# Patient Record
Sex: FEMALE | Race: WHITE | Employment: OTHER | URBAN - METROPOLITAN AREA
[De-identification: names, ages, dates, MRNs, and addresses within clinical notes are randomized per-mention and may not be internally consistent; named-entity substitution may affect disease eponyms.]

---

## 2017-01-05 LAB
C-REACTIVE PROTEIN: 0.44 MG/DL
SED RATE BY MODIFIED$WESTERGREN: 33 MM/H

## 2017-01-10 ENCOUNTER — TELEPHONE (OUTPATIENT)
Dept: NEUROLOGY | Facility: CLINIC | Age: 72
End: 2017-01-10

## 2017-01-10 NOTE — TELEPHONE ENCOUNTER
Per Dr. Stephon Lorenzo, C-reactive protein was normal, SED still mildly elevated but has not changed, no concerning findings with blood work. Relayed to patient. Verbalized understanding.      Also asking regarding additional imaging that was ordered by Dr. Stephon Lorenzo (s

## 2017-02-20 ENCOUNTER — TELEPHONE (OUTPATIENT)
Dept: INTERNAL MEDICINE CLINIC | Facility: CLINIC | Age: 72
End: 2017-02-20

## 2017-02-20 RX ORDER — OMEPRAZOLE 20 MG/1
CAPSULE, DELAYED RELEASE ORAL
Qty: 90 CAPSULE | Refills: 0 | Status: SHIPPED | OUTPATIENT
Start: 2017-02-20 | End: 2017-05-20

## 2017-02-20 NOTE — TELEPHONE ENCOUNTER
Patient states she has IBS, advised contact GI MD or schedule appointment with Dr. Lennette Bosworth.  Patient verbalized understanding and will contact office if black stools continue

## 2017-04-10 ENCOUNTER — OFFICE VISIT (OUTPATIENT)
Dept: INTERNAL MEDICINE CLINIC | Facility: CLINIC | Age: 72
End: 2017-04-10

## 2017-04-10 VITALS
HEIGHT: 57.5 IN | OXYGEN SATURATION: 100 % | DIASTOLIC BLOOD PRESSURE: 78 MMHG | BODY MASS INDEX: 33.41 KG/M2 | HEART RATE: 92 BPM | SYSTOLIC BLOOD PRESSURE: 142 MMHG | RESPIRATION RATE: 23 BRPM | WEIGHT: 157 LBS | TEMPERATURE: 98 F

## 2017-04-10 DIAGNOSIS — L70.9 ACNE, UNSPECIFIED ACNE TYPE: ICD-10-CM

## 2017-04-10 DIAGNOSIS — M47.816 FACET ARTHRITIS OF LUMBAR REGION: ICD-10-CM

## 2017-04-10 DIAGNOSIS — E78.2 MIXED HYPERLIPIDEMIA: ICD-10-CM

## 2017-04-10 DIAGNOSIS — Z00.00 ENCOUNTER FOR ANNUAL HEALTH EXAMINATION: ICD-10-CM

## 2017-04-10 DIAGNOSIS — D05.12 DUCTAL CARCINOMA IN SITU (DCIS) OF LEFT BREAST: ICD-10-CM

## 2017-04-10 DIAGNOSIS — E66.9 DIABETES MELLITUS TYPE 2 IN OBESE (HCC): ICD-10-CM

## 2017-04-10 DIAGNOSIS — E11.9 DIET-CONTROLLED DIABETES MELLITUS (HCC): Primary | ICD-10-CM

## 2017-04-10 DIAGNOSIS — R40.0 DAYTIME SOMNOLENCE: ICD-10-CM

## 2017-04-10 DIAGNOSIS — K21.9 GASTROESOPHAGEAL REFLUX DISEASE, ESOPHAGITIS PRESENCE NOT SPECIFIED: ICD-10-CM

## 2017-04-10 DIAGNOSIS — R90.89 ABNORMAL BRAIN MRI: ICD-10-CM

## 2017-04-10 DIAGNOSIS — M85.80 OSTEOPENIA, UNSPECIFIED LOCATION: ICD-10-CM

## 2017-04-10 DIAGNOSIS — E11.69 DIABETES MELLITUS TYPE 2 IN OBESE (HCC): ICD-10-CM

## 2017-04-10 DIAGNOSIS — I10 ESSENTIAL HYPERTENSION: ICD-10-CM

## 2017-04-10 DIAGNOSIS — R53.83 FATIGUE, UNSPECIFIED TYPE: ICD-10-CM

## 2017-04-10 PROCEDURE — 96160 PT-FOCUSED HLTH RISK ASSMT: CPT | Performed by: INTERNAL MEDICINE

## 2017-04-10 RX ORDER — LISINOPRIL 5 MG/1
5 TABLET ORAL DAILY
Qty: 90 TABLET | Refills: 0 | Status: SHIPPED | OUTPATIENT
Start: 2017-04-10 | End: 2017-05-12

## 2017-04-10 NOTE — PROGRESS NOTES
HPI:   Hollis Marks is a 66year old female who presents for a medicare supervisit and additional isues noted below. She comes in with a list of questions. 1. GERD: doing well on omeprazole  2. HLP: tolerating lipitor as ASA well  3.  She was ill f (NEUROLOGY)  Noelle Villafana MD as Consulting Physician (HEMATOLOGY)  Diane Rivas PT as Physical Therapist    Patient Active Problem List:     Diet-controlled diabetes mellitus ([address]     Mixed hyperlipidemia     Ductal carcinoma in situ (DCIS) of left breast (2004); CANCER; Other and unspecified hyperlipidemia; Type II or unspecified type diabetes mellitus without mention of complication, not stated as uncontrolled; and IBS (irritable bowel syndrome).     She  has past surgical history that includes colo Ht 57.5\"  Wt 157 lb  BMI 33.36 kg/m2  SpO2 100%  Breastfeeding? No Estimated body mass index is 33.36 kg/(m^2) as calculated from the following:    Height as of this encounter: 57.5\". Weight as of this encounter: 157 lb.     Medicare Hearing Assessment lumbar region [facility])    Gastroesophageal reflux disease, esophagitis presence not specified         Ms. Juan Mendoza already takes aspirin and has it on her medication list.     Diet assessment: Fair     Advanced Directive:  Living Will on file in [facility]?   Jeevan Cr the last 12 months?: 0-No    Do you accidently lose urine?: 0-No    Do you have difficulty seeing?: 0-No    Do you have any difficulty walking or getting up?: 0-No    Do you have any tripping hazards?: 0-No    Are you on multiple medications?: 1-Yes    Jaimes Colonoscopy,10 Years due on 11/10/1993 Update [facility] if applicable    Flex Sigmoidoscopy Screen every 10 years No results found for this or any previous visit. No flowsheet data found.      Fecal Occult Blood Annually No results found for: FOB N cover unless Medically needed    Zoster  Not covered by Medicare Part B No vaccine history found This may be covered with your pharmacy  prescription benefits        [facility] Internal Lab or Procedure External Lab or Procedure   Annual M Spine: doing well with HEP, nsaids prn, ice/heat. # Diet Controlled type 2 DM in obese patient: A1C <7.0 in 10/2016. Follows ADA diet.    - Diabetic Retinal Eye exam on 6/2/2016 by [address], [place] OD was negative for b/l diabetic reti

## 2017-04-10 NOTE — PATIENT INSTRUCTIONS
You are due for your diabetic eye exam in June, 2017. If your blood-work is normal then we could consider a sleep study if your fatigue and low energy levels do not improve. You will need the pneumovac 23 vaccine in October, 2017.     For your high bl

## 2017-05-10 RX ORDER — LISINOPRIL 5 MG/1
TABLET ORAL
Qty: 90 TABLET | Refills: 0 | OUTPATIENT
Start: 2017-05-10

## 2017-05-13 RX ORDER — LISINOPRIL 5 MG/1
TABLET ORAL
Qty: 90 TABLET | Refills: 0 | Status: SHIPPED | OUTPATIENT
Start: 2017-05-13 | End: 2017-09-29

## 2017-05-22 RX ORDER — OMEPRAZOLE 20 MG/1
CAPSULE, DELAYED RELEASE ORAL
Qty: 90 CAPSULE | Refills: 3 | Status: SHIPPED | OUTPATIENT
Start: 2017-05-22 | End: 2018-05-22

## 2017-08-23 ENCOUNTER — TELEPHONE (OUTPATIENT)
Dept: INTERNAL MEDICINE CLINIC | Facility: CLINIC | Age: 72
End: 2017-08-23

## 2017-08-23 NOTE — TELEPHONE ENCOUNTER
Pt was called and message was left instructing pt to call and make an appointment to see dr for a follow up.

## 2017-08-25 NOTE — PROGRESS NOTES
DM Eye Exam on 8/21/2017: no diabetic retinopathy b/l.   By [address] [facility] OD

## 2017-09-06 ENCOUNTER — TELEPHONE (OUTPATIENT)
Dept: INTERNAL MEDICINE CLINIC | Facility: CLINIC | Age: 72
End: 2017-09-06

## 2017-09-06 DIAGNOSIS — D05.12 DUCTAL CARCINOMA IN SITU (DCIS) OF LEFT BREAST: Primary | ICD-10-CM

## 2017-09-06 DIAGNOSIS — Z08 ENCOUNTER FOR FOLLOW-UP EXAMINATION AFTER COMPLETED TREATMENT FOR MALIGNANT NEOPLASM: ICD-10-CM

## 2017-09-06 DIAGNOSIS — Z17.0 ESTROGEN RECEPTOR POSITIVE STATUS (ER+): ICD-10-CM

## 2017-09-06 DIAGNOSIS — Z86.000 PERSONAL HISTORY OF IN-SITU NEOPLASM OF BREAST: ICD-10-CM

## 2017-09-06 NOTE — TELEPHONE ENCOUNTER
Patient walked in asking for a mammogram order from Dr Marine Vazquez. She attempted to call multiple times and left messages with no reply back. She needs a specific order for mammogram which is specified by onocologist Dr Jesus Wetzel.   Please contact patient today and

## 2017-09-08 ENCOUNTER — HOSPITAL ENCOUNTER (OUTPATIENT)
Dept: MAMMOGRAPHY | Age: 72
Discharge: HOME | End: 2017-09-08
Attending: INTERNAL MEDICINE
Payer: MEDICARE

## 2017-09-08 DIAGNOSIS — Z86.000 PERSONAL HISTORY OF IN-SITU NEOPLASM OF BREAST: ICD-10-CM

## 2017-09-08 DIAGNOSIS — Z17.0 ESTROGEN RECEPTOR POSITIVE STATUS (ER+): ICD-10-CM

## 2017-09-08 DIAGNOSIS — D05.12 DUCTAL CARCINOMA IN SITU (DCIS) OF LEFT BREAST: ICD-10-CM

## 2017-09-08 DIAGNOSIS — Z08 ENCOUNTER FOR FOLLOW-UP EXAMINATION AFTER COMPLETED TREATMENT FOR MALIGNANT NEOPLASM: ICD-10-CM

## 2017-09-08 PROCEDURE — 77067 SCR MAMMO BI INCL CAD: CPT | Performed by: INTERNAL MEDICINE

## 2017-09-19 DIAGNOSIS — Z00.00 ENCOUNTER FOR ANNUAL HEALTH EXAMINATION: ICD-10-CM

## 2017-09-19 DIAGNOSIS — M47.816 FACET ARTHRITIS OF LUMBAR REGION: ICD-10-CM

## 2017-09-19 DIAGNOSIS — E78.2 MIXED HYPERLIPIDEMIA: ICD-10-CM

## 2017-09-19 DIAGNOSIS — R53.83 FATIGUE, UNSPECIFIED TYPE: ICD-10-CM

## 2017-09-19 DIAGNOSIS — K21.9 GASTROESOPHAGEAL REFLUX DISEASE, ESOPHAGITIS PRESENCE NOT SPECIFIED: ICD-10-CM

## 2017-09-19 DIAGNOSIS — D05.12 DUCTAL CARCINOMA IN SITU (DCIS) OF LEFT BREAST: ICD-10-CM

## 2017-09-19 DIAGNOSIS — L70.9 ACNE, UNSPECIFIED ACNE TYPE: ICD-10-CM

## 2017-09-19 DIAGNOSIS — E11.69 DIABETES MELLITUS TYPE 2 IN OBESE (HCC): ICD-10-CM

## 2017-09-19 DIAGNOSIS — E66.9 DIABETES MELLITUS TYPE 2 IN OBESE (HCC): ICD-10-CM

## 2017-09-19 DIAGNOSIS — I10 ESSENTIAL HYPERTENSION: ICD-10-CM

## 2017-09-19 DIAGNOSIS — E11.9 DIET-CONTROLLED DIABETES MELLITUS (HCC): ICD-10-CM

## 2017-09-19 RX ORDER — LISINOPRIL 5 MG/1
TABLET ORAL
Qty: 90 TABLET | Refills: 0 | OUTPATIENT
Start: 2017-09-19

## 2017-09-19 NOTE — TELEPHONE ENCOUNTER
If patient is completely out of lisinopril I will send 30 tablets but she is overdue for an office visit.

## 2017-09-29 ENCOUNTER — OFFICE VISIT (OUTPATIENT)
Dept: INTERNAL MEDICINE CLINIC | Facility: CLINIC | Age: 72
End: 2017-09-29

## 2017-09-29 VITALS
TEMPERATURE: 99 F | DIASTOLIC BLOOD PRESSURE: 70 MMHG | SYSTOLIC BLOOD PRESSURE: 124 MMHG | WEIGHT: 158.25 LBS | BODY MASS INDEX: 33.67 KG/M2 | HEIGHT: 57.5 IN | HEART RATE: 98 BPM | RESPIRATION RATE: 16 BRPM | OXYGEN SATURATION: 97 %

## 2017-09-29 DIAGNOSIS — E66.9 DIABETES MELLITUS TYPE 2 IN OBESE (HCC): ICD-10-CM

## 2017-09-29 DIAGNOSIS — E78.2 MIXED HYPERLIPIDEMIA: ICD-10-CM

## 2017-09-29 DIAGNOSIS — D05.12 DUCTAL CARCINOMA IN SITU (DCIS) OF LEFT BREAST: ICD-10-CM

## 2017-09-29 DIAGNOSIS — E11.9 DIET-CONTROLLED DIABETES MELLITUS (HCC): ICD-10-CM

## 2017-09-29 DIAGNOSIS — I10 ESSENTIAL HYPERTENSION: Primary | ICD-10-CM

## 2017-09-29 DIAGNOSIS — N18.30 CKD (CHRONIC KIDNEY DISEASE) STAGE 3, GFR 30-59 ML/MIN (HCC): ICD-10-CM

## 2017-09-29 DIAGNOSIS — E11.69 DIABETES MELLITUS TYPE 2 IN OBESE (HCC): ICD-10-CM

## 2017-09-29 PROCEDURE — 99214 OFFICE O/P EST MOD 30 MIN: CPT | Performed by: INTERNAL MEDICINE

## 2017-09-29 PROCEDURE — G0009 ADMIN PNEUMOCOCCAL VACCINE: HCPCS | Performed by: INTERNAL MEDICINE

## 2017-09-29 PROCEDURE — 90732 PPSV23 VACC 2 YRS+ SUBQ/IM: CPT | Performed by: INTERNAL MEDICINE

## 2017-09-29 RX ORDER — LISINOPRIL 5 MG/1
TABLET ORAL
Qty: 90 TABLET | Refills: 3 | Status: SHIPPED | OUTPATIENT
Start: 2017-09-29 | End: 2018-10-11

## 2017-09-29 NOTE — PATIENT INSTRUCTIONS
Please see me every April for a medicare supervisit. Please make sure to get blood-work every 6 months AND notify me of your blood pressure at Dr. Tito Becker office.

## 2017-09-29 NOTE — PROGRESS NOTES
Miya Albert is a 66year old female. HPI:   Patient presents for the following issues. 1. CKDz: reviewed labs and her GFR has been stable since 2011  2. HTN: tolerating lisinopril well. Has not been checking bp at home.    3. HLP: doing well on stati pre cancerous cells stage 0   • Ductal carcinoma in situ of breast 2004    left breast   • GERD    • IBS (irritable bowel syndrome)    • Other and unspecified hyperlipidemia    • Personal history of irradiation, presenting hazards to health 01/01/2004    joy Arthritis of Lumbar Spine and Severe DDD of Lumbar Spine: doing well with HEP, nsaids prn, ice/heat. # Diet Controlled type 2 DM in obese patient: A1C <7.0 in 4/2017 Follows ADA diet. - DM Eye Exam on 8/21/2017: no diabetic retinopathy b/l.   By Vivien Alonso supervisit. She will update with her bp at Dr. Dotty Oliver office every October.    Natalie Bryson MD

## 2017-10-10 RX ORDER — LANCETS
EACH MISCELLANEOUS
Qty: 102 EACH | Refills: 1 | Status: SHIPPED | OUTPATIENT
Start: 2017-10-10 | End: 2020-01-02

## 2017-10-13 ENCOUNTER — PRIOR ORIGINAL RECORDS (OUTPATIENT)
Dept: OTHER | Age: 72
End: 2017-10-13

## 2017-10-19 ENCOUNTER — TELEPHONE (OUTPATIENT)
Dept: INTERNAL MEDICINE CLINIC | Facility: CLINIC | Age: 72
End: 2017-10-19

## 2017-10-19 NOTE — TELEPHONE ENCOUNTER
Patient went to Dr Tonnie Heimlich office and got blood pressure read; Dr Jono Anderson wanted her to let her know what her bp was at that dr visit :  122/76   Was her reading at Dr Figueroa Apodaca appt

## 2017-11-14 RX ORDER — BLOOD SUGAR DIAGNOSTIC
STRIP MISCELLANEOUS
Qty: 50 STRIP | Refills: 9 | Status: SHIPPED | OUTPATIENT
Start: 2017-11-14 | End: 2019-01-14

## 2017-11-22 RX ORDER — ATORVASTATIN CALCIUM 40 MG/1
TABLET, FILM COATED ORAL
Qty: 90 TABLET | Refills: 1 | Status: SHIPPED | OUTPATIENT
Start: 2017-11-22 | End: 2018-06-29

## 2017-12-31 ENCOUNTER — PRIOR ORIGINAL RECORDS (OUTPATIENT)
Dept: OTHER | Age: 72
End: 2017-12-31

## 2018-04-09 ENCOUNTER — TELEPHONE (OUTPATIENT)
Dept: INTERNAL MEDICINE CLINIC | Facility: CLINIC | Age: 73
End: 2018-04-09

## 2018-04-09 NOTE — TELEPHONE ENCOUNTER
Spoke to patient, dx'd with IBSD and is taking a bus tour, concerned about diarrhea possible accident as bus will only stop @ certain places, instructed patient to call her GI MD, patient verbalized understanding

## 2018-04-09 NOTE — TELEPHONE ENCOUNTER
Pt asking if ok to take OTC antidiarrheal every morning during upcoming vacation.   Call to advise pt

## 2018-05-04 ENCOUNTER — OFFICE VISIT (OUTPATIENT)
Dept: INTERNAL MEDICINE CLINIC | Facility: CLINIC | Age: 73
End: 2018-05-04

## 2018-05-04 VITALS
RESPIRATION RATE: 13 BRPM | HEIGHT: 57.5 IN | BODY MASS INDEX: 33.2 KG/M2 | OXYGEN SATURATION: 96 % | HEART RATE: 80 BPM | SYSTOLIC BLOOD PRESSURE: 130 MMHG | DIASTOLIC BLOOD PRESSURE: 84 MMHG | WEIGHT: 156 LBS | TEMPERATURE: 99 F

## 2018-05-04 DIAGNOSIS — L98.9 SCALP LESION: ICD-10-CM

## 2018-05-04 DIAGNOSIS — I10 ESSENTIAL HYPERTENSION: ICD-10-CM

## 2018-05-04 DIAGNOSIS — N18.30 CKD (CHRONIC KIDNEY DISEASE) STAGE 3, GFR 30-59 ML/MIN (HCC): ICD-10-CM

## 2018-05-04 DIAGNOSIS — E11.9 DIET-CONTROLLED DIABETES MELLITUS (HCC): ICD-10-CM

## 2018-05-04 DIAGNOSIS — E78.2 MIXED HYPERLIPIDEMIA: ICD-10-CM

## 2018-05-04 DIAGNOSIS — E66.9 OBESITY (BMI 30-39.9): ICD-10-CM

## 2018-05-04 DIAGNOSIS — L30.9 DERMATITIS: Primary | ICD-10-CM

## 2018-05-04 PROCEDURE — 99213 OFFICE O/P EST LOW 20 MIN: CPT | Performed by: PHYSICIAN ASSISTANT

## 2018-05-04 NOTE — PROGRESS NOTES
Ananth Cardozo is a 74year old female. HPI:   Patient presents for eval of what she thinks are bug bites just behind her R ear. She first noticed these on 4/26 when on vacation in [place]. She was on [address], not in a [place]ed area.   C/o deb Years: 20.00        Types: Cigarettes     Quit date: 3/1/2008  Smokeless tobacco: Never Used                      Alcohol use:  No                  REVIEW OF SYSTEMS:   Per HPI    EXAM:   /84 (BP Location: Right arm, Patient Position: Sitting, Cu

## 2018-05-04 NOTE — PATIENT INSTRUCTIONS
Bug Bite:  - may use hydrocortisone cream -- thin layer applied twice a day as needed for itching/irritation  - call if developing increased redness, pain, swelling, fever    Please do your lab work ASAP.   Remember to fast for 10-12 hours but drink plenty

## 2018-05-11 ENCOUNTER — OFFICE VISIT (OUTPATIENT)
Dept: INTERNAL MEDICINE CLINIC | Facility: CLINIC | Age: 73
End: 2018-05-11

## 2018-05-11 VITALS
BODY MASS INDEX: 32.88 KG/M2 | RESPIRATION RATE: 20 BRPM | HEIGHT: 57.5 IN | HEART RATE: 99 BPM | SYSTOLIC BLOOD PRESSURE: 126 MMHG | DIASTOLIC BLOOD PRESSURE: 80 MMHG | WEIGHT: 154.5 LBS | TEMPERATURE: 99 F | OXYGEN SATURATION: 95 %

## 2018-05-11 DIAGNOSIS — K58.0 IRRITABLE BOWEL SYNDROME WITH DIARRHEA: ICD-10-CM

## 2018-05-11 DIAGNOSIS — E78.2 MIXED HYPERLIPIDEMIA: ICD-10-CM

## 2018-05-11 DIAGNOSIS — K21.9 GASTROESOPHAGEAL REFLUX DISEASE, ESOPHAGITIS PRESENCE NOT SPECIFIED: ICD-10-CM

## 2018-05-11 DIAGNOSIS — E66.9 OBESITY (BMI 30-39.9): ICD-10-CM

## 2018-05-11 DIAGNOSIS — D05.12 DUCTAL CARCINOMA IN SITU (DCIS) OF LEFT BREAST: ICD-10-CM

## 2018-05-11 DIAGNOSIS — E11.9 DIET-CONTROLLED DIABETES MELLITUS (HCC): ICD-10-CM

## 2018-05-11 DIAGNOSIS — N18.30 CKD (CHRONIC KIDNEY DISEASE) STAGE 3, GFR 30-59 ML/MIN (HCC): ICD-10-CM

## 2018-05-11 DIAGNOSIS — Z00.00 ENCOUNTER FOR ANNUAL HEALTH EXAMINATION: Primary | ICD-10-CM

## 2018-05-11 DIAGNOSIS — I10 ESSENTIAL HYPERTENSION: ICD-10-CM

## 2018-05-11 DIAGNOSIS — Z12.39 BREAST CANCER SCREENING: ICD-10-CM

## 2018-05-11 PROBLEM — E11.69 DIABETES MELLITUS TYPE 2 IN OBESE  (HCC): Status: RESOLVED | Noted: 2017-04-10 | Resolved: 2018-05-11

## 2018-05-11 PROBLEM — E11.69 DIABETES MELLITUS TYPE 2 IN OBESE: Status: RESOLVED | Noted: 2017-04-10 | Resolved: 2018-05-11

## 2018-05-11 PROBLEM — E11.69 DIABETES MELLITUS TYPE 2 IN OBESE (HCC): Status: RESOLVED | Noted: 2017-04-10 | Resolved: 2018-05-11

## 2018-05-11 PROCEDURE — G0439 PPPS, SUBSEQ VISIT: HCPCS | Performed by: PHYSICIAN ASSISTANT

## 2018-05-11 PROCEDURE — 96160 PT-FOCUSED HLTH RISK ASSMT: CPT | Performed by: PHYSICIAN ASSISTANT

## 2018-05-11 RX ORDER — DICYCLOMINE HCL 20 MG
20 TABLET ORAL
Qty: 120 TABLET | Refills: 0 | Status: SHIPPED | OUTPATIENT
Start: 2018-05-11 | End: 2019-05-20

## 2018-05-11 NOTE — PATIENT INSTRUCTIONS
IBS / Diarrhea:  - start dicyclomine 20 mg -- start with 1 tablet at dinner time and before bed  - may take 1 tablet with each meal and before bed if needed  - call with an update in 3 weeks    Vaccines:  - recommend updated TdaP (tetanus/diphtheria/pertus

## 2018-05-11 NOTE — PROGRESS NOTES
HPI:   José Miguel Gomez is a 74year old female who presents for a MA (Medicare Advantage) [facility] (Once per calendar year). IBS - loose stool/diarrhea every morning. Symptoms sometimes flare after eating. C/o a lot of gas/bloating.   No particular f score of 0 so is at low risk.     Patient Care Team: Patient Care Team:  Alessia Hines MD as PCP - Casie Manzanares MD as Consulting Physician (NEUROLOGY)  Connie Washington MD as Consulting Physician (HEMATOLOGY)  Guanako Robledo PT as Physical Ther sores BID x 2-3 weeks. ibuprofen 200 MG Oral Tab Take 200 mg by mouth every 6 (six) hours as needed for Pain. Calcium Carbonate Antacid (TUMS) 500 MG Oral Chew Tab Chew 1 tablet by mouth daily. MULTI-VITAMIN OR Take 1 Tab by mouth daily.    ASPIRIN 81 incontinence   MUSCULOSKELETAL: denies back pain  NEURO: denies headaches, dizziness, LH  PSYCHE: denies depression or anxiety  HEMATOLOGIC: denies hx of anemia  ENDOCRINE: denies thyroid history  ALL/ASTHMA: denies hx of allergy or asthma    EXAM:   BP 12 (Zostavax) 09/19/2012        ASSESSMENT AND OTHER RELEVANT CHRONIC CONDITIONS:   Renato Souza is a 74year old female who presents for a Medicare Assessment.      PLAN SUMMARY:   Diagnoses and all orders for this visit:    Encounter for annual health exami Glaucoma Screening      Ophthalmology Visit Annually: Diabetics, FHx Glaucoma, AA>50, > 65 Data entered on: 6/21/2016   Last Dilated Eye Exam 6/2/2016       Bone Density Screening      Dexascan Every two years Last Dexa Scan:   XR DEXA BONE DENSI (ACE/ARB, digoxin diuretics, anticonvulsants.)    Potassium  Annually POTASSIUM (mmol/L)   Date Value   05/07/2018 4.3    No flowsheet data found. Creatinine  Annually CREATININE (mg/dL)   Date Value   05/07/2018 1.03 (H)    No flowsheet data found. diverticulosis, internal hemorrhoids. Dr. Alida Paredes recommended xifaxan but insurance will not cover. Symptoms are stable. # Bone density screening: see above.   # Vaccines: rec yearly flu shot, DTAP done 2007, Prevnar 13 done 2016, Pneumovax done 2017, Shonda Hunt

## 2018-05-14 ENCOUNTER — TELEPHONE (OUTPATIENT)
Dept: INTERNAL MEDICINE CLINIC | Facility: CLINIC | Age: 73
End: 2018-05-14

## 2018-05-14 NOTE — TELEPHONE ENCOUNTER
Dicyclomine HCl 20 MG Oral Tab  Patient has questions regarding side effects from this medication please call back

## 2018-05-14 NOTE — TELEPHONE ENCOUNTER
Patient picked up Dicyclomine rx and s/e listed state \"Do not take with antacids, patient currently on Tums (regular strength 500 mg 1 tablet q hs for calcium per oncology) should she stop Tums while on Dicyclomine?  Please advise

## 2018-05-23 RX ORDER — OMEPRAZOLE 20 MG/1
20 CAPSULE, DELAYED RELEASE ORAL DAILY
Qty: 90 CAPSULE | Refills: 3 | Status: SHIPPED | OUTPATIENT
Start: 2018-05-23 | End: 2019-05-23

## 2018-05-23 NOTE — TELEPHONE ENCOUNTER
Refill requested: Omeprazole 20 mg     Failed protocoll      Last refill: 5/22/17 Omeprazole 20 mg #90 NR  Relevant Labs: NA  Last OV / RTC advised: 5/11/18 Julia  RTC in 6 months     Appt Scheduled: No

## 2018-06-29 RX ORDER — ATORVASTATIN CALCIUM 40 MG/1
TABLET, FILM COATED ORAL
Qty: 90 TABLET | Refills: 0 | Status: SHIPPED | OUTPATIENT
Start: 2018-06-29 | End: 2018-10-07

## 2018-06-29 NOTE — TELEPHONE ENCOUNTER
Protocol Passed    Medication(s) to Refill:   Pending Prescriptions Disp Refills    ATORVASTATIN 40 MG Oral Tab [Pharmacy Med Name: Atorvastatin Calcium Oral Tablet 40 MG] 90 tablet 0     Sig: TAKE 1 TABLET BY MOUTH IN THE EVENING          Last Time Medication was Filled:  11/22/17      Last Office Visit with PCP: 9/29/2017    When Patient was Due Back to the Office:  6 months  (from when PCP last addressed condition)    Future Appointments:  No future appointments.       Recent Labs:  ALT and Lipid: 05/07/2018

## 2018-07-03 RX ORDER — ATORVASTATIN CALCIUM 40 MG/1
40 TABLET, FILM COATED ORAL EVERY EVENING
Qty: 90 TABLET | Refills: 1 | OUTPATIENT
Start: 2018-07-03

## 2018-07-03 NOTE — TELEPHONE ENCOUNTER
Refill requested: Atorvastatin 40 mg       Passed protocol      Last refill: 6/29/2018 Atorvastatin 40 mg #90 NR to [facility] in [place]      **Declined to pharmacy - refill sent recently.

## 2018-08-28 ENCOUNTER — TELEPHONE (OUTPATIENT)
Dept: INTERNAL MEDICINE CLINIC | Facility: CLINIC | Age: 73
End: 2018-08-28

## 2018-08-28 DIAGNOSIS — D05.12 DUCTAL CARCINOMA IN SITU OF LEFT BREAST: Primary | ICD-10-CM

## 2018-08-28 NOTE — TELEPHONE ENCOUNTER
Patient would like an order for mammogram screening entered by doctor; advised Dr Yonny Leung not in office until tomorrow and to wait 48 hours or after call central scheduling

## 2018-08-28 NOTE — TELEPHONE ENCOUNTER
Patient walked in and requested to schedule Shingrix vaccine; please request order from doctor as needed and contact patient when we are able to schedule/have inventory.  Please callback

## 2018-08-28 NOTE — TELEPHONE ENCOUNTER
Patient informed Alfonzo on back order, suggested possibly her pharmacy could provide the immunization  but pharmacies on back order also, suggested check with our office in [place], patient verbalized understanding

## 2018-09-14 ENCOUNTER — HOSPITAL ENCOUNTER (OUTPATIENT)
Dept: MAMMOGRAPHY | Age: 73
Discharge: HOME | End: 2018-09-14
Attending: PHYSICIAN ASSISTANT
Payer: MEDICARE

## 2018-09-14 DIAGNOSIS — Z12.39 BREAST CANCER SCREENING: ICD-10-CM

## 2018-09-14 PROCEDURE — 77063 BREAST TOMOSYNTHESIS BI: CPT | Performed by: PHYSICIAN ASSISTANT

## 2018-09-14 PROCEDURE — 77067 SCR MAMMO BI INCL CAD: CPT | Performed by: PHYSICIAN ASSISTANT

## 2018-10-08 RX ORDER — ATORVASTATIN CALCIUM 40 MG/1
TABLET, FILM COATED ORAL
Qty: 90 TABLET | Refills: 0 | Status: SHIPPED | OUTPATIENT
Start: 2018-10-08 | End: 2019-01-06

## 2018-10-08 NOTE — TELEPHONE ENCOUNTER
Refill requested:   Requested Prescriptions     Pending Prescriptions Disp Refills   • ATORVASTATIN 40 MG Oral Tab [Pharmacy Med Name: Atorvastatin Calcium Oral Tablet 40 MG] 90 tablet 0     Sig: TAKE 1 TABLET BY MOUTH IN THE EVENING         Passed princeo

## 2018-10-12 RX ORDER — ATORVASTATIN CALCIUM 40 MG/1
40 TABLET, FILM COATED ORAL EVERY EVENING
Qty: 90 TABLET | Refills: 1 | OUTPATIENT
Start: 2018-10-12

## 2018-10-12 RX ORDER — LISINOPRIL 5 MG/1
5 TABLET ORAL DAILY
Qty: 90 TABLET | Refills: 1 | Status: SHIPPED | OUTPATIENT
Start: 2018-10-12 | End: 2019-04-05

## 2018-11-02 ENCOUNTER — PRIOR ORIGINAL RECORDS (OUTPATIENT)
Dept: OTHER | Age: 73
End: 2018-11-02

## 2018-11-12 ENCOUNTER — TELEPHONE (OUTPATIENT)
Dept: INTERNAL MEDICINE CLINIC | Facility: CLINIC | Age: 73
End: 2018-11-12

## 2018-11-12 NOTE — TELEPHONE ENCOUNTER
Discussed with pt and notified her that we are on back order for the vaccine. Advised pt that we would not be able to start the series until we received the vaccine off backorder. Pt verbalized understanding.

## 2018-11-12 NOTE — TELEPHONE ENCOUNTER
Patient called to follow up on status of Shingrix vaccine. Patient declined getting vaccine at the pharmacy as her insurance company advised her to see PCP's office.    Please advise    Future Appointments   Date Time Provider Maral Rivera   12/7/2018

## 2018-12-07 ENCOUNTER — OFFICE VISIT (OUTPATIENT)
Dept: INTERNAL MEDICINE CLINIC | Facility: CLINIC | Age: 73
End: 2018-12-07

## 2018-12-07 VITALS
WEIGHT: 155 LBS | OXYGEN SATURATION: 98 % | SYSTOLIC BLOOD PRESSURE: 132 MMHG | DIASTOLIC BLOOD PRESSURE: 80 MMHG | RESPIRATION RATE: 18 BRPM | HEIGHT: 57.5 IN | TEMPERATURE: 97 F | HEART RATE: 88 BPM | BODY MASS INDEX: 32.98 KG/M2

## 2018-12-07 DIAGNOSIS — M47.816 FACET ARTHRITIS OF LUMBAR REGION: ICD-10-CM

## 2018-12-07 DIAGNOSIS — M94.9 DISORDER OF BONE AND CARTILAGE: ICD-10-CM

## 2018-12-07 DIAGNOSIS — M75.81 RIGHT ROTATOR CUFF TENDONITIS: ICD-10-CM

## 2018-12-07 DIAGNOSIS — M89.9 DISORDER OF BONE AND CARTILAGE: ICD-10-CM

## 2018-12-07 DIAGNOSIS — Z85.3 HISTORY OF LEFT BREAST CANCER: ICD-10-CM

## 2018-12-07 DIAGNOSIS — M54.50 CHRONIC RIGHT-SIDED LOW BACK PAIN WITHOUT SCIATICA: ICD-10-CM

## 2018-12-07 DIAGNOSIS — Z01.10 EVALUATION OF HEARING IMPAIRMENT: ICD-10-CM

## 2018-12-07 DIAGNOSIS — K58.0 IRRITABLE BOWEL SYNDROME WITH DIARRHEA: Primary | ICD-10-CM

## 2018-12-07 DIAGNOSIS — E11.9 DIET-CONTROLLED DIABETES MELLITUS (HCC): ICD-10-CM

## 2018-12-07 DIAGNOSIS — G89.29 CHRONIC RIGHT-SIDED LOW BACK PAIN WITHOUT SCIATICA: ICD-10-CM

## 2018-12-07 PROCEDURE — 99215 OFFICE O/P EST HI 40 MIN: CPT | Performed by: INTERNAL MEDICINE

## 2018-12-07 RX ORDER — LOPERAMIDE HYDROCHLORIDE 2 MG/1
2 CAPSULE ORAL 4 TIMES DAILY PRN
COMMUNITY
End: 2019-10-11

## 2018-12-07 NOTE — PROGRESS NOTES
Lane Carlson is a 74year old female. HPI:   Patient presents for the following issues. 1. Irritable Bowel syndrome: had many episodes of diarrhea in 2/2018-4/2018 so Jaycob Gayle started her on dicyclomine in 5/2018.  She tried it BID for about 2-3 months while watching TV if she is laying while watching it. Has never had a sleep study. REVIEW OF SYSTEMS:   GENERAL HEALTH: feels well otherwise.  No f/c  NEURO: denies any headaches, LH, dizzyness, LOC, falls  VISION: denies any blurred or double vision complication, not stated as uncontrolled       Past Surgical History:   Procedure Laterality Date   • APPENDECTOMY      1948   • COLONOSCOPY      2005 hiatal hernia, sm internal hem   • COLONOSCOPY  01/01/2005    fiberoptic   • D & C     • LUMPECTOMY LEFT Already on ASA 81 mg daily. CTA brain/neck was advised by myself and neurology but she declines. # HTN: controlled.  cont lisinopril 5 mg once daily  # Facet Arthritis of Lumbar Spine and Severe DDD of Lumbar Spine: doing well with HEP, nsaids prn, ice/he Gill Morales ([address])    I spent 45 minutes with patient > 50% counseling her on shoulder pain, irritable bowel syndrome. The patient indicates understanding of these issues and agrees to the plan.   The patient is asked to

## 2018-12-11 PROBLEM — Z85.3 HISTORY OF LEFT BREAST CANCER: Status: ACTIVE | Noted: 2018-12-11

## 2018-12-31 ENCOUNTER — PRIOR ORIGINAL RECORDS (OUTPATIENT)
Dept: OTHER | Age: 73
End: 2018-12-31

## 2019-01-02 ENCOUNTER — TELEPHONE (OUTPATIENT)
Dept: INTERNAL MEDICINE CLINIC | Facility: CLINIC | Age: 74
End: 2019-01-02

## 2019-01-02 DIAGNOSIS — M75.81 RIGHT ROTATOR CUFF TENDONITIS: Primary | ICD-10-CM

## 2019-01-02 DIAGNOSIS — G89.29 CHRONIC RIGHT-SIDED LOW BACK PAIN WITHOUT SCIATICA: ICD-10-CM

## 2019-01-02 DIAGNOSIS — M54.50 CHRONIC RIGHT-SIDED LOW BACK PAIN WITHOUT SCIATICA: ICD-10-CM

## 2019-01-02 NOTE — TELEPHONE ENCOUNTER
Pt had referral issued for PT for R rotator cuff tendonitis and chronic right-sided low back pain without sciatica on 18. Referral  18 and she was unable to use referral within allotted time frame.      Requesting new referral. Justin Dawson

## 2019-01-07 RX ORDER — ATORVASTATIN CALCIUM 40 MG/1
TABLET, FILM COATED ORAL
Qty: 90 TABLET | Refills: 0 | Status: SHIPPED | OUTPATIENT
Start: 2019-01-07 | End: 2019-04-05

## 2019-01-09 RX ORDER — ATORVASTATIN CALCIUM 40 MG/1
40 TABLET, FILM COATED ORAL EVERY EVENING
Qty: 90 TABLET | Refills: 0 | OUTPATIENT
Start: 2019-01-09

## 2019-01-09 NOTE — TELEPHONE ENCOUNTER
Declined : Rx recently filled to pharmacy.      Refill requested:   Requested Prescriptions     Pending Prescriptions Disp Refills   • atorvastatin 40 MG Oral Tab [Pharmacy Med Name: Atorvastatin Calcium Oral Tablet 40 MG] 90 tablet 0     Sig: Take 1 tablet

## 2019-01-14 RX ORDER — BLOOD SUGAR DIAGNOSTIC
STRIP MISCELLANEOUS
Qty: 100 STRIP | Refills: 3 | Status: SHIPPED | OUTPATIENT
Start: 2019-01-14 | End: 2020-05-18

## 2019-01-14 NOTE — TELEPHONE ENCOUNTER
Refill requested:   Requested Prescriptions     Pending Prescriptions Disp Refills   • ACCU-CHEK BRYANNA PLUS In Vitro Strip [Pharmacy Med Name: Accu-Chek Bryanna Plus In Vitro Strip]  8     Sig: use to check blood sugar 3 times a  week.        Passed protocol

## 2019-01-17 RX ORDER — BLOOD SUGAR DIAGNOSTIC
STRIP MISCELLANEOUS
Refills: 8 | OUTPATIENT
Start: 2019-01-17

## 2019-04-06 RX ORDER — ATORVASTATIN CALCIUM 40 MG/1
TABLET, FILM COATED ORAL
Qty: 90 TABLET | Refills: 0 | Status: SHIPPED | OUTPATIENT
Start: 2019-04-06 | End: 2020-01-02

## 2019-04-06 RX ORDER — LISINOPRIL 5 MG/1
TABLET ORAL
Qty: 90 TABLET | Refills: 0 | Status: SHIPPED | OUTPATIENT
Start: 2019-04-06 | End: 2019-07-08

## 2019-04-06 NOTE — TELEPHONE ENCOUNTER
Passed protocol    Last refill:   1/7/2019 Atorvastatin 40 mg #90 NR  10/12/2018 Lisinopril 5 mg #90 1R    Last lipid 5/7/2018     LOV: 12/7/2018 Dr Jen Nur RTC April [facility] Supervisit   # HTN: controlled.  cont lisinopril 5 mg once daily  # HLP, HyperTG: well

## 2019-04-09 DIAGNOSIS — N18.30 CKD (CHRONIC KIDNEY DISEASE) STAGE 3, GFR 30-59 ML/MIN (HCC): ICD-10-CM

## 2019-04-09 DIAGNOSIS — E11.9 DIET-CONTROLLED DIABETES MELLITUS (HCC): Primary | ICD-10-CM

## 2019-04-09 DIAGNOSIS — I10 ESSENTIAL HYPERTENSION: ICD-10-CM

## 2019-04-09 RX ORDER — ATORVASTATIN CALCIUM 40 MG/1
TABLET, FILM COATED ORAL
Qty: 90 TABLET | Refills: 0 | Status: SHIPPED | OUTPATIENT
Start: 2019-04-09 | End: 2019-05-20

## 2019-04-09 NOTE — TELEPHONE ENCOUNTER
Last OV: 12/7/18 with Dr. Janna Waldron  Last refill date: 4/6/19     #/refills: #90, 0 refills  When pt was asked to return for OV: April 2019  Upcoming appt: 5/20/19 with Dr. Janna Waldron  Last labs 12/28/18  Last lipid 5/7/18

## 2019-05-20 ENCOUNTER — OFFICE VISIT (OUTPATIENT)
Dept: INTERNAL MEDICINE CLINIC | Facility: CLINIC | Age: 74
End: 2019-05-20
Payer: MEDICARE

## 2019-05-20 VITALS
HEART RATE: 82 BPM | BODY MASS INDEX: 31.55 KG/M2 | SYSTOLIC BLOOD PRESSURE: 120 MMHG | WEIGHT: 156.5 LBS | HEIGHT: 59 IN | DIASTOLIC BLOOD PRESSURE: 70 MMHG | TEMPERATURE: 98 F | OXYGEN SATURATION: 97 % | RESPIRATION RATE: 14 BRPM

## 2019-05-20 DIAGNOSIS — E11.9 DIET-CONTROLLED DIABETES MELLITUS (HCC): ICD-10-CM

## 2019-05-20 DIAGNOSIS — E66.9 OBESITY (BMI 30-39.9): ICD-10-CM

## 2019-05-20 DIAGNOSIS — E78.5 HYPERLIPIDEMIA ASSOCIATED WITH TYPE 2 DIABETES MELLITUS (HCC): ICD-10-CM

## 2019-05-20 DIAGNOSIS — E11.69 HYPERLIPIDEMIA ASSOCIATED WITH TYPE 2 DIABETES MELLITUS (HCC): ICD-10-CM

## 2019-05-20 DIAGNOSIS — E11.59 HYPERTENSION ASSOCIATED WITH DIABETES (HCC): ICD-10-CM

## 2019-05-20 DIAGNOSIS — Z00.00 ROUTINE GENERAL MEDICAL EXAMINATION AT A HEALTH CARE FACILITY: Primary | ICD-10-CM

## 2019-05-20 DIAGNOSIS — N18.30 CKD (CHRONIC KIDNEY DISEASE) STAGE 3, GFR 30-59 ML/MIN (HCC): ICD-10-CM

## 2019-05-20 DIAGNOSIS — M46.96 INFLAMMATORY SPONDYLOPATHY OF LUMBAR REGION (HCC): ICD-10-CM

## 2019-05-20 DIAGNOSIS — K21.9 GASTROESOPHAGEAL REFLUX DISEASE, ESOPHAGITIS PRESENCE NOT SPECIFIED: ICD-10-CM

## 2019-05-20 DIAGNOSIS — I15.2 HYPERTENSION ASSOCIATED WITH DIABETES (HCC): ICD-10-CM

## 2019-05-20 PROBLEM — J41.0 SMOKERS' COUGH (HCC): Chronic | Status: ACTIVE | Noted: 2019-05-20

## 2019-05-20 PROCEDURE — 99397 PER PM REEVAL EST PAT 65+ YR: CPT | Performed by: INTERNAL MEDICINE

## 2019-05-20 PROCEDURE — G0439 PPPS, SUBSEQ VISIT: HCPCS | Performed by: INTERNAL MEDICINE

## 2019-05-20 PROCEDURE — 96160 PT-FOCUSED HLTH RISK ASSMT: CPT | Performed by: INTERNAL MEDICINE

## 2019-05-20 RX ORDER — CLOTRIMAZOLE AND BETAMETHASONE DIPROPIONATE 10; .64 MG/G; MG/G
1 CREAM TOPICAL 2 TIMES DAILY PRN
Qty: 15 G | Refills: 0 | Status: SHIPPED | OUTPATIENT
Start: 2019-05-20 | End: 2019-10-11

## 2019-05-20 NOTE — PROGRESS NOTES
Sandra Azevedo is a 74year old female. HPI:   Patient presents for medicare supervisit and additional issues noted below. 1. DM: controlledj without medications. FBS are < 130. No hpyoglycemia. 2. HTN: tolerating meds well  3.  HLP: tolerating statin shoulder             Syncope, stomach cramps    Family History   Problem Relation Age of Onset   • Heart Disorder Father          of heart attack age 62   • Alcohol and Other Disorders Associated Father    • Arthritis Father    • Other (Other) Father bilateral rub test is equal  GENERAL: A&O well developed, well nourished, in no apparent distress  SKIN: no rashes, no suspicious lesions  HEENT: atraumatic, MMM, throat is clear  NECK: supple, no jvd, no thyromegaly  LUNGS: clear to auscultation bilateral controlled on omeprazole. Was unable to wean off PPI  # HLP, HyperTG with T2 DM: well controlled on atorvastatin  # Osteopenia: DEXA 11/2016 FRAX score is low for major osteoporotic/hip fracture. Thus no indication for pharm therapy.  Cont daily calcium/vit

## 2019-05-20 NOTE — PATIENT INSTRUCTIONS
Please repeat your labs at the end of May, 2019. You are due for your diabetic eye exam in August,2019. Please call central scheduling at [phone].     I do advise you get the TDAP (tetanus, diptheriae, pertussis) vaccine every 10 years but it

## 2019-05-24 RX ORDER — OMEPRAZOLE 20 MG/1
20 CAPSULE, DELAYED RELEASE ORAL DAILY
Qty: 90 CAPSULE | Refills: 3 | Status: SHIPPED | OUTPATIENT
Start: 2019-05-24 | End: 2020-05-18

## 2019-05-24 NOTE — TELEPHONE ENCOUNTER
Refill requested:   Requested Prescriptions     Pending Prescriptions Disp Refills   • OMEPRAZOLE 20 MG Oral Capsule Delayed Release [Pharmacy Med Name: OMEPRAZOLE 20 MG Capsule Delayed Release] 90 capsule 3     Sig: TAKE 1 CAPSULE (20 MG TOTAL) BY MOUTH D

## 2019-06-11 ENCOUNTER — TELEPHONE (OUTPATIENT)
Dept: INTERNAL MEDICINE CLINIC | Facility: CLINIC | Age: 74
End: 2019-06-11

## 2019-06-11 NOTE — TELEPHONE ENCOUNTER
Call pt with recent lab results     Calls are screened must announce calling from Dr. Daniel Bhakta office when prompted

## 2019-07-09 RX ORDER — LISINOPRIL 5 MG/1
TABLET ORAL
Qty: 90 TABLET | Refills: 2 | Status: SHIPPED | OUTPATIENT
Start: 2019-07-09 | End: 2020-03-26

## 2019-07-09 NOTE — TELEPHONE ENCOUNTER
Lisinopril 5 mg    Last OV relevant to medication: 5/20/2019    Last refill date: 4/6/2019 90tabs with 0 refills     When pt was asked to return for OV: 1 year    Upcoming appt/reason: none    Labs: n/a

## 2019-07-23 NOTE — TELEPHONE ENCOUNTER
DC on 5/13/17 I have personally performed a face to face diagnostic evaluation on this patient. I have reviewed the ACP note and agree with the history, exam and plan of care, except as noted.

## 2019-08-14 ENCOUNTER — TELEPHONE (OUTPATIENT)
Dept: INTERNAL MEDICINE CLINIC | Facility: CLINIC | Age: 74
End: 2019-08-14

## 2019-08-14 NOTE — TELEPHONE ENCOUNTER
Patient dropped off diabetic eye exam report for Dr. Jono Anderson.   Report placed in Dr. Jnoo Anderson folder in front office

## 2019-08-14 NOTE — TELEPHONE ENCOUNTER
Diabetic eye exam received from [address] [place] [address] Rd     Exam done on 8/8/2019    Sasha Bass O.D    No Diabetic Retinopathy Detected

## 2019-09-10 ENCOUNTER — TELEPHONE (OUTPATIENT)
Dept: INTERNAL MEDICINE CLINIC | Facility: CLINIC | Age: 74
End: 2019-09-10

## 2019-09-10 DIAGNOSIS — Z12.31 ENCOUNTER FOR SCREENING MAMMOGRAM FOR MALIGNANT NEOPLASM OF BREAST: Primary | ICD-10-CM

## 2019-09-10 NOTE — TELEPHONE ENCOUNTER
Patient calling in requesting an order for a mammogram to be placed for her. Please call pt with order status.

## 2019-09-16 ENCOUNTER — HOSPITAL ENCOUNTER (OUTPATIENT)
Dept: MAMMOGRAPHY | Age: 74
Discharge: HOME | End: 2019-09-16
Attending: INTERNAL MEDICINE
Payer: MEDICARE

## 2019-09-16 DIAGNOSIS — Z12.31 ENCOUNTER FOR SCREENING MAMMOGRAM FOR MALIGNANT NEOPLASM OF BREAST: ICD-10-CM

## 2019-09-16 PROCEDURE — 77067 SCR MAMMO BI INCL CAD: CPT | Performed by: INTERNAL MEDICINE

## 2019-09-16 PROCEDURE — 77063 BREAST TOMOSYNTHESIS BI: CPT | Performed by: INTERNAL MEDICINE

## 2019-09-25 ENCOUNTER — OFFICE VISIT (OUTPATIENT)
Dept: INTERNAL MEDICINE CLINIC | Facility: CLINIC | Age: 74
End: 2019-09-25
Payer: MEDICARE

## 2019-09-25 VITALS
TEMPERATURE: 98 F | HEIGHT: 57.5 IN | RESPIRATION RATE: 16 BRPM | HEART RATE: 88 BPM | WEIGHT: 151.25 LBS | SYSTOLIC BLOOD PRESSURE: 136 MMHG | BODY MASS INDEX: 32.19 KG/M2 | OXYGEN SATURATION: 98 % | DIASTOLIC BLOOD PRESSURE: 62 MMHG

## 2019-09-25 DIAGNOSIS — R93.89 ABNORMAL MAGNETIC RESONANCE ANGIOGRAPHY (MRA) OF NECK: ICD-10-CM

## 2019-09-25 DIAGNOSIS — H53.9 VISUAL CHANGES: Primary | ICD-10-CM

## 2019-09-25 DIAGNOSIS — R90.89 ABNORMAL MRA, BRAIN: ICD-10-CM

## 2019-09-25 DIAGNOSIS — G43.109 OCULAR MIGRAINE: ICD-10-CM

## 2019-09-25 PROCEDURE — 99214 OFFICE O/P EST MOD 30 MIN: CPT | Performed by: PHYSICIAN ASSISTANT

## 2019-09-25 NOTE — PATIENT INSTRUCTIONS
Please call Barry Caldwell at [phone] to set up the following tests:  - CTA of the brain & carotid arteries    Please schedule follow up visits with neurology (Dr. Eric Vogt) and ophthalmology (Dr. Mirza Davies).     Please see Dr. Marcy Bach nex

## 2019-09-25 NOTE — PROGRESS NOTES
Ananth Spine is a 74year old female. HPI:   Patient presents for visual changes over the last few months. C/o several episodes a month of seeing a \"halo\" around everything she looked at. Mostly L eye but sometimes R.   Then became more of a \"jagg Social History    Tobacco Use      Smoking status: Former Smoker        Packs/day: 1.00        Years: 20.00        Pack years: 20        Types: Cigarettes        Quit date: 3/1/2008        Years since quittin.5      Smokeless tobacco: Never Used appropriate judgement and insight    ASSESSMENT AND PLAN:   # Visual changes, ocular migraine, abnormal MRA brain/neck: pt saw neuro for similar visual changes in 2016 (was also having HAs at that time) and had MRI/MRA brain & neck which showed:  - Mild to

## 2019-09-25 NOTE — PROGRESS NOTES
Renato Souza is a 74year old female. HPI:   Patient presents for intermittent ocular migraines.     Had a cluster of these in June, patient expresses experiencing similar sx to that cluster on 9/18, then again Sunday (9/22), but sx lasting longer than TAKE 1 TABLET BY MOUTH ONE TIME A DAY , Disp: 90 tablet, Rfl: 2  •  OMEPRAZOLE 20 MG Oral Capsule Delayed Release, TAKE 1 CAPSULE (20 MG TOTAL) BY MOUTH DAILY. , Disp: 90 capsule, Rfl: 3  •  clotrimazole-betamethasone 1-0.05 % External Cream, Apply 1 Applic SURGICAL HISTORY      lumpectomy 2004 lft breast stage 0   • RADIATION LEFT      Mammosite   • TONSILLECTOMY      1949   • UPPER GI ENDOSCOPY,EXAM      01/01/2005      Social History    Tobacco Use      Smoking status: Former Smoker        Packs/day: 1.00 left eye  --referral to neurology and ophthalmology  --counseled to seek emergent medical attention if loss of vision, severe headache, eye pain, changes in vision. 2. Ocular Migraines  --defer mgmt to ophthalmology     3.  DM-2 -- deferred today  --f/u

## 2019-09-30 ENCOUNTER — TELEPHONE (OUTPATIENT)
Dept: INTERNAL MEDICINE CLINIC | Facility: CLINIC | Age: 74
End: 2019-09-30

## 2019-09-30 NOTE — TELEPHONE ENCOUNTER
Nena Del Castillo from [employer] calling to discuss prior authorization for CT scan of head; please callback to discuss further    Tracking NUMBER/REF # [id number]

## 2019-10-03 ENCOUNTER — HOSPITAL ENCOUNTER (OUTPATIENT)
Dept: CT IMAGING | Facility: HOSPITAL | Age: 74
Discharge: HOME | End: 2019-10-03
Attending: PHYSICIAN ASSISTANT
Payer: MEDICARE

## 2019-10-03 DIAGNOSIS — R90.89 ABNORMAL MRA, BRAIN: ICD-10-CM

## 2019-10-03 DIAGNOSIS — H53.9 VISUAL CHANGES: ICD-10-CM

## 2019-10-03 DIAGNOSIS — R93.89 ABNORMAL MAGNETIC RESONANCE ANGIOGRAPHY (MRA) OF NECK: ICD-10-CM

## 2019-10-03 DIAGNOSIS — G43.109 OCULAR MIGRAINE: ICD-10-CM

## 2019-10-03 PROCEDURE — 82565 ASSAY OF CREATININE: CPT

## 2019-10-03 PROCEDURE — 70498 CT ANGIOGRAPHY NECK: CPT | Performed by: PHYSICIAN ASSISTANT

## 2019-10-03 PROCEDURE — 70496 CT ANGIOGRAPHY HEAD: CPT | Performed by: PHYSICIAN ASSISTANT

## 2019-10-04 ENCOUNTER — PRIOR ORIGINAL RECORDS (OUTPATIENT)
Dept: OTHER | Age: 74
End: 2019-10-04

## 2019-10-06 DIAGNOSIS — N18.30 CKD (CHRONIC KIDNEY DISEASE) STAGE 3, GFR 30-59 ML/MIN (HCC): ICD-10-CM

## 2019-10-06 DIAGNOSIS — I10 ESSENTIAL HYPERTENSION: ICD-10-CM

## 2019-10-06 DIAGNOSIS — E11.9 DIET-CONTROLLED DIABETES MELLITUS (HCC): ICD-10-CM

## 2019-10-07 RX ORDER — ATORVASTATIN CALCIUM 40 MG/1
TABLET, FILM COATED ORAL
Qty: 90 TABLET | Refills: 0 | Status: SHIPPED | OUTPATIENT
Start: 2019-10-07 | End: 2019-10-11

## 2019-10-07 NOTE — TELEPHONE ENCOUNTER
Last OV; 9/25/19 with Selina Ac PA-C  Last refill date: 4/6/19     #/refills: #90,  0 refills  When pt was asked to return for OV: 1 month  Upcoming appt/reason: no upcoming appt  Last labs 5/31/19

## 2019-10-11 ENCOUNTER — OFFICE VISIT (OUTPATIENT)
Dept: NEUROLOGY | Facility: CLINIC | Age: 74
End: 2019-10-11
Payer: MEDICARE

## 2019-10-11 VITALS
WEIGHT: 151 LBS | DIASTOLIC BLOOD PRESSURE: 61 MMHG | HEIGHT: 57.5 IN | HEART RATE: 74 BPM | BODY MASS INDEX: 32.13 KG/M2 | RESPIRATION RATE: 16 BRPM | SYSTOLIC BLOOD PRESSURE: 138 MMHG

## 2019-10-11 DIAGNOSIS — H53.40 VISUAL FIELD LOSS: Primary | ICD-10-CM

## 2019-10-11 PROCEDURE — 99215 OFFICE O/P EST HI 40 MIN: CPT | Performed by: OTHER

## 2019-10-11 NOTE — PROGRESS NOTES
[facility] Neurology outpatient progress note  Date of service: 10/11/2019    Patient here for a follow-up visit for new worsening left eye visual symptoms, she noted a left lower half visual field loss in the past few weeks, she has seen ophthalmologist, she has ATORVASTATIN 40 MG Oral Tab, TAKE 1 TABLET BY MOUTH IN THE EVENING , Disp: 90 tablet, Rfl: 0  •  ACCU-CHEK BRYANNA PLUS In Vitro Strip, use to check blood sugar 3 times a  week., Disp: 100 strip, Rfl: 3  •  ACCU-CHEK MULTICLIX LANCETS Does not apply Misc, us Types: Cigarettes        Quit date: 3/1/2008        Years since quittin.6      Smokeless tobacco: Never Used    Alcohol use: No      Alcohol/week: 0.0 standard drinks    Family History   Problem Relation Age of Onset   • Heart Disorder Father in any invasive procedure at this time, she was made aware of increased risk for stroke due to such condition    Plan:  I recommend MRI brain to rule out stroke, before she is going to travel [place], she agreed  I referred her to see Neuro ophthalmologist,

## 2019-10-14 ENCOUNTER — TELEPHONE (OUTPATIENT)
Dept: SURGERY | Facility: CLINIC | Age: 74
End: 2019-10-14

## 2019-10-14 ENCOUNTER — TELEPHONE (OUTPATIENT)
Dept: NEUROLOGY | Facility: CLINIC | Age: 74
End: 2019-10-14

## 2019-10-14 ENCOUNTER — TELEPHONE (OUTPATIENT)
Dept: INTERNAL MEDICINE CLINIC | Facility: CLINIC | Age: 74
End: 2019-10-14

## 2019-10-14 DIAGNOSIS — H53.40 VISUAL FIELD LOSS: Primary | ICD-10-CM

## 2019-10-14 NOTE — TELEPHONE ENCOUNTER
Patient wants to know if Brody Bynum received a report from Dr. Hamilton Prado ophthalmologist. She stated that Dr. Mirza Davies is recommending her to see a neuro ophthalmologist. Patient also wants to know if Brody Bynum has received the report from Dr. Prado Feeling

## 2019-10-14 NOTE — TELEPHONE ENCOUNTER
I returned the patient's call with questions regarding her MRI and scheduling. Patient also requested  an open MRI and would like medication for her MRI. Per patient please call hp# and let the phone ring until the voice mail picks up.  Once she hears your

## 2019-10-16 NOTE — TELEPHONE ENCOUNTER
Patient called in for an update to see if we recieved report from Dr. Carlson Quiet ophthalmologist.     Patient also stated that Dr. Alek Estrada did give her two recommendations.  Patient is inquiring if this recommendation is within her network and if PCP can w

## 2019-10-16 NOTE — TELEPHONE ENCOUNTER
Pt notified referral has been entered and approved through Dr Elsa Bullock' office. Pt asked if Dr Elsa Bullock is in her network. I informed pt that we do not have that information and advised her to contact insurance company to ensure coverage.      Pt also informed th

## 2019-10-21 ENCOUNTER — HOSPITAL ENCOUNTER (OUTPATIENT)
Dept: MRI IMAGING | Age: 74
Discharge: HOME | End: 2019-10-21
Attending: Other
Payer: MEDICARE

## 2019-10-21 DIAGNOSIS — H53.40 VISUAL FIELD LOSS: ICD-10-CM

## 2019-10-21 PROCEDURE — 70551 MRI BRAIN STEM W/O DYE: CPT | Performed by: OTHER

## 2019-10-21 NOTE — TELEPHONE ENCOUNTER
Per Epic review, patient is scheduled today for MRI at 11 am.      Patient never returned calls to [facility]. Encounter closed.

## 2019-10-23 ENCOUNTER — TELEPHONE (OUTPATIENT)
Dept: NEUROLOGY | Facility: CLINIC | Age: 74
End: 2019-10-23

## 2019-10-23 NOTE — TELEPHONE ENCOUNTER
Patient called CARLIE back and was given the following information regarding the brain MRI:    Message from Amado Andujar MD sent at 10/22/2019  2:33 PM CDT -----  Please advise pt : MRI brain unremarkable. No action required. Patient VU and would like to inform provider of the following information:    Patient saw ophthalmologist Dr. 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 Patient has f/u scheduled on 10/29/2019 with Dr. Ervin Cordova. Patient will have Dr. Ervin Cordova fax results of OV notes to Dr. Daniel Borrego. Patient would like to know if Dr. Daniel Borrego believe is is safe for her to fly to [facility] in two weeks.

## 2019-10-23 NOTE — TELEPHONE ENCOUNTER
Left message for pt to call office, no answer at home number, voicemail not identified. Need to relay note below from Dr. Iliana Munoz.

## 2019-10-23 NOTE — TELEPHONE ENCOUNTER
----- Message from Edith Scheuermann, MD sent at 10/22/2019  2:33 PM CDT -----  Please advise pt : MRI brain unremarkable. No action required.

## 2019-10-24 NOTE — TELEPHONE ENCOUNTER
From neuro standpoint, No contraindication for her to fly, MRI brain on 10/21 was negative for acute stroke, pt should seek clearance from Dr. Laron Arshad and Dr. Avani Perez for her incoming trip as well. For new severe symptoms, pt should go ER for evaluation.

## 2019-10-24 NOTE — TELEPHONE ENCOUNTER
Called pt to relay note below, left message for pt to call office back to relay Dr. Chaparrita Aguilar recommendation.

## 2019-10-24 NOTE — TELEPHONE ENCOUNTER
Spoke with pt, informed her of note below from Dr. Lilia Lopez, she expressed understanding and states Dr. Avani Perez has ok'd her flight, she states she will seek clearance from Dr. Laron Arshad.

## 2019-10-28 ENCOUNTER — TELEPHONE (OUTPATIENT)
Dept: INTERNAL MEDICINE CLINIC | Facility: CLINIC | Age: 74
End: 2019-10-28

## 2019-10-28 NOTE — TELEPHONE ENCOUNTER
Pt called for referral to opthalmology, Dr. Josue Barry. Pt was told by ophthalmology office that she was only authorized for 1 visit. Pt has fol up appt with Dr. Tonio Reid 10/29/19. Referral auth for 3 visits 10/16/19.   Spoke with Debbie aNylor in Dr. Tonio Reid

## 2019-10-29 ENCOUNTER — TELEPHONE (OUTPATIENT)
Dept: NEUROLOGY | Facility: CLINIC | Age: 74
End: 2019-10-29

## 2019-12-31 ENCOUNTER — PRIOR ORIGINAL RECORDS (OUTPATIENT)
Dept: OTHER | Age: 74
End: 2019-12-31

## 2020-01-02 RX ORDER — LANCETS
EACH MISCELLANEOUS
Qty: 102 EACH | Refills: 0 | Status: SHIPPED | OUTPATIENT
Start: 2020-01-02 | End: 2021-09-08

## 2020-01-02 RX ORDER — ATORVASTATIN CALCIUM 40 MG/1
TABLET, FILM COATED ORAL
Qty: 90 TABLET | Refills: 0 | Status: SHIPPED | OUTPATIENT
Start: 2020-01-02 | End: 2020-04-07

## 2020-01-02 NOTE — TELEPHONE ENCOUNTER
Atorvastatin 40 MG  Last OV relevant to medication: 5-20-19  Last refill date: 4-6-19 #/refills: 0  When pt was asked to return for OV: CPX 1yr  Upcoming appt/reason: none  Recent labs: 5-31-19: Lipid

## 2020-01-29 ENCOUNTER — TELEPHONE (OUTPATIENT)
Dept: INTERNAL MEDICINE CLINIC | Facility: CLINIC | Age: 75
End: 2020-01-29

## 2020-01-29 RX ORDER — LANCETS
1 EACH MISCELLANEOUS DAILY
Qty: 102 EACH | Refills: 0 | Status: SHIPPED | OUTPATIENT
Start: 2020-01-29 | End: 2021-01-28

## 2020-01-29 NOTE — TELEPHONE ENCOUNTER
Fax received from [facility] stating Multiclix is discontinued and they are request fastclix lancets to replace other lancets.     New lancets sent to pharmacy

## 2020-03-25 NOTE — TELEPHONE ENCOUNTER
Lisinopril 5 mg  Last OV relevant to medication: 5-2-19  Last refill date: 7-9-19 #/refills: 2  When pt was asked to return for OV: 1 yr  Upcoming appt/reason: none  Recent labs: 5-31-19: BMP

## 2020-03-26 RX ORDER — LISINOPRIL 5 MG/1
TABLET ORAL
Qty: 90 TABLET | Refills: 0 | Status: SHIPPED | OUTPATIENT
Start: 2020-03-26 | End: 2020-06-22

## 2020-04-07 RX ORDER — ATORVASTATIN CALCIUM 40 MG/1
TABLET, FILM COATED ORAL
Qty: 90 TABLET | Refills: 0 | Status: SHIPPED | OUTPATIENT
Start: 2020-04-07 | End: 2020-07-16

## 2020-04-07 NOTE — TELEPHONE ENCOUNTER
Atorvastatin 40 mg  Last OV relevant to medication: 5-20-19  Last refill date: 1-2-20 #/refills: 0  When pt was asked to return for OV: 1 yr  Upcoming appt/reason: none  Recent labs: 5-31-19: Lipid

## 2020-05-08 RX ORDER — BLOOD SUGAR DIAGNOSTIC
STRIP MISCELLANEOUS
Qty: 100 STRIP | Refills: 3 | OUTPATIENT
Start: 2020-05-08

## 2020-05-08 RX ORDER — OMEPRAZOLE 20 MG/1
CAPSULE, DELAYED RELEASE ORAL
Qty: 90 CAPSULE | Refills: 3 | OUTPATIENT
Start: 2020-05-08

## 2020-05-08 NOTE — TELEPHONE ENCOUNTER
ACCU-CHEK BRYANNA PLUS   Strip  Protocol Passed   Last refill date: 1/4/19     #/refills: 100 strip, 3 refills  OMEPRAZOLE 20 MG Capsule Delayed Release  Last refill date: 5/24/19     #/refills: #90, 3 refills     Last OV relevant to medication: 5/20/19  [facility]

## 2020-05-18 RX ORDER — BLOOD SUGAR DIAGNOSTIC
STRIP MISCELLANEOUS
Qty: 100 STRIP | Refills: 0 | Status: SHIPPED | OUTPATIENT
Start: 2020-05-18 | End: 2021-02-15

## 2020-05-18 RX ORDER — OMEPRAZOLE 20 MG/1
20 CAPSULE, DELAYED RELEASE ORAL DAILY
Qty: 90 CAPSULE | Refills: 0 | Status: SHIPPED | OUTPATIENT
Start: 2020-05-18 | End: 2020-08-13

## 2020-05-18 NOTE — TELEPHONE ENCOUNTER
Pt scheduled appt     Future Appointments   Date Time Provider Maral Rivera   6/1/2020 11:30 AM Shameka Dee MD [facility] 8 [facility] [place]     Refills below needed to [facility] mail order    Omeprazole 20MG   Accu-Chek Vicenta Plus Strip

## 2020-06-01 ENCOUNTER — OFFICE VISIT (OUTPATIENT)
Dept: INTERNAL MEDICINE CLINIC | Facility: CLINIC | Age: 75
End: 2020-06-01
Payer: MEDICARE

## 2020-06-01 VITALS
OXYGEN SATURATION: 99 % | RESPIRATION RATE: 16 BRPM | HEIGHT: 59.5 IN | SYSTOLIC BLOOD PRESSURE: 138 MMHG | TEMPERATURE: 99 F | HEART RATE: 88 BPM | BODY MASS INDEX: 30.24 KG/M2 | WEIGHT: 152 LBS | DIASTOLIC BLOOD PRESSURE: 70 MMHG

## 2020-06-01 DIAGNOSIS — E11.69 HYPERLIPIDEMIA ASSOCIATED WITH TYPE 2 DIABETES MELLITUS (HCC): ICD-10-CM

## 2020-06-01 DIAGNOSIS — H47.012 OPTIC NERVE ISCHEMIA, LEFT: ICD-10-CM

## 2020-06-01 DIAGNOSIS — H93.13 BILATERAL TINNITUS: ICD-10-CM

## 2020-06-01 DIAGNOSIS — E78.5 HYPERLIPIDEMIA ASSOCIATED WITH TYPE 2 DIABETES MELLITUS (HCC): ICD-10-CM

## 2020-06-01 DIAGNOSIS — N18.30 CKD (CHRONIC KIDNEY DISEASE) STAGE 3, GFR 30-59 ML/MIN (HCC): ICD-10-CM

## 2020-06-01 DIAGNOSIS — Z00.00 ROUTINE GENERAL MEDICAL EXAMINATION AT A HEALTH CARE FACILITY: Primary | ICD-10-CM

## 2020-06-01 DIAGNOSIS — I15.2 HYPERTENSION ASSOCIATED WITH DIABETES (HCC): ICD-10-CM

## 2020-06-01 DIAGNOSIS — Z12.31 VISIT FOR SCREENING MAMMOGRAM: ICD-10-CM

## 2020-06-01 DIAGNOSIS — E11.9 DIET-CONTROLLED DIABETES MELLITUS (HCC): ICD-10-CM

## 2020-06-01 DIAGNOSIS — E11.59 HYPERTENSION ASSOCIATED WITH DIABETES (HCC): ICD-10-CM

## 2020-06-01 DIAGNOSIS — M85.852 OSTEOPENIA OF LEFT HIP: ICD-10-CM

## 2020-06-01 PROBLEM — J41.0 SMOKERS' COUGH (HCC): Chronic | Status: ACTIVE | Noted: 2020-06-01

## 2020-06-01 PROCEDURE — 96160 PT-FOCUSED HLTH RISK ASSMT: CPT | Performed by: INTERNAL MEDICINE

## 2020-06-01 PROCEDURE — G0439 PPPS, SUBSEQ VISIT: HCPCS | Performed by: INTERNAL MEDICINE

## 2020-06-01 PROCEDURE — 99397 PER PM REEVAL EST PAT 65+ YR: CPT | Performed by: INTERNAL MEDICINE

## 2020-06-01 NOTE — PROGRESS NOTES
Felicia Tsai is a 66year old female. HPI:   Patient presents for a medicare supervisit and additional issues noted below. Comes in with a list of questions.   1. GERD: cannot live without taking omeprazole every day  2. Episodes of dizziness and light palpitations  GI: denies abdominal pain, constipation, n/v, BRBPR, melena. Chronic diarrhea is stable. : no dysuria  or hematuria  SKIN: denies any unusual skin lesions or rashes - I have referrd her to dermatology but she does not go regularly.  She do 2005 hiatal hernia, sm internal hem   • COLONOSCOPY  01/01/2005    fiberoptic   • D & C     • LUMPECTOMY LEFT  01/01/2004    breast   • OTHER SURGICAL HISTORY      lumpectomy 2004 lft breast stage 0   • RADIATION LEFT      Mammosite   • TONSILLECTOMY dermatitis: continues to have symptoms. Does not think clotrimazole-betamethasone helped. She is going to try otc anti-fungal. Has seen gyne in past and had biopsy. # Irritable Bowel Syndrome with diarrhea: chronic, stable.  Dr. Sam Walton suggested [facility] diverticulosis, internal hemorrhoids. Dr. Susana Craig recommended xifaxan but insurance will not cover. Symptoms are stable. # Bone density screening: see above. # Vaccines: rec yearly flu shot,, Prevnar 13 done 2016, Pneumovax done 2017, Zostavax done 2012.

## 2020-06-11 ENCOUNTER — OFFICE VISIT (OUTPATIENT)
Dept: INTERNAL MEDICINE CLINIC | Facility: CLINIC | Age: 75
End: 2020-06-11
Payer: MEDICARE

## 2020-06-11 VITALS
DIASTOLIC BLOOD PRESSURE: 60 MMHG | HEIGHT: 59 IN | WEIGHT: 150.5 LBS | BODY MASS INDEX: 30.34 KG/M2 | SYSTOLIC BLOOD PRESSURE: 136 MMHG | RESPIRATION RATE: 16 BRPM | TEMPERATURE: 99 F | HEART RATE: 97 BPM | OXYGEN SATURATION: 97 %

## 2020-06-11 DIAGNOSIS — N76.0 VULVOVAGINITIS: Primary | ICD-10-CM

## 2020-06-11 PROCEDURE — 99213 OFFICE O/P EST LOW 20 MIN: CPT | Performed by: PHYSICIAN ASSISTANT

## 2020-06-11 NOTE — PROGRESS NOTES
HPI:  Tani Umanzor is a 66year old female who presents for vaginal itching x several years. Flared up again over the last 6 months. Was rx clotrimazole/betamethasone cream last year -- unsure if this helped.   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

## 2020-06-11 NOTE — PATIENT INSTRUCTIONS
Stop clotrimazole cream.    Cool compresses as needed. Start an over the counter anti-histamine such as Claritin for the itch. Follow up with Dr. Sabiha Gates.

## 2020-06-12 ENCOUNTER — TELEPHONE (OUTPATIENT)
Dept: INTERNAL MEDICINE CLINIC | Facility: CLINIC | Age: 75
End: 2020-06-12

## 2020-06-12 DIAGNOSIS — H53.40 VISUAL FIELD LOSS: ICD-10-CM

## 2020-06-12 DIAGNOSIS — H47.012 OPTIC NERVE ISCHEMIA, LEFT: Primary | ICD-10-CM

## 2020-06-12 NOTE — TELEPHONE ENCOUNTER
Patient walked into office with requests of: Fax Referral to [facility] Ophthalmology -faxed today [phone]. Patient also requests a new referral for Dr Ronan Paredes Ophthalmology.   Please contact patient when doctor and [address] authorize

## 2020-06-15 NOTE — TELEPHONE ENCOUNTER
Pt notified that referral for Dr Gussie Holstein has been issued and pending approval through insurance.

## 2020-06-22 RX ORDER — LISINOPRIL 5 MG/1
TABLET ORAL
Qty: 90 TABLET | Refills: 3 | Status: SHIPPED | OUTPATIENT
Start: 2020-06-22 | End: 2021-06-28

## 2020-06-26 ENCOUNTER — OFFICE VISIT (OUTPATIENT)
Dept: OBGYN CLINIC | Facility: CLINIC | Age: 75
End: 2020-06-26
Payer: MEDICARE

## 2020-06-26 VITALS
HEIGHT: 59 IN | HEART RATE: 96 BPM | SYSTOLIC BLOOD PRESSURE: 160 MMHG | WEIGHT: 152 LBS | DIASTOLIC BLOOD PRESSURE: 80 MMHG | RESPIRATION RATE: 16 BRPM | BODY MASS INDEX: 30.64 KG/M2 | TEMPERATURE: 98 F

## 2020-06-26 DIAGNOSIS — N76.3 CHRONIC VULVITIS: Primary | ICD-10-CM

## 2020-06-26 DIAGNOSIS — L29.2 VULVAR ITCHING: ICD-10-CM

## 2020-06-26 DIAGNOSIS — L90.0 LICHEN SCLEROSUS: ICD-10-CM

## 2020-06-26 PROCEDURE — 99203 OFFICE O/P NEW LOW 30 MIN: CPT | Performed by: OBSTETRICS & GYNECOLOGY

## 2020-06-26 RX ORDER — CLOBETASOL PROPIONATE 0.5 MG/G
1 CREAM TOPICAL 2 TIMES DAILY
Qty: 1 TUBE | Refills: 0 | Status: SHIPPED | OUTPATIENT
Start: 2020-06-26 | End: 2020-12-02

## 2020-06-26 NOTE — PROGRESS NOTES
Seen at pcp office for the labial itching. Had used  otc antifungal for 9 days, used thin layer of Cortizone 10 after to help with the itch and generic Claritin. Had a sore in labial area. Better now.

## 2020-06-26 NOTE — PROGRESS NOTES
[facility]  Obstetrics and Gynecology   History & Physical    Chief complaint: Patient presents with:   Other: external itching for a while  chronic vulvitis with itching    Subjective:     HPI: Hollis Marks is a 66year old  with LMP No L total) by mouth daily. , Disp: 90 capsule, Rfl: 0  Glucose Blood (ACCU-CHEK BRYANNA PLUS) In Vitro Strip, use to check blood sugar 3 times a  week., Disp: 100 strip, Rfl: 0  ATORVASTATIN 40 MG Oral Tab, TAKE 1 TABLET BY MOUTH IN THE EVENING , Disp: 90 tablet, lumpectomy 2004 lft breast stage 0   • RADIATION LEFT      Mammosite   • TONSILLECTOMY      1951   • UPPER GI ENDOSCOPY,EXAM      01/01/2005       Social History:  Social History    Socioeconomic History      Marital status:       Spouse name: Concern: Not Asked        Stress Concern: Not Asked        Weight Concern: Not Asked        Special Diet: Not Asked        Back Care: Not Asked        Exercise: Yes          jazzercise 3x/wk, tennis 1-2x/wk        Bike Helmet: Not Asked        Seat Belt: N 06/08/2020    K 4.4 06/08/2020     06/08/2020    CO2 27 06/08/2020       Lab Results   Component Value Date    CHOLEST 174 06/08/2020    TRIG 153 (H) 06/08/2020    HDL 59 06/08/2020    LDL 90 06/08/2020    VLDL 35 03/17/2016    TCHDLRATIO 3.13 03/17/

## 2020-07-13 ENCOUNTER — OFFICE VISIT (OUTPATIENT)
Dept: OBGYN CLINIC | Facility: CLINIC | Age: 75
End: 2020-07-13
Payer: MEDICARE

## 2020-07-13 VITALS
RESPIRATION RATE: 16 BRPM | SYSTOLIC BLOOD PRESSURE: 148 MMHG | DIASTOLIC BLOOD PRESSURE: 70 MMHG | BODY MASS INDEX: 30.84 KG/M2 | WEIGHT: 153 LBS | HEART RATE: 94 BPM | TEMPERATURE: 98 F | HEIGHT: 59 IN

## 2020-07-13 DIAGNOSIS — N90.9 NONINFLAMMATORY DISORDER OF VULVA AND PERINEUM: Primary | ICD-10-CM

## 2020-07-13 PROCEDURE — 88305 TISSUE EXAM BY PATHOLOGIST: CPT | Performed by: OBSTETRICS & GYNECOLOGY

## 2020-07-13 PROCEDURE — 56605 BIOPSY OF VULVA/PERINEUM: CPT | Performed by: OBSTETRICS & GYNECOLOGY

## 2020-07-13 NOTE — PROGRESS NOTES
Vulva biopsy    DIAGNOSIS:   R/o LS. White discoloration over labia minora and introitus. There are 2 area of abrasion most likely due to scratching    HPI:   66year old  No LMP recorded. Patient is postmenopausal..  Here for  .biopsy    PROCEDURE

## 2020-07-16 RX ORDER — ATORVASTATIN CALCIUM 40 MG/1
TABLET, FILM COATED ORAL
Qty: 90 TABLET | Refills: 3 | Status: SHIPPED | OUTPATIENT
Start: 2020-07-16 | End: 2021-06-28

## 2020-07-16 NOTE — TELEPHONE ENCOUNTER
LOV: 6/11/2020 with Elder Backbone, BRIJESH  RTC: 1 year  Last Relevant Labs: 6/8/2020   Filled: 4/7/2020  #90 with 0 refills    Future Appointments   Date Time Provider Maral Rivera   8/17/2020  1:30 PM Sveta Bass MD [facility] [place]

## 2020-08-13 RX ORDER — OMEPRAZOLE 20 MG/1
CAPSULE, DELAYED RELEASE ORAL
Qty: 90 CAPSULE | Refills: 3 | Status: SHIPPED | OUTPATIENT
Start: 2020-08-13 | End: 2021-08-09

## 2020-08-17 ENCOUNTER — OFFICE VISIT (OUTPATIENT)
Dept: OBGYN CLINIC | Facility: CLINIC | Age: 75
End: 2020-08-17
Payer: MEDICARE

## 2020-08-17 VITALS
HEIGHT: 59.5 IN | TEMPERATURE: 98 F | HEART RATE: 84 BPM | WEIGHT: 154 LBS | SYSTOLIC BLOOD PRESSURE: 138 MMHG | BODY MASS INDEX: 30.63 KG/M2 | DIASTOLIC BLOOD PRESSURE: 80 MMHG

## 2020-08-17 DIAGNOSIS — L90.0 LICHEN SCLEROSUS: Primary | ICD-10-CM

## 2020-08-17 PROCEDURE — 99214 OFFICE O/P EST MOD 30 MIN: CPT | Performed by: OBSTETRICS & GYNECOLOGY

## 2020-08-17 PROCEDURE — 3079F DIAST BP 80-89 MM HG: CPT | Performed by: OBSTETRICS & GYNECOLOGY

## 2020-08-17 PROCEDURE — 3075F SYST BP GE 130 - 139MM HG: CPT | Performed by: OBSTETRICS & GYNECOLOGY

## 2020-08-17 PROCEDURE — 3008F BODY MASS INDEX DOCD: CPT | Performed by: OBSTETRICS & GYNECOLOGY

## 2020-08-17 NOTE — PROGRESS NOTES
CHIEF COMPLAINT:   Patient presents with follow-up for lichen sclerosus  HPI:   Lane Carlson is a 66year old  No LMP recorded. Patient is postmenopausal. who presents with history of severe vulva itching.   She had a vulvar biopsy done showing lich resolution of  White discoloration  IMPRESSION/PLAN:     1. Lichen sclerosus  Responded well to clobetasol. Patient has stopped using it for several weeks. She states that the itching has completely resolved.   Lichen sclerosus is a chronic condition and

## 2020-08-17 NOTE — PROGRESS NOTES
Pt here for f/u to pathology results. She states itching has stopped and she is no longer using medication.

## 2020-09-16 ENCOUNTER — HOSPITAL ENCOUNTER (OUTPATIENT)
Dept: MAMMOGRAPHY | Age: 75
Discharge: HOME | End: 2020-09-16
Attending: INTERNAL MEDICINE
Payer: MEDICARE

## 2020-09-16 DIAGNOSIS — Z12.31 VISIT FOR SCREENING MAMMOGRAM: ICD-10-CM

## 2020-09-16 PROCEDURE — 77067 SCR MAMMO BI INCL CAD: CPT | Performed by: INTERNAL MEDICINE

## 2020-09-16 PROCEDURE — 77063 BREAST TOMOSYNTHESIS BI: CPT | Performed by: INTERNAL MEDICINE

## 2020-09-17 ENCOUNTER — HOSPITAL ENCOUNTER (OUTPATIENT)
Dept: BONE DENSITY | Age: 75
Discharge: HOME | End: 2020-09-17
Attending: INTERNAL MEDICINE
Payer: MEDICARE

## 2020-09-17 DIAGNOSIS — M85.852 OSTEOPENIA OF LEFT HIP: ICD-10-CM

## 2020-09-17 PROCEDURE — 77080 DXA BONE DENSITY AXIAL: CPT | Performed by: INTERNAL MEDICINE

## 2020-10-11 VITALS
DIASTOLIC BLOOD PRESSURE: 68 MMHG | WEIGHT: 154.41 LBS | SYSTOLIC BLOOD PRESSURE: 126 MMHG | HEIGHT: 59 IN | BODY MASS INDEX: 31.13 KG/M2

## 2020-10-11 VITALS
BODY MASS INDEX: 31.08 KG/M2 | WEIGHT: 154.19 LBS | DIASTOLIC BLOOD PRESSURE: 66 MMHG | HEIGHT: 59 IN | SYSTOLIC BLOOD PRESSURE: 128 MMHG

## 2020-10-11 VITALS
SYSTOLIC BLOOD PRESSURE: 122 MMHG | BODY MASS INDEX: 31.69 KG/M2 | WEIGHT: 157.21 LBS | HEIGHT: 59 IN | DIASTOLIC BLOOD PRESSURE: 76 MMHG

## 2020-10-12 ENCOUNTER — OFFICE VISIT (OUTPATIENT)
Dept: INTERNAL MEDICINE CLINIC | Facility: CLINIC | Age: 75
End: 2020-10-12
Payer: MEDICARE

## 2020-10-12 VITALS
RESPIRATION RATE: 16 BRPM | DIASTOLIC BLOOD PRESSURE: 62 MMHG | SYSTOLIC BLOOD PRESSURE: 114 MMHG | HEART RATE: 64 BPM | WEIGHT: 151 LBS | HEIGHT: 59.6 IN | OXYGEN SATURATION: 98 % | TEMPERATURE: 99 F | BODY MASS INDEX: 30.04 KG/M2

## 2020-10-12 DIAGNOSIS — M81.0 AGE-RELATED OSTEOPOROSIS WITHOUT CURRENT PATHOLOGICAL FRACTURE: Primary | ICD-10-CM

## 2020-10-12 DIAGNOSIS — L98.9 NON-HEALING SKIN LESION: ICD-10-CM

## 2020-10-12 DIAGNOSIS — E78.5 HYPERLIPIDEMIA ASSOCIATED WITH TYPE 2 DIABETES MELLITUS (HCC): ICD-10-CM

## 2020-10-12 DIAGNOSIS — L90.0 LICHEN SCLEROSUS: ICD-10-CM

## 2020-10-12 DIAGNOSIS — E11.9 DIET-CONTROLLED DIABETES MELLITUS (HCC): ICD-10-CM

## 2020-10-12 DIAGNOSIS — E11.69 HYPERLIPIDEMIA ASSOCIATED WITH TYPE 2 DIABETES MELLITUS (HCC): ICD-10-CM

## 2020-10-12 PROCEDURE — 99214 OFFICE O/P EST MOD 30 MIN: CPT | Performed by: INTERNAL MEDICINE

## 2020-10-12 PROCEDURE — 3078F DIAST BP <80 MM HG: CPT | Performed by: INTERNAL MEDICINE

## 2020-10-12 PROCEDURE — 3074F SYST BP LT 130 MM HG: CPT | Performed by: INTERNAL MEDICINE

## 2020-10-12 PROCEDURE — 3008F BODY MASS INDEX DOCD: CPT | Performed by: INTERNAL MEDICINE

## 2020-10-12 RX ORDER — ALENDRONATE SODIUM 70 MG/1
70 TABLET ORAL WEEKLY
Qty: 12 TABLET | Refills: 3 | Status: SHIPPED | OUTPATIENT
Start: 2020-10-12

## 2020-10-12 RX ORDER — ACETAMINOPHEN 160 MG
2000 TABLET,DISINTEGRATING ORAL DAILY
COMMUNITY

## 2020-10-12 NOTE — PROGRESS NOTES
Sharlette Dancer is a 66year old female. HPI:   Patient presents for a new diagnosis of osteoporosis and the following issues. 1. Osteoporosis: new. she plays tennis 2-3 times per week and does jazzercise 2-3 mornings per week.  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 Jw Anderson: diverticulosis, internal hemorrhoids. Dr. Jw Anderson recommended xifaxan but insurance will not cover. Symptoms are stable. # Bone density screening: see above.   # Payal Mckay

## 2020-10-12 NOTE — PATIENT INSTRUCTIONS
Please complete your dental work, discuss fosamax with your oral surgeon, and complete your vitamin D blood test prior to starting fosamax. You will be due for labs again in late December, 2020. Please call in early December so I can place orders.     P

## 2020-10-26 ENCOUNTER — TELEPHONE (OUTPATIENT)
Dept: INTERNAL MEDICINE CLINIC | Facility: CLINIC | Age: 75
End: 2020-10-26

## 2020-10-26 NOTE — TELEPHONE ENCOUNTER
Patient calling with update from her conversation with oral surgeon regarding bone density medication; she would like to give update to nurse please callback

## 2020-10-27 NOTE — TELEPHONE ENCOUNTER
Pt wanted to inform KS that she spoke with oral surgeon, has tooth extraction scheduled for 11/6/20 and will keep us updated as to when she can start fosamax.

## 2020-12-02 RX ORDER — CLOBETASOL PROPIONATE 0.5 MG/G
CREAM TOPICAL
Qty: 15 G | Refills: 0 | Status: SHIPPED | OUTPATIENT
Start: 2020-12-02 | End: 2021-10-22

## 2020-12-08 ENCOUNTER — TELEPHONE (OUTPATIENT)
Dept: INTERNAL MEDICINE CLINIC | Facility: CLINIC | Age: 75
End: 2020-12-08

## 2020-12-08 NOTE — TELEPHONE ENCOUNTER
1) Pt is calling to request lab orders to be placed to [facility]. States at [address] she was advised to call our office and request this. LOV: 10/12/2020 - Dr Maldonado Sanchez and requesting call back once ordered so she can schedule an OV.     2) Pt states she re

## 2020-12-09 NOTE — TELEPHONE ENCOUNTER
Labs ordered. She needs to ask her dentist to give us a specific time when it is okay to start fosamax.

## 2021-02-15 RX ORDER — BLOOD SUGAR DIAGNOSTIC
STRIP MISCELLANEOUS
Qty: 50 STRIP | Refills: 0 | Status: SHIPPED | OUTPATIENT
Start: 2021-02-15 | End: 2021-06-15

## 2021-02-15 NOTE — TELEPHONE ENCOUNTER
Name from pharmacy: [address]          Will file in chart as: [address] [facility] In Vitro Strip    Sig: TEST BLOOD SUGAR THREE TIMES WEEKLY    Disp:  50 strip    Refills:  0 (Pharmacy requested: Not specified)    Start: 2/15/2021    Cl

## 2021-03-04 DIAGNOSIS — Z23 NEED FOR VACCINATION: ICD-10-CM

## 2021-03-12 ENCOUNTER — TELEPHONE (OUTPATIENT)
Dept: CASE MANAGEMENT | Age: 76
End: 2021-03-12

## 2021-04-28 ENCOUNTER — OFFICE VISIT (OUTPATIENT)
Dept: INTERNAL MEDICINE CLINIC | Facility: CLINIC | Age: 76
End: 2021-04-28
Payer: MEDICARE

## 2021-04-28 VITALS
BODY MASS INDEX: 31.26 KG/M2 | HEART RATE: 89 BPM | OXYGEN SATURATION: 98 % | TEMPERATURE: 98 F | HEIGHT: 58.66 IN | WEIGHT: 153 LBS | RESPIRATION RATE: 16 BRPM | SYSTOLIC BLOOD PRESSURE: 132 MMHG | DIASTOLIC BLOOD PRESSURE: 72 MMHG

## 2021-04-28 DIAGNOSIS — Z12.31 VISIT FOR SCREENING MAMMOGRAM: ICD-10-CM

## 2021-04-28 DIAGNOSIS — E11.69 HYPERLIPIDEMIA ASSOCIATED WITH TYPE 2 DIABETES MELLITUS (HCC): ICD-10-CM

## 2021-04-28 DIAGNOSIS — E11.59 HYPERTENSION ASSOCIATED WITH DIABETES (HCC): ICD-10-CM

## 2021-04-28 DIAGNOSIS — E78.5 HYPERLIPIDEMIA ASSOCIATED WITH TYPE 2 DIABETES MELLITUS (HCC): ICD-10-CM

## 2021-04-28 DIAGNOSIS — E11.9 DIET-CONTROLLED DIABETES MELLITUS (HCC): ICD-10-CM

## 2021-04-28 DIAGNOSIS — L90.0 LICHEN SCLEROSUS: ICD-10-CM

## 2021-04-28 DIAGNOSIS — M46.96 INFLAMMATORY SPONDYLOPATHY OF LUMBAR REGION (HCC): ICD-10-CM

## 2021-04-28 DIAGNOSIS — Z00.00 ROUTINE GENERAL MEDICAL EXAMINATION AT A HEALTH CARE FACILITY: Primary | ICD-10-CM

## 2021-04-28 DIAGNOSIS — M81.0 AGE-RELATED OSTEOPOROSIS WITHOUT CURRENT PATHOLOGICAL FRACTURE: ICD-10-CM

## 2021-04-28 DIAGNOSIS — E66.9 DIABETES MELLITUS TYPE 2 IN OBESE (HCC): ICD-10-CM

## 2021-04-28 DIAGNOSIS — N18.31 STAGE 3A CHRONIC KIDNEY DISEASE (HCC): ICD-10-CM

## 2021-04-28 DIAGNOSIS — I15.2 HYPERTENSION ASSOCIATED WITH DIABETES (HCC): ICD-10-CM

## 2021-04-28 DIAGNOSIS — E11.69 DIABETES MELLITUS TYPE 2 IN OBESE (HCC): ICD-10-CM

## 2021-04-28 PROBLEM — E11.22 CKD STAGE 3 DUE TO TYPE 2 DIABETES MELLITUS (HCC): Status: ACTIVE | Noted: 2017-09-29

## 2021-04-28 PROBLEM — N18.30 CKD STAGE 3 DUE TO TYPE 2 DIABETES MELLITUS (HCC): Status: ACTIVE | Noted: 2017-09-29

## 2021-04-28 PROBLEM — H47.012: Status: RESOLVED | Noted: 2020-06-01 | Resolved: 2021-04-28

## 2021-04-28 PROBLEM — N18.30 CKD (CHRONIC KIDNEY DISEASE) STAGE 3, GFR 30-59 ML/MIN (HCC): Status: RESOLVED | Noted: 2017-09-29 | Resolved: 2021-04-28

## 2021-04-28 PROBLEM — L29.2 VULVAR ITCHING: Status: RESOLVED | Noted: 2020-06-26 | Resolved: 2021-04-28

## 2021-04-28 PROCEDURE — 3075F SYST BP GE 130 - 139MM HG: CPT | Performed by: INTERNAL MEDICINE

## 2021-04-28 PROCEDURE — 3078F DIAST BP <80 MM HG: CPT | Performed by: INTERNAL MEDICINE

## 2021-04-28 PROCEDURE — 99397 PER PM REEVAL EST PAT 65+ YR: CPT | Performed by: INTERNAL MEDICINE

## 2021-04-28 PROCEDURE — 3008F BODY MASS INDEX DOCD: CPT | Performed by: INTERNAL MEDICINE

## 2021-04-28 PROCEDURE — 96160 PT-FOCUSED HLTH RISK ASSMT: CPT | Performed by: INTERNAL MEDICINE

## 2021-04-28 PROCEDURE — G0439 PPPS, SUBSEQ VISIT: HCPCS | Performed by: INTERNAL MEDICINE

## 2021-04-28 NOTE — PROGRESS NOTES
Hollis Marks is a 66year old female. HPI:   Patient presents for a medicare supervisit and additional issues noted below. 1. DM: home FBS are < 130s. 2. Osteoporosis: she has not started fosamax b/c of her dental work.  She is overdue for appt with denies edema     Wt Readings from Last 6 Encounters:  04/28/21 : 153 lb (69.4 kg)  10/12/20 : 151 lb (68.5 kg)  08/17/20 : 154 lb (69.9 kg)  07/13/20 : 153 lb (69.4 kg)  06/26/20 : 152 lb (68.9 kg)  06/11/20 : 150 lb 8 oz (68.3 kg)        Ampicillin Former Smoker        Packs/day: 1.00        Years: 20.00        Pack years: 20        Types: Cigarettes        Quit date: 3/1/2006        Years since quitting: 15.1      Smokeless tobacco: Never Used    Vaping Use      Vaping Use: Never used    Alcohol use with chronic clobetasol bi-weekly  # Rectal Rash: appears to be perianal lichen sclerosus. Will use clobetasol bi-weekly. If still not improving, refer to derm  # Irritable Bowel Syndrome with diarrhea: chronic, stable.  Dr. Mindi Pascual suggested [facility] in past 2012.  TDAP 2019. Rec Shingrix vaccine at pharmacy.   Goals of Care - full code but no prolonged life support  POA for [facility] [place]. -Jason Luis Miguel sister in [address] Team:  GI - Dr. Chiquita Jimenez - Dr. Maxine Terry ([address])       The p

## 2021-04-28 NOTE — PATIENT INSTRUCTIONS
Please complete fasting labs in June, 2021. For your rectal folds you can use over the counter cetaphil, aveeno, aquaphor. I also advise you get the shingrix vaccine once in a lifetime.  This is NEW and considered better than the previous shingles v falling asleep. RLS is more common in older people and tends to run in families. Overuse of caffeine or alcohol may make symptoms worse. Iron deficiency, diabetes, or kidney problems can contribute to RLS.  In more severe cases, you may also have symptoms i other medical problems linked to RLS. These include kidney disease, diabetes, Parkinson disease, and multiple sclerosis. Your doctor may prescribe medicines to reduce your symptoms and help you sleep better.    Tips for temporary relief  To reduce your disc

## 2021-05-01 ENCOUNTER — TELEPHONE (OUTPATIENT)
Dept: INTERNAL MEDICINE CLINIC | Facility: CLINIC | Age: 76
End: 2021-05-01

## 2021-06-15 RX ORDER — BLOOD SUGAR DIAGNOSTIC
STRIP MISCELLANEOUS
Qty: 50 STRIP | Refills: 0 | Status: SHIPPED | OUTPATIENT
Start: 2021-06-15 | End: 2021-10-18

## 2021-06-15 NOTE — TELEPHONE ENCOUNTER
Glucose Blood (ACCU-CHEK BRYANNA PLUS) In Vitro Strip          Sig: TEST BLOOD SUGAR THREE TIMES WEEKLY    Disp:  50 strip    Refills:  0    Start: 6/15/2021    Class: Normal    Non-formulary    Last ordered: 4 months ago by Timothy Caban MD     Diabetic Supp

## 2021-06-28 RX ORDER — ATORVASTATIN CALCIUM 40 MG/1
TABLET, FILM COATED ORAL
Qty: 90 TABLET | Refills: 0 | Status: SHIPPED | OUTPATIENT
Start: 2021-06-28 | End: 2021-10-20

## 2021-06-28 RX ORDER — LISINOPRIL 5 MG/1
TABLET ORAL
Qty: 90 TABLET | Refills: 0 | Status: SHIPPED | OUTPATIENT
Start: 2021-06-28 | End: 2021-09-29

## 2021-08-09 RX ORDER — OMEPRAZOLE 20 MG/1
CAPSULE, DELAYED RELEASE ORAL
Qty: 90 CAPSULE | Refills: 3 | Status: SHIPPED | OUTPATIENT
Start: 2021-08-09

## 2021-09-02 ENCOUNTER — HOSPITAL ENCOUNTER (OUTPATIENT)
Age: 76
Discharge: HOME | End: 2021-09-02
Payer: MEDICARE

## 2021-09-02 VITALS
DIASTOLIC BLOOD PRESSURE: 48 MMHG | RESPIRATION RATE: 16 BRPM | TEMPERATURE: 98 F | HEART RATE: 87 BPM | HEIGHT: 58.5 IN | BODY MASS INDEX: 30.65 KG/M2 | OXYGEN SATURATION: 97 % | SYSTOLIC BLOOD PRESSURE: 166 MMHG | WEIGHT: 150 LBS

## 2021-09-02 DIAGNOSIS — M54.6 ACUTE LEFT-SIDED THORACIC BACK PAIN: Primary | ICD-10-CM

## 2021-09-02 LAB
POCT BILIRUBIN URINE: NEGATIVE
POCT GLUCOSE URINE: NEGATIVE MG/DL
POCT KETONE URINE: NEGATIVE MG/DL
POCT LEUKOCYTE ESTERASE URINE: NEGATIVE
POCT NITRITE URINE: NEGATIVE
POCT PH URINE: 5.5 (ref 5–8)
POCT PROTEIN URINE: NEGATIVE MG/DL
POCT SPECIFIC GRAVITY URINE: 1.01
POCT URINE CLARITY: CLEAR
POCT URINE COLOR: YELLOW
POCT UROBILINOGEN URINE: 0.2 MG/DL

## 2021-09-02 PROCEDURE — 81002 URINALYSIS NONAUTO W/O SCOPE: CPT | Performed by: PHYSICIAN ASSISTANT

## 2021-09-02 PROCEDURE — 99203 OFFICE O/P NEW LOW 30 MIN: CPT

## 2021-09-02 PROCEDURE — 99213 OFFICE O/P EST LOW 20 MIN: CPT

## 2021-09-02 RX ORDER — IBUPROFEN 600 MG/1
600 TABLET ORAL ONCE
Status: COMPLETED | OUTPATIENT
Start: 2021-09-02 | End: 2021-09-02

## 2021-09-02 NOTE — ED PROVIDER NOTES
Patient Seen in: [facility]      History   Patient presents with:  Flank Pain    Stated Complaint: lower back pain,breathing hard    HPI/Subjective:   HPI    Jorge Ellis is a 77-year-old female who presents today for evaluation of left upper b History:   Procedure Laterality Date   • APPENDECTOMY      1950   • COLONOSCOPY      2005 hiatal hernia, sm internal hem   • COLONOSCOPY  01/01/2005    fiberoptic   • D & C     • LUMPECTOMY LEFT  01/01/2004    breast   • OTHER SURGICAL HISTORY      lumpect motion. No edema or tenderness. Neurological: CN III - XII grossly intact, normal strength and sensation. Skin: Skin is warm and dry. No rash noted. No erythema.        ED Course     Labs Reviewed   POCT URINALYSIS DIPSTICK - Abnormal; Notable for the f left-sided thoracic back pain  (primary encounter diagnosis)     Disposition:  Discharge  9/2/2021 11:58 am    Follow-up:  Carmelo Manzanares MD  [address] [id number]      As needed, If symptoms worsen          Me

## 2021-09-02 NOTE — ED INITIAL ASSESSMENT (HPI)
Left flank pain - started at 1 pm last night. Takes tylenol 1 500mg  1-2 tabs every 3 hrs. Last  Dose at 9 am.  Denies nausea, vomiting, fever, diarrhea. In the past had right sided flank pain .  Over a year ago pt addressed the issue to PCP but was nothin

## 2021-09-08 RX ORDER — LANCETS
EACH MISCELLANEOUS
Qty: 102 EACH | Refills: 1 | Status: SHIPPED | OUTPATIENT
Start: 2021-09-08

## 2021-09-17 ENCOUNTER — HOSPITAL ENCOUNTER (OUTPATIENT)
Dept: MAMMOGRAPHY | Age: 76
Discharge: HOME | End: 2021-09-17
Attending: INTERNAL MEDICINE
Payer: MEDICARE

## 2021-09-17 DIAGNOSIS — Z12.31 VISIT FOR SCREENING MAMMOGRAM: ICD-10-CM

## 2021-09-17 PROCEDURE — 77063 BREAST TOMOSYNTHESIS BI: CPT | Performed by: INTERNAL MEDICINE

## 2021-09-17 PROCEDURE — 77067 SCR MAMMO BI INCL CAD: CPT | Performed by: INTERNAL MEDICINE

## 2021-09-29 RX ORDER — LISINOPRIL 5 MG/1
TABLET ORAL
Qty: 90 TABLET | Refills: 0 | Status: SHIPPED | OUTPATIENT
Start: 2021-09-29 | End: 2021-12-28

## 2021-09-29 NOTE — TELEPHONE ENCOUNTER
Medication(s) to Refill:   Requested Prescriptions     Pending Prescriptions Disp Refills   • LISINOPRIL 5 MG Oral Tab [Pharmacy Med Name: Lisinopril Oral Tablet 5 MG] 90 tablet 0     Sig: TAKE 1 TABLET BY MOUTH EVERY DAY       LOV: 4/28/2021      RTC:  6

## 2021-10-18 RX ORDER — BLOOD SUGAR DIAGNOSTIC
STRIP MISCELLANEOUS
Qty: 50 STRIP | Refills: 0 | Status: SHIPPED | OUTPATIENT
Start: 2021-10-18 | End: 2022-01-27

## 2021-10-20 RX ORDER — ATORVASTATIN CALCIUM 40 MG/1
TABLET, FILM COATED ORAL
Qty: 90 TABLET | Refills: 0 | Status: SHIPPED | OUTPATIENT
Start: 2021-10-20 | End: 2022-01-19

## 2021-10-22 RX ORDER — CLOBETASOL PROPIONATE 0.5 MG/G
CREAM TOPICAL
Qty: 15 G | Refills: 0 | Status: SHIPPED | OUTPATIENT
Start: 2021-10-22

## 2021-12-02 ENCOUNTER — TELEPHONE (OUTPATIENT)
Dept: INTERNAL MEDICINE CLINIC | Facility: CLINIC | Age: 76
End: 2021-12-02

## 2021-12-02 DIAGNOSIS — H47.012 OPTIC NERVE ISCHEMIA, LEFT: Primary | ICD-10-CM

## 2021-12-02 NOTE — TELEPHONE ENCOUNTER
Pt is here to let us know she received her Flu shot at [place] [facility] on 09/28/2021. Also received her Covid vaccine booster at [place] [facility] on 10/22/2021. Copy of card placed in Dr. Titi Lorenz fax folder.

## 2021-12-02 NOTE — TELEPHONE ENCOUNTER
Pt has an appt on 01/04/2022 with Ophthalmologist Dr. Kamlesh Rogel. Pt is requesting referral.    Pt would like referral to be faxed to Dr. Spencer Wills once approved. [address]. [phone] R.[phone]

## 2021-12-20 NOTE — TELEPHONE ENCOUNTER
Called parent left a message letting her know that the referral is authorized and that it was faxed to doctor's office.

## 2021-12-28 ENCOUNTER — TELEPHONE (OUTPATIENT)
Dept: INTERNAL MEDICINE CLINIC | Facility: CLINIC | Age: 76
End: 2021-12-28

## 2021-12-28 RX ORDER — LISINOPRIL 5 MG/1
TABLET ORAL
Qty: 90 TABLET | Refills: 0 | Status: SHIPPED | OUTPATIENT
Start: 2021-12-28

## 2021-12-28 NOTE — TELEPHONE ENCOUNTER
Due for OV - please reach out to schedule pt      Failed protocol     Last refill:  LISINOPRIL 5 MG Oral Tab 90 tablet 0 9/29/2021    Sig:   TAKE 1 TABLET BY MOUTH EVERY DAY       LOV:   4/28/2021 Dr Marine Vazquez RTC 6 months  # HTN with T2DM: controlled.  cont lis

## 2021-12-28 NOTE — TELEPHONE ENCOUNTER
Please inform patient she was due for 6 month follow-up in October. She is also due for her 6 month labs now. We do recommend she sees us every 6 months (and she has in past) for close follow-up of her DM and HTN.

## 2021-12-28 NOTE — TELEPHONE ENCOUNTER
Patient declining to schedule until she is due for her annual physical. Patient states it is unnecessary for an office visit and does not remember anyone telling her she would be due in 6 months.  Patient states unless she absolutely has to, she won't be co

## 2021-12-30 NOTE — TELEPHONE ENCOUNTER
Patient called back   She misunderstood her avs   Patient is aware that she was supposed to follow up in 6months   MAS and follow up appt scheduled for 2/16/22

## 2022-01-19 RX ORDER — ATORVASTATIN CALCIUM 40 MG/1
TABLET, FILM COATED ORAL
Qty: 90 TABLET | Refills: 0 | Status: SHIPPED | OUTPATIENT
Start: 2022-01-19

## 2022-01-27 RX ORDER — BLOOD SUGAR DIAGNOSTIC
STRIP MISCELLANEOUS
Qty: 50 STRIP | Refills: 3 | Status: SHIPPED | OUTPATIENT
Start: 2022-01-27 | End: 2022-05-11

## 2022-02-16 ENCOUNTER — OFFICE VISIT (OUTPATIENT)
Dept: INTERNAL MEDICINE CLINIC | Facility: CLINIC | Age: 77
End: 2022-02-16
Payer: MEDICARE

## 2022-02-16 VITALS
HEART RATE: 101 BPM | TEMPERATURE: 98 F | BODY MASS INDEX: 30.98 KG/M2 | HEIGHT: 58.5 IN | DIASTOLIC BLOOD PRESSURE: 64 MMHG | SYSTOLIC BLOOD PRESSURE: 138 MMHG | OXYGEN SATURATION: 99 % | WEIGHT: 151.63 LBS | RESPIRATION RATE: 18 BRPM

## 2022-02-16 DIAGNOSIS — M81.0 AGE-RELATED OSTEOPOROSIS WITHOUT CURRENT PATHOLOGICAL FRACTURE: ICD-10-CM

## 2022-02-16 DIAGNOSIS — E11.69 HYPERLIPIDEMIA ASSOCIATED WITH TYPE 2 DIABETES MELLITUS (HCC): ICD-10-CM

## 2022-02-16 DIAGNOSIS — E11.59 HYPERTENSION ASSOCIATED WITH DIABETES (HCC): ICD-10-CM

## 2022-02-16 DIAGNOSIS — E11.69 DIABETES MELLITUS TYPE 2 IN OBESE (HCC): ICD-10-CM

## 2022-02-16 DIAGNOSIS — I15.2 HYPERTENSION ASSOCIATED WITH DIABETES (HCC): ICD-10-CM

## 2022-02-16 DIAGNOSIS — E78.5 HYPERLIPIDEMIA ASSOCIATED WITH TYPE 2 DIABETES MELLITUS (HCC): ICD-10-CM

## 2022-02-16 DIAGNOSIS — N18.30 CKD STAGE 3 DUE TO TYPE 2 DIABETES MELLITUS (HCC): ICD-10-CM

## 2022-02-16 DIAGNOSIS — M46.96 INFLAMMATORY SPONDYLOPATHY OF LUMBAR REGION (HCC): ICD-10-CM

## 2022-02-16 DIAGNOSIS — Z00.00 ROUTINE GENERAL MEDICAL EXAMINATION AT A HEALTH CARE FACILITY: Primary | ICD-10-CM

## 2022-02-16 DIAGNOSIS — E11.9 DIET-CONTROLLED DIABETES MELLITUS (HCC): ICD-10-CM

## 2022-02-16 DIAGNOSIS — K58.0 IRRITABLE BOWEL SYNDROME WITH DIARRHEA: ICD-10-CM

## 2022-02-16 DIAGNOSIS — E11.22 CKD STAGE 3 DUE TO TYPE 2 DIABETES MELLITUS (HCC): ICD-10-CM

## 2022-02-16 DIAGNOSIS — E66.9 DIABETES MELLITUS TYPE 2 IN OBESE (HCC): ICD-10-CM

## 2022-02-16 PROBLEM — J41.0 SMOKERS' COUGH (HCC): Chronic | Status: ACTIVE | Noted: 2022-02-16

## 2022-02-16 PROCEDURE — 3078F DIAST BP <80 MM HG: CPT | Performed by: INTERNAL MEDICINE

## 2022-02-16 PROCEDURE — 3008F BODY MASS INDEX DOCD: CPT | Performed by: INTERNAL MEDICINE

## 2022-02-16 PROCEDURE — 99397 PER PM REEVAL EST PAT 65+ YR: CPT | Performed by: INTERNAL MEDICINE

## 2022-02-16 PROCEDURE — 96160 PT-FOCUSED HLTH RISK ASSMT: CPT | Performed by: INTERNAL MEDICINE

## 2022-02-16 PROCEDURE — G0439 PPPS, SUBSEQ VISIT: HCPCS | Performed by: INTERNAL MEDICINE

## 2022-02-16 PROCEDURE — 3075F SYST BP GE 130 - 139MM HG: CPT | Performed by: INTERNAL MEDICINE

## 2022-02-16 RX ORDER — ALENDRONATE SODIUM 70 MG/1
70 TABLET ORAL WEEKLY
Qty: 12 TABLET | Refills: 3 | Status: SHIPPED | OUTPATIENT
Start: 2022-02-16

## 2022-02-16 NOTE — PATIENT INSTRUCTIONS
Please bring your machine into your next office visit to ensure it is accurate. Please measure your blood pressure and pulse daily and record these values. Please measure your blood pressure two times in the morning and two times in the evening (1 minute apart) after when you have been relaxing for at least 5 minutes. Both feet should be flat on the ground and you should be seated. Please bring these values in at your next visit. Please call us if your blood pressure is greater than 130/80 on 3 occasions. Please do not exceed 3000 mg (3 grams) of tylenol in 24 hours. It is very important you look at the amount of tylenol (acetaminophen) in any of your over the counter medications to ensure you do not exceed a safe amount of tylenol per day. You can use 400-800 mg of ibuprofen 3-4 times daily. You should not exceed 2400 mg in 24 hours. ALWAYS take with food. Do not continue this high dosing for more than 2-4 weeks as ibuprofen products can cause stomach ulcers, worsen heartburn, affect the kidneys and thin the blood. You can also use over the counter lidocaine patches and diclofenac gel. You can use over the counter turmeric daily for your muscle aches    I also advise you get the shingrix vaccine once in a lifetime. This is NEW and considered better than the previous shingles vaccine named, zostavax. It can cost up to $200. I advise you get the annual flu shot every September, October.

## 2022-03-14 ENCOUNTER — TELEPHONE (OUTPATIENT)
Dept: INTERNAL MEDICINE CLINIC | Facility: CLINIC | Age: 77
End: 2022-03-14

## 2022-03-23 RX ORDER — LISINOPRIL 5 MG/1
TABLET ORAL
Qty: 90 TABLET | Refills: 0 | Status: SHIPPED | OUTPATIENT
Start: 2022-03-23

## 2022-04-05 ENCOUNTER — TELEPHONE (OUTPATIENT)
Dept: INTERNAL MEDICINE CLINIC | Facility: CLINIC | Age: 77
End: 2022-04-05

## 2022-04-20 ENCOUNTER — TELEPHONE (OUTPATIENT)
Dept: INTERNAL MEDICINE CLINIC | Facility: CLINIC | Age: 77
End: 2022-04-20

## 2022-04-20 NOTE — TELEPHONE ENCOUNTER
Incoming DM eye exam via fax from [address].  Flowsheet has been updated, report viewed and signed by [facility]

## 2022-04-20 NOTE — TELEPHONE ENCOUNTER
Called pt lmtcb to notify her referral has been authorized and provide Dr. Ramon Luz.     Dr. Octavia Muhammad  Gastroenterologist   [address] [place] [address] Rd    [phone]

## 2022-04-25 RX ORDER — ATORVASTATIN CALCIUM 40 MG/1
40 TABLET, FILM COATED ORAL EVERY EVENING
Qty: 90 TABLET | Refills: 3 | Status: SHIPPED | OUTPATIENT
Start: 2022-04-25 | End: 2023-04-18

## 2022-05-11 ENCOUNTER — OFFICE VISIT (OUTPATIENT)
Dept: INTERNAL MEDICINE CLINIC | Facility: CLINIC | Age: 77
End: 2022-05-11
Payer: MEDICARE

## 2022-05-11 VITALS
HEART RATE: 104 BPM | OXYGEN SATURATION: 8 % | DIASTOLIC BLOOD PRESSURE: 50 MMHG | BODY MASS INDEX: 31.45 KG/M2 | HEIGHT: 58 IN | TEMPERATURE: 98 F | RESPIRATION RATE: 16 BRPM | SYSTOLIC BLOOD PRESSURE: 150 MMHG | WEIGHT: 149.81 LBS

## 2022-05-11 DIAGNOSIS — I15.2 HYPERTENSION ASSOCIATED WITH DIABETES (HCC): ICD-10-CM

## 2022-05-11 DIAGNOSIS — K21.9 GASTROESOPHAGEAL REFLUX DISEASE, UNSPECIFIED WHETHER ESOPHAGITIS PRESENT: ICD-10-CM

## 2022-05-11 DIAGNOSIS — E78.5 HYPERLIPIDEMIA ASSOCIATED WITH TYPE 2 DIABETES MELLITUS (HCC): ICD-10-CM

## 2022-05-11 DIAGNOSIS — E11.59 HYPERTENSION ASSOCIATED WITH DIABETES (HCC): ICD-10-CM

## 2022-05-11 DIAGNOSIS — E66.9 DIABETES MELLITUS TYPE 2 IN OBESE (HCC): Primary | ICD-10-CM

## 2022-05-11 DIAGNOSIS — M81.0 AGE-RELATED OSTEOPOROSIS WITHOUT CURRENT PATHOLOGICAL FRACTURE: ICD-10-CM

## 2022-05-11 DIAGNOSIS — E11.69 DIABETES MELLITUS TYPE 2 IN OBESE (HCC): Primary | ICD-10-CM

## 2022-05-11 DIAGNOSIS — E11.69 HYPERLIPIDEMIA ASSOCIATED WITH TYPE 2 DIABETES MELLITUS (HCC): ICD-10-CM

## 2022-05-11 PROCEDURE — 3078F DIAST BP <80 MM HG: CPT | Performed by: INTERNAL MEDICINE

## 2022-05-11 PROCEDURE — 99214 OFFICE O/P EST MOD 30 MIN: CPT | Performed by: INTERNAL MEDICINE

## 2022-05-11 PROCEDURE — 3077F SYST BP >= 140 MM HG: CPT | Performed by: INTERNAL MEDICINE

## 2022-05-11 PROCEDURE — 3008F BODY MASS INDEX DOCD: CPT | Performed by: INTERNAL MEDICINE

## 2022-05-11 RX ORDER — BLOOD SUGAR DIAGNOSTIC
STRIP MISCELLANEOUS
Qty: 50 STRIP | Refills: 3 | Status: SHIPPED | OUTPATIENT
Start: 2022-05-11

## 2022-05-11 NOTE — PATIENT INSTRUCTIONS
You can also use the following topical agents for your pain. They are over the counter:  1. Topical lidocaine (lidoderm patches). Wear for 12 hours and off for 12 hours  2. Topical diclofenac gel can be used several times/day    Do not lay down for 45 minutes after you swallow the ibuprofen. Please call us with name of physical therapy location you would like us to order a referral for. You are due for your labs in July, 2022.

## 2022-05-13 ENCOUNTER — TELEPHONE (OUTPATIENT)
Dept: INTERNAL MEDICINE CLINIC | Facility: CLINIC | Age: 77
End: 2022-05-13

## 2022-05-13 NOTE — TELEPHONE ENCOUNTER
Pt walked into office and gave physical therapy info for referral.     [address] [facility] [address] [facility], [address]  Ada Reynaga PT   Phone - [phone]  Fax - [phone]    Per pt she has already confirmed with insurance that they will cover

## 2022-06-20 RX ORDER — LISINOPRIL 5 MG/1
5 TABLET ORAL DAILY
Qty: 90 TABLET | Refills: 3 | Status: SHIPPED | OUTPATIENT
Start: 2022-06-20 | End: 2023-04-18

## 2022-07-05 ENCOUNTER — TELEPHONE (OUTPATIENT)
Dept: PHYSICAL THERAPY | Facility: HOSPITAL | Age: 77
End: 2022-07-05

## 2022-07-06 ENCOUNTER — OFFICE VISIT (OUTPATIENT)
Dept: PHYSICAL THERAPY | Age: 77
End: 2022-07-06
Attending: INTERNAL MEDICINE
Payer: MEDICARE

## 2022-07-06 DIAGNOSIS — M47.816 FACET ARTHRITIS OF LUMBAR REGION: ICD-10-CM

## 2022-07-06 DIAGNOSIS — M54.6 THORACIC BACK PAIN, UNSPECIFIED BACK PAIN LATERALITY, UNSPECIFIED CHRONICITY: ICD-10-CM

## 2022-07-06 PROCEDURE — 97161 PT EVAL LOW COMPLEX 20 MIN: CPT

## 2022-07-06 PROCEDURE — 97110 THERAPEUTIC EXERCISES: CPT

## 2022-07-07 ENCOUNTER — TELEPHONE (OUTPATIENT)
Dept: INTERNAL MEDICINE CLINIC | Facility: CLINIC | Age: 77
End: 2022-07-07

## 2022-07-07 NOTE — TELEPHONE ENCOUNTER
Incoming diabetic eye exam via fax from [facility]. Flowsheet has been updated, report placed in KS in basket.

## 2022-07-08 LAB
ALT: 15 U/L (ref 6–29)
AST: 17 U/L (ref 10–35)
BUN/CREATININE RATIO: 17 (CALC) (ref 6–22)
BUN: 18 MG/DL (ref 7–25)
CALCIUM: 8.9 MG/DL (ref 8.6–10.4)
CARBON DIOXIDE: 28 MMOL/L (ref 20–32)
CHLORIDE: 105 MMOL/L (ref 98–110)
CREATININE: 1.06 MG/DL (ref 0.6–0.93)
EGFR IF AFRICN AM: 58 ML/MIN/1.73M2
EGFR IF NONAFRICN AM: 50 ML/MIN/1.73M2
GLUCOSE: 118 MG/DL (ref 65–99)
HEMOGLOBIN A1C: 6.5 % OF TOTAL HGB
POTASSIUM: 4.3 MMOL/L (ref 3.5–5.3)
SODIUM: 140 MMOL/L (ref 135–146)
VITAMIN D, 25-OH, TOTAL: 61 NG/ML (ref 30–100)

## 2022-07-11 ENCOUNTER — OFFICE VISIT (OUTPATIENT)
Dept: PHYSICAL THERAPY | Age: 77
End: 2022-07-11
Attending: INTERNAL MEDICINE
Payer: MEDICARE

## 2022-07-11 DIAGNOSIS — M54.6 THORACIC BACK PAIN, UNSPECIFIED BACK PAIN LATERALITY, UNSPECIFIED CHRONICITY: ICD-10-CM

## 2022-07-11 DIAGNOSIS — M47.816 FACET ARTHRITIS OF LUMBAR REGION: ICD-10-CM

## 2022-07-11 PROCEDURE — 97140 MANUAL THERAPY 1/> REGIONS: CPT

## 2022-07-11 PROCEDURE — 97110 THERAPEUTIC EXERCISES: CPT

## 2022-07-11 NOTE — PROGRESS NOTES
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 States she is compliant with HEP but has questions about exercises. Just came from [facility] and states that they worked on a lot of back strengthening so her right mid back is sore today. Pain: 6/10 right mid back      Objective: lat dorsi tenderness--moderate on right      Assessment: completed todays treatment without c/o increased pain or discomfort following treatment. Reviewed HEP and pt required cues and modifications for proper performance. Pt able to complete correctly with cues. Thoracic mobility did improve with mobilizations. Responded very well to [facility] and reported no pain post treatment. Plan: continue POC. Progress as tolerated. Date: 7/11/2022  TX#: 2/4 Date:                 TX#: 3/ Date:                 TX#: 4/ Date:                 TX#: 5/ Date:    Tx#: 6/   UBE x6 min F/B       Reviewed HEP and answered questions regarding form       Doorway pectoral stretch 3x10 sec       Wall press up feet close to wall x10       Back to wall posture correction with arm slides x10       RTB scapular low rows x20  RTB scapular high rows x15  RTB R shoulder ER x10       Prone: thoracic lateral mobilizations Gr 2/3- x8 min  Prone: STM scapular and lats x8 min  Prone: bilat sh ext x10  hillary pose: 3x10 sec ea for central, R/L          CP x10 min              HEP: 7/11/2022  Continue HEP    Charges: Ex 2 MT 1       Total Timed Treatment: 45 min  Total Treatment Time: 55 min

## 2022-07-13 ENCOUNTER — OFFICE VISIT (OUTPATIENT)
Dept: PHYSICAL THERAPY | Age: 77
End: 2022-07-13
Attending: INTERNAL MEDICINE
Payer: MEDICARE

## 2022-07-13 DIAGNOSIS — M54.6 THORACIC BACK PAIN, UNSPECIFIED BACK PAIN LATERALITY, UNSPECIFIED CHRONICITY: ICD-10-CM

## 2022-07-13 DIAGNOSIS — M47.816 FACET ARTHRITIS OF LUMBAR REGION: ICD-10-CM

## 2022-07-13 PROCEDURE — 97140 MANUAL THERAPY 1/> REGIONS: CPT

## 2022-07-13 PROCEDURE — 97110 THERAPEUTIC EXERCISES: CPT

## 2022-07-13 NOTE — PROGRESS NOTES
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 States she felt good for the rest of the day after last session but soreness came back next morning. Was able to do some yard work on Monday without increases in back pain. Was at [facility] this morning and did a lot of arm work so right shoulder blade area is tender. Pain: 0/10 at rest, with moving back tenderness VAS 4/10      Objective: lat dorsi tenderness--moderate on right      Assessment: completed todays treatment without c/o increased pain or discomfort following treatment. Good tolerance for exercise progression today for scapular strengthening. Thoracic mobility also improving and tolerating gentle mobilizations well. Moderate lat dorsi and scapular tenderness noted during STM. Added 3 way hillary pose to HEP. Denies pain post treatment. Plan: continue POC. Progress as tolerated. Date: 7/11/2022  TX#: 2/4 Date: 7/13/2022             TX#: 3/4 Date:                 TX#: 4/ Date:                 TX#: 5/ Date:    Tx#: 6/   UBE x6 min F/B UBE x5 min backward      Reviewed HEP and answered questions regarding form --      Doorway pectoral stretch 3x10 sec Doorway pectoral stretch 3x10 sec      Wall press up feet close to wall x10 GTB lat pull down 2x10      Back to wall posture correction with arm slides x10 Back to wall posture correction with arm slides x10      RTB scapular low rows x20  RTB scapular high rows x15  RTB R shoulder ER x10 GTB scapular low rows x20  GTB scapular high rows x20  RTB R shoulder ER x10      Prone: thoracic lateral mobilizations Gr 2/3- x8 min  Prone: STM scapular and lats x8 min  Prone: bilat sh ext x10  hillary pose: 3x10 sec ea for central, R/L    Prone: thoracic lateral mobilizations Gr 2/3-, inferior rib mobilizations gentle Gr 2 x8 min  Prone: STM scapular and lats x8 min  Prone: bilat sh ext x10  Prone sh rows x10  hillary pose: 3x10 sec ea for central, R/L       CP x10 min CP x10 min             HEP: 7/11/2022  Continue HEP  7/13/2022  3 way hillary pose    Charges: Ex 2 MT 1       Total Timed Treatment: 45 min  Total Treatment Time: 55 min

## 2022-07-18 ENCOUNTER — OFFICE VISIT (OUTPATIENT)
Dept: PHYSICAL THERAPY | Age: 77
End: 2022-07-18
Attending: INTERNAL MEDICINE
Payer: MEDICARE

## 2022-07-18 DIAGNOSIS — M54.6 THORACIC BACK PAIN, UNSPECIFIED BACK PAIN LATERALITY, UNSPECIFIED CHRONICITY: ICD-10-CM

## 2022-07-18 DIAGNOSIS — M47.816 FACET ARTHRITIS OF LUMBAR REGION: ICD-10-CM

## 2022-07-18 PROCEDURE — 97110 THERAPEUTIC EXERCISES: CPT

## 2022-07-18 PROCEDURE — 97140 MANUAL THERAPY 1/> REGIONS: CPT

## 2022-07-20 ENCOUNTER — OFFICE VISIT (OUTPATIENT)
Dept: PHYSICAL THERAPY | Age: 77
End: 2022-07-20
Attending: INTERNAL MEDICINE
Payer: MEDICARE

## 2022-07-20 PROCEDURE — 97140 MANUAL THERAPY 1/> REGIONS: CPT

## 2022-07-20 PROCEDURE — 97110 THERAPEUTIC EXERCISES: CPT

## 2022-07-20 NOTE — PROGRESS NOTES
Diagnosis:   Facet arthritis of lumbar region (M47.816)  Thoracic back pain, unspecified back pain laterality, unspecified chronicity (M54.6)      Referring Provider: Dany Ruiz  Date of Evaluation:    7/6/22    Precautions:  osteopenia Next MD visit:   none scheduled  Date of Surgery: n/a   Insurance Primary/Secondary: [facility] / N/A     # Auth Visits: 8 approved until 9/19/22           Subjective: Reports she felt good after last session and the stiffness in the mid back has been a lot better too. Mowed her lawn last night and didn't have the back pain or stiffness afterwards which she is very pleased with. Denies pain pre-rx. Pain: 0/10 at rest, feels some stiffness in mid back      Objective: lat dorsi tenderness--moderate on right      Assessment: completed todays treatment without c/o increased pain or discomfort following treatment. Mid thoracic hypomobility present today and did improve with manual therapy. Lower thoracic mobility T8-12 WNL today on assessment. Lat dorsi tenderness improving. Good tolerance for exercise progressions today. Denies pain post treatment. Plan: continue POC. Progress as tolerated. Date: 7/11/2022  TX#: 2/4 Date: 7/13/2022             TX#: 3/4 Date: 7/18/2022            TX#: 4/4 Date: 7/20/2022             TX#: 5/8 Date:    Tx#: 6/   UBE x6 min F/B UBE x5 min backward UBE x5 min backward UBE x5 min backward    Reviewed HEP and answered questions regarding form --      Doorway pectoral stretch 3x10 sec Doorway pectoral stretch 3x10 sec Doorway pectoral stretch 3x10 sec Doorway pectoral stretch 3x10 sec    Wall press up feet close to wall x10 GTB lat pull down 2x10      Back to wall posture correction with arm slides x10 Back to wall posture correction with arm slides x10 Back to wall posture correction with arm slides x10 Back to wall posture correction with arm slides x10    RTB scapular low rows x20  RTB scapular high rows x15  RTB R shoulder ER x10 GTB scapular low rows x20  GTB scapular high rows x20  RTB R shoulder ER x10 GTB scapular low rows x20  GTB scapular high rows x20  GTB bilat sh ext x20  RTB R/L shoulder ER 2x10 GTB scapular low rows x20  GTB scapular high rows x20  GTB bilat sh ext x20  RTB bilat shoulder ER in neutral 2x10       Free motion: 2 plates lat pull down 2x10    Prone: thoracic lateral mobilizations Gr 2/3- x8 min  Prone: STM scapular and lats x8 min  Prone: bilat sh ext x10  hillary pose: 3x10 sec ea for central, R/L    Prone: thoracic lateral mobilizations Gr 2/3-, inferior rib mobilizations gentle Gr 2 x8 min  Prone: STM scapular and lats x8 min  Prone: bilat sh ext x10  Prone sh rows x10  hillary pose: 3x10 sec ea for central, R/L  Prone: thoracic lateral mobilizations Gr 2/3-, inferior rib mobilizations gentle Gr 2 x8 min  Prone: STM scapular and lats x8 min  Re-assess Prone: thoracic lateral mobilizations Gr 2/3-, inferior rib mobilizations gentle Gr 2 x8 min  Prone: STM scapular and lats x8 min  Prone: bilat sh ext x10  Prone sh rows x10  Prone sh h abd x10    CP x10 min CP x10 min             HEP: 7/11/2022  Continue HEP  7/13/2022  3 way hillary pose    Charges: Ex 2 MT 1       Total Timed Treatment: 45 min  Total Treatment Time: 45 min

## 2022-07-25 ENCOUNTER — OFFICE VISIT (OUTPATIENT)
Dept: PHYSICAL THERAPY | Age: 77
End: 2022-07-25
Attending: INTERNAL MEDICINE
Payer: MEDICARE

## 2022-07-25 PROCEDURE — 97110 THERAPEUTIC EXERCISES: CPT

## 2022-07-25 PROCEDURE — 97140 MANUAL THERAPY 1/> REGIONS: CPT

## 2022-07-25 NOTE — PROGRESS NOTES
Diagnosis:   Facet arthritis of lumbar region (M47.816)  Thoracic back pain, unspecified back pain laterality, unspecified chronicity (M54.6)      Referring Provider: James Nolan  Date of Evaluation:    7/6/22    Precautions:  osteopenia Next MD visit:   none scheduled  Date of Surgery: n/a   Insurance Primary/Secondary: [facility] / N/A     # Auth Visits: 8 approved until 9/19/22           Subjective: Reports she has been having some gastric issues for the past 10 weeks with loose stool and is being treated with Regan Epp. Over the weekend she had been feeling very constipated and had some epigastric discomfort with burping. States that is it a little better today but she is planning to call MD today for an appt. Did not go to [facility] today or [facility] yesterday due to not feeling well. Pain: 0/10 at rest, feels some stiffness in mid back      Objective: lat dorsi tenderness--mild on right  BP: 150/78  SPO2: 98%, HR: 88bpm      Assessment: completed todays treatment without c/o increased pain or discomfort following treatment. Deferred prone lying exercises due to her gastric issues that were exacerbated yesterday. Advised pt to contact MD or go to urgent care if her gastric pain does not improve or worsens. Patient states understanding. Vitals were taken today and everything was WNL except systolic BP which was 223 mmHg. She tolerated strengthening and posture exercises well today and did not have increases in her gastric or back pain. Plan: continue POC. Progress as tolerated.   Date: 7/18/2022            TX#: 4/4 Date: 7/20/2022             TX#: 5/8 Date: 7/25/2022  Tx#: 6/8   UBE x5 min backward UBE x5 min backward  re-assess        Doorway pectoral stretch 3x10 sec Doorway pectoral stretch 3x10 sec Doorway pectoral stretch 3x10 sec        Back to wall posture correction with arm slides x10 Back to wall posture correction with arm slides x10 Back to wall posture correction with arm slides x10   GTB scapular low rows x20  GTB scapular high rows x20  GTB bilat sh ext x20  RTB R/L shoulder ER 2x10 GTB scapular low rows x20  GTB scapular high rows x20  GTB bilat sh ext x20  RTB bilat shoulder ER in neutral 2x10 GTB scapular low rows x20  GTB scapular high rows x20  GTB bilat sh ext x20  RTB bilat shoulder ER in neutral 2x10    Free motion: 2 plates lat pull down 2x10 Free motion: 2 plates lat pull down 2x10   Prone: thoracic lateral mobilizations Gr 2/3-, inferior rib mobilizations gentle Gr 2 x8 min  Prone: STM scapular and lats x8 min  Re-assess Prone: thoracic lateral mobilizations Gr 2/3-, inferior rib mobilizations gentle Gr 2 x8 min  Prone: STM scapular and lats x8 min  Prone: bilat sh ext x10  Prone sh rows x10  Prone sh h abd x10 Sidelying: STM scapular and lats x8 min    Deferred prone lying due to gastric discomfort             HEP: 7/11/2022  Continue HEP  7/13/2022  3 way hillary pose    Charges: Ex 2 MT 1       Total Timed Treatment: 45 min  Total Treatment Time: 45 min

## 2022-07-27 ENCOUNTER — OFFICE VISIT (OUTPATIENT)
Dept: PHYSICAL THERAPY | Age: 77
End: 2022-07-27
Attending: INTERNAL MEDICINE
Payer: MEDICARE

## 2022-07-27 PROCEDURE — 97140 MANUAL THERAPY 1/> REGIONS: CPT

## 2022-07-27 PROCEDURE — 97110 THERAPEUTIC EXERCISES: CPT

## 2022-07-27 NOTE — PROGRESS NOTES
Diagnosis:   Facet arthritis of lumbar region (M47.816)  Thoracic back pain, unspecified back pain laterality, unspecified chronicity (M54.6)      Referring Provider: James Nolan  Date of Evaluation:    7/6/22    Precautions:  osteopenia Next MD visit:   none scheduled  Date of Surgery: n/a   Insurance Primary/Secondary: [facility] / N/A     # Auth Visits: 8 approved until 9/19/22           Subjective: Reports that she is feeling better with her gastric issue. Used a stool to place her feet on when on the toilet which was able to have a couple good BM's. States that her mid back is feeling better and doesn't have stiffness or pain today. Does feel a little tenderness in the right lat dorsi area. Also still feels a little tenderness in her abdomen so she doesn't want to do the prone lying exercises today. Pain: 0/10 at rest,      Objective: lat dorsi tenderness--mild on right        Assessment: completed todays treatment without c/o increased pain or discomfort following treatment. Good tolerance for exercise progressions today. Lat dorsi muscle tenderness is improving. Not able to assess thoracic mobility due to pt deferring prone lying due to gastric issues. Overall is progressing well.   Plan:Re-assess next session  Date: 7/18/2022            TX#: 4/4 Date: 7/20/2022             TX#: 5/8 Date: 7/25/2022  Tx#: 6/8 Date: 7/27/2022  Tx# 7/8   UBE x5 min backward UBE x5 min backward  re-assess UBE retro x5 min         Doorway pectoral stretch 3x10 sec Doorway pectoral stretch 3x10 sec Doorway pectoral stretch 3x10 sec Corner stretch 3x10 sec         Back to wall posture correction with arm slides x10 Back to wall posture correction with arm slides x10 Back to wall posture correction with arm slides x10 Back to wall posture correction with arm slides x10   GTB scapular low rows x20  GTB scapular high rows x20  GTB bilat sh ext x20  RTB R/L shoulder ER 2x10 GTB scapular low rows x20  GTB scapular high rows x20  GTB bilat sh ext x20  RTB bilat shoulder ER in neutral 2x10 GTB scapular low rows x20  GTB scapular high rows x20  GTB bilat sh ext x20  RTB bilat shoulder ER in neutral 2x10 GTB scapular low rows x25  GTB scapular high rows x25  GTB bilat sh ext x25  Seated scap squeeze 10x 5 sec  bilat sh ER in neutral 2# db x15      Free motion: 2 plates lat pull down 2x10 Free motion: 2 plates lat pull down 2x10 Free motion: 2 plates lat pull down 2x10   Prone: thoracic lateral mobilizations Gr 2/3-, inferior rib mobilizations gentle Gr 2 x8 min  Prone: STM scapular and lats x8 min  Re-assess Prone: thoracic lateral mobilizations Gr 2/3-, inferior rib mobilizations gentle Gr 2 x8 min  Prone: STM scapular and lats x8 min  Prone: bilat sh ext x10  Prone sh rows x10  Prone sh h abd x10 Sidelying: STM scapular and lats x8 min    Deferred prone lying due to gastric discomfort Sidelying: STM scapular and lats x8 min               HEP: 7/11/2022  Continue HEP  7/13/2022  3 way hillary pose    Charges: Ex 2 MT 1       Total Timed Treatment: 45 min  Total Treatment Time: 45 min

## 2022-08-01 ENCOUNTER — OFFICE VISIT (OUTPATIENT)
Dept: PHYSICAL THERAPY | Age: 77
End: 2022-08-01
Attending: INTERNAL MEDICINE
Payer: MEDICARE

## 2022-08-01 PROCEDURE — 97140 MANUAL THERAPY 1/> REGIONS: CPT

## 2022-08-01 PROCEDURE — 97110 THERAPEUTIC EXERCISES: CPT

## 2022-08-02 ENCOUNTER — TELEPHONE (OUTPATIENT)
Dept: INTERNAL MEDICINE CLINIC | Facility: CLINIC | Age: 77
End: 2022-08-02

## 2022-08-02 NOTE — TELEPHONE ENCOUNTER
Incoming diabetic eye exam via fax from [address] Flowsheet has been updated, report placed in [place] un basket.

## 2022-08-05 RX ORDER — CLOBETASOL PROPIONATE 0.5 MG/G
CREAM TOPICAL
Qty: 15 G | Refills: 0 | Status: SHIPPED | OUTPATIENT
Start: 2022-08-05

## 2022-08-15 RX ORDER — OMEPRAZOLE 20 MG/1
CAPSULE, DELAYED RELEASE ORAL
Qty: 90 CAPSULE | Refills: 3 | Status: SHIPPED
Start: 2022-08-15

## 2022-09-07 ENCOUNTER — TELEPHONE (OUTPATIENT)
Dept: INTERNAL MEDICINE CLINIC | Facility: CLINIC | Age: 77
End: 2022-09-07

## 2022-09-07 DIAGNOSIS — Z12.31 VISIT FOR SCREENING MAMMOGRAM: Primary | ICD-10-CM

## 2022-09-07 NOTE — TELEPHONE ENCOUNTER
Pt stated she needs to get the latest covid booster shoot as soon as possible. However she is due for her mammogram. Pt stated she received a letter in the mail about being due for her mammogram followed by covid regulations where it is advised for pt's to wait 4 weeks after receiving the booster or notify staff before scheduling mammogram. Pt stated she would rather wait the 4 weeks after and she would need a new order because the one in the system will  soon. Please advise.

## 2022-09-07 NOTE — TELEPHONE ENCOUNTER
Pended new order for mammogram,, please review and sign if approve.     Pt wants covid booster now, then wait 4 weeks for jarod.

## 2022-09-12 NOTE — TELEPHONE ENCOUNTER
Called pt and informed that mammogram was ordered and it's good for a year. Pt stated she'll schedule for around beginning of October.

## 2022-09-27 ENCOUNTER — HOSPITAL ENCOUNTER (OUTPATIENT)
Dept: MAMMOGRAPHY | Age: 77
Discharge: HOME | End: 2022-09-27
Attending: INTERNAL MEDICINE

## 2022-09-27 DIAGNOSIS — Z12.31 VISIT FOR SCREENING MAMMOGRAM: ICD-10-CM

## 2022-09-27 PROCEDURE — 77067 SCR MAMMO BI INCL CAD: CPT | Performed by: INTERNAL MEDICINE

## 2022-09-27 PROCEDURE — 77063 BREAST TOMOSYNTHESIS BI: CPT | Performed by: INTERNAL MEDICINE

## 2022-11-16 ENCOUNTER — TELEPHONE (OUTPATIENT)
Dept: INTERNAL MEDICINE CLINIC | Facility: CLINIC | Age: 77
End: 2022-11-16

## 2022-11-16 DIAGNOSIS — M81.0 AGE-RELATED OSTEOPOROSIS WITHOUT CURRENT PATHOLOGICAL FRACTURE: Primary | ICD-10-CM

## 2022-11-16 NOTE — TELEPHONE ENCOUNTER
Patient is requesting an order for a dexascan, her last scan was 09/2020. She started Fosamax in the spring and would like to know if it is making a difference. Would like a call once order is entered.

## 2022-12-13 ENCOUNTER — HOSPITAL ENCOUNTER (OUTPATIENT)
Dept: BONE DENSITY | Age: 77
Discharge: HOME | End: 2022-12-13
Attending: INTERNAL MEDICINE
Payer: MEDICARE

## 2022-12-13 DIAGNOSIS — M81.0 AGE-RELATED OSTEOPOROSIS WITHOUT CURRENT PATHOLOGICAL FRACTURE: ICD-10-CM

## 2022-12-13 PROCEDURE — 77080 DXA BONE DENSITY AXIAL: CPT | Performed by: INTERNAL MEDICINE

## 2023-01-18 RX ORDER — ALENDRONATE SODIUM 70 MG/1
TABLET ORAL
Qty: 12 TABLET | Refills: 0 | Status: SHIPPED | OUTPATIENT
Start: 2023-01-18

## 2023-01-25 ENCOUNTER — TELEPHONE (OUTPATIENT)
Dept: SURGERY | Facility: CLINIC | Age: 78
End: 2023-01-25

## 2023-01-25 ENCOUNTER — TELEPHONE (OUTPATIENT)
Dept: INTERNAL MEDICINE CLINIC | Facility: CLINIC | Age: 78
End: 2023-01-25

## 2023-01-25 NOTE — TELEPHONE ENCOUNTER
TRUE; she has not seen me over 3 years, so she is no longer an established pt of mine anymore, will defer all to her PCP.

## 2023-02-06 NOTE — TELEPHONE ENCOUNTER
Refill request for OMEPRAZOLE 20 MG Oral.    Requesting as new prescription since updating insurance and now needs to be sent to new pharmacy.      [address] [facility], [address]  [address] e [phone], [phone]   [address] [id number]   Phone: [phone] Fax: [phone]

## 2023-02-07 RX ORDER — OMEPRAZOLE 20 MG/1
20 CAPSULE, DELAYED RELEASE ORAL DAILY
Qty: 90 CAPSULE | Refills: 0 | Status: SHIPPED | OUTPATIENT
Start: 2023-02-07

## 2023-04-06 RX ORDER — ALENDRONATE SODIUM 70 MG/1
TABLET ORAL
Qty: 12 TABLET | Refills: 0 | Status: SHIPPED | OUTPATIENT
Start: 2023-04-06

## 2023-04-06 NOTE — TELEPHONE ENCOUNTER
Future Appointments   Date Time Provider Maral Rivera   6/2/2023  2:00 PM Seema Avilez MD [facility] [place]     Pt informed and scheduled MAS.

## 2023-04-18 RX ORDER — LISINOPRIL 5 MG/1
TABLET ORAL
Qty: 90 TABLET | Refills: 0 | Status: SHIPPED | OUTPATIENT
Start: 2023-04-18

## 2023-04-18 RX ORDER — ATORVASTATIN CALCIUM 40 MG/1
40 TABLET, FILM COATED ORAL EVERY EVENING
Qty: 90 TABLET | Refills: 0 | Status: SHIPPED | OUTPATIENT
Start: 2023-04-18

## 2023-04-18 RX ORDER — OMEPRAZOLE 20 MG/1
CAPSULE, DELAYED RELEASE ORAL
Qty: 90 CAPSULE | Refills: 0 | Status: SHIPPED | OUTPATIENT
Start: 2023-04-18

## 2023-04-18 RX ORDER — ALENDRONATE SODIUM 70 MG/1
TABLET ORAL
Qty: 12 TABLET | Refills: 0 | Status: SHIPPED | OUTPATIENT
Start: 2023-04-18

## 2023-06-01 NOTE — TELEPHONE ENCOUNTER
I called and let the patient know the medication was sent in and that she will need someone to drive her there and back. She voiced understanding and had no further questions.    Protocol failed     Requesting: atorvastatin 40mg     LOV: 4/28/21   # HLP, HyperTG with T2 DM: well controlled on atorvastatin  RTC: 6 months   Filled: 10/20/21 #90 0 refills   Recent Labs: 1/11/22     Upcoming OV: 2/16/22

## 2023-06-02 ENCOUNTER — OFFICE VISIT (OUTPATIENT)
Dept: INTERNAL MEDICINE CLINIC | Facility: CLINIC | Age: 78
End: 2023-06-02
Payer: MEDICARE

## 2023-06-02 VITALS
DIASTOLIC BLOOD PRESSURE: 70 MMHG | BODY MASS INDEX: 31.14 KG/M2 | OXYGEN SATURATION: 98 % | SYSTOLIC BLOOD PRESSURE: 150 MMHG | RESPIRATION RATE: 16 BRPM | HEIGHT: 58 IN | WEIGHT: 148.38 LBS | HEART RATE: 98 BPM | TEMPERATURE: 98 F

## 2023-06-02 DIAGNOSIS — K21.9 GASTROESOPHAGEAL REFLUX DISEASE, UNSPECIFIED WHETHER ESOPHAGITIS PRESENT: ICD-10-CM

## 2023-06-02 DIAGNOSIS — E11.69 HYPERLIPIDEMIA ASSOCIATED WITH TYPE 2 DIABETES MELLITUS (HCC): ICD-10-CM

## 2023-06-02 DIAGNOSIS — I15.2 HYPERTENSION ASSOCIATED WITH DIABETES (HCC): ICD-10-CM

## 2023-06-02 DIAGNOSIS — Z00.00 ROUTINE GENERAL MEDICAL EXAMINATION AT A HEALTH CARE FACILITY: Primary | ICD-10-CM

## 2023-06-02 DIAGNOSIS — E78.5 HYPERLIPIDEMIA ASSOCIATED WITH TYPE 2 DIABETES MELLITUS (HCC): ICD-10-CM

## 2023-06-02 DIAGNOSIS — E66.9 DIABETES MELLITUS TYPE 2 IN OBESE (HCC): ICD-10-CM

## 2023-06-02 DIAGNOSIS — E11.22 CKD STAGE 3 DUE TO TYPE 2 DIABETES MELLITUS (HCC): ICD-10-CM

## 2023-06-02 DIAGNOSIS — N18.30 CKD STAGE 3 DUE TO TYPE 2 DIABETES MELLITUS (HCC): ICD-10-CM

## 2023-06-02 DIAGNOSIS — M81.0 AGE-RELATED OSTEOPOROSIS WITHOUT CURRENT PATHOLOGICAL FRACTURE: ICD-10-CM

## 2023-06-02 DIAGNOSIS — E11.59 HYPERTENSION ASSOCIATED WITH DIABETES (HCC): ICD-10-CM

## 2023-06-02 DIAGNOSIS — M46.96 INFLAMMATORY SPONDYLOPATHY OF LUMBAR REGION (HCC): ICD-10-CM

## 2023-06-02 DIAGNOSIS — Z12.31 VISIT FOR SCREENING MAMMOGRAM: ICD-10-CM

## 2023-06-02 DIAGNOSIS — E11.9 DIET-CONTROLLED DIABETES MELLITUS (HCC): ICD-10-CM

## 2023-06-02 DIAGNOSIS — R20.2 PARESTHESIA: ICD-10-CM

## 2023-06-02 DIAGNOSIS — E11.69 DIABETES MELLITUS TYPE 2 IN OBESE (HCC): ICD-10-CM

## 2023-06-02 DIAGNOSIS — M76.62 TENDONITIS, ACHILLES, LEFT: ICD-10-CM

## 2023-06-02 PROBLEM — J41.0 SMOKERS' COUGH (HCC): Chronic | Status: RESOLVED | Noted: 2023-06-02 | Resolved: 2023-06-02

## 2023-06-02 PROBLEM — J41.0 SMOKERS' COUGH (HCC): Chronic | Status: ACTIVE | Noted: 2023-06-02

## 2023-06-02 PROCEDURE — 3008F BODY MASS INDEX DOCD: CPT | Performed by: INTERNAL MEDICINE

## 2023-06-02 PROCEDURE — 3078F DIAST BP <80 MM HG: CPT | Performed by: INTERNAL MEDICINE

## 2023-06-02 PROCEDURE — 1160F RVW MEDS BY RX/DR IN RCRD: CPT | Performed by: INTERNAL MEDICINE

## 2023-06-02 PROCEDURE — 1125F AMNT PAIN NOTED PAIN PRSNT: CPT | Performed by: INTERNAL MEDICINE

## 2023-06-02 PROCEDURE — 1159F MED LIST DOCD IN RCRD: CPT | Performed by: INTERNAL MEDICINE

## 2023-06-02 PROCEDURE — 1170F FXNL STATUS ASSESSED: CPT | Performed by: INTERNAL MEDICINE

## 2023-06-02 PROCEDURE — 96160 PT-FOCUSED HLTH RISK ASSMT: CPT | Performed by: INTERNAL MEDICINE

## 2023-06-02 PROCEDURE — G0439 PPPS, SUBSEQ VISIT: HCPCS | Performed by: INTERNAL MEDICINE

## 2023-06-02 PROCEDURE — 3077F SYST BP >= 140 MM HG: CPT | Performed by: INTERNAL MEDICINE

## 2023-06-02 PROCEDURE — 99214 OFFICE O/P EST MOD 30 MIN: CPT | Performed by: INTERNAL MEDICINE

## 2023-06-02 RX ORDER — ATORVASTATIN CALCIUM 80 MG/1
80 TABLET, FILM COATED ORAL DAILY
Qty: 90 TABLET | Refills: 1 | Status: SHIPPED | OUTPATIENT
Start: 2023-06-02

## 2023-06-02 RX ORDER — CLOBETASOL PROPIONATE 0.5 MG/G
CREAM TOPICAL
Qty: 15 G | Refills: 0 | Status: SHIPPED | OUTPATIENT
Start: 2023-06-02

## 2023-06-02 RX ORDER — BLOOD SUGAR DIAGNOSTIC
1 STRIP MISCELLANEOUS
Qty: 50 STRIP | Refills: 3 | Status: SHIPPED | OUTPATIENT
Start: 2023-06-02

## 2023-06-02 NOTE — PATIENT INSTRUCTIONS
Please take lisinopril every evening. Please increase your atorvastatin to 80 mg daily and repeat your labs in 3 months. You do not need to fast.    You can use 400-800 mg of ibuprofen 3-4 times daily. You should not exceed 2400 mg in 24 hours. ALWAYS take with food. Do not continue this high dosing for more than 2-4 weeks as ibuprofen products can cause stomach ulcers, worsen heartburn, affect the kidneys and thin the blood.

## 2023-08-14 RX ORDER — OMEPRAZOLE 20 MG/1
20 CAPSULE, DELAYED RELEASE ORAL DAILY
Qty: 90 CAPSULE | Refills: 3 | Status: SHIPPED | OUTPATIENT
Start: 2023-08-14

## 2023-08-14 NOTE — TELEPHONE ENCOUNTER
LOV: 6/2/2023 with Dr. Spencer Arnold  RTC: 1 year  Last Relevant Labs: 2/10/2023  Filled: 4/18/2023    #90 with 0 refills    No future appointments.

## 2023-08-29 ENCOUNTER — OFFICE VISIT (OUTPATIENT)
Dept: NEUROLOGY | Facility: CLINIC | Age: 78
End: 2023-08-29
Payer: MEDICARE

## 2023-08-29 VITALS — SYSTOLIC BLOOD PRESSURE: 140 MMHG | DIASTOLIC BLOOD PRESSURE: 82 MMHG | HEART RATE: 84 BPM | RESPIRATION RATE: 16 BRPM

## 2023-08-29 DIAGNOSIS — G43.109 OCULAR MIGRAINE: Primary | ICD-10-CM

## 2023-08-29 PROCEDURE — 1159F MED LIST DOCD IN RCRD: CPT | Performed by: OTHER

## 2023-08-29 PROCEDURE — 3079F DIAST BP 80-89 MM HG: CPT | Performed by: OTHER

## 2023-08-29 PROCEDURE — 3077F SYST BP >= 140 MM HG: CPT | Performed by: OTHER

## 2023-08-29 PROCEDURE — 99204 OFFICE O/P NEW MOD 45 MIN: CPT | Performed by: OTHER

## 2023-08-29 RX ORDER — DIVALPROEX SODIUM 250 MG/1
TABLET, EXTENDED RELEASE ORAL
Qty: 60 TABLET | Refills: 3 | Status: SHIPPED | OUTPATIENT
Start: 2023-08-29

## 2023-09-02 LAB
ALT: 11 U/L (ref 6–29)
AST: 14 U/L (ref 10–35)
BUN: 11 MG/DL (ref 7–25)
CALCIUM: 8.9 MG/DL (ref 8.6–10.4)
CARBON DIOXIDE: 29 MMOL/L (ref 20–32)
CHLORIDE: 105 MMOL/L (ref 98–110)
CHOL/HDLC RATIO: 2.8 (CALC)
CHOLESTEROL, TOTAL: 145 MG/DL
CREATININE: 0.99 MG/DL (ref 0.6–1)
EGFR: 58 ML/MIN/1.73M2
FOLATE, SERUM: >24 NG/ML
GLUCOSE: 157 MG/DL (ref 65–139)
HDL CHOLESTEROL: 51 MG/DL
HEMOGLOBIN A1C: 7 % OF TOTAL HGB
LDL-CHOLESTEROL: 69 MG/DL (CALC)
NON-HDL CHOLESTEROL: 94 MG/DL (CALC)
POTASSIUM: 3.7 MMOL/L (ref 3.5–5.3)
SODIUM: 143 MMOL/L (ref 135–146)
TRIGLYCERIDES: 170 MG/DL
VITAMIN B12: 685 PG/ML (ref 200–1100)

## 2023-09-13 ENCOUNTER — TELEPHONE (OUTPATIENT)
Dept: NEUROLOGY | Facility: CLINIC | Age: 78
End: 2023-09-13

## 2023-09-19 NOTE — TELEPHONE ENCOUNTER
LOV: 6/2/2023 with Dr. Karishma Mansfield  RTC: 1 year  Last Relevant Labs: 8/30/2023  Filled: 4/18/2023   #90 with 0 refills    Future Appointments   Date Time Provider Maral Soaresi   9/22/2023  1:00 PM Zay Lawrence MD [facility] [place]   10/2/2023  1:20 PM BBK TRAE RM1 [facility] [place]   10/11/2023 10:30 AM BK MR RM1 (1.5T) BK [address]   12/5/2023 11:00 AM Alejandro Ma MD [facility] [address] [facility] [address]

## 2023-09-20 RX ORDER — LISINOPRIL 5 MG/1
5 TABLET ORAL DAILY
Qty: 90 TABLET | Refills: 0 | Status: SHIPPED | OUTPATIENT
Start: 2023-09-20

## 2023-09-22 ENCOUNTER — OFFICE VISIT (OUTPATIENT)
Dept: INTERNAL MEDICINE CLINIC | Facility: CLINIC | Age: 78
End: 2023-09-22
Payer: MEDICARE

## 2023-09-22 VITALS
WEIGHT: 144 LBS | BODY MASS INDEX: 30.23 KG/M2 | RESPIRATION RATE: 12 BRPM | OXYGEN SATURATION: 98 % | HEIGHT: 58 IN | TEMPERATURE: 98 F | DIASTOLIC BLOOD PRESSURE: 64 MMHG | HEART RATE: 70 BPM | SYSTOLIC BLOOD PRESSURE: 136 MMHG

## 2023-09-22 DIAGNOSIS — E11.9 DIET-CONTROLLED DIABETES MELLITUS (HCC): ICD-10-CM

## 2023-09-22 DIAGNOSIS — I15.2 HYPERTENSION ASSOCIATED WITH DIABETES: ICD-10-CM

## 2023-09-22 DIAGNOSIS — E11.59 HYPERTENSION ASSOCIATED WITH DIABETES: ICD-10-CM

## 2023-09-22 DIAGNOSIS — G43.109 OCULAR MIGRAINE: Primary | ICD-10-CM

## 2023-09-22 PROCEDURE — 99215 OFFICE O/P EST HI 40 MIN: CPT | Performed by: INTERNAL MEDICINE

## 2023-09-22 PROCEDURE — 3008F BODY MASS INDEX DOCD: CPT | Performed by: INTERNAL MEDICINE

## 2023-09-22 PROCEDURE — 3075F SYST BP GE 130 - 139MM HG: CPT | Performed by: INTERNAL MEDICINE

## 2023-09-22 PROCEDURE — 1160F RVW MEDS BY RX/DR IN RCRD: CPT | Performed by: INTERNAL MEDICINE

## 2023-09-22 PROCEDURE — 1159F MED LIST DOCD IN RCRD: CPT | Performed by: INTERNAL MEDICINE

## 2023-09-22 PROCEDURE — 1170F FXNL STATUS ASSESSED: CPT | Performed by: INTERNAL MEDICINE

## 2023-09-22 PROCEDURE — 3078F DIAST BP <80 MM HG: CPT | Performed by: INTERNAL MEDICINE

## 2023-09-25 RX ORDER — ALENDRONATE SODIUM 70 MG/1
70 TABLET ORAL WEEKLY
Qty: 12 TABLET | Refills: 3 | Status: SHIPPED | OUTPATIENT
Start: 2023-09-25

## 2023-09-25 NOTE — TELEPHONE ENCOUNTER
LOV: 9/22/2023 with Dr. Rupesh Lion  RTC: 6 months  Last Relevant Labs: 8/30/2023  Last bone density scan 12/13/2022  Filled: 4/18/2023    #12 with 0 refills    Future Appointments   Date Time Provider Maral Rivera   10/2/2023  1:20 PM BBK TRAE RM1 [facility] [place]   10/11/2023 10:30 AM BK MR RM1 (1.5T) BK [address]   12/5/2023 11:00 AM Suellen Shepard MD EM[facility] [address] [facility] [address]

## 2023-09-28 ENCOUNTER — TELEPHONE (OUTPATIENT)
Dept: NEUROLOGY | Facility: CLINIC | Age: 78
End: 2023-09-28

## 2023-09-28 NOTE — TELEPHONE ENCOUNTER
Attempted to call pt but was bad connection. Will try again later        Per Epic review:    Type Date User Summary Attachment   ANSI 278 Information 09/27/2023 10:58 AM Abhinav, Jhonny ANSI Authorization Response for DANNIELLE MCR -   Note:  Referral was marked as Authorized     Service Information  Action code:  A1-Certified in total    Authorization code:  [id number]    Trace number:  [id number] (Assigned by: [id number])    Note:  ----------Case Summary-----------, Status: APPROVED, Status Detail: Your case has been approved by the benefit manager., BENEFIT MANAGER: Ghada, ----------Decision Data----------, Status: APPROVED, Procedure Code: 28553, Authorization Number: [id number], Start Date: 2023-09-26, End Date: 2024-03-24, Status: APPROVED, Procedure Code: 08107, Authorization Number: [id number], Start Date: 2023-09-27, End Date: 2024-03-25, Status: APPROVED, Procedure Code: 88966, Authorization Number: [id number], Start Date: 2023-09-26, ---------------------------------, Link: [email or web address]    Certification expires:  9/87/7981    Certification from:  9/26/2023

## 2023-09-28 NOTE — TELEPHONE ENCOUNTER
Pt is inquiring if her imaging requests have been approved by [address] [place] [address] . She also has questions regarding the imaging. Pt's best call back number is [phone]. Endorsed to [facility].

## 2023-10-02 ENCOUNTER — HOSPITAL ENCOUNTER (OUTPATIENT)
Dept: MAMMOGRAPHY | Age: 78
Discharge: HOME | End: 2023-10-02
Attending: INTERNAL MEDICINE
Payer: MEDICARE

## 2023-10-02 DIAGNOSIS — Z12.31 VISIT FOR SCREENING MAMMOGRAM: ICD-10-CM

## 2023-10-02 PROCEDURE — 77063 BREAST TOMOSYNTHESIS BI: CPT | Performed by: INTERNAL MEDICINE

## 2023-10-02 PROCEDURE — 77067 SCR MAMMO BI INCL CAD: CPT | Performed by: INTERNAL MEDICINE

## 2023-10-11 ENCOUNTER — HOSPITAL ENCOUNTER (OUTPATIENT)
Dept: MRI IMAGING | Age: 78
Discharge: HOME | DRG: 023 | End: 2023-10-11
Attending: Other

## 2023-10-11 ENCOUNTER — TELEPHONE (OUTPATIENT)
Dept: NEUROLOGY | Facility: CLINIC | Age: 78
End: 2023-10-11

## 2023-10-11 ENCOUNTER — ANESTHESIA (OUTPATIENT)
Dept: SURGERY | Facility: HOSPITAL | Age: 78
End: 2023-10-11
Payer: MEDICARE

## 2023-10-11 ENCOUNTER — HOSPITAL ENCOUNTER (INPATIENT)
Facility: HOSPITAL | Age: 78
LOS: 14 days | Discharge: NURSING, REHAB OR LONG-TERM CARE | DRG: 023 | End: 2023-10-25
Attending: EMERGENCY MEDICINE | Admitting: HOSPITALIST

## 2023-10-11 ENCOUNTER — HOSPITAL ENCOUNTER (OUTPATIENT)
Dept: MRI IMAGING | Age: 78
Discharge: HOME | End: 2023-10-11
Attending: Other

## 2023-10-11 ENCOUNTER — APPOINTMENT (OUTPATIENT)
Dept: CT IMAGING | Facility: HOSPITAL | Age: 78
DRG: 023 | End: 2023-10-11
Attending: EMERGENCY MEDICINE

## 2023-10-11 ENCOUNTER — ANESTHESIA EVENT (OUTPATIENT)
Dept: SURGERY | Facility: HOSPITAL | Age: 78
End: 2023-10-11
Payer: MEDICARE

## 2023-10-11 ENCOUNTER — APPOINTMENT (OUTPATIENT)
Dept: GENERAL RADIOLOGY | Facility: HOSPITAL | Age: 78
DRG: 023 | End: 2023-10-11
Attending: EMERGENCY MEDICINE

## 2023-10-11 DIAGNOSIS — I60.9 SAH (SUBARACHNOID HEMORRHAGE) (HCC): Primary | ICD-10-CM

## 2023-10-11 DIAGNOSIS — I62.9 INTRACRANIAL HEMORRHAGE (HCC): Primary | ICD-10-CM

## 2023-10-11 DIAGNOSIS — G43.109 OCULAR MIGRAINE: ICD-10-CM

## 2023-10-11 DIAGNOSIS — R58 HEMORRHAGE: ICD-10-CM

## 2023-10-11 LAB
ALBUMIN SERPL-MCNC: 2.7 G/DL (ref 3.4–5)
ALBUMIN/GLOB SERPL: 0.8 {RATIO} (ref 1–2)
ALP LIVER SERPL-CCNC: 58 U/L
ALT SERPL-CCNC: 21 U/L
ANION GAP SERPL CALC-SCNC: 8 MMOL/L (ref 0–18)
ANTIBODY SCREEN: NEGATIVE
APTT PPP: 25.5 SECONDS (ref 23.3–35.6)
ARTERIAL PATENCY WRIST A: POSITIVE
AST SERPL-CCNC: 16 U/L (ref 15–37)
BASE EXCESS BLD CALC-SCNC: 2 MMOL/L
BASE EXCESS BLDA CALC-SCNC: 7.2 MMOL/L (ref ?–2)
BASOPHILS # BLD AUTO: 0.04 X10(3) UL (ref 0–0.2)
BASOPHILS NFR BLD AUTO: 0.5 %
BILIRUB SERPL-MCNC: 0.3 MG/DL (ref 0.1–2)
BODY TEMPERATURE: 98.6 F
BUN BLD-MCNC: 17 MG/DL (ref 7–18)
CA-I BLD-SCNC: 1.03 MMOL/L (ref 1.12–1.32)
CA-I BLD-SCNC: 1.11 MMOL/L (ref 0.95–1.32)
CALCIUM BLD-MCNC: 8.1 MG/DL (ref 8.5–10.1)
CHLORIDE SERPL-SCNC: 106 MMOL/L (ref 98–112)
CO2 BLD-SCNC: 27 MMOL/L (ref 22–32)
CO2 SERPL-SCNC: 27 MMOL/L (ref 21–32)
COHGB MFR BLD: 1.2 % SAT (ref 0–3)
CREAT BLD-MCNC: 1.04 MG/DL
EGFRCR SERPLBLD CKD-EPI 2021: 55 ML/MIN/1.73M2 (ref 60–?)
EOSINOPHIL # BLD AUTO: 0.05 X10(3) UL (ref 0–0.7)
EOSINOPHIL NFR BLD AUTO: 0.7 %
ERYTHROCYTE [DISTWIDTH] IN BLOOD BY AUTOMATED COUNT: 13.4 %
FIO2: 100 %
GLOBULIN PLAS-MCNC: 3.6 G/DL (ref 2.8–4.4)
GLUCOSE BLD-MCNC: 151 MG/DL (ref 70–99)
GLUCOSE BLD-MCNC: 154 MG/DL (ref 70–99)
GLUCOSE BLD-MCNC: 167 MG/DL (ref 70–99)
HCO3 BLD-SCNC: 25.7 MEQ/L
HCO3 BLDA-SCNC: 30.6 MEQ/L (ref 21–27)
HCT VFR BLD AUTO: 35.1 %
HCT VFR BLD CALC: 29 %
HGB BLD-MCNC: 11.4 G/DL
HGB BLD-MCNC: 12.1 G/DL
IMM GRANULOCYTES # BLD AUTO: 0.02 X10(3) UL (ref 0–1)
IMM GRANULOCYTES NFR BLD: 0.3 %
INR BLD: 1.12 (ref 0.85–1.16)
LACTATE BLD-SCNC: 1.9 MMOL/L (ref 0.5–2)
LYMPHOCYTES # BLD AUTO: 2.25 X10(3) UL (ref 1–4)
LYMPHOCYTES NFR BLD AUTO: 29.6 %
MCH RBC QN AUTO: 29.9 PG (ref 26–34)
MCHC RBC AUTO-ENTMCNC: 32.5 G/DL (ref 31–37)
MCV RBC AUTO: 92.1 FL
METHGB MFR BLD: 0.5 % SAT (ref 0.4–1.5)
MONOCYTES # BLD AUTO: 0.87 X10(3) UL (ref 0.1–1)
MONOCYTES NFR BLD AUTO: 11.5 %
NEUTROPHILS # BLD AUTO: 4.36 X10 (3) UL (ref 1.5–7.7)
NEUTROPHILS # BLD AUTO: 4.36 X10(3) UL (ref 1.5–7.7)
NEUTROPHILS NFR BLD AUTO: 57.4 %
OSMOLALITY SERPL CALC.SUM OF ELEC: 297 MOSM/KG (ref 275–295)
OXYHGB MFR BLDA: 98.3 % (ref 92–100)
PCO2 BLD: 37.7 MMHG
PCO2 BLDA: 29 MM HG (ref 35–45)
PEEP: 5 CM H2O
PH BLD: 7.44 [PH]
PH BLDA: 7.6 [PH] (ref 7.35–7.45)
PLATELET # BLD AUTO: 167 10(3)UL (ref 150–450)
PO2 BLD: 393 MMHG
PO2 BLDA: 455 MM HG (ref 80–100)
POTASSIUM BLD-SCNC: 2.5 MMOL/L (ref 3.6–5.1)
POTASSIUM BLD-SCNC: 2.6 MMOL/L (ref 3.6–5.1)
POTASSIUM SERPL-SCNC: 2.8 MMOL/L (ref 3.5–5.1)
PROT SERPL-MCNC: 6.3 G/DL (ref 6.4–8.2)
PROTHROMBIN TIME: 14.4 SECONDS (ref 11.6–14.8)
RBC # BLD AUTO: 3.81 X10(6)UL
RH BLOOD TYPE: POSITIVE
SAO2 % BLD: 100 %
SARS-COV-2 RNA RESP QL NAA+PROBE: NOT DETECTED
SODIUM BLD-SCNC: 138 MMOL/L (ref 135–145)
SODIUM BLD-SCNC: 143 MMOL/L (ref 136–145)
SODIUM SERPL-SCNC: 141 MMOL/L (ref 136–145)
TIDAL VOLUME: 450 ML
TROPONIN I HIGH SENSITIVITY: 14 NG/L
VALPROATE SERPL-MCNC: 38.6 UG/ML (ref 50–100)
VENT RATE: 20 /MIN
WBC # BLD AUTO: 7.6 X10(3) UL (ref 4–11)

## 2023-10-11 PROCEDURE — 99223 1ST HOSP IP/OBS HIGH 75: CPT | Performed by: STUDENT IN AN ORGANIZED HEALTH CARE EDUCATION/TRAINING PROGRAM

## 2023-10-11 PROCEDURE — 70498 CT ANGIOGRAPHY NECK: CPT | Performed by: EMERGENCY MEDICINE

## 2023-10-11 PROCEDURE — 70549 MR ANGIOGRAPH NECK W/O&W/DYE: CPT | Performed by: OTHER

## 2023-10-11 PROCEDURE — 71045 X-RAY EXAM CHEST 1 VIEW: CPT | Performed by: EMERGENCY MEDICINE

## 2023-10-11 PROCEDURE — 70553 MRI BRAIN STEM W/O & W/DYE: CPT | Performed by: OTHER

## 2023-10-11 PROCEDURE — A9575 INJ GADOTERATE MEGLUMI 0.1ML: HCPCS | Performed by: OTHER

## 2023-10-11 PROCEDURE — 3075F SYST BP GE 130 - 139MM HG: CPT | Performed by: STUDENT IN AN ORGANIZED HEALTH CARE EDUCATION/TRAINING PROGRAM

## 2023-10-11 PROCEDURE — 70546 MR ANGIOGRAPH HEAD W/O&W/DYE: CPT | Performed by: OTHER

## 2023-10-11 PROCEDURE — 00C00ZZ EXTIRPATION OF MATTER FROM BRAIN, OPEN APPROACH: ICD-10-PCS | Performed by: NEUROLOGICAL SURGERY

## 2023-10-11 PROCEDURE — 3078F DIAST BP <80 MM HG: CPT | Performed by: STUDENT IN AN ORGANIZED HEALTH CARE EDUCATION/TRAINING PROGRAM

## 2023-10-11 PROCEDURE — 70496 CT ANGIOGRAPHY HEAD: CPT | Performed by: EMERGENCY MEDICINE

## 2023-10-11 PROCEDURE — 99291 CRITICAL CARE FIRST HOUR: CPT | Performed by: NURSE PRACTITIONER

## 2023-10-11 PROCEDURE — 70450 CT HEAD/BRAIN W/O DYE: CPT | Performed by: EMERGENCY MEDICINE

## 2023-10-11 DEVICE — BURR HOLE COVER, WITH TAB, 10MM
Type: IMPLANTABLE DEVICE | Site: HEAD | Status: FUNCTIONAL
Brand: UNIVERSAL NEURO 3

## 2023-10-11 DEVICE — STRAIGHT PLATE, 12MM BAR, WITH TAB
Type: IMPLANTABLE DEVICE | Site: HEAD | Status: FUNCTIONAL
Brand: UNIVERSAL NEURO 3

## 2023-10-11 DEVICE — BONE PLATE, WITHOUT BAR: Type: IMPLANTABLE DEVICE | Site: HEAD | Status: FUNCTIONAL

## 2023-10-11 DEVICE — COLLAGEN DURAL REGENERATION MEMBRANE 3IN X 3IN (7.5CM X 7.5CM)
Type: IMPLANTABLE DEVICE | Site: HEAD | Status: FUNCTIONAL
Brand: DURAMATRIX-ONLAY PLUS

## 2023-10-11 RX ORDER — LIDOCAINE HYDROCHLORIDE AND EPINEPHRINE 10; 10 MG/ML; UG/ML
INJECTION, SOLUTION INFILTRATION; PERINEURAL AS NEEDED
Status: DISCONTINUED | OUTPATIENT
Start: 2023-10-11 | End: 2023-10-11 | Stop reason: HOSPADM

## 2023-10-11 RX ORDER — ROCURONIUM BROMIDE 10 MG/ML
INJECTION, SOLUTION INTRAVENOUS AS NEEDED
Status: DISCONTINUED | OUTPATIENT
Start: 2023-10-11 | End: 2023-10-11 | Stop reason: SURG

## 2023-10-11 RX ORDER — ETOMIDATE 2 MG/ML
INJECTION INTRAVENOUS
Status: COMPLETED | OUTPATIENT
Start: 2023-10-11 | End: 2023-10-11

## 2023-10-11 RX ORDER — PHENYLEPHRINE HCL 10 MG/ML
VIAL (ML) INJECTION AS NEEDED
Status: DISCONTINUED | OUTPATIENT
Start: 2023-10-11 | End: 2023-10-11 | Stop reason: SURG

## 2023-10-11 RX ORDER — GADOTERATE MEGLUMINE 376.9 MG/ML
15 INJECTION INTRAVENOUS
Status: COMPLETED | OUTPATIENT
Start: 2023-10-11 | End: 2023-10-11

## 2023-10-11 RX ORDER — CEFAZOLIN SODIUM 1 G/3ML
INJECTION, POWDER, FOR SOLUTION INTRAMUSCULAR; INTRAVENOUS AS NEEDED
Status: DISCONTINUED | OUTPATIENT
Start: 2023-10-11 | End: 2023-10-11 | Stop reason: SURG

## 2023-10-11 RX ORDER — SODIUM CHLORIDE 9 MG/ML
INJECTION, SOLUTION INTRAVENOUS CONTINUOUS PRN
Status: DISCONTINUED | OUTPATIENT
Start: 2023-10-11 | End: 2023-10-11 | Stop reason: SURG

## 2023-10-11 RX ORDER — LEVETIRACETAM 500 MG/5ML
1000 INJECTION, SOLUTION, CONCENTRATE INTRAVENOUS ONCE
Status: COMPLETED | OUTPATIENT
Start: 2023-10-11 | End: 2023-10-11

## 2023-10-11 RX ORDER — LABETALOL HYDROCHLORIDE 5 MG/ML
INJECTION, SOLUTION INTRAVENOUS
Status: COMPLETED
Start: 2023-10-11 | End: 2023-10-11

## 2023-10-11 RX ORDER — ROCURONIUM BROMIDE 10 MG/ML
INJECTION, SOLUTION INTRAVENOUS
Status: COMPLETED | OUTPATIENT
Start: 2023-10-11 | End: 2023-10-11

## 2023-10-11 RX ADMIN — PHENYLEPHRINE HCL 100 MCG: 10 MG/ML VIAL (ML) INJECTION at 22:24:00

## 2023-10-11 RX ADMIN — CEFAZOLIN SODIUM 2 G: 1 INJECTION, POWDER, FOR SOLUTION INTRAMUSCULAR; INTRAVENOUS at 21:50:00

## 2023-10-11 RX ADMIN — GADOTERATE MEGLUMINE 13 ML: 376.9 INJECTION INTRAVENOUS at 11:27:00

## 2023-10-11 RX ADMIN — ROCURONIUM BROMIDE 50 MG: 10 INJECTION, SOLUTION INTRAVENOUS at 21:45:00

## 2023-10-11 RX ADMIN — SODIUM CHLORIDE: 9 INJECTION, SOLUTION INTRAVENOUS at 21:38:00

## 2023-10-11 RX ADMIN — ROCURONIUM BROMIDE 20 MG: 10 INJECTION, SOLUTION INTRAVENOUS at 23:10:00

## 2023-10-11 RX ADMIN — ROCURONIUM BROMIDE 20 MG: 10 INJECTION, SOLUTION INTRAVENOUS at 22:32:00

## 2023-10-11 RX ADMIN — SODIUM CHLORIDE: 9 INJECTION, SOLUTION INTRAVENOUS at 23:51:00

## 2023-10-11 RX ADMIN — PHENYLEPHRINE HCL 100 MCG: 10 MG/ML VIAL (ML) INJECTION at 22:11:00

## 2023-10-11 NOTE — TELEPHONE ENCOUNTER
Received call regarding patient MRI. Radiologist states patient is recommended for CTA of the neck is recommended. Will advised provider. Per Dr. Curtis Mantilla order CTA as requested. JUAN J as DX. Spoke to patient regarding the order and why. She was concerned but after discussion verbalized understanding. Patient will call to schedule STAT CTA. Adult

## 2023-10-11 NOTE — TELEPHONE ENCOUNTER
Patient called to report possible side effects of divalproex 250 MG bid. Wants to know if she should continue taking it. Not sure if all symptoms are related to the medication. Reports macular migraines have not really diminished, still has one every 2-3 days. Started medication on 9/17/23: 1.) Began having daily headaches in right temple,behind right ear & back of head. -Now taking Ibuprofen 200 MG daily for the headaches & inflammation. 2.) Having bilateral ankle swelling and puffiness of feet, L>R.     3.) Feeling \"a little bit light-headed, more than before starting medication. \"     Routed to provider for review & recommendation.

## 2023-10-12 ENCOUNTER — APPOINTMENT (OUTPATIENT)
Dept: CT IMAGING | Facility: HOSPITAL | Age: 78
DRG: 023 | End: 2023-10-12
Attending: NURSE PRACTITIONER

## 2023-10-12 ENCOUNTER — APPOINTMENT (OUTPATIENT)
Dept: CT IMAGING | Facility: HOSPITAL | Age: 78
DRG: 023 | End: 2023-10-12
Attending: NEUROLOGICAL SURGERY

## 2023-10-12 ENCOUNTER — APPOINTMENT (OUTPATIENT)
Dept: INTERVENTIONAL RADIOLOGY/VASCULAR | Facility: HOSPITAL | Age: 78
DRG: 023 | End: 2023-10-12

## 2023-10-12 ENCOUNTER — APPOINTMENT (OUTPATIENT)
Dept: CV DIAGNOSTICS | Facility: HOSPITAL | Age: 78
DRG: 023 | End: 2023-10-12

## 2023-10-12 PROBLEM — E87.6 HYPOKALEMIA: Status: ACTIVE | Noted: 2023-10-12

## 2023-10-12 PROBLEM — I61.1 NONTRAUMATIC CORTICAL HEMORRHAGE OF RIGHT CEREBRAL HEMISPHERE (HCC): Status: ACTIVE | Noted: 2023-10-12

## 2023-10-12 PROBLEM — J96.00 ACUTE RESPIRATORY FAILURE (HCC): Status: ACTIVE | Noted: 2023-10-12

## 2023-10-12 PROBLEM — I10 PRIMARY HYPERTENSION: Status: ACTIVE | Noted: 2017-04-10

## 2023-10-12 PROBLEM — I60.9 SAH (SUBARACHNOID HEMORRHAGE) (HCC): Status: ACTIVE | Noted: 2023-10-12

## 2023-10-12 PROBLEM — D72.829 LEUKOCYTOSIS: Status: ACTIVE | Noted: 2023-10-12

## 2023-10-12 PROBLEM — I16.1 HYPERTENSIVE EMERGENCY: Status: ACTIVE | Noted: 2023-10-12

## 2023-10-12 PROBLEM — Z98.890 S/P CRANIOTOMY: Status: ACTIVE | Noted: 2023-10-12

## 2023-10-12 PROBLEM — G93.5 BRAIN COMPRESSION (HCC): Status: ACTIVE | Noted: 2023-10-12

## 2023-10-12 PROBLEM — R90.89 MIDLINE SHIFT OF BRAIN: Status: ACTIVE | Noted: 2023-10-12

## 2023-10-12 PROBLEM — E83.42 HYPOMAGNESEMIA: Status: ACTIVE | Noted: 2023-10-12

## 2023-10-12 LAB
ANION GAP SERPL CALC-SCNC: 7 MMOL/L (ref 0–18)
ATRIAL RATE: 79 BPM
BASE EXCESS BLDA CALC-SCNC: 1.8 MMOL/L (ref ?–2)
BODY TEMPERATURE: 98.6 F
BUN BLD-MCNC: 15 MG/DL (ref 7–18)
C DIFF TOX B STL QL: NEGATIVE
CALCIUM BLD-MCNC: 7.4 MG/DL (ref 8.5–10.1)
CHLORIDE SERPL-SCNC: 111 MMOL/L (ref 98–112)
CHOLEST SERPL-MCNC: 129 MG/DL (ref ?–200)
CO2 SERPL-SCNC: 26 MMOL/L (ref 21–32)
COHGB MFR BLD: 2.3 % SAT (ref 0–3)
CREAT BLD-MCNC: 0.89 MG/DL
EGFRCR SERPLBLD CKD-EPI 2021: 66 ML/MIN/1.73M2 (ref 60–?)
ERYTHROCYTE [DISTWIDTH] IN BLOOD BY AUTOMATED COUNT: 13.5 %
EST. AVERAGE GLUCOSE BLD GHB EST-MCNC: 160 MG/DL (ref 68–126)
FIO2: 40 %
GLUCOSE BLD-MCNC: 156 MG/DL (ref 70–99)
GLUCOSE BLD-MCNC: 180 MG/DL (ref 70–99)
GLUCOSE BLD-MCNC: 181 MG/DL (ref 70–99)
GLUCOSE BLD-MCNC: 191 MG/DL (ref 70–99)
GLUCOSE BLD-MCNC: 195 MG/DL (ref 70–99)
GLUCOSE BLD-MCNC: 208 MG/DL (ref 70–99)
GLUCOSE BLD-MCNC: 211 MG/DL (ref 70–99)
HBA1C MFR BLD: 7.2 % (ref ?–5.7)
HCO3 BLDA-SCNC: 26.3 MEQ/L (ref 21–27)
HCT VFR BLD AUTO: 33 %
HDLC SERPL-MCNC: 45 MG/DL (ref 40–59)
HGB BLD-MCNC: 10.9 G/DL
HGB BLD-MCNC: 11 G/DL
LDLC SERPL CALC-MCNC: 66 MG/DL (ref ?–100)
MAGNESIUM SERPL-MCNC: 1.6 MG/DL (ref 1.6–2.6)
MCH RBC QN AUTO: 30.5 PG (ref 26–34)
MCHC RBC AUTO-ENTMCNC: 33 G/DL (ref 31–37)
MCV RBC AUTO: 92.4 FL
METHGB MFR BLD: 0.3 % SAT (ref 0.4–1.5)
MRSA DNA SPEC QL NAA+PROBE: NEGATIVE
NONHDLC SERPL-MCNC: 84 MG/DL (ref ?–130)
OSMOLALITY SERPL CALC.SUM OF ELEC: 305 MOSM/KG (ref 275–295)
OXYHGB MFR BLDA: 97.4 % (ref 92–100)
P AXIS: 57 DEGREES
P-R INTERVAL: 132 MS
PCO2 BLDA: 37 MM HG (ref 35–45)
PEEP: 5 CM H2O
PH BLDA: 7.45 [PH] (ref 7.35–7.45)
PLATELET # BLD AUTO: 153 10(3)UL (ref 150–450)
PO2 BLDA: 104 MM HG (ref 80–100)
POTASSIUM SERPL-SCNC: 3 MMOL/L (ref 3.5–5.1)
POTASSIUM SERPL-SCNC: 3.7 MMOL/L (ref 3.5–5.1)
Q-T INTERVAL: 400 MS
QRS DURATION: 74 MS
QTC CALCULATION (BEZET): 458 MS
R AXIS: 38 DEGREES
RBC # BLD AUTO: 3.57 X10(6)UL
SODIUM SERPL-SCNC: 144 MMOL/L (ref 136–145)
T AXIS: 40 DEGREES
TIDAL VOLUME: 390 ML
TRIGL SERPL-MCNC: 119 MG/DL (ref 30–149)
TRIGL SERPL-MCNC: 97 MG/DL (ref 30–149)
VENT RATE: 15 /MIN
VENTRICULAR RATE: 79 BPM
VLDLC SERPL CALC-MCNC: 15 MG/DL (ref 0–30)
WBC # BLD AUTO: 14.1 X10(3) UL (ref 4–11)

## 2023-10-12 PROCEDURE — 99232 SBSQ HOSP IP/OBS MODERATE 35: CPT | Performed by: HOSPITALIST

## 2023-10-12 PROCEDURE — 99292 CRITICAL CARE ADDL 30 MIN: CPT | Performed by: OTHER

## 2023-10-12 PROCEDURE — B31Q1ZZ FLUOROSCOPY OF CERVICO-CEREBRAL ARCH USING LOW OSMOLAR CONTRAST: ICD-10-PCS | Performed by: RADIOLOGY

## 2023-10-12 PROCEDURE — 70450 CT HEAD/BRAIN W/O DYE: CPT | Performed by: NURSE PRACTITIONER

## 2023-10-12 PROCEDURE — 70450 CT HEAD/BRAIN W/O DYE: CPT | Performed by: NEUROLOGICAL SURGERY

## 2023-10-12 PROCEDURE — 93306 TTE W/DOPPLER COMPLETE: CPT | Performed by: NURSE PRACTITIONER

## 2023-10-12 PROCEDURE — B3181ZZ FLUOROSCOPY OF BILATERAL INTERNAL CAROTID ARTERIES USING LOW OSMOLAR CONTRAST: ICD-10-PCS | Performed by: RADIOLOGY

## 2023-10-12 PROCEDURE — 99291 CRITICAL CARE FIRST HOUR: CPT | Performed by: OTHER

## 2023-10-12 PROCEDURE — 99291 CRITICAL CARE FIRST HOUR: CPT

## 2023-10-12 RX ORDER — ONDANSETRON 2 MG/ML
INJECTION INTRAMUSCULAR; INTRAVENOUS
Status: COMPLETED
Start: 2023-10-12 | End: 2023-10-12

## 2023-10-12 RX ORDER — POLYETHYLENE GLYCOL 3350 17 G/17G
17 POWDER, FOR SOLUTION ORAL DAILY PRN
Status: DISCONTINUED | OUTPATIENT
Start: 2023-10-12 | End: 2023-10-25

## 2023-10-12 RX ORDER — METOCLOPRAMIDE HYDROCHLORIDE 5 MG/ML
10 INJECTION INTRAMUSCULAR; INTRAVENOUS AS NEEDED
Status: ACTIVE | OUTPATIENT
Start: 2023-10-12 | End: 2023-10-12

## 2023-10-12 RX ORDER — NICOTINE POLACRILEX 4 MG
15 LOZENGE BUCCAL
Status: DISCONTINUED | OUTPATIENT
Start: 2023-10-12 | End: 2023-10-25

## 2023-10-12 RX ORDER — LORAZEPAM 2 MG/ML
1 INJECTION INTRAMUSCULAR
Status: DISCONTINUED | OUTPATIENT
Start: 2023-10-12 | End: 2023-10-25

## 2023-10-12 RX ORDER — BISACODYL 10 MG
10 SUPPOSITORY, RECTAL RECTAL
Status: DISCONTINUED | OUTPATIENT
Start: 2023-10-12 | End: 2023-10-25

## 2023-10-12 RX ORDER — ATORVASTATIN CALCIUM 80 MG/1
80 TABLET, FILM COATED ORAL DAILY
Status: DISCONTINUED | OUTPATIENT
Start: 2023-10-12 | End: 2023-10-17

## 2023-10-12 RX ORDER — ONDANSETRON 2 MG/ML
4 INJECTION INTRAMUSCULAR; INTRAVENOUS AS NEEDED
Status: ACTIVE | OUTPATIENT
Start: 2023-10-12 | End: 2023-10-12

## 2023-10-12 RX ORDER — SENNOSIDES 8.8 MG/5ML
10 LIQUID ORAL NIGHTLY PRN
Status: DISCONTINUED | OUTPATIENT
Start: 2023-10-12 | End: 2023-10-17

## 2023-10-12 RX ORDER — POTASSIUM CHLORIDE 1.5 G/1.58G
40 POWDER, FOR SOLUTION ORAL ONCE
Status: COMPLETED | OUTPATIENT
Start: 2023-10-12 | End: 2023-10-12

## 2023-10-12 RX ORDER — ACETAMINOPHEN 650 MG/1
650 SUPPOSITORY RECTAL EVERY 4 HOURS PRN
Status: DISCONTINUED | OUTPATIENT
Start: 2023-10-12 | End: 2023-10-25

## 2023-10-12 RX ORDER — DEXTROSE MONOHYDRATE 25 G/50ML
50 INJECTION, SOLUTION INTRAVENOUS
Status: DISCONTINUED | OUTPATIENT
Start: 2023-10-12 | End: 2023-10-25

## 2023-10-12 RX ORDER — HYDROCODONE BITARTRATE AND ACETAMINOPHEN 5; 325 MG/1; MG/1
1 TABLET ORAL ONCE AS NEEDED
Status: DISCONTINUED | OUTPATIENT
Start: 2023-10-12 | End: 2023-10-12

## 2023-10-12 RX ORDER — NALOXONE HYDROCHLORIDE 0.4 MG/ML
80 INJECTION, SOLUTION INTRAMUSCULAR; INTRAVENOUS; SUBCUTANEOUS AS NEEDED
Status: ACTIVE | OUTPATIENT
Start: 2023-10-12 | End: 2023-10-12

## 2023-10-12 RX ORDER — PANTOPRAZOLE SODIUM 20 MG/1
20 TABLET, DELAYED RELEASE ORAL
Status: DISCONTINUED | OUTPATIENT
Start: 2023-10-12 | End: 2023-10-12

## 2023-10-12 RX ORDER — METOCLOPRAMIDE HYDROCHLORIDE 5 MG/ML
10 INJECTION INTRAMUSCULAR; INTRAVENOUS EVERY 8 HOURS PRN
Status: DISCONTINUED | OUTPATIENT
Start: 2023-10-12 | End: 2023-10-25

## 2023-10-12 RX ORDER — HEPARIN SODIUM 5000 [USP'U]/ML
INJECTION, SOLUTION INTRAVENOUS; SUBCUTANEOUS
Status: COMPLETED
Start: 2023-10-12 | End: 2023-10-12

## 2023-10-12 RX ORDER — DEXAMETHASONE SODIUM PHOSPHATE 4 MG/ML
4 VIAL (ML) INJECTION AS NEEDED
Status: ACTIVE | OUTPATIENT
Start: 2023-10-12 | End: 2023-10-12

## 2023-10-12 RX ORDER — HYDROMORPHONE HYDROCHLORIDE 1 MG/ML
0.2 INJECTION, SOLUTION INTRAMUSCULAR; INTRAVENOUS; SUBCUTANEOUS EVERY 2 HOUR PRN
Status: DISCONTINUED | OUTPATIENT
Start: 2023-10-12 | End: 2023-10-18

## 2023-10-12 RX ORDER — ONDANSETRON 2 MG/ML
4 INJECTION INTRAMUSCULAR; INTRAVENOUS EVERY 6 HOURS PRN
Status: DISCONTINUED | OUTPATIENT
Start: 2023-10-12 | End: 2023-10-25

## 2023-10-12 RX ORDER — SENNOSIDES 8.6 MG
17.2 TABLET ORAL NIGHTLY PRN
Status: DISCONTINUED | OUTPATIENT
Start: 2023-10-12 | End: 2023-10-25

## 2023-10-12 RX ORDER — OXYCODONE HYDROCHLORIDE 5 MG/1
5 TABLET ORAL EVERY 4 HOURS PRN
Status: DISCONTINUED | OUTPATIENT
Start: 2023-10-12 | End: 2023-10-25

## 2023-10-12 RX ORDER — HYDRALAZINE HYDROCHLORIDE 20 MG/ML
10 INJECTION INTRAMUSCULAR; INTRAVENOUS EVERY 2 HOUR PRN
Status: DISCONTINUED | OUTPATIENT
Start: 2023-10-12 | End: 2023-10-25

## 2023-10-12 RX ORDER — ENEMA 19; 7 G/133ML; G/133ML
1 ENEMA RECTAL ONCE AS NEEDED
Status: DISCONTINUED | OUTPATIENT
Start: 2023-10-12 | End: 2023-10-25

## 2023-10-12 RX ORDER — ACETAMINOPHEN 325 MG/1
650 TABLET ORAL EVERY 4 HOURS PRN
Status: DISCONTINUED | OUTPATIENT
Start: 2023-10-12 | End: 2023-10-25

## 2023-10-12 RX ORDER — SODIUM CHLORIDE 9 MG/ML
INJECTION, SOLUTION INTRAVENOUS CONTINUOUS
Status: DISCONTINUED | OUTPATIENT
Start: 2023-10-12 | End: 2023-10-14

## 2023-10-12 RX ORDER — DIVALPROEX SODIUM 250 MG/1
250 TABLET, EXTENDED RELEASE ORAL DAILY
Status: DISCONTINUED | OUTPATIENT
Start: 2023-10-12 | End: 2023-10-12

## 2023-10-12 RX ORDER — POTASSIUM CHLORIDE 14.9 MG/ML
20 INJECTION INTRAVENOUS ONCE
Status: COMPLETED | OUTPATIENT
Start: 2023-10-12 | End: 2023-10-12

## 2023-10-12 RX ORDER — MAGNESIUM SULFATE HEPTAHYDRATE 40 MG/ML
2 INJECTION, SOLUTION INTRAVENOUS ONCE
Status: COMPLETED | OUTPATIENT
Start: 2023-10-12 | End: 2023-10-12

## 2023-10-12 RX ORDER — IODIXANOL 320 MG/ML
200 INJECTION, SOLUTION INTRAVASCULAR
Status: DISCONTINUED | OUTPATIENT
Start: 2023-10-12 | End: 2023-10-23 | Stop reason: ALTCHOICE

## 2023-10-12 RX ORDER — HYDROCODONE BITARTRATE AND ACETAMINOPHEN 5; 325 MG/1; MG/1
2 TABLET ORAL ONCE AS NEEDED
Status: DISCONTINUED | OUTPATIENT
Start: 2023-10-12 | End: 2023-10-12

## 2023-10-12 RX ORDER — CAFFEINE CITRATE 20 MG/ML
SOLUTION ORAL
Status: COMPLETED
Start: 2023-10-12 | End: 2023-10-12

## 2023-10-12 RX ORDER — LIDOCAINE HYDROCHLORIDE 10 MG/ML
INJECTION, SOLUTION INFILTRATION; PERINEURAL
Status: COMPLETED
Start: 2023-10-12 | End: 2023-10-12

## 2023-10-12 RX ORDER — MIDAZOLAM HYDROCHLORIDE 1 MG/ML
INJECTION INTRAMUSCULAR; INTRAVENOUS
Status: COMPLETED
Start: 2023-10-12 | End: 2023-10-12

## 2023-10-12 RX ORDER — HYDROMORPHONE HYDROCHLORIDE 1 MG/ML
0.4 INJECTION, SOLUTION INTRAMUSCULAR; INTRAVENOUS; SUBCUTANEOUS EVERY 5 MIN PRN
Status: ACTIVE | OUTPATIENT
Start: 2023-10-12 | End: 2023-10-12

## 2023-10-12 RX ORDER — CHLORHEXIDINE GLUCONATE ORAL RINSE 1.2 MG/ML
15 SOLUTION DENTAL
Status: DISCONTINUED | OUTPATIENT
Start: 2023-10-12 | End: 2023-10-13

## 2023-10-12 RX ORDER — LABETALOL HYDROCHLORIDE 5 MG/ML
10 INJECTION, SOLUTION INTRAVENOUS EVERY 10 MIN PRN
Status: COMPLETED | OUTPATIENT
Start: 2023-10-12 | End: 2023-10-13

## 2023-10-12 RX ORDER — NICOTINE POLACRILEX 4 MG
30 LOZENGE BUCCAL
Status: DISCONTINUED | OUTPATIENT
Start: 2023-10-12 | End: 2023-10-25

## 2023-10-12 RX ORDER — CEFAZOLIN SODIUM/WATER 2 G/20 ML
SYRINGE (ML) INTRAVENOUS
Status: COMPLETED
Start: 2023-10-12 | End: 2023-10-12

## 2023-10-12 RX ORDER — LEVETIRACETAM 500 MG/5ML
500 INJECTION, SOLUTION, CONCENTRATE INTRAVENOUS EVERY 12 HOURS
Status: DISCONTINUED | OUTPATIENT
Start: 2023-10-12 | End: 2023-10-13

## 2023-10-12 RX ORDER — HYDROMORPHONE HYDROCHLORIDE 1 MG/ML
0.4 INJECTION, SOLUTION INTRAMUSCULAR; INTRAVENOUS; SUBCUTANEOUS EVERY 2 HOUR PRN
Status: DISCONTINUED | OUTPATIENT
Start: 2023-10-12 | End: 2023-10-18

## 2023-10-12 RX ORDER — PHENYLEPHRINE HCL IN 0.9% NACL 50MG/250ML
PLASTIC BAG, INJECTION (ML) INTRAVENOUS CONTINUOUS PRN
Status: DISCONTINUED | OUTPATIENT
Start: 2023-10-12 | End: 2023-10-18

## 2023-10-12 RX ORDER — OXYCODONE HYDROCHLORIDE 5 MG/1
2.5 TABLET ORAL EVERY 4 HOURS PRN
Status: DISCONTINUED | OUTPATIENT
Start: 2023-10-12 | End: 2023-10-25

## 2023-10-12 NOTE — CM/SW NOTE
CM asked to assist with finding resources for pt's dog, whom is home alone while she is hospitalized. Spoke with pt's brother, Leroy Irwin, via phone who states that he lives in [place], [place] and is not able to fly to [place] due to his medical condition. There are not any family/friends in the [place] area that he would be able to care for the dog, except he believes her next door neighbor (on the left when looking at her home) may have a key and has cared for her dog previously. He does not know his name or have his phone number. Leroy Irwin reports that he knows the pt would be very worried about her dog when she comes to. Leroy Irwin reports that he will be available by phone to assist anyone he can. Call placed to the [facility] and spoke to Ladonna. Ladonna provided contact information for the [employer] and suggested speaking with the supervisor Mihir. Call placed to [employer] and left  with request to call back. Additionally, listened to the prompts and was directed to the [facility] [place] police dispatch. Spoke with dispatcher and was told that animal control will call back. Call received from Sandie Delgado ([phone]) with the [employer] and explained circumstances. Sandie Delgado was provided with the information for pt's brother and she will speak with colleagues on how to proceed. She will contact  with next steps. 1340: Follow up call placed to [facility] at the [employer] to determine status of situation. [facility] reports that she has escalated this matter to her supervisor and that her supervisor is working on this at the moment. She will follow up with CM in the next hour with plans. IAN Hanane able to confirm that pt has house keys and car keys in her purse at bedside. Relayed this information to [facility]. 1500: Call received from [facility] at [employer].  She reports that she was able to locate and speak with pt's next door neighbor, [employer] ([phone]). CM spoke with Melina Jones who confirms she has house keys and has previously cared for pt's dog. She spoke with pt yesterday before she went in for testing and pt had asked her to look after her dog if need be, but she had not heard from her since then. Melina Jones confirms that she will go to pt's home and take care of the dog. She was also provided with pt's brother, Ciaran's, phone number to further discuss needs for the dog.     1600: Melina Jones tended to the dog and provided CM with update. She also was able to get in touch with pt's brother. Melina Jones is able to care for the dog over next few days and will work with pt's brother on long term plan for dog if need be. She wishes this information be relayed to pt to ease her mind, as she knows she will be worried about her dog. Updated unit CM.     SHELDON MurrayN, RN-BC    [id number]

## 2023-10-12 NOTE — ANESTHESIA PROCEDURE NOTES
Arterial Line    Date/Time: 10/11/2023 9:45 PM    Performed by: Mary Hawk MD  Authorized by: Mary Hawk MD    General Information and Staff    Procedure Start:  10/11/2023 9:45 PM  Procedure End:  10/11/2023 9:50 PM  Anesthesiologist:  Mary Hawk MD  Performed By:  Anesthesiologist  Patient Location:  OR  Indication: continuous blood pressure monitoring and blood sampling needed    Site Identification: real time ultrasound guided and surface landmarks    Preanesthetic Checklist: 2 patient identifiers, IV checked, risks and benefits discussed, monitors and equipment checked, pre-op evaluation, timeout performed, anesthesia consent and sterile technique used    Procedure Details    Catheter Size:  20 G  Catheter Length:  1 and 3/4 inch  Catheter Type:  Arrow  Seldinger Technique?: Yes    Laterality:  Left  Site:  Radial artery  Site Prep: chlorhexidine    Line Secured:  Wrist Brace, tape and Tegaderm    Assessment    Events: patient tolerated procedure well with no complications      Medications  10/11/2023 9:45 PM      Additional Comments

## 2023-10-12 NOTE — PHYSICAL THERAPY NOTE
Physical Therapy    Order for PT eval received. Pt remains intubated and sedated following craniotomy on 10/11. Will re-attempt as schedule and pt's medical stability allow.

## 2023-10-12 NOTE — TELEPHONE ENCOUNTER
Not sure if these are due to medication, but please stop med if you feel it caused them, I recommend you to check with eye doctor as well, we can discuss about other options when above so called \"side effects\" are better, please go ER or UC for any concerning questions or symptoms, see me sooner in office if needed, can be on waitlist.

## 2023-10-12 NOTE — BRIEF PROCEDURE NOTE
[facility]     Neurointerventional Surgery Operative Report  Estela Ruiz Patient Status:  Inpatient    11/10/1945 MRN [id number]   Location [address] [facility][1] Attending Duwaine Buerger, MD   Hosp Day # 1 PCP Dois Carrel, MD     Procedure: cervicocerebral angiography    Physicians: PARIS Torres. Technique: Sterile technique, US guidance and fluoroscopic guidance    Anesthesia: Local and Sedation    Procedure Details   Informed consent was obtained from the patient's family after explaining the procedure, it's rationale and risks including but not limited to infection, bleeding, contrast rxn/nephropathy, vascular injury, and stroke. The patient's family expressed an understanding of the procedure and its risks and a willingness for the patient to undergo the procedure. The patient was brought to the inteventional suite where a standard timeout procedure was performed prior to commencing the procedure. At the termination of the procedure, the devices were removed and hemostasis was achieved using Mynx  device. Findings:  A full report is to follow. The preliminary findings are : The right MCA branches are diffusely and smoothly narrowed, new/worse than seen on CTA done on admission. No definite AV shunt noted. No other abnormalities seen. The findings were discussed with:  Message sent to Dr.'s Roland Fletcher and Barry barrera. Estimated Blood Loss:  Minimal         Complications:  None; patient tolerated the procedure well. Disposition: ICU - intubated and hemodynamically stable. Condition: stable    Plan: The patient will be sent directly to the Neurospine ICU.       Brenden Horta MD  10/12/2023

## 2023-10-12 NOTE — OCCUPATIONAL THERAPY NOTE
OT orders received, chart reviewed. Patient remains vented/sedated. Will hold for today and continue to follow, resuming OT when medically appropriate.

## 2023-10-12 NOTE — SLP NOTE
Order received, note patient orally intubated and not appropriate for evaluation at this time. Will hold and continue to follow peripherally, completing evaluation as clinically appropriate.     Ramana Castro Pérez 87 CCC-SLP  Pager [phone]

## 2023-10-12 NOTE — PLAN OF CARE
Assumed care of patient this morning. Patient vented/sedated with propofol. Sedation holiday done. Able to follow commands intermittently on right side. Tone noted to left side but unable to move. Does not open eyes. When opened forced deviation noted. Pupils remains reactive. Pupillometer q1h per orders along with neuro checks. Hydralazine prn to maintain sbp within parameters given. Kong remains to gravity. Diagnostic cerebral angio in progress. Vss. See assessment for complete details. Will continue to monitor.

## 2023-10-12 NOTE — ED QUICK NOTES
Pt returned to room B2 with RN on monitor. Pt quickly declining in mental status, ERMD is aware. RT, ERMD, and pharm at bedside, preparing to intubate.

## 2023-10-12 NOTE — ED QUICK NOTES
Called and notified pt's brother Kenny Chan that pt is in ER. He reports he lives in [place] and pt does not have any family/friends in the area.

## 2023-10-12 NOTE — OPERATIVE REPORT
Neurosurgery operative report        Preoperative diagnosis:  1. Right frontal intraparenchymal hemorrhage with brain compression, shift, and altered mental status      Postoperative diagnosis:  Same      Procedure performed:  1. Right frontal craniotomy for evacuation of intraparenchymal hemorrhage          Surgeon: Jony Mendoza MD        Assistant: ISIS Plata        Anesthesia: General endotracheal        Estimated blood loss: 50 cc        Indications for procedure:    Please also see the relevant progress notes for further information. Briefly, this patient presented to the emergency department as a code stroke. Earlier in the day, she underwent MRI scan of the brain as part of an outpatient work-up. This demonstrated a small amount of subarachnoid hemorrhage. The patient was recalled for CTA. She became symptomatic and presented to the emergency department. CT scan demonstrated a right frontal intraparenchymal hemorrhage. There were areas of hypodensity consistent with active extravasation. CTA demonstrated no evidence of aneurysm or vascular malformation. Following CT scan, the patient declined neurologically, and was intubated by the emergency department staff. We were consulted. OR team was called in for emergent craniotomy. Upon evaluation, the patient was found to be intubated. She was localizing weakly on the right, with no movement on the left. No family was available for consent. Therefore, two-physician consent was documented. This represented an emergent, life-threatening crisis, and any delay of treatment could result in worsening neurologic disability or death. Procedure in detail:    The patient was reevaluated in the preoperative holding area. The patient was reexamined. The operation was reviewed, including risks, benefits, and alternatives. Informed consent was verified. The patient was then brought back to the operating theater.   A timeout was performed per protocol. General endotracheal anesthesia was induced. Lines and monitors were started by the anesthesiologist.    She was placed in the supine position on standard operating table. The patient's head was placed on a Crawford horseshoe. All pressure points were carefully padded and checked. It was removed with surgical clippers. The scalp was cleansed with isopropyl alcohol. The incision was marked out. DuraPrep was now applied followed by standard sterile surgical draping. Lidocaine with epinephrine was injected. None a timeout was performed per protocol. Skin incision was made with a #10 scalpel blade. Kendall clips were applied. A subgaleal dissection was carried out. A self-retaining retractor was placed. The craniotomy incision was marked out with Bovie electrocautery using cutting current, to incise pericranium. 2 bur holes were now placed medially with the . A craniotome was used to fashion the craniotomy flap. The bone was handed off to the back table for later reimplantation. The dura was found to be tinged blue and fairly tense. A cruciform dural incision was made with Metzenbaum scissors. Dural leaflets were retracted with 4-0 Nurolon sutures. 8 large area of discolored, hemorrhagic cortex was noted. This was incised with a #15 scalpel blade, microscissors, and bipolar electrocautery. Ultimately, a portion of this was fairly dense and fibrous, suggesting more mature clot. This could not be evacuated with suction alone. The deeper portions were more gelatinous blood clot with some fluid. This was evacuated with suction and sparing use of bipolar electrocautery. The more firm, fibrous portion was then resected from adjacent tissue with suction and bipolar electrocautery. Several specimens were sent to pathology. At the conclusion of this process, the barium was found to be well decompressed.   The surgical site was irrigated copiously. Hemostasis was achieved with bipolar electrocautery, cotton balls with half-strength peroxide, and a small amount of Floseal.  At the conclusion of this process, meticulous hemostasis was noted. Dura was then reapproximated with interrupted 4-0 Nurolon sutures. A piece of DuraGen was cut and placed over the dura. The bone was prefabricated to the back table. The bone flap was reimplanted. The incision was irrigated copiously. Kendall clips were removed. A subgaleal Hemovac drain was placed and brought out through a separate stab incision. January Angst was closed with interrupted inverted 3-0 Vicryl's. Skin was closed with staples. The drain was secured with a suture. A dressing was applied. Drapes removed. The patient was now returned to a neutral, supine position. Disposition: Cardiac neuro ICU    Condition: Critical, intubated, and under anesthesia    Counts: All needle, sponge, and cottonoid counts were reported correct at the end of the operation. Complications: None    Wound classification: 1, clean    Implants: Stephanie cranial plating system    Specimen: Multiple specimens of intracranial hemorrhage sent to pathology for permanent sections          This report was dictated using voice recognition technology. Errors in syntax or recognition may occur, and should not be construed to change the meaning of a sentence.

## 2023-10-12 NOTE — ED QUICK NOTES
Radiology called and states there is active extravasation noted from the imaging. MD Holland made aware.

## 2023-10-12 NOTE — ED QUICK NOTES
Pt mental status appears to be declining quickly while in CT scan, ERMD is in CT scan and is aware at this time. Will notify team to set up for probably intubation upon return to room. RN unable to complete full neuro assessment due to pt's delayed responses.

## 2023-10-12 NOTE — PLAN OF CARE
Admitted pt from OR s/p crani intubated and sedated. Propofol turned off to assess neuro status. Pt woke up and followed commands on R side. No movements in L side. EOM only to R side. Pt unable to look to L side. Wrist restraint applied to R side for safety. SBP maintained 110-140 with Cardene gtt.

## 2023-10-12 NOTE — ED QUICK NOTES
RN met other staff at stroke launch pad. Per report, pt was sent to ER from outpatient imaging due to having stroke symptoms- left arm tingling and unable to move left arm. Pt is a&ox4, unable to move left side and has gaze to right side and left facial droop. Pt unsure when her last known normal was. DIANE called and notified.

## 2023-10-12 NOTE — BRIEF OP NOTE
Pre-Operative Diagnosis: Hemorrhage [R58]     Post-Operative Diagnosis: Hemorrhage [R58]      Procedure Performed:   CRANIOTOMY FOR RIGHT FRONTAL HEMORRHAGE    Surgeon(s) and Role:     Cierra Fang MD - Primary    Assistant(s):  Surgical Assistant.: Germán Saenz     Surgical Findings: large right frontal ICH with more dense fibrous area     Specimen: permanent x2     Estimated Blood Loss: 50 cc    Johnson Hancock MD  10/11/2023  11:28 PM

## 2023-10-13 ENCOUNTER — APPOINTMENT (OUTPATIENT)
Dept: ULTRASOUND IMAGING | Facility: HOSPITAL | Age: 78
DRG: 023 | End: 2023-10-13
Attending: Other

## 2023-10-13 ENCOUNTER — APPOINTMENT (OUTPATIENT)
Dept: CT IMAGING | Facility: HOSPITAL | Age: 78
DRG: 023 | End: 2023-10-13
Attending: INTERNAL MEDICINE

## 2023-10-13 ENCOUNTER — NURSE ONLY (OUTPATIENT)
Dept: ELECTROPHYSIOLOGY | Facility: HOSPITAL | Age: 78
End: 2023-10-13
Attending: INTERNAL MEDICINE
Payer: MEDICARE

## 2023-10-13 ENCOUNTER — APPOINTMENT (OUTPATIENT)
Dept: GENERAL RADIOLOGY | Facility: HOSPITAL | Age: 78
DRG: 023 | End: 2023-10-13
Attending: INTERNAL MEDICINE

## 2023-10-13 LAB
ANION GAP SERPL CALC-SCNC: 6 MMOL/L (ref 0–18)
ARTERIAL PATENCY WRIST A: POSITIVE
BASE EXCESS BLDA CALC-SCNC: -0.6 MMOL/L (ref ?–2)
BASE EXCESS BLDA CALC-SCNC: 0.6 MMOL/L (ref ?–2)
BLOOD TYPE BARCODE: 6200
BODY TEMPERATURE: 98.6 F
BODY TEMPERATURE: 98.6 F
BUN BLD-MCNC: 12 MG/DL (ref 7–18)
CA-I BLD-SCNC: 1.04 MMOL/L (ref 0.95–1.32)
CALCIUM BLD-MCNC: 7.4 MG/DL (ref 8.5–10.1)
CHLORIDE SERPL-SCNC: 115 MMOL/L (ref 98–112)
CO2 SERPL-SCNC: 24 MMOL/L (ref 21–32)
COHGB MFR BLD: 1.8 % SAT (ref 0–3)
COHGB MFR BLD: 2 % SAT (ref 0–3)
CREAT BLD-MCNC: 0.86 MG/DL
EGFRCR SERPLBLD CKD-EPI 2021: 69 ML/MIN/1.73M2 (ref 60–?)
ERYTHROCYTE [DISTWIDTH] IN BLOOD BY AUTOMATED COUNT: 14.7 %
FIO2: 30 %
GLUCOSE BLD-MCNC: 128 MG/DL (ref 70–99)
GLUCOSE BLD-MCNC: 141 MG/DL (ref 70–99)
GLUCOSE BLD-MCNC: 166 MG/DL (ref 70–99)
GLUCOSE BLD-MCNC: 168 MG/DL (ref 70–99)
GLUCOSE BLD-MCNC: 180 MG/DL (ref 70–99)
HCO3 BLDA-SCNC: 24.5 MEQ/L (ref 21–27)
HCO3 BLDA-SCNC: 25.4 MEQ/L (ref 21–27)
HCT VFR BLD AUTO: 32.3 %
HGB BLD-MCNC: 10.1 G/DL
HGB BLD-MCNC: 10.4 G/DL
HGB BLD-MCNC: 9.7 G/DL
L/M: 2 L/MIN
LACTATE BLD-SCNC: 1.7 MMOL/L (ref 0.5–2)
MAGNESIUM SERPL-MCNC: 2.1 MG/DL (ref 1.6–2.6)
MCH RBC QN AUTO: 29.7 PG (ref 26–34)
MCHC RBC AUTO-ENTMCNC: 31.3 G/DL (ref 31–37)
MCV RBC AUTO: 95 FL
METHGB MFR BLD: 0.2 % SAT (ref 0.4–1.5)
METHGB MFR BLD: 0.2 % SAT (ref 0.4–1.5)
OSMOLALITY SERPL CALC.SUM OF ELEC: 304 MOSM/KG (ref 275–295)
OXYHGB MFR BLDA: 96.4 % (ref 92–100)
OXYHGB MFR BLDA: 98 % (ref 92–100)
PCO2 BLDA: 32 MM HG (ref 35–45)
PCO2 BLDA: 32 MM HG (ref 35–45)
PEEP: 5 CM H2O
PH BLDA: 7.46 [PH] (ref 7.35–7.45)
PH BLDA: 7.48 [PH] (ref 7.35–7.45)
PLATELET # BLD AUTO: 132 10(3)UL (ref 150–450)
PO2 BLDA: 127 MM HG (ref 80–100)
PO2 BLDA: 74 MM HG (ref 80–100)
POTASSIUM BLD-SCNC: 4.1 MMOL/L (ref 3.6–5.1)
POTASSIUM SERPL-SCNC: 3.9 MMOL/L (ref 3.5–5.1)
POTASSIUM SERPL-SCNC: 3.9 MMOL/L (ref 3.5–5.1)
PRESSURE SUPPORT: 5 CM H2O
RBC # BLD AUTO: 3.4 X10(6)UL
SODIUM BLD-SCNC: 141 MMOL/L (ref 135–145)
SODIUM SERPL-SCNC: 145 MMOL/L (ref 136–145)
UNIT VOLUME: 282 ML
WBC # BLD AUTO: 11.9 X10(3) UL (ref 4–11)

## 2023-10-13 PROCEDURE — 71045 X-RAY EXAM CHEST 1 VIEW: CPT | Performed by: INTERNAL MEDICINE

## 2023-10-13 PROCEDURE — 70450 CT HEAD/BRAIN W/O DYE: CPT | Performed by: INTERNAL MEDICINE

## 2023-10-13 PROCEDURE — 99232 SBSQ HOSP IP/OBS MODERATE 35: CPT | Performed by: HOSPITALIST

## 2023-10-13 PROCEDURE — 93886 INTRACRANIAL COMPLETE STUDY: CPT | Performed by: OTHER

## 2023-10-13 PROCEDURE — 99291 CRITICAL CARE FIRST HOUR: CPT | Performed by: INTERNAL MEDICINE

## 2023-10-13 RX ORDER — SODIUM BICARBONATE 325 MG/1
325 TABLET ORAL AS NEEDED
Status: DISCONTINUED | OUTPATIENT
Start: 2023-10-13 | End: 2023-10-25

## 2023-10-13 NOTE — PLAN OF CARE
Problem: NEUROLOGICAL - ADULT  Goal: Achieves stable or improved neurological status  Description: INTERVENTIONS  - Assess for and report changes in neurological status  - Initiate measures to prevent increased intracranial pressure  - Maintain blood pressure and fluid volume within ordered parameters to optimize cerebral perfusion and minimize risk of hemorrhage  - Monitor temperature, glucose, and sodium.  Initiate appropriate interventions as ordered  Outcome: Not Progressing  Goal: Achieves maximal functionality and self care  Description: INTERVENTIONS  - Monitor swallowing and airway patency with patient fatigue and changes in neurological status  - Encourage and assist patient to increase activity and self care with guidance from PT/OT  - Encourage visually impaired, hearing impaired and aphasic patients to use assistive/communication devices  Outcome: Not Progressing     Problem: Safety Risk - Non-Violent Restraints  Goal: Patient will remain free from self-harm  Description: INTERVENTIONS:  - Apply the least restrictive restraint to prevent harm  - Notify patient and family of reasons restraints applied  - Assess for any contributing factors to confusion (electrolyte disturbances, delirium, medications)  - Discontinue any unnecessary medical devices as soon as possible  - Assess the patient's physical comfort, circulation, skin condition, hydration, nutrition and elimination needs   - Reorient and redirection as needed  - Assess for the need to continue restraints  Outcome: Completed

## 2023-10-13 NOTE — OCCUPATIONAL THERAPY NOTE
OT orders received, chart reviewed. Patient obtunded and not following commands. Will hold today and continue to follow, completing evaluation as medically appropriate.

## 2023-10-13 NOTE — PLAN OF CARE
Q1 Neuro assessments with pupillometer see flowsheets. Intubated. No sedation. Follows commands briefly unable to stay awake for extended period of time. VSS overnight. Arterial line. Kong.

## 2023-10-13 NOTE — PHYSICAL THERAPY NOTE
Physical Therapy    Re-attempted eval this a.m. Remains intubated, but not sedated w/ diminished alertness. Will re-attempt as schedule and medical stability allow.

## 2023-10-14 ENCOUNTER — APPOINTMENT (OUTPATIENT)
Dept: ULTRASOUND IMAGING | Facility: HOSPITAL | Age: 78
DRG: 023 | End: 2023-10-14
Attending: Other

## 2023-10-14 ENCOUNTER — APPOINTMENT (OUTPATIENT)
Dept: GENERAL RADIOLOGY | Facility: HOSPITAL | Age: 78
DRG: 023 | End: 2023-10-14
Attending: HOSPITALIST

## 2023-10-14 ENCOUNTER — NURSE ONLY (OUTPATIENT)
Dept: ELECTROPHYSIOLOGY | Facility: HOSPITAL | Age: 78
End: 2023-10-14
Attending: INTERNAL MEDICINE
Payer: MEDICARE

## 2023-10-14 ENCOUNTER — APPOINTMENT (OUTPATIENT)
Dept: GENERAL RADIOLOGY | Facility: HOSPITAL | Age: 78
DRG: 023 | End: 2023-10-14
Attending: INTERNAL MEDICINE

## 2023-10-14 PROBLEM — R56.9 SEIZURE-LIKE ACTIVITY (HCC): Status: ACTIVE | Noted: 2023-10-14

## 2023-10-14 LAB
ANION GAP SERPL CALC-SCNC: 6 MMOL/L (ref 0–18)
APTT PPP: 26.4 SECONDS (ref 23.3–35.6)
BASOPHILS # BLD AUTO: 0.04 X10(3) UL (ref 0–0.2)
BASOPHILS NFR BLD AUTO: 0.3 %
BUN BLD-MCNC: 16 MG/DL (ref 7–18)
CALCIUM BLD-MCNC: 7.7 MG/DL (ref 8.5–10.1)
CHLORIDE SERPL-SCNC: 115 MMOL/L (ref 98–112)
CO2 SERPL-SCNC: 23 MMOL/L (ref 21–32)
CREAT BLD-MCNC: 0.78 MG/DL
EGFRCR SERPLBLD CKD-EPI 2021: 77 ML/MIN/1.73M2 (ref 60–?)
EOSINOPHIL # BLD AUTO: 0.05 X10(3) UL (ref 0–0.7)
EOSINOPHIL NFR BLD AUTO: 0.4 %
ERYTHROCYTE [DISTWIDTH] IN BLOOD BY AUTOMATED COUNT: 14.8 %
GLUCOSE BLD-MCNC: 131 MG/DL (ref 70–99)
GLUCOSE BLD-MCNC: 135 MG/DL (ref 70–99)
GLUCOSE BLD-MCNC: 142 MG/DL (ref 70–99)
GLUCOSE BLD-MCNC: 154 MG/DL (ref 70–99)
HCT VFR BLD AUTO: 32.4 %
HGB BLD-MCNC: 10 G/DL
IMM GRANULOCYTES # BLD AUTO: 0.04 X10(3) UL (ref 0–1)
IMM GRANULOCYTES NFR BLD: 0.3 %
INR BLD: 1.26 (ref 0.85–1.16)
LYMPHOCYTES # BLD AUTO: 1 X10(3) UL (ref 1–4)
LYMPHOCYTES NFR BLD AUTO: 8.7 %
MAGNESIUM SERPL-MCNC: 2.2 MG/DL (ref 1.6–2.6)
MCH RBC QN AUTO: 30.2 PG (ref 26–34)
MCHC RBC AUTO-ENTMCNC: 30.9 G/DL (ref 31–37)
MCV RBC AUTO: 97.9 FL
MONOCYTES # BLD AUTO: 1.58 X10(3) UL (ref 0.1–1)
MONOCYTES NFR BLD AUTO: 13.8 %
NEUTROPHILS # BLD AUTO: 8.76 X10 (3) UL (ref 1.5–7.7)
NEUTROPHILS # BLD AUTO: 8.76 X10(3) UL (ref 1.5–7.7)
NEUTROPHILS NFR BLD AUTO: 76.5 %
OSMOLALITY SERPL CALC.SUM OF ELEC: 301 MOSM/KG (ref 275–295)
PLATELET # BLD AUTO: 122 10(3)UL (ref 150–450)
POTASSIUM SERPL-SCNC: 3.6 MMOL/L (ref 3.5–5.1)
PROTHROMBIN TIME: 15.8 SECONDS (ref 11.6–14.8)
RBC # BLD AUTO: 3.31 X10(6)UL
SODIUM SERPL-SCNC: 144 MMOL/L (ref 136–145)
WBC # BLD AUTO: 11.5 X10(3) UL (ref 4–11)

## 2023-10-14 PROCEDURE — 99292 CRITICAL CARE ADDL 30 MIN: CPT | Performed by: INTERNAL MEDICINE

## 2023-10-14 PROCEDURE — 71045 X-RAY EXAM CHEST 1 VIEW: CPT | Performed by: INTERNAL MEDICINE

## 2023-10-14 PROCEDURE — 99232 SBSQ HOSP IP/OBS MODERATE 35: CPT | Performed by: HOSPITALIST

## 2023-10-14 PROCEDURE — 99291 CRITICAL CARE FIRST HOUR: CPT | Performed by: INTERNAL MEDICINE

## 2023-10-14 PROCEDURE — 43752 NASAL/OROGASTRIC W/TUBE PLMT: CPT | Performed by: INTERNAL MEDICINE

## 2023-10-14 PROCEDURE — 93886 INTRACRANIAL COMPLETE STUDY: CPT | Performed by: OTHER

## 2023-10-14 RX ORDER — SODIUM CHLORIDE 9 MG/ML
INJECTION, SOLUTION INTRAVENOUS CONTINUOUS
Status: DISCONTINUED | OUTPATIENT
Start: 2023-10-14 | End: 2023-10-16

## 2023-10-14 RX ORDER — LORAZEPAM 2 MG/ML
4 INJECTION INTRAMUSCULAR EVERY 5 MIN PRN
Status: DISCONTINUED | OUTPATIENT
Start: 2023-10-14 | End: 2023-10-25

## 2023-10-14 NOTE — PLAN OF CARE
Moves right side following commands. Left side withdraws from pain. Frequent secretions requiring suctioning. PT removed IV and dobhoff tube. Unable to get new dobhoff tube placed overnight. Soft wrist restraint applied.

## 2023-10-14 NOTE — OCCUPATIONAL THERAPY NOTE
Patient continues to be minimally responsive, RN noted facial twitching and is currently undergoing vEEG. Will hold for today and continue to follow, completing evaluation as medically appropriate.

## 2023-10-14 NOTE — PROGRESS NOTES
Patient's brother Antonio Loma contacted and updated on patient's status. All pertinent questions and concerns were addressed. Unable to complete stroke education over the telephone. Stroke booklet left at patient's bedside. Will provide stroke education when appropriate.

## 2023-10-14 NOTE — PLAN OF CARE
Assumed care of patient this morning. Patient vented. No sedation. Moves right side spontaneously but not to command. Left arm tone noted. Lle movement noted to knee with stimulation. Patient does not open eyes or nod to questions. Purposeful movement with right hand to suctioning. Breathing trial completed. Extubated per orders. Patient remains somnolent. Arousable with stimulation. Does not speak. Opens eyes slightly to increased stimulation. Kong/art dc'd per protocol. Purewick in place. Brother-in-law updated regarding poc. Questions encouraged/answered. Vss. See assessment for complete details. Will continue to monitor.

## 2023-10-15 ENCOUNTER — APPOINTMENT (OUTPATIENT)
Dept: ULTRASOUND IMAGING | Facility: HOSPITAL | Age: 78
DRG: 023 | End: 2023-10-15
Attending: Other

## 2023-10-15 ENCOUNTER — NURSE ONLY (OUTPATIENT)
Dept: ELECTROPHYSIOLOGY | Facility: HOSPITAL | Age: 78
DRG: 023 | End: 2023-10-15
Attending: INTERNAL MEDICINE
Payer: MEDICARE

## 2023-10-15 DIAGNOSIS — R13.10 DYSPHAGIA: Primary | ICD-10-CM

## 2023-10-15 PROBLEM — R56.9 SEIZURES (HCC): Status: ACTIVE | Noted: 2023-10-14

## 2023-10-15 LAB
ANION GAP SERPL CALC-SCNC: 4 MMOL/L (ref 0–18)
APTT PPP: 27.5 SECONDS (ref 23.3–35.6)
BASOPHILS # BLD AUTO: 0.03 X10(3) UL (ref 0–0.2)
BASOPHILS NFR BLD AUTO: 0.3 %
BUN BLD-MCNC: 18 MG/DL (ref 7–18)
CALCIUM BLD-MCNC: 7.7 MG/DL (ref 8.5–10.1)
CHLORIDE SERPL-SCNC: 119 MMOL/L (ref 98–112)
CO2 SERPL-SCNC: 22 MMOL/L (ref 21–32)
CREAT BLD-MCNC: 0.94 MG/DL
EGFRCR SERPLBLD CKD-EPI 2021: 62 ML/MIN/1.73M2 (ref 60–?)
EOSINOPHIL # BLD AUTO: 0.1 X10(3) UL (ref 0–0.7)
EOSINOPHIL NFR BLD AUTO: 1 %
ERYTHROCYTE [DISTWIDTH] IN BLOOD BY AUTOMATED COUNT: 14.9 %
GLUCOSE BLD-MCNC: 111 MG/DL (ref 70–99)
GLUCOSE BLD-MCNC: 119 MG/DL (ref 70–99)
GLUCOSE BLD-MCNC: 119 MG/DL (ref 70–99)
GLUCOSE BLD-MCNC: 126 MG/DL (ref 70–99)
GLUCOSE BLD-MCNC: 127 MG/DL (ref 70–99)
HCT VFR BLD AUTO: 30.5 %
HGB BLD-MCNC: 9.8 G/DL
IMM GRANULOCYTES # BLD AUTO: 0.04 X10(3) UL (ref 0–1)
IMM GRANULOCYTES NFR BLD: 0.4 %
INR BLD: 1.33 (ref 0.85–1.16)
LYMPHOCYTES # BLD AUTO: 0.89 X10(3) UL (ref 1–4)
LYMPHOCYTES NFR BLD AUTO: 8.8 %
MAGNESIUM SERPL-MCNC: 2.3 MG/DL (ref 1.6–2.6)
MCH RBC QN AUTO: 29.8 PG (ref 26–34)
MCHC RBC AUTO-ENTMCNC: 32.1 G/DL (ref 31–37)
MCV RBC AUTO: 92.7 FL
MONOCYTES # BLD AUTO: 1.17 X10(3) UL (ref 0.1–1)
MONOCYTES NFR BLD AUTO: 11.6 %
NEUTROPHILS # BLD AUTO: 7.89 X10 (3) UL (ref 1.5–7.7)
NEUTROPHILS # BLD AUTO: 7.89 X10(3) UL (ref 1.5–7.7)
NEUTROPHILS NFR BLD AUTO: 77.9 %
OSMOLALITY SERPL CALC.SUM OF ELEC: 303 MOSM/KG (ref 275–295)
PHOSPHATE SERPL-MCNC: 1.7 MG/DL (ref 2.5–4.9)
PLATELET # BLD AUTO: 108 10(3)UL (ref 150–450)
POTASSIUM SERPL-SCNC: 3.8 MMOL/L (ref 3.5–5.1)
POTASSIUM SERPL-SCNC: 3.8 MMOL/L (ref 3.5–5.1)
PROTHROMBIN TIME: 16.6 SECONDS (ref 11.6–14.8)
RBC # BLD AUTO: 3.29 X10(6)UL
SODIUM SERPL-SCNC: 145 MMOL/L (ref 136–145)
VALPROATE SERPL-MCNC: 102.9 UG/ML (ref 50–100)
WBC # BLD AUTO: 10.1 X10(3) UL (ref 4–11)

## 2023-10-15 PROCEDURE — 99291 CRITICAL CARE FIRST HOUR: CPT | Performed by: INTERNAL MEDICINE

## 2023-10-15 PROCEDURE — 99024 POSTOP FOLLOW-UP VISIT: CPT | Performed by: NEUROLOGICAL SURGERY

## 2023-10-15 PROCEDURE — 93886 INTRACRANIAL COMPLETE STUDY: CPT | Performed by: OTHER

## 2023-10-15 PROCEDURE — 99292 CRITICAL CARE ADDL 30 MIN: CPT | Performed by: INTERNAL MEDICINE

## 2023-10-15 PROCEDURE — 99232 SBSQ HOSP IP/OBS MODERATE 35: CPT | Performed by: HOSPITALIST

## 2023-10-15 RX ORDER — LABETALOL HYDROCHLORIDE 5 MG/ML
10 INJECTION, SOLUTION INTRAVENOUS EVERY 4 HOURS PRN
Status: DISCONTINUED | OUTPATIENT
Start: 2023-10-15 | End: 2023-10-25

## 2023-10-15 NOTE — PLAN OF CARE
Assumed care of pt this am, she will follow commands with right side, left side is flacid, she appears to be seizing with facial/mouth twitching, ativan given, neuro critical care notified. Continuous EEG ordered which was positive for seizure activity, additional dose of IV depacon given. Pt pulled out dobhoff over night, unable to replace at bedside or in radiology. Neuro critical care notified. Pt remains lethargic, will follow simple commands on right side with repeated stimulation.

## 2023-10-15 NOTE — PLAN OF CARE
Q1 Neuros with Pupillometry. Follows commands on right side. Withdraws from pain on left. Non verbal. Continuous EEG. Needs dobhoff placed by GI unable to be placed by IR. Sinus Tach on tele.  SBP parameter maintained

## 2023-10-15 NOTE — PHYSICAL THERAPY NOTE
Physical Therapy    Cont to hold pt per neuro request, as pt had some seizures last night. Has been on 24 hour EEG. Will cont to follow peripherally and will initiate eval when pt is medically appropriate to participate.

## 2023-10-15 NOTE — SLP NOTE
ADULT SWALLOWING EVALUATION    ASSESSMENT    ASSESSMENT/OVERALL IMPRESSION:  Pt seen for BSSE. Pt just extubated on 10/13/23. Pt has not been seen by our ST service previously. Pt admitted for right frontal ICH/subarachnoid hemorrhage. Per the pt's RN, pt had a seizure yesterday and was postictal. Pt is arousable and overall more alert today. The pt had a dobhoff placed, which she pulled out and the staff as well as radiology have been unable to place the tube again. Attempting po trials to see if an oral diet can be initiated. Pt's RN present for evaluation. Pt repositioned. Pt following commands to open mouth, stick out tongue and vocalize. Speech severely dysarthric, however pt asking \"Am I going to live? \"  Pt given po trials of moderately thick liquids by tsp. Pt with minimal oral opening for acceptance, reduced containment, reduced bolus formation and reduced a-p propulsion. Laryngeal elevation present, but weak. Throat clear observed x1. Due to severity of oral phase, weakness and decreased endurance, recommend to continue NPO at this time with oral care. GI to be consulted to place NG tube. Agree with non-oral method of nutrition/hydration at this time. Will continue to attempt po trials to determine appropriateness for least restrictive diet consistency. Will complete cog/comm evaluation when appropriate. RN aware.          RECOMMENDATIONS   Diet Recommendations - Solids: NPO  Diet Recommendations - Liquids: NPO                              Medication Administration Recommendations: Non-oral  Treatment Plan/Recommendations: Dysphagia therapy;SLP to reassess       HISTORY   MEDICAL HISTORY  Reason for Referral: Stroke protocol    Problem List  Principal Problem:    Intracranial hemorrhage ([address]  Active Problems:    Type 2 diabetes mellitus without complication, without long-term current use of insulin (HCC)    Primary hypertension    SAH (subarachnoid hemorrhage) ([address]    Acute respiratory failure ([address] S/P craniotomy    Hypertensive emergency    Nontraumatic cortical hemorrhage of right cerebral hemisphere [facility])    Brain compression (HCC)    Midline shift of brain    Hypokalemia    Hypomagnesemia    Leukocytosis    Seizures ([facility]      Past Medical History  Past Medical History:   Diagnosis Date    Anxiety     Breast CA ([facility] 2004    CANCER     lft breast lumpectomy pre cancerous cells stage 0    Diabetes mellitus ([facility]     Diarrhea, unspecified     Ductal carcinoma in situ of breast 2004    left breast    Flatulence/gas pain/belching     GERD     H/O infectious mononucleosis     diagnosed in 1960s    High cholesterol     Hypertension     IBS (irritable bowel syndrome)     Osteoporosis     Other and unspecified hyperlipidemia     Personal history of irradiation, presenting hazards to health 01/01/2004    mammosite    Sleep disturbance     Stool incontinence     Type II or unspecified type diabetes mellitus without mention of complication, not stated as uncontrolled     Wears glasses           Diet Prior to Admission: Regular; Thin liquids           SWALLOWING HISTORY  Current Diet Consistency: NPO  Dysphagia History: None  Imaging Results:   Narrative   PROCEDURE:  XR CHEST AP PORTABLE  (CPT=71045)     TECHNIQUE:  AP chest radiograph was obtained. COMPARISON:  [facility] , XR, XR CHEST AP PORTABLE  (CPT=71045), 10/13/2023, 4:50 PM.     INDICATIONS:  dobhoff placement     PATIENT STATED HISTORY: (As transcribed by Technologist)  Status post dobhoff placement. FINDINGS:  Dobbhoff tube is within the mid esophagus and should be advanced further into the stomach. There is small effusions atelectasis at the lung bases.        SUBJECTIVE       OBJECTIVE   ORAL MOTOR EXAMINATION  Dentition: Functional  Symmetry: Reduced left facial  Strength: Reduced left facial  Tone: Reduced left facial  Range of Motion: Reduced left facial  Rate of Motion: Reduced    Voice Quality: Weak  Respiratory Status: Supplemental O2  Consistencies Trialed: Honey thick liquids  Method of Presentation: Spoon  Patient Positioning: Upright    Oral Phase of Swallow: Impaired  Bolus Retrieval: Impaired  Bilabial Seal: Impaired  Bolus Formation: Impaired  Bolus Propulsion: Impaired  Mastication:  (did not assess)  Retention: Impaired    Pharyngeal Phase of Swallow: Impaired  Laryngeal Elevation Timing: Appears impaired  Laryngeal Elevation Strength: Appears impaired  Laryngeal Elevation Coordination: Appears impaired  (Please note: Silent aspiration cannot be evaluated clinically. Videofluoroscopic Swallow Study is required to rule-out silent aspiration.)    Esophageal Phase of Swallow: No complaints consistent with possible esophageal involvement                GOALS  Goal #1 SLP to continue po trials.   In Progress     FOLLOW UP  Treatment Plan/Recommendations: Dysphagia therapy;SLP to reassess     Follow Up Needed (Documentation Required): Yes  SLP Follow-up Date: 10/17/23    Thank you for your referral.   If you have any questions, please contact LOUIS Haider

## 2023-10-15 NOTE — PLAN OF CARE
Assumed care of pt this am, more awake today and able to answer questions appropriately. Speech is very slurred but can make out most words. Pt follow commands and moves right arm and leg but left side is flacid. Neuro surgery removed drain, there is small amount of serosanguineous drainage. Spoke with pt brother Vladimir Abrams and gave him update, all questions answered.

## 2023-10-16 ENCOUNTER — APPOINTMENT (OUTPATIENT)
Dept: ULTRASOUND IMAGING | Facility: HOSPITAL | Age: 78
DRG: 023 | End: 2023-10-16
Attending: Other

## 2023-10-16 ENCOUNTER — ANESTHESIA EVENT (OUTPATIENT)
Dept: ENDOSCOPY | Facility: HOSPITAL | Age: 78
End: 2023-10-16
Payer: MEDICARE

## 2023-10-16 ENCOUNTER — ANESTHESIA (OUTPATIENT)
Dept: ENDOSCOPY | Facility: HOSPITAL | Age: 78
End: 2023-10-16
Payer: MEDICARE

## 2023-10-16 ENCOUNTER — APPOINTMENT (OUTPATIENT)
Dept: GENERAL RADIOLOGY | Facility: HOSPITAL | Age: 78
DRG: 023 | End: 2023-10-16
Attending: INTERNAL MEDICINE

## 2023-10-16 LAB
ALBUMIN SERPL-MCNC: 2 G/DL (ref 3.4–5)
ALBUMIN/GLOB SERPL: 0.5 {RATIO} (ref 1–2)
ALP LIVER SERPL-CCNC: 50 U/L
ALT SERPL-CCNC: 14 U/L
ANION GAP SERPL CALC-SCNC: 5 MMOL/L (ref 0–18)
APTT PPP: 31.1 SECONDS (ref 23.3–35.6)
AST SERPL-CCNC: 13 U/L (ref 15–37)
BASOPHILS # BLD AUTO: 0.02 X10(3) UL (ref 0–0.2)
BASOPHILS NFR BLD AUTO: 0.2 %
BILIRUB SERPL-MCNC: 0.5 MG/DL (ref 0.1–2)
BUN BLD-MCNC: 23 MG/DL (ref 7–18)
CALCIUM BLD-MCNC: 7.8 MG/DL (ref 8.5–10.1)
CHLORIDE SERPL-SCNC: 120 MMOL/L (ref 98–112)
CO2 SERPL-SCNC: 25 MMOL/L (ref 21–32)
CREAT BLD-MCNC: 0.79 MG/DL
EGFRCR SERPLBLD CKD-EPI 2021: 76 ML/MIN/1.73M2 (ref 60–?)
EOSINOPHIL # BLD AUTO: 0.02 X10(3) UL (ref 0–0.7)
EOSINOPHIL NFR BLD AUTO: 0.2 %
ERYTHROCYTE [DISTWIDTH] IN BLOOD BY AUTOMATED COUNT: 14.9 %
GLOBULIN PLAS-MCNC: 4.2 G/DL (ref 2.8–4.4)
GLUCOSE BLD-MCNC: 113 MG/DL (ref 70–99)
GLUCOSE BLD-MCNC: 127 MG/DL (ref 70–99)
GLUCOSE BLD-MCNC: 128 MG/DL (ref 70–99)
GLUCOSE BLD-MCNC: 131 MG/DL (ref 70–99)
GLUCOSE BLD-MCNC: 153 MG/DL (ref 70–99)
HCT VFR BLD AUTO: 32.7 %
HGB BLD-MCNC: 10.1 G/DL
IMM GRANULOCYTES # BLD AUTO: 0.04 X10(3) UL (ref 0–1)
IMM GRANULOCYTES NFR BLD: 0.4 %
INR BLD: 1.3 (ref 0.85–1.16)
LYMPHOCYTES # BLD AUTO: 0.75 X10(3) UL (ref 1–4)
LYMPHOCYTES NFR BLD AUTO: 8.2 %
MAGNESIUM SERPL-MCNC: 2.3 MG/DL (ref 1.6–2.6)
MCH RBC QN AUTO: 29.5 PG (ref 26–34)
MCHC RBC AUTO-ENTMCNC: 30.9 G/DL (ref 31–37)
MCV RBC AUTO: 95.6 FL
MONOCYTES # BLD AUTO: 0.93 X10(3) UL (ref 0.1–1)
MONOCYTES NFR BLD AUTO: 10.1 %
NEUTROPHILS # BLD AUTO: 7.43 X10 (3) UL (ref 1.5–7.7)
NEUTROPHILS # BLD AUTO: 7.43 X10(3) UL (ref 1.5–7.7)
NEUTROPHILS NFR BLD AUTO: 80.9 %
OSMOLALITY SERPL CALC.SUM OF ELEC: 315 MOSM/KG (ref 275–295)
PHOSPHATE SERPL-MCNC: 2.1 MG/DL (ref 2.5–4.9)
PLATELET # BLD AUTO: 139 10(3)UL (ref 150–450)
POTASSIUM SERPL-SCNC: 3.6 MMOL/L (ref 3.5–5.1)
POTASSIUM SERPL-SCNC: 3.6 MMOL/L (ref 3.5–5.1)
PROT SERPL-MCNC: 6.2 G/DL (ref 6.4–8.2)
PROTHROMBIN TIME: 16.2 SECONDS (ref 11.6–14.8)
RBC # BLD AUTO: 3.42 X10(6)UL
SODIUM SERPL-SCNC: 150 MMOL/L (ref 136–145)
WBC # BLD AUTO: 9.2 X10(3) UL (ref 4–11)

## 2023-10-16 PROCEDURE — 99291 CRITICAL CARE FIRST HOUR: CPT | Performed by: INTERNAL MEDICINE

## 2023-10-16 PROCEDURE — 99292 CRITICAL CARE ADDL 30 MIN: CPT | Performed by: INTERNAL MEDICINE

## 2023-10-16 PROCEDURE — 71045 X-RAY EXAM CHEST 1 VIEW: CPT | Performed by: INTERNAL MEDICINE

## 2023-10-16 PROCEDURE — 05H933Z INSERTION OF INFUSION DEVICE INTO RIGHT BRACHIAL VEIN, PERCUTANEOUS APPROACH: ICD-10-PCS | Performed by: HOSPITALIST

## 2023-10-16 PROCEDURE — 0DH68UZ INSERTION OF FEEDING DEVICE INTO STOMACH, VIA NATURAL OR ARTIFICIAL OPENING ENDOSCOPIC: ICD-10-PCS | Performed by: INTERNAL MEDICINE

## 2023-10-16 PROCEDURE — 93886 INTRACRANIAL COMPLETE STUDY: CPT | Performed by: OTHER

## 2023-10-16 PROCEDURE — 99232 SBSQ HOSP IP/OBS MODERATE 35: CPT | Performed by: HOSPITALIST

## 2023-10-16 RX ORDER — SODIUM CHLORIDE, SODIUM LACTATE, POTASSIUM CHLORIDE, CALCIUM CHLORIDE 600; 310; 30; 20 MG/100ML; MG/100ML; MG/100ML; MG/100ML
INJECTION, SOLUTION INTRAVENOUS CONTINUOUS PRN
Status: DISCONTINUED | OUTPATIENT
Start: 2023-10-16 | End: 2023-10-16 | Stop reason: SURG

## 2023-10-16 RX ORDER — POTASSIUM CHLORIDE 14.9 MG/ML
20 INJECTION INTRAVENOUS ONCE
Status: COMPLETED | OUTPATIENT
Start: 2023-10-16 | End: 2023-10-16

## 2023-10-16 RX ORDER — ONDANSETRON 2 MG/ML
4 INJECTION INTRAMUSCULAR; INTRAVENOUS EVERY 6 HOURS PRN
Status: DISCONTINUED | OUTPATIENT
Start: 2023-10-16 | End: 2023-10-16 | Stop reason: HOSPADM

## 2023-10-16 RX ORDER — METOCLOPRAMIDE HYDROCHLORIDE 5 MG/ML
10 INJECTION INTRAMUSCULAR; INTRAVENOUS EVERY 8 HOURS PRN
Status: DISCONTINUED | OUTPATIENT
Start: 2023-10-16 | End: 2023-10-16 | Stop reason: HOSPADM

## 2023-10-16 RX ORDER — SODIUM CHLORIDE, SODIUM LACTATE, POTASSIUM CHLORIDE, CALCIUM CHLORIDE 600; 310; 30; 20 MG/100ML; MG/100ML; MG/100ML; MG/100ML
INJECTION, SOLUTION INTRAVENOUS CONTINUOUS
Status: DISCONTINUED | OUTPATIENT
Start: 2023-10-16 | End: 2023-10-16

## 2023-10-16 RX ORDER — MIDAZOLAM HYDROCHLORIDE 1 MG/ML
INJECTION INTRAMUSCULAR; INTRAVENOUS AS NEEDED
Status: DISCONTINUED | OUTPATIENT
Start: 2023-10-16 | End: 2023-10-16 | Stop reason: SURG

## 2023-10-16 RX ORDER — SODIUM CHLORIDE, SODIUM GLUCONATE, SODIUM ACETATE, POTASSIUM CHLORIDE AND MAGNESIUM CHLORIDE 526; 502; 368; 37; 30 MG/100ML; MG/100ML; MG/100ML; MG/100ML; MG/100ML
75 INJECTION, SOLUTION INTRAVENOUS CONTINUOUS
Status: DISCONTINUED | OUTPATIENT
Start: 2023-10-16 | End: 2023-10-17

## 2023-10-16 RX ADMIN — SODIUM CHLORIDE, SODIUM LACTATE, POTASSIUM CHLORIDE, CALCIUM CHLORIDE: 600; 310; 30; 20 INJECTION, SOLUTION INTRAVENOUS at 16:15:00

## 2023-10-16 RX ADMIN — MIDAZOLAM HYDROCHLORIDE 1 MG: 1 INJECTION INTRAMUSCULAR; INTRAVENOUS at 16:23:00

## 2023-10-16 RX ADMIN — MIDAZOLAM HYDROCHLORIDE 1 MG: 1 INJECTION INTRAMUSCULAR; INTRAVENOUS at 16:20:00

## 2023-10-16 NOTE — PHYSICAL THERAPY NOTE
PHYSICAL THERAPY EVALUATION - INPATIENT     Room Number: 5385/1875-P  Evaluation Date: 10/16/2023  Type of Evaluation: Initial  Physician Order: PT Eval and Treat    Presenting Problem: IPH s/p R frontal craniotomy on 10/11  Co-Morbidities : DM, ocular migraine, IBS, CKD, GERD, HTN, anxiety, left breast cancer  Reason for Therapy: Mobility Dysfunction and Discharge Planning    History related to current admission: Patient is a 76year old female who presented to the ED on 10/11/23 from outpatient medical institution, where she was undergoing MRI for complaints of migraines, when pt had sudden onset L sided weakness and a facial numbness. Pt was admitted with large IPH in R frontal lobe. Pt underwent emergent right frontal craniotomy for evacuation of IPH on 10/11/23. Pt underwent cervicocerebral angiography on 10/12/23. ASSESSMENT   In this PT evaluation, the patient presents with the following impairments: 1) impaired L U/LE force generating capacity, 2) impaired static sitting balance, 3) R sided gaze preference/L sided neglect, 4) impaired RUE coordination, 5) impaired aerobic endurance, 6) impaired muscular endurance, 7) impaired L U/LE sensation. These impairments and comorbidities manifest themselves as functional limitations in activity tolerance andindependent bed mobility and render the pt unable to achieve transfers or gait at this time. The patient is below baseline and would benefit from skilled inpatient PT to address the above deficits to assist patient in returning to prior to level of function. Functional outcome measures completed include Modified [place] and AMPAC. The patient scored a 5 on the Modified [place] Scale, which is below baseline level of function compared to pre-stroke activities. The -PAC '6-Clicks' Inpatient Basic Mobility Short Form was completed and this patient is demonstrating a Approx Degree of Impairment: 100%  degree of impairment in mobility.  Research supports that patients with this level of impairment may benefit from inpatient rehabilitation. DISCHARGE RECOMMENDATIONS  PT Discharge Recommendations: Acute rehabilitation    PLAN  PT Treatment Plan: Bed mobility; Body mechanics; Endurance; Patient education; Neuromuscular re-educate;Range of motion;Strengthening;Stair training;Transfer training;Balance training  Rehab Potential : Good  Frequency (Obs): 3-5x/week  Number of Visits to Meet Established Goals: 5      CURRENT GOALS    Goal #1 Patient is able to demonstrate supine - sit EOB @ level: maximum assistance     Goal #2 Patient is able to demonstrate transfers Sit to/from Stand at assistance level: dependent assistance     Goal #3 Patient is able to maintain static unsupported sit x5 minutes with SBA. Goal #4    Goal #5    Goal #6    Goal Comments: Goals established on 10/16/2023    HOME SITUATION  Type of Home: House   Home Layout: Two level                Lives With: Alone (dog, Pablito)  Drives: Yes  Patient Owned Equipment: None  Patient Regularly Uses: Reading glasses    Prior Level of Hardy: The pt reports that prior to admit she was independent with ambulation and I/ADL's. Pt reports she has one recent fall when she was dizzy and her legs \"collapsed. \"    SUBJECTIVE  \"Milk and cereal.\"  Pt reports she is hungry. Pt's speech is garbled but intelligible ~75% of the time. Pt speaks in short 1-2 word phrases. OBJECTIVE  Precautions: Seizure;Aspiration;Bed/chair alarm (SBP: 110-140)  Fall Risk: High fall risk    WEIGHT BEARING RESTRICTION  Weight Bearing Restriction: None                PAIN ASSESSMENT  Rating:  (denies but unable to rate)          COGNITION  Arousal/Alertness:  delayed responses to stimuli  Orientation Level:  oriented x4--initially states \"[facility]\" but when reoriented to [facility] able to state town as [facility].   Memory:  decreased recall of recent events  Following Commands:  follows one step commands with increased time and follows one step commands with repetition  Awareness of Deficits:  decreased awareness of deficits  Problem Solving:  assistance required to identify errors made, assistance required to generate solutions, and assistance required to implement solutions    RANGE OF MOTION AND STRENGTH ASSESSMENT  RUE ASSESSMENT   RUE AROM  R Shoulder Flexion: WFL - Within Functional Limits   R Elbow Flexion/Extension: WFL - Within Functional Limits   R Wrist Flexion: WFL - Within Functional Limits   R Wrist Extension: WFL - Within Functional Limits    RUE PROM     RUE Strength  R Shoulder Flexion: 4-/5   R Elbow Flexion: 4/5   R Elbow Extension: 4/5   R Hand : 4+/5    LUE ASSESSMENT   LUE AROM  No active movement noted   LUE PROM  L Shoulder Flexion: WFL - Within Functional Limits   L Elbow Flexion/Extension: WFL - Within Functional Limits   L Wrist Flexion: WFL - Within Functional Limits   L Wrist Extension: WFL - Within Functional Limits    LUE Strength  L Shoulder Flexion: 0/5   L Elbow Flexion: 0/5   L Elbow Extension: 0/5   L Hand : 0/5    RLE ASSESSMENT   RLE AROM  R Hip Flexion: 3/4 - Full ROM   R Knee Flexion: 3/4 - Full ROM   R Knee Extension: 3/4 - Full ROM   R Ankle Dorsiflexion:  3/4 - Full ROM  R Ankle Plantar Flexion: 3/4 - Full ROM   RLE PROM     RLE Strength  R Iliopsoas: 4/5   R Quadricep: 4/5   R Hamstrin/5   R Anterior Tibialis: 4/5   R Gastrocnemius: 4/5    RLE Edema  None    LLE ASSESSMENT   LLE AROM  No active movement noted   LLE PROM  L Hip Flexion: 3/4 - Full ROM   L Knee Flexion: 3/4 - Full ROM   L Knee Extension: 3/4 - Full ROM   L Ankle Dorsiflexion:  3/4 - Full ROM  L Ankle Plantar Flexion: 3/4 - Full ROM   LLE Strength  L Hip Iliopsoas: 0/5   L Quadricep: 0/5   L Hamstrin/5   L Anterior Tibialis: 0/5   L Gastrocnemius: 0/5    LLE Edema  None        BALANCE  Static Sitting: Poor  Dynamic Sitting: Dependent  Static Standing: Not tested  Dynamic Standing: Not tested    ADDITIONAL TESTS  Additional Tests: Modified [place]              Modified [place]: 5                      O2 WALK  Oxygen Therapy  SPO2% on Oxygen at Rest: 97  At rest oxygen flow (liters per minute): 2    NEUROLOGICAL FINDINGS  Neurological Findings: Coordination - Finger to Nose;Sensation; Tone  Coordination - Finger to Nose: Asymmetrical;Right decreased speed;Right decreased accuracy (unable to complete on left side, RUE dysmetria)        Sensation: decreased sensation to L U/LE, unable to distinguish light touch stimuli  Tone: Resting flexion posture of LUE, wrist and hand, but PROM full with no resistance      AM-PAC '6-Clicks' INPATIENT SHORT FORM - BASIC MOBILITY  How much difficulty does the patient currently have. .. Patient Difficulty: Turning over in bed (including adjusting bedclothes, sheets and blankets)?: Unable   Patient Difficulty: Sitting down on and standing up from a chair with arms (e.g., wheelchair, bedside commode, etc.): Unable   Patient Difficulty: Moving from lying on back to sitting on the side of the bed?: Unable   How much help from another person does the patient currently need. .. Help from Another: Moving to and from a bed to a chair (including a wheelchair)?: Total   Help from Another: Need to walk in hospital room?: Total   Help from Another: Climbing 3-5 steps with a railing?: Total       AM-PAC Score:  Raw Score: 6   Approx Degree of Impairment: 100%   Standardized Score (AM-PAC Scale): 23.55   CMS Modifier (G-Code): CN    FUNCTIONAL ABILITY STATUS  Gait Assessment   Functional Mobility/Gait Assessment  Gait Assistance: Not tested    Skilled Therapy Provided     Bed Mobility:  Rolling: Max A x2  Supine to sit: Max A x2   Sit to supine: Max A x2     Transfer Mobility:  Sit to stand: NT   Stand to sit: NT  Gait = NT    Therapist's Comments:  Pt able to sit upright at EOB x15 minutes with Min-Mod A.   Pt completed lateral trunk flexion with forearm weight bearing x10 seconds bilaterally with Min-Mod A to return to midline. Pt with right sided gaze preference throughout session. When prompted pt able to turn head to the left, but eyes do not cross midline. Vision: Pt able to read large print presented to the right side correctly. With left eye covered pt was able to correctly identify number of fingers. Pt was unable to identify number of fingers with right eye covered. Exercise/Education Provided:  Bed mobility  Functional activity tolerated  Neuromuscular re-educate  Posture  Strengthening    Patient End of Session: In bed;Needs met;Call light within reach;RN aware of session/findings; All patient questions and concerns addressed;SCDs in place; Alarm set (in chair position)      Patient Evaluation Complexity Level:  History High - 3 or more personal factors and/or co-morbidities   Examination of body systems High - addressing a total of 4 or more elements   Clinical Presentation High - Unstable   Clinical Decision Making High - Unstable       PT Session Time: 30 minutes  Neuromuscular Re-education: 15 minutes

## 2023-10-16 NOTE — OPERATIVE REPORT
Operative Report-Esophagogastroduodenoscopy with endoscopically guided Dobhoff placement      PREOPERATIVE DIAGNOSIS/INDICATION:  Feeding difficulty  Intracranial hemorrhage  POSTOPERTATIVE DIAGNOSIS:  Hiatal hernia  PROCEDURE PERFORMED: EGD with Dobhoff placement  INFORMED CONSENT: Once a brief history and physical examination was performed, the risks, benefits and alternatives to the procedure were discussed with the patient and/or family and informed consent was obtained. The risks of sedation, perforation, missed lesions and need for surgery were all discussed. Patient expressed understanding of the risks and agreed to proceed. PROCEDURE DESCRIPTION:  The patient was then brought to the endoscopy suite where his/her pulse, pulse oximetry and blood pressure were monitored. He/she was placed in the left lateral decubitus position and deep sedation was administered. Once adequate sedation was achieved, a bite block was placed and a lubricated tip of an Olympus video upper endoscope was inserted through the oropharynx and gently manipulated through the esophagus into the stomach and the distal duodenum. Upon withdrawal of the endoscope, careful visualization of the mucosa was performed. FINDINGS:  ESOPHAGUS: Tortuous in course, no mucosal erosions ulcers, masses  EGJ: Located at 35 cm, diaphragmatic impression at 40 cm, 5 cm hiatal hernia  STOMACH:Normal mucosa throughout  DUODENUM: Normal to the extent examined  THERAPEUTICS: Using direct visualization, the Dobhoff tube was advanced via the right nostril into the distal esophagus, where the tip was endoscopically re-directed out of the hernia sac into the antrum, and duodenal bulb  RECOMMENDATIONS:   Post EGD precautions, watch for bleeding, infection, perforation, adverse drug reactions   CXR to confirm placement   Once confirmed, ok to begin tube feeding per dietary recommendations.   CC Report:     Tete Jackson MD  10/16/2023  4:39 PM  Everette Bonilla MD

## 2023-10-16 NOTE — CM/SW NOTE
Notified by RN that visitors from patient's [employer] were at bedside and requested to be added to pt chart for updates- RN declined and noted they were also asking pt if she wanted them to assist with paying bills, managing her house and moving her car and pt said \"no\"  RN contacted security to secure pt's purse which was at bedside. Pt's brother in law is emergency contact listed on Face Sheet    / to remain available for support and/or discharge planning.      Mattie Terrazas MBA MSN, RN CTL/  [id number]

## 2023-10-16 NOTE — PLAN OF CARE
0800 Received drowsy following commands . NPO for possible dobhoff placement . Dr. Quinn Hill here to see . MRI cancelled . CT for tomorrow. Speech here failed swallow . Discussed with GI APN plans for dobhoff placement later this afternoon. She called brother in law and left message. Incontinent of stool x2 care rendered . Pure placed . Seen by PT / OT . Several visitors today 2 from [employer] and and person who states he is her  . I asked patient prior if she  was ok to have visitors. and she answered yes . Her purse was placed in security . One of the visitors  wanted keys to move her car and to go to her house and look for papers and be placed on her chart so she could get information . Gave her  number did not give information . Patient did not want help from this couple . She was not listed as patient contact . Seizure precautions . No seizures seen. [id number] accompanied by RN.

## 2023-10-16 NOTE — ANESTHESIA POSTPROCEDURE EVALUATION
[facility] Patient Status:  Inpatient   Age/Gender 76year old female MRN [id number]   Location [address] [address] Attending Ayaka Lopez MD   [address] PCP Dois Carrel, MD       Anesthesia Post-op Note    ESOPHAGOGASTRODUODENOSCOPY with Dobbhoff placement    Procedure Summary       Date: 10/16/23 Room / Location: [facility] ENDOSCOPY 02 / 1404 [facility] ENDOSCOPY    Anesthesia Start: 3207 Anesthesia Stop: 7320    Procedure: ESOPHAGOGASTRODUODENOSCOPY with Dobbhoff placement Diagnosis:       Dysphagia      (successful dobhoff tube placement)    Surgeons: Nilo Adame MD Anesthesiologist: Sumit Lagunas MD    Anesthesia Type: MAC ASA Status: 3            Anesthesia Type: MAC    Vitals Value Taken Time   /72 10/16/23 1635   Temp 97.5 10/16/23 1635   Pulse 92 10/16/23 1635   Resp 14 10/16/23 1635   SpO2 96 10/16/23 1635       Patient Location: Endoscopy    Anesthesia Type: MAC    Airway Patency: patent    Postop Pain Control: adequate    Mental Status: preanesthetic baseline    Nausea/Vomiting: none    Cardiopulmonary/Hydration status: stable euvolemic    Complications: no apparent anesthesia related complications    Postop vital signs: stable    Dental Exam: Unchanged from Preop    Patient to be discharged from PACU when criteria met.

## 2023-10-16 NOTE — SLP NOTE
ADULT SWALLOWING EVALUATION    ASSESSMENT    ASSESSMENT/OVERALL IMPRESSION:  Patient seen to reassess swallow function. Patient admitted for right frontal ICH/SAH. She was intubated from 10/11/23 to 10/13/23. She had a seizure 10/14/23 but none since. Upon my arrival today, she is alert with a right gaze preference. She does answer basic questions. Her speech is quite dysarthric. Her respiratory rate ranged from 23-29 throughout. Her voice was clear. Oral mechanism exam remarkable for reduced left facial tone and strength. She also has general weakness in the lingual and facial musculature as well during oral mechanism exam. SLP rendered oral care, presented ice chip, mildly thick and moderately thick liquids by tsp (all CLD and limited trials given possible procedure later today). Patient with intact oral retrieveal and containment. Oral prep and transit appeared mildly prolonged. Laryngeal excursion judged to be of reduced strength and timeliness to palpation. There was increased upper airway congestion after ice chip and mildly thick liquids and post swallow cough after mildly thick and moderately thick liquid trials. Patient denied sensation of cough related to swallowing. Patient appears unsafe for po diet at this time. SLP will continue to follow for ongoing assessment of swallow function and completion of communication assessment per protocol. Discussed with RN and patient. Updated GI APN as well. Huber Assessment of Swallow Function Score: Moderate (149)    RECOMMENDATIONS   Diet Recommendations - Solids: NPO  Diet Recommendations - Liquids: NPO                              Medication Administration Recommendations: Non-oral  Treatment Plan/Recommendations: SLP to reassess; Dysphagia therapy;Communication evaluation  Discharge Recommendations/Plan: Undetermined    HISTORY   MEDICAL HISTORY  Reason for Referral: Stroke protocol    Problem List  Principal Problem:    Intracranial hemorrhage [facility])  Active Problems:    Type 2 diabetes mellitus without complication, without long-term current use of insulin ([facility] [address]    Primary hypertension    SAH (subarachnoid hemorrhage) (HCC)    Acute respiratory failure (HCC)    S/P craniotomy    Hypertensive emergency    Nontraumatic cortical hemorrhage of right cerebral hemisphere [facility])    Brain compression (HCC)    Midline shift of brain    Hypokalemia    Hypomagnesemia    Leukocytosis    Seizures ([facility] [address]      Past Medical History  Past Medical History:   Diagnosis Date    Anxiety     Breast CA ([facility] [address] 2004    CANCER     lft breast lumpectomy pre cancerous cells stage 0    Diabetes mellitus ([facility] [address]     Diarrhea, unspecified     Ductal carcinoma in situ of breast 2004    left breast    Flatulence/gas pain/belching     GERD     H/O infectious mononucleosis     diagnosed in 1960s    High cholesterol     Hypertension     IBS (irritable bowel syndrome)     Osteoporosis     Other and unspecified hyperlipidemia     Personal history of irradiation, presenting hazards to health 01/01/2004    mammosite    Sleep disturbance     Stool incontinence     Type II or unspecified type diabetes mellitus without mention of complication, not stated as uncontrolled     Wears glasses           Diet Prior to Admission: Regular; Thin liquids       Patient/Family Goals: to get better    SWALLOWING HISTORY  Current Diet Consistency: NPO  Dysphagia History: denied  Imaging Results:   CXR from 10/13/23 revealed:  FINDINGS:  The cardiomediastinal silhouette is enlarged, stable. A gastric tube has been advanced terminating in the distal stomach in appropriate position. Small left effusion is noted                   Impression   CONCLUSION:  Gastric tube has been advanced terminating in the distal stomach. .     Brain CT from 10/13/23 revealed:  CONCLUSION:  There is interval development of hypoattenuation centered on the left posterior fossa most likely representing an area of recent infarct. Scattered areas of subarachnoid and likely subdural as well as intraparenchymal hemorrhage appear overall stable. Continued follow-up is suggested. Critical results were discussed with Alexei Garland RN by Dr. Corinne Malcolm at approximately [phone] on 10/13/23. Read back was performed. LOCATION:  [facility]         Dictated by (CST): Colt Gee MD on 10/13/2023 at 4:47 PM   SUBJECTIVE       OBJECTIVE   ORAL MOTOR EXAMINATION  Dentition: Functional  Symmetry: Reduced left facial  Strength: Reduced left facial  Tone: Reduced left facial  Range of Motion: Reduced left facial  Rate of Motion: Reduced    Voice Quality: Weak  Respiratory Status: Supplemental O2  Consistencies Trialed: Thin liquids; Nectar thick liquids; Honey thick liquids (clear liquids administered only)  Method of Presentation: Spoon  Patient Positioning: Upright    Oral Phase of Swallow: Impaired  Bolus Retrieval: Impaired  Bilabial Seal: Impaired  Bolus Formation: Impaired  Bolus Propulsion: Impaired  Mastication:  (did not assess)  Retention: Impaired    Pharyngeal Phase of Swallow: Impaired  Laryngeal Elevation Timing: Appears impaired  Laryngeal Elevation Strength: Appears impaired  Laryngeal Elevation Coordination: Appears impaired  (Please note: Silent aspiration cannot be evaluated clinically. Videofluoroscopic Swallow Study is required to rule-out silent aspiration.)    Esophageal Phase of Swallow: No complaints consistent with possible esophageal involvement              GOALS  Goal #1 Patient will participate in reassessment of swallow function  In Progress   Goal #2 Patient will participate in cognitive communication assessment    In Progress     FOLLOW UP  Treatment Plan/Recommendations: SLP to reassess; Dysphagia therapy;Communication evaluation  Number of Visits to Meet Established Goals: 5  Follow Up Needed (Documentation Required): Yes  SLP Follow-up Date: 10/17/23    Thank you for your referral.   If you have any questions, please contact Raquel Gamboa MS CCC-SLP  Pager [phone]

## 2023-10-16 NOTE — VASCULAR ACCESS
Vascular Access consult for IV Access. Left arm flaccid. Midline and PIV placed in right arm. Noted RUE cephalic vein found to be non-compressible for entire length of extremity.

## 2023-10-16 NOTE — PLAN OF CARE
Neuro checks with pupilometer q 1 hr. Pt with left facial droop, slurred speech. O x 3-4. LE +4/5. RE +1/5. Sensation intact per pt x 4 extremities. Daily NIH. Seizure precautions. Daily TCD's. Continuous EEG in place. On 2-L NC to keep sats >93%. +nonproductive, weak cough. SR-ST with PAC's. Occasionally in bigeminy. NPO. PEG vs dobhoff by GI today. Accu checks q 6. Pt unable to void. Bladder scanned and straight cathed at 2100. Bladder scan this am 240 at 0400. Brief and purwick in place. BM x 3 overnight. Denies pain. PT/OT/Speech working with pt. No family present for shift.

## 2023-10-16 NOTE — DISCHARGE PLANNING
Attn: CENTRAL SCHEDULING DEPT.     Patient follow-up appointment information:    Visit Type: Stroke follow-up  Date of Stroke: 10/11/2023  Type of Stroke: Hemorrhagic  Patient to follow-up: 3 months  OP Neurologist: Any available neurologist  Anticipated discharge (if known): TBD  Discharge disposition (if known): Acute rehab      Anjana Robledo RN, BSN  Stroke Navigator  [phone]

## 2023-10-17 ENCOUNTER — APPOINTMENT (OUTPATIENT)
Dept: CT IMAGING | Facility: HOSPITAL | Age: 78
DRG: 023 | End: 2023-10-17
Attending: INTERNAL MEDICINE

## 2023-10-17 ENCOUNTER — APPOINTMENT (OUTPATIENT)
Dept: ULTRASOUND IMAGING | Facility: HOSPITAL | Age: 78
DRG: 023 | End: 2023-10-17
Attending: Other

## 2023-10-17 PROBLEM — R13.10 DYSPHAGIA: Status: ACTIVE | Noted: 2023-10-17

## 2023-10-17 LAB
ANION GAP SERPL CALC-SCNC: 4 MMOL/L (ref 0–18)
ANION GAP SERPL CALC-SCNC: 6 MMOL/L (ref 0–18)
BASOPHILS # BLD AUTO: 0.02 X10(3) UL (ref 0–0.2)
BASOPHILS NFR BLD AUTO: 0.3 %
BUN BLD-MCNC: 22 MG/DL (ref 7–18)
BUN BLD-MCNC: 25 MG/DL (ref 7–18)
CALCIUM BLD-MCNC: 7.7 MG/DL (ref 8.5–10.1)
CALCIUM BLD-MCNC: 7.8 MG/DL (ref 8.5–10.1)
CHLORIDE SERPL-SCNC: 121 MMOL/L (ref 98–112)
CHLORIDE SERPL-SCNC: 124 MMOL/L (ref 98–112)
CO2 SERPL-SCNC: 25 MMOL/L (ref 21–32)
CO2 SERPL-SCNC: 27 MMOL/L (ref 21–32)
CREAT BLD-MCNC: 0.8 MG/DL
CREAT BLD-MCNC: 0.84 MG/DL
EGFRCR SERPLBLD CKD-EPI 2021: 71 ML/MIN/1.73M2 (ref 60–?)
EGFRCR SERPLBLD CKD-EPI 2021: 75 ML/MIN/1.73M2 (ref 60–?)
EOSINOPHIL # BLD AUTO: 0.07 X10(3) UL (ref 0–0.7)
EOSINOPHIL NFR BLD AUTO: 1 %
ERYTHROCYTE [DISTWIDTH] IN BLOOD BY AUTOMATED COUNT: 15.3 %
GLUCOSE BLD-MCNC: 108 MG/DL (ref 70–99)
GLUCOSE BLD-MCNC: 117 MG/DL (ref 70–99)
GLUCOSE BLD-MCNC: 118 MG/DL (ref 70–99)
GLUCOSE BLD-MCNC: 122 MG/DL (ref 70–99)
GLUCOSE BLD-MCNC: 138 MG/DL (ref 70–99)
GLUCOSE BLD-MCNC: 162 MG/DL (ref 70–99)
GLUCOSE BLD-MCNC: 175 MG/DL (ref 70–99)
HCT VFR BLD AUTO: 32.4 %
HGB BLD-MCNC: 10.3 G/DL
IMM GRANULOCYTES # BLD AUTO: 0.08 X10(3) UL (ref 0–1)
IMM GRANULOCYTES NFR BLD: 1.1 %
LYMPHOCYTES # BLD AUTO: 0.79 X10(3) UL (ref 1–4)
LYMPHOCYTES NFR BLD AUTO: 10.7 %
MCH RBC QN AUTO: 29.4 PG (ref 26–34)
MCHC RBC AUTO-ENTMCNC: 31.8 G/DL (ref 31–37)
MCV RBC AUTO: 92.6 FL
MONOCYTES # BLD AUTO: 0.74 X10(3) UL (ref 0.1–1)
MONOCYTES NFR BLD AUTO: 10.1 %
NEUTROPHILS # BLD AUTO: 5.66 X10 (3) UL (ref 1.5–7.7)
NEUTROPHILS # BLD AUTO: 5.66 X10(3) UL (ref 1.5–7.7)
NEUTROPHILS NFR BLD AUTO: 76.8 %
OSMOLALITY SERPL CALC.SUM OF ELEC: 323 MOSM/KG (ref 275–295)
OSMOLALITY SERPL CALC.SUM OF ELEC: 324 MOSM/KG (ref 275–295)
PHOSPHATE SERPL-MCNC: 1.6 MG/DL (ref 2.5–4.9)
PLATELET # BLD AUTO: 108 10(3)UL (ref 150–450)
POTASSIUM SERPL-SCNC: 3.4 MMOL/L (ref 3.5–5.1)
POTASSIUM SERPL-SCNC: 4 MMOL/L (ref 3.5–5.1)
POTASSIUM SERPL-SCNC: 4 MMOL/L (ref 3.5–5.1)
RBC # BLD AUTO: 3.5 X10(6)UL
SODIUM SERPL-SCNC: 153 MMOL/L (ref 136–145)
SODIUM SERPL-SCNC: 154 MMOL/L (ref 136–145)
VALPROATE SERPL-MCNC: 88.4 UG/ML (ref 50–100)
WBC # BLD AUTO: 7.4 X10(3) UL (ref 4–11)

## 2023-10-17 PROCEDURE — 70498 CT ANGIOGRAPHY NECK: CPT | Performed by: INTERNAL MEDICINE

## 2023-10-17 PROCEDURE — 70496 CT ANGIOGRAPHY HEAD: CPT | Performed by: INTERNAL MEDICINE

## 2023-10-17 PROCEDURE — 99232 SBSQ HOSP IP/OBS MODERATE 35: CPT | Performed by: HOSPITALIST

## 2023-10-17 PROCEDURE — 99291 CRITICAL CARE FIRST HOUR: CPT | Performed by: INTERNAL MEDICINE

## 2023-10-17 PROCEDURE — 93886 INTRACRANIAL COMPLETE STUDY: CPT | Performed by: OTHER

## 2023-10-17 RX ORDER — ENOXAPARIN SODIUM 100 MG/ML
40 INJECTION SUBCUTANEOUS DAILY
Status: DISCONTINUED | OUTPATIENT
Start: 2023-10-17 | End: 2023-10-25

## 2023-10-17 RX ORDER — DEXTROSE MONOHYDRATE 50 MG/ML
INJECTION, SOLUTION INTRAVENOUS CONTINUOUS
Status: DISCONTINUED | OUTPATIENT
Start: 2023-10-17 | End: 2023-10-19

## 2023-10-17 RX ORDER — ATORVASTATIN CALCIUM 80 MG/1
80 TABLET, FILM COATED ORAL DAILY
Status: DISCONTINUED | OUTPATIENT
Start: 2023-10-18 | End: 2023-10-25

## 2023-10-17 NOTE — SLP NOTE
SPEECH DAILY NOTE - INPATIENT    ASSESSMENT & PLAN   ASSESSMENT  Patient seen to assess for improvement in swallow function. Patient alert and up in bed. She remains quite dysarthric but is able to participate in conversation today quite well. SLP presented puree, moderately thick and mildly thick liquids by tsp to assess swallow function. Patient with reduced bilabial seal to spoon and intermittent anterior loss of bolus noted. Reduced bolus formation noted with resulting mild left buccal retention which was removed by SLP. Laryngeal excursion judged to be of reduced strength and timeliness to palpation. There was post swallow cough after mildly thick and moderately thick liquids. Patient alertness improved today. Plan to continue to follow allowing more time for recovery and ongoing evaluation. Diet Recommendations - Solids: NPO  Diet Recommendations - Liquids: NPO          Medication Administration Recommendations: Non-oral                     Discharge Recommendations  Discharge Recommendations/Plan: Undetermined    Treatment Plan  Treatment Plan/Recommendations: SLP to reassess; Dysphagia therapy;Communication evaluation    Interdisciplinary Communication: Discussed with RN            GOALS  Goal #1 Patient will participate in reassessment of swallow function  In Progress   Goal #2 Patient will participate in cognitive communication assessment     In Progress     FOLLOW UP  Follow Up Needed (Documentation Required): Yes  SLP Follow-up Date: 10/18/23  Number of Visits to Meet Established Goals: 5    Session: 1 of 5    If you have any questions, please contact Brett Ortez [phone] [facility] [place]  Pager [phone]

## 2023-10-17 NOTE — PROGRESS NOTES
[facility] Pharmacy Note:  Discontinuation of Stress Ulcer Prophylaxis     Berta Reina is a 76year old patient who was initiated on stress ulcer prophylaxis with pantoprazole (PROTONIX). The patient no longer meets criteria for stress ulcer prophylaxis. It has been discontinued per the pharmacy consult.     Thank you,  Jake Bolanos, PharmD  10/17/2023 1:19 PM

## 2023-10-17 NOTE — PLAN OF CARE
Neuro q 2 days/q 3 nights. No new deficits noted. Seizure precautions in place. Daily TCD's. Daily NIH. AM CT results called to APN - no new orders. Restraint to RUE to prevent pt from pulling out GI inserted feeding tube. Unable to wean O2 off while keeping sats >93% per orders. Currently on 1-L. VSS. Edema noted to UE L>R. Dobhoff at 65 cm. Tolerating TF at goal. Water flushed q 4 hrs 100mL. 1 BM overnight. Purwick in place. Denies pain. SCD's on. PT/OT/Speech working with pt. No family present during shift.

## 2023-10-18 ENCOUNTER — ANESTHESIA EVENT (OUTPATIENT)
Dept: ENDOSCOPY | Facility: HOSPITAL | Age: 78
DRG: 023 | End: 2023-10-18
Payer: MEDICARE

## 2023-10-18 ENCOUNTER — ANESTHESIA (OUTPATIENT)
Dept: ENDOSCOPY | Facility: HOSPITAL | Age: 78
DRG: 023 | End: 2023-10-18
Payer: MEDICARE

## 2023-10-18 LAB
ANION GAP SERPL CALC-SCNC: 2 MMOL/L (ref 0–18)
BUN BLD-MCNC: 22 MG/DL (ref 7–18)
CALCIUM BLD-MCNC: 7.7 MG/DL (ref 8.5–10.1)
CHLORIDE SERPL-SCNC: 122 MMOL/L (ref 98–112)
CO2 SERPL-SCNC: 29 MMOL/L (ref 21–32)
CREAT BLD-MCNC: 0.72 MG/DL
EGFRCR SERPLBLD CKD-EPI 2021: 85 ML/MIN/1.73M2 (ref 60–?)
ERYTHROCYTE [DISTWIDTH] IN BLOOD BY AUTOMATED COUNT: 15.1 %
GLUCOSE BLD-MCNC: 122 MG/DL (ref 70–99)
GLUCOSE BLD-MCNC: 123 MG/DL (ref 70–99)
GLUCOSE BLD-MCNC: 132 MG/DL (ref 70–99)
GLUCOSE BLD-MCNC: 138 MG/DL (ref 70–99)
HCT VFR BLD AUTO: 30.2 %
HGB BLD-MCNC: 9.3 G/DL
MCH RBC QN AUTO: 29.3 PG (ref 26–34)
MCHC RBC AUTO-ENTMCNC: 30.8 G/DL (ref 31–37)
MCV RBC AUTO: 95.3 FL
OSMOLALITY SERPL CALC.SUM OF ELEC: 322 MOSM/KG (ref 275–295)
PHOSPHATE SERPL-MCNC: 2.4 MG/DL (ref 2.5–4.9)
PLATELET # BLD AUTO: 116 10(3)UL (ref 150–450)
POTASSIUM SERPL-SCNC: 3.7 MMOL/L (ref 3.5–5.1)
POTASSIUM SERPL-SCNC: 3.7 MMOL/L (ref 3.5–5.1)
RBC # BLD AUTO: 3.17 X10(6)UL
SODIUM SERPL-SCNC: 153 MMOL/L (ref 136–145)
WBC # BLD AUTO: 5.9 X10(3) UL (ref 4–11)

## 2023-10-18 PROCEDURE — 0DH63UZ INSERTION OF FEEDING DEVICE INTO STOMACH, PERCUTANEOUS APPROACH: ICD-10-PCS | Performed by: INTERNAL MEDICINE

## 2023-10-18 PROCEDURE — 99232 SBSQ HOSP IP/OBS MODERATE 35: CPT | Performed by: HOSPITALIST

## 2023-10-18 DEVICE — PERCUTANEOUS ENDOSCOPIC GASTROSTOMY KIT
Type: IMPLANTABLE DEVICE | Site: ABDOMEN | Status: FUNCTIONAL
Brand: ENDOVIVE SAFETY PEG KIT

## 2023-10-18 RX ORDER — NALOXONE HYDROCHLORIDE 0.4 MG/ML
80 INJECTION, SOLUTION INTRAMUSCULAR; INTRAVENOUS; SUBCUTANEOUS AS NEEDED
Status: DISCONTINUED | OUTPATIENT
Start: 2023-10-18 | End: 2023-10-18 | Stop reason: HOSPADM

## 2023-10-18 RX ORDER — SODIUM CHLORIDE, SODIUM LACTATE, POTASSIUM CHLORIDE, CALCIUM CHLORIDE 600; 310; 30; 20 MG/100ML; MG/100ML; MG/100ML; MG/100ML
INJECTION, SOLUTION INTRAVENOUS CONTINUOUS
Status: DISCONTINUED | OUTPATIENT
Start: 2023-10-18 | End: 2023-10-19

## 2023-10-18 RX ORDER — SODIUM CHLORIDE, SODIUM LACTATE, POTASSIUM CHLORIDE, CALCIUM CHLORIDE 600; 310; 30; 20 MG/100ML; MG/100ML; MG/100ML; MG/100ML
INJECTION, SOLUTION INTRAVENOUS CONTINUOUS PRN
Status: DISCONTINUED | OUTPATIENT
Start: 2023-10-18 | End: 2023-10-18 | Stop reason: SURG

## 2023-10-18 RX ORDER — LIDOCAINE HYDROCHLORIDE 10 MG/ML
INJECTION, SOLUTION EPIDURAL; INFILTRATION; INTRACAUDAL; PERINEURAL AS NEEDED
Status: DISCONTINUED | OUTPATIENT
Start: 2023-10-18 | End: 2023-10-18 | Stop reason: SURG

## 2023-10-18 RX ORDER — ONDANSETRON 2 MG/ML
4 INJECTION INTRAMUSCULAR; INTRAVENOUS AS NEEDED
Status: DISCONTINUED | OUTPATIENT
Start: 2023-10-18 | End: 2023-10-18 | Stop reason: HOSPADM

## 2023-10-18 RX ORDER — CEFAZOLIN SODIUM 1 G/3ML
INJECTION, POWDER, FOR SOLUTION INTRAMUSCULAR; INTRAVENOUS AS NEEDED
Status: DISCONTINUED | OUTPATIENT
Start: 2023-10-18 | End: 2023-10-18 | Stop reason: SURG

## 2023-10-18 RX ADMIN — LIDOCAINE HYDROCHLORIDE 30 MG: 10 INJECTION, SOLUTION EPIDURAL; INFILTRATION; INTRACAUDAL; PERINEURAL at 17:13:00

## 2023-10-18 RX ADMIN — SODIUM CHLORIDE, SODIUM LACTATE, POTASSIUM CHLORIDE, CALCIUM CHLORIDE: 600; 310; 30; 20 INJECTION, SOLUTION INTRAVENOUS at 17:09:00

## 2023-10-18 RX ADMIN — CEFAZOLIN SODIUM 2 G: 1 INJECTION, POWDER, FOR SOLUTION INTRAMUSCULAR; INTRAVENOUS at 17:10:00

## 2023-10-18 NOTE — PLAN OF CARE
Pt tele status. Neuro q 4 hrs - no new deficits noted. No seizure activity noted,. VSS. On RA. NPO after pulling out dobhoff - GI to see pt today. Purewick in place. SCD's on. Head incision staples intact, no drainage noted. Denies pain.  No family present for shift

## 2023-10-18 NOTE — CM/SW NOTE
Since patient to get PEG tube today--PM&R order obtained to evaluate for acute rehab--updated [facility] liaison Arsh Mix of consult--patient to be evaluated tomorrow

## 2023-10-18 NOTE — SLP NOTE
SPEECH DAILY NOTE - INPATIENT    ASSESSMENT & PLAN   ASSESSMENT  Patient seen to assess for improvement in swallow function. Patient alert to voice and up in bed. Persistent left neglect though given cues, she is able to turn head and eyes to midline but difficulty sustaining more than a few seconds. Patient speech remains dysarthric but with appropriate conversation. SLP presented limited trials of moderately thick liquids by tsp. Patient with prolonged oral prep and transit time. Laryngeal excursion strength appeared of reduced strength and timeliness to palpation. There was post swallow cough x2 out of 3 trials. Note NG tube was pulled by patient overnight. Note plan for PEG placement given clinical signs of dysphagia as well as concern for ability to meet nutrition needs by mouth. Will continue to follow for ongoing assessment of po tolerance. Discussed with RN. Diet Recommendations - Solids: NPO  Diet Recommendations - Liquids: NPO          Medication Administration Recommendations: Non-oral                     Discharge Recommendations  Discharge Recommendations/Plan: Undetermined    Treatment Plan  Treatment Plan/Recommendations: SLP to reassess; Dysphagia therapy;Communication evaluation    Interdisciplinary Communication: Discussed with RN            GOALS  Goal #1 Patient will participate in reassessment of swallow function  In Progress   Goal #2 Patient will participate in cognitive communication assessment     In Progress     FOLLOW UP  Follow Up Needed (Documentation Required): Yes  SLP Follow-up Date: 10/19/23  Number of Visits to Meet Established Goals: 5    Session: 2 of 5    If you have any questions, please contact Karli Castro Pérez [phone] [facility]  Pager [phone]

## 2023-10-18 NOTE — PHYSICAL THERAPY NOTE
PHYSICAL THERAPY TREATMENT NOTE - INPATIENT    Room Number: 2816/4145-N       Session: 1     Number of Visits to Meet Established Goals: 5    Presenting Problem: IPH s/p R frontal craniotomy on 10/11  Co-Morbidities : DM, ocular migraine, IBS, CKD, GERD, HTN, anxiety, left breast cancer  ASSESSMENT     Pt is able to complete EOB sitting x 10 minutes - still requiring Max A x 2 for any and all mobility. At this time, Pt. presents with decreased balance, impaired strength, difficulty with gait/transfers resulting in downgrade of overall functional mobility. Due to above deficits, Pt will benefit from continued IP PT, so that patient may achieve highest functional independence/return to baseline. DISCHARGE RECOMMENDATIONS  PT Discharge Recommendations: Acute rehabilitation     PLAN  PT Treatment Plan: Bed mobility; Body mechanics; Endurance; Patient education; Neuromuscular re-educate;Range of motion;Strengthening;Stair training;Transfer training;Balance training  Rehab Potential : Good  Frequency (Obs): 3-5x/week    CURRENT GOALS     Goal #1 Patient is able to demonstrate supine - sit EOB @ level: maximum assistance      Goal #2 Patient is able to demonstrate transfers Sit to/from Stand at assistance level: dependent assistance      Goal #3 Patient is able to maintain static unsupported sit x5 minutes with SBA. Goal #4     Goal #5     Goal #6     Goal Comments: Goals established on 10/16/2023    10/18/2023 all goals ongoing      SUBJECTIVE  Garbled, \"yes ok\"    OBJECTIVE  Precautions: Drain(s); Bed/chair alarm;Seizure;Aspiration (SBP: 110-150; DOBHOFF tube)    WEIGHT BEARING RESTRICTION  Weight Bearing Restriction: None                PAIN ASSESSMENT   Rating: Unable to rate  Location: noticeable pain response during LLE manual stretching       BALANCE                                                                                                                       Static Sitting: Poor  Dynamic Sitting: Dependent           Static Standing: Not tested  Dynamic Standing: Not tested    ACTIVITY TOLERANCE                         O2 WALK         AM-PAC '6-Clicks' INPATIENT SHORT FORM - BASIC MOBILITY  How much difficulty does the patient currently have. .. Patient Difficulty: Turning over in bed (including adjusting bedclothes, sheets and blankets)?: Unable   Patient Difficulty: Sitting down on and standing up from a chair with arms (e.g., wheelchair, bedside commode, etc.): Unable   Patient Difficulty: Moving from lying on back to sitting on the side of the bed?: Unable   How much help from another person does the patient currently need. .. Help from Another: Moving to and from a bed to a chair (including a wheelchair)?: Total   Help from Another: Need to walk in hospital room?: Total   Help from Another: Climbing 3-5 steps with a railing?: Total       AM-PAC Score:  Raw Score: 6   Approx Degree of Impairment: 100%   Standardized Score (AM-PAC Scale): 23.55   CMS Modifier (G-Code): CN    FUNCTIONAL ABILITY STATUS  Gait Assessment   Functional Mobility/Gait Assessment  Gait Assistance: Not tested    Skilled Therapy Provided    Bed Mobility:  Rolling: Max A x 2   Supine<>Sit: Max A x 2   Sit<>Supine: Max A x 2       Therapist's Comments: worked on cervical flexibility - still with R side gaze preference, only able to obtain L cervical rotation ~10 deg left of midline. THERAPEUTIC EXERCISES  Lower Extremity Hamstring/gastroc     Upper Extremity LUE PROM     Position Sitting and Supine     Repetitions   10   Sets   1     Patient End of Session: In bed;Needs met;Call light within reach;RN aware of session/findings; All patient questions and concerns addressed;SCDs in place; Alarm set    PT Session Time: 23 minutes  Gait Trainin minutes  Therapeutic Activity: 8 minutes  Therapeutic Exercise: 0 minutes   Neuromuscular Re-education: 15 minutes

## 2023-10-18 NOTE — ANESTHESIA POSTPROCEDURE EVALUATION
Gopi 7 Patient Status:  Inpatient   Age/Gender 76year old female MRN [id number]   Location [address] [address] Attending Al Buckner MD   [facility] Day # 7 PCP Lashell Luz MD       Anesthesia Post-op Note    PERCUTANEOUS ENDOSCOPIC GASTROSTOMY TUBE PLACEMENT    Procedure Summary       Date: 10/18/23 Room / Location: [address] [place] [address] ENDOSCOPY 02 / [address] [facility] ENDOSCOPY    Anesthesia Start: 7749 Anesthesia Stop: 0678    Procedure: PERCUTANEOUS ENDOSCOPIC GASTROSTOMY TUBE PLACEMENT Diagnosis: (successful Gtube placement)    Surgeons: Urszula Curran MD Anesthesiologist: Jaylen Wells MD    Anesthesia Type: MAC ASA Status: 3            Anesthesia Type: MAC    Vitals Value Taken Time   /56 10/18/23 1740   Temp  10/18/23 1740   Pulse 85 10/18/23 1740   Resp 16 10/18/23 1740   SpO2 92 10/18/23 1740       Patient Location: Endoscopy    Anesthesia Type: MAC    Airway Patency: patent    Postop Pain Control: adequate    Mental Status: mildly sedated but able to meaningfully participate in the post-anesthesia evaluation    Nausea/Vomiting: none    Cardiopulmonary/Hydration status: stable euvolemic    Complications: no apparent anesthesia related complications    Postop vital signs: stable    Dental Exam: Unchanged from Postop

## 2023-10-18 NOTE — PLAN OF CARE
Received patient at 07:30 drowsy wakens easily follows commands and right side, flaccid on the left. Patient with slurred speech oriented x3. Obtain consent from brother in law Arely Guzman. Patient agreeable to PEG tube placement. Problem: NEUROLOGICAL - ADULT  Goal: Achieves stable or improved neurological status  Description: INTERVENTIONS  - Assess for and report changes in neurological status  - Initiate measures to prevent increased intracranial pressure  - Maintain blood pressure and fluid volume within ordered parameters to optimize cerebral perfusion and minimize risk of hemorrhage  - Monitor temperature, glucose, and sodium.  Initiate appropriate interventions as ordered  Outcome: Progressing  Goal: Achieves maximal functionality and self care  Description: INTERVENTIONS  - Monitor swallowing and airway patency with patient fatigue and changes in neurological status  - Encourage and assist patient to increase activity and self care with guidance from PT/OT  - Encourage visually impaired, hearing impaired and aphasic patients to use assistive/communication devices  Outcome: Progressing  Goal: Absence of seizures  Description: INTERVENTIONS  - Monitor for seizure activity  - Administer anti-seizure medications as ordered  - Monitor neurological status  Outcome: Progressing  Goal: Remains free of injury related to seizure activity  Description: INTERVENTIONS:  - Maintain airway, patient safety  and administer oxygen as ordered  - Monitor patient for seizure activity, document and report duration and description of seizure to MD/LIP  - If seizure occurs, turn patient to side and suction secretions as needed  - Reorient patient post seizure  - Seizure pads on all 4 side rails  - Instruct patient/family to notify RN of any seizure activity  - Instruct patient/family to call for assistance with activity based on assessment  Outcome: Progressing

## 2023-10-18 NOTE — PROGRESS NOTES
Discussed the risks, benefits, and alternatives with the patient's POA Luis Tinsley) including bleeding, perforation, intestinal obstruction, peritonitis, and the requirement that the tube remain in place for a minimum of 30 days. He was agreeable to precede after all questions were answered to his satisfaction.       Will plan for EGD with PEG placement under MAC this afternoon with Dr Alysia Hurst  Vancomycin 1gm IVPB on-call to GI lab     HYACINTH Darby  12:36 PM  10/18/2023  [facility]  [phone]

## 2023-10-18 NOTE — OR NURSING
Received patient awake. Answers yes and no questions with nod of her head appropriately. L side flaccid. Bilateral arms edematous with purple discoloration to areas. IVF's infusing. NSR. L arm and L leg elevated on pillows.

## 2023-10-18 NOTE — OPERATIVE REPORT
Operative Report-EGD with PEG placement      PREOPERATIVE DIAGNOSIS/INDICATION: Feeding difficulty, Neuromuscular dysphagia  POSTOPERTATIVE DIAGNOSIS: Feeding difficulty, Neurmuscular dysphagia s/p successful placement of PEG  PROCEDURE PERFORMED: EGD with PEG placement  INFORMED CONSENT: Once a brief history and physical examination was performed, the risks, benefits and alternatives to the procedure were discussed with the patient and/or family and informed consent was obtained. The risks of sedation, perforation, missed lesions and need for surgery, PEG tract infections were all discussed. Patient expressed understanding of the risks and agreed to proceed. PROCEDURE DESCRIPTION:  The patient was then brought to the endoscopy suite where his/her pulse, pulse oximetry and blood pressure were monitored. He/she was placed in the left lateral decubitus position and deep sedation was administered. Once adequate sedation was achieved, a bite block was placed and a lubricated tip of an Olympus video upper endoscope was inserted through the oropharynx and gently manipulated through the esophagus into the stomach and the distal duodenum. Upon withdrawal of the endoscope, careful visualization of the mucosa was performed. The optimal site for PEG placement was identified using transillumination. The site was marked and draped in sterile fashion. A total of 5 cc of Lidocaine was injected locally for topical anesthesia. A needle and trochar were then introduced through the abdominal wall into the gastric lumen. The needle was visualized internally. The trochar was removed and a guidewire was introduced through the needle and grasped internally using a snare. The endoscope and guidewire were withdrawn from the patient and adhered to a 20-Fr BARD gastrostomy tube.   Using standard pull through technique, the tube was adhered to the abdominal wall using an external rounded bolster. The patient tolerated the procedure well without complication. FINDINGS:  ESOPHAGUS: Normal in course and caliber  no mucosal erosions, ulcers  EGJ:Located at 40 cm otherwise normal  STOMACH: Normal  DUODENUM: Normal  THERAPEUTICS: PEG placement  RECOMMENDATIONS:   Post EGD precautions, watch for bleeding, infection, perforation, adverse drug reactions   Use abdominal binder at all times  CBC in am  OK to use tube for pills and flushes now, feedings in 1 hour . Nuno Mancini   CC Report:     Abbi Erazo MD  10/18/2023  5:31 PM

## 2023-10-19 ENCOUNTER — APPOINTMENT (OUTPATIENT)
Dept: ULTRASOUND IMAGING | Facility: HOSPITAL | Age: 78
DRG: 023 | End: 2023-10-19
Attending: INTERNAL MEDICINE

## 2023-10-19 LAB
BASOPHILS # BLD AUTO: 0.05 X10(3) UL (ref 0–0.2)
BASOPHILS NFR BLD AUTO: 0.4 %
BUN BLD-MCNC: 15 MG/DL (ref 7–18)
CREAT BLD-MCNC: 0.84 MG/DL
EGFRCR SERPLBLD CKD-EPI 2021: 71 ML/MIN/1.73M2 (ref 60–?)
EOSINOPHIL # BLD AUTO: 0.12 X10(3) UL (ref 0–0.7)
EOSINOPHIL NFR BLD AUTO: 1 %
ERYTHROCYTE [DISTWIDTH] IN BLOOD BY AUTOMATED COUNT: 14.6 %
GLUCOSE BLD-MCNC: 164 MG/DL (ref 70–99)
GLUCOSE BLD-MCNC: 174 MG/DL (ref 70–99)
GLUCOSE BLD-MCNC: 176 MG/DL (ref 70–99)
HCT VFR BLD AUTO: 33.3 %
HGB BLD-MCNC: 10.8 G/DL
IMM GRANULOCYTES # BLD AUTO: 0.16 X10(3) UL (ref 0–1)
IMM GRANULOCYTES NFR BLD: 1.3 %
LYMPHOCYTES # BLD AUTO: 1.71 X10(3) UL (ref 1–4)
LYMPHOCYTES NFR BLD AUTO: 13.7 %
MCH RBC QN AUTO: 30.1 PG (ref 26–34)
MCHC RBC AUTO-ENTMCNC: 32.4 G/DL (ref 31–37)
MCV RBC AUTO: 92.8 FL
MONOCYTES # BLD AUTO: 0.85 X10(3) UL (ref 0.1–1)
MONOCYTES NFR BLD AUTO: 6.8 %
NEUTROPHILS # BLD AUTO: 9.63 X10 (3) UL (ref 1.5–7.7)
NEUTROPHILS # BLD AUTO: 9.63 X10(3) UL (ref 1.5–7.7)
NEUTROPHILS NFR BLD AUTO: 76.8 %
PHOSPHATE SERPL-MCNC: 2.2 MG/DL (ref 2.5–4.9)
PLATELET # BLD AUTO: 131 10(3)UL (ref 150–450)
POTASSIUM SERPL-SCNC: 3.7 MMOL/L (ref 3.5–5.1)
RBC # BLD AUTO: 3.59 X10(6)UL
WBC # BLD AUTO: 12.5 X10(3) UL (ref 4–11)

## 2023-10-19 PROCEDURE — 93970 EXTREMITY STUDY: CPT | Performed by: INTERNAL MEDICINE

## 2023-10-19 PROCEDURE — 99233 SBSQ HOSP IP/OBS HIGH 50: CPT | Performed by: HOSPITALIST

## 2023-10-19 NOTE — CM/SW NOTE
SORIN spoke with pt's brother in law, Evangelist Moreno, over the phone to discuss discharge planning. Explained to Evangelist Moreno that PT is recommending rehab at discharge. Pt got peg tube placed. Pt may be nearing discharge. Explained that pt is most likely going to require care even after RADHA. Evangelist Moreno [place]. Evangelist Moreno does agree to rehab. However, he is requesting that Astrid Joshua (unsure of last name spelling) be involved in pt's care and RADHA decision. Evangelist Moreno stated that she is involved in [place] and has visited. Explained that we don't have Cande's contact information and Evangelist Moreno has been the one that has been contacted. Evangelist Moreno said that he has Cande's phone # and will provide it to SW when he returns home. SORIN explained that pt's [employer] will have to provide authorization for RADHA, and that they typically approve week by week. SORIN sent RADHA referrals in [place]. SW to send email to Evangelist Johnselfego with the [facility] 12 list once available. Will complete PASRR. BART Bateman, [facility]  Discharge Planner  [id number]    Addendum: PASRR assessment completed and uploaded to RADHA referral in [facility] [facility].

## 2023-10-19 NOTE — CDS QUERY
CLINICAL DOCUMENTATION CLARIFICATION FORM  Dear Charis CLAROS,  Clinical information (provided below) suggests a finding of edema on the brain imaging report. BASED ON YOUR CLINICAL JUDGEMENT, PLEASE SELECT   THE MOST APPROPRIATE RESPONSE:   [ X  ] Clinically Significant Vasogenic Edema  [   ] Evidence of Vasogenic Edema on Imaging without Clinical Significance  [   ] Other (please specify):     Documentation from the Medical Record:   RISK FACTORS: HTN - DM2 - Migraine    CLINICAL INDICATORS:  -10/11/23 CT Brain: CONCLUSION:    There is interval development of a large parenchymal hematoma measuring up to 4.9 cm with surrounding vasogenic edema. There is mild mass effect but no midline shift       TREATMENT: Decompressive Craniotomy - Neuro Critical Care - Neurosurgery consultation                For questions regarding this query, please contact Clinical : Nisha Howell [phone]                                                           THIS FORM IS A PERMANENT PART OF [facility]

## 2023-10-19 NOTE — PLAN OF CARE
Received pt from GI lab this evening  Denies pain  PEG tube C/D/I.  Abd binder in place  Seizure precautions maintained  Tube feeds started per orders  Pt updated on POC

## 2023-10-19 NOTE — PLAN OF CARE
Received pt at 1930  Pt AOx3, SR/ST, RA, VSS  Seizure precautions  Neuro checks. See flowsheets  IVF  TF infusing at goal  PRN Hydralazine x1  PRN tylenol & heat packs given for neck pain  D/c Planning: PMR Eval 10/19. US BUE  Call light within reach. Needs currently met      Problem: NEUROLOGICAL - ADULT  Goal: Achieves stable or improved neurological status  Description: INTERVENTIONS  - Assess for and report changes in neurological status  - Initiate measures to prevent increased intracranial pressure  - Maintain blood pressure and fluid volume within ordered parameters to optimize cerebral perfusion and minimize risk of hemorrhage  - Monitor temperature, glucose, and sodium.  Initiate appropriate interventions as ordered  Outcome: Progressing  Goal: Achieves maximal functionality and self care  Description: INTERVENTIONS  - Monitor swallowing and airway patency with patient fatigue and changes in neurological status  - Encourage and assist patient to increase activity and self care with guidance from PT/OT  - Encourage visually impaired, hearing impaired and aphasic patients to use assistive/communication devices  Outcome: Progressing  Goal: Absence of seizures  Description: INTERVENTIONS  - Monitor for seizure activity  - Administer anti-seizure medications as ordered  - Monitor neurological status  Outcome: Progressing  Goal: Remains free of injury related to seizure activity  Description: INTERVENTIONS:  - Maintain airway, patient safety  and administer oxygen as ordered  - Monitor patient for seizure activity, document and report duration and description of seizure to MD/LIP  - If seizure occurs, turn patient to side and suction secretions as needed  - Reorient patient post seizure  - Seizure pads on all 4 side rails  - Instruct patient/family to notify RN of any seizure activity  - Instruct patient/family to call for assistance with activity based on assessment  Outcome: Progressing     Problem: Diabetes/Glucose Control  Goal: Glucose maintained within prescribed range  Description: INTERVENTIONS:  - Monitor Blood Glucose as ordered  - Assess for signs and symptoms of hyperglycemia and hypoglycemia  - Administer ordered medications to maintain glucose within target range  - Assess barriers to adequate nutritional intake and initiate nutrition consult as needed  - Instruct patient on self management of diabetes  Outcome: Progressing

## 2023-10-19 NOTE — PLAN OF CARE
Assumed care at [facility] and oriented x4  NSR on tele and on room air   Headache 6/10, relieved with prn tylenol  PEG clean, dry, and intact   Q4 neuro checks, L sided deficits noted, no new deficits noted  Stroke education given by RN   Plan for XR video swallow tomorrow   US done, pending results   Continuous tube feeds infusing through PEG  Phosphorus electrolyte replacement per protocol   Needs met  Call light w/in reach

## 2023-10-19 NOTE — PLAN OF CARE
Stroke education given to patient; the following education was provided:     BEFAST - Stroke warning signs and symptoms  Personalized risk factors including hypertension, diabetes  Need for follow-up after discharge  How to activate EMS for stroke  Healthy lifestyle (nutrition and exercise)    Patient present during education. Patient receptive to teachings. All pertinent questions and concerns were addressed.      Norm Rhoades RN, BSN  Stroke Navigator  [phone]

## 2023-10-19 NOTE — CM/SW NOTE
SW received call from [facility] whom provided SW with Cande's phone # of [phone]. [facility] stated that he wants Jonathan Alert involved in pt's care and rehab planning. SW met with pt at bedside to also gather information on [employer] Alert. Pt was able to tell SW who Jonathan Alert was. Pt stated Peter Robles is from my [employer]. \" Pt stated that she also lives in [facility]. Pt did give ok for SW to contact Jonathan Alert for discharge planning and rehab placement. SW to call Jonathan Alert to further discuss.     BART Cassidy, [facility]  Discharge [address] [facility].

## 2023-10-19 NOTE — CM/SW NOTE
SORIN spoke with Miguel Nava over the phone to introduce SW role in pt's care. Explained that [employer] to assist with pt's plan of care and next steps regarding discharge planning. Miguel Nava stated that she just got off the phone w/ Vladimir Abrams and is aware of pt's current needs. Praised Miguel Nava for being a dear friend to pt and they go to the same [employer]. Sarah agreeable to assisting with helping pt for rehab planning. SORIN explained to Miguel Nava that pt needs RADHA. SORIN emailed RADHA list to Miguel Nava at [email or web address] Miguel Nava will review the RADHA options. Explained that insurance Gayatri Bills will need to be obtained after a choice is made. SORIN encouraged Sarah to secure a RADHA choice by Friday morning. Miguel Nava stated that she will be unavailable from 12:00p-5:00p tomorrow, but will be available in the morning to discuss. SORIN also sent message to the available facilities to see if pt were to go to chosen facility, could her dog Pablito come and visit her at some point for emotional support. Await responses. SORIN placed Sarah as an emergency contact on pt's file.      BART Bergman, [facility]  Discharge [address] [facility].

## 2023-10-19 NOTE — SLP NOTE
SPEECH DAILY NOTE - INPATIENT    ASSESSMENT & PLAN   ASSESSMENT  Patient seen to assess for improvement in swallow function. Patient alert and up in bed. Patient speech remains dysarthric but intelligibility adequate for conversation though with reduced breath support for sustained verbalization. Persistent left visual field preference. SLP presented puree and moderately thick liquids by tsp. Patient with intact oral retrieval and containment. Oral prep and transit appeared mildly prolonged. Patient benefited from cues to not speak with po in her mouth. Laryngeal excursion judged to be of adequate strength to palpation. Patient with no significant oral retention noted after multiple trials. Patient was noted to cough prior to po trials today and delayed cough x2 after po trials. Patient continues to demonstrate slow progress daily. Recommend proceed with VFSS to objectively assess swallow function and further guide treatment. Discussed with patient who was in agreement.      Diet Recommendations - Solids: NPO  Diet Recommendations - Liquids: NPO          Medication Administration Recommendations: Non-oral                     Discharge Recommendations  Discharge Recommendations/Plan: Undetermined    Treatment Plan  Treatment Plan/Recommendations: Videofluoroscopic swallow study;Communication evaluation    Interdisciplinary Communication: Discussed with RN            GOALS  Goal #1 Patient will participate in reassessment of swallow function  Met   Goal #2 Patient will participate in cognitive communication assessment     In Progress   Goal #3 Patient will participate in VFSS  New Goal     FOLLOW UP  Follow Up Needed (Documentation Required): Yes  SLP Follow-up Date: 10/20/23  Number of Visits to Meet Established Goals: 5    Session: 3 of 5    If you have any questions, please contact Mary Castro Pérez [phone] [facility]  Pager [phone]

## 2023-10-20 ENCOUNTER — APPOINTMENT (OUTPATIENT)
Dept: GENERAL RADIOLOGY | Facility: HOSPITAL | Age: 78
DRG: 023 | End: 2023-10-20
Attending: HOSPITALIST

## 2023-10-20 LAB
BILIRUB UR QL STRIP.AUTO: NEGATIVE
CLARITY UR REFRACT.AUTO: CLEAR
ERYTHROCYTE [DISTWIDTH] IN BLOOD BY AUTOMATED COUNT: 14.8 %
GLUCOSE BLD-MCNC: 148 MG/DL (ref 70–99)
GLUCOSE BLD-MCNC: 158 MG/DL (ref 70–99)
GLUCOSE BLD-MCNC: 159 MG/DL (ref 70–99)
GLUCOSE BLD-MCNC: 166 MG/DL (ref 70–99)
GLUCOSE BLD-MCNC: 172 MG/DL (ref 70–99)
GLUCOSE BLD-MCNC: 181 MG/DL (ref 70–99)
GLUCOSE UR STRIP.AUTO-MCNC: NORMAL MG/DL
HCT VFR BLD AUTO: 30.5 %
HGB BLD-MCNC: 10 G/DL
KETONES UR STRIP.AUTO-MCNC: NEGATIVE MG/DL
LEUKOCYTE ESTERASE UR QL STRIP.AUTO: NEGATIVE
MCH RBC QN AUTO: 30 PG (ref 26–34)
MCHC RBC AUTO-ENTMCNC: 32.8 G/DL (ref 31–37)
MCV RBC AUTO: 91.6 FL
NITRITE UR QL STRIP.AUTO: NEGATIVE
PH UR STRIP.AUTO: 6.5 [PH] (ref 5–8)
PHOSPHATE SERPL-MCNC: 3.4 MG/DL (ref 2.5–4.9)
PLATELET # BLD AUTO: 120 10(3)UL (ref 150–450)
POTASSIUM SERPL-SCNC: 3 MMOL/L (ref 3.5–5.1)
PROT UR STRIP.AUTO-MCNC: NEGATIVE MG/DL
RBC # BLD AUTO: 3.33 X10(6)UL
RBC UR QL AUTO: NEGATIVE
SP GR UR STRIP.AUTO: 1.02 (ref 1–1.03)
STAPHYLOCOCCUS AUREUS, NOT MRSA BY PCR: NOT DETECTED
UROBILINOGEN UR STRIP.AUTO-MCNC: NORMAL MG/DL
WBC # BLD AUTO: 9.9 X10(3) UL (ref 4–11)

## 2023-10-20 PROCEDURE — 74230 X-RAY XM SWLNG FUNCJ C+: CPT | Performed by: HOSPITALIST

## 2023-10-20 PROCEDURE — 99233 SBSQ HOSP IP/OBS HIGH 50: CPT | Performed by: HOSPITALIST

## 2023-10-20 RX ORDER — POTASSIUM CHLORIDE 1.5 G/1.58G
40 POWDER, FOR SOLUTION ORAL EVERY 4 HOURS
Status: COMPLETED | OUTPATIENT
Start: 2023-10-20 | End: 2023-10-20

## 2023-10-20 RX ORDER — LISINOPRIL 5 MG/1
5 TABLET ORAL DAILY
Status: DISCONTINUED | OUTPATIENT
Start: 2023-10-20 | End: 2023-10-25

## 2023-10-20 NOTE — VIDEO SWALLOW STUDY NOTE
ADULT VIDEOFLUOROSCOPIC SWALLOWING STUDY    Admission Date: 10/11/2023  Evaluation Date: 10/20/23  Radiologist: Dr. Naranjo Pulling: Puree (1:1 feeding assistance)  Diet Recommendations - Liquids: Nectar thick liquids/ Mildly thick (by tsp only)    Further Follow-up:  Follow Up Needed (Documentation Required): Yes  SLP Follow-up Date: 10/23/23        Medication Administration Recommendations: Crushed in puree  Treatment Plan/Recommendations: Dysphagia therapy    HISTORY   Background/Objective Information:    Problem List  Principal Problem:    Intracranial hemorrhage ([address]  Active Problems:    Type 2 diabetes mellitus without complication, without long-term current use of insulin ([address]    Primary hypertension    SAH (subarachnoid hemorrhage) (HCC)    Acute respiratory failure (HCC)    S/P craniotomy    Hypertensive emergency    Nontraumatic cortical hemorrhage of right cerebral hemisphere ([address]    Brain compression (HCC)    Midline shift of brain    Hypokalemia    Hypomagnesemia    Leukocytosis    Seizures ([address]    Dysphagia      Past Medical History  Past Medical History:   Diagnosis Date    Anxiety     Breast CA ([address] 2004    CANCER     lft breast lumpectomy pre cancerous cells stage 0    Diabetes mellitus ([address]     Diarrhea, unspecified     Ductal carcinoma in situ of breast 2004    left breast    Flatulence/gas pain/belching     GERD     H/O infectious mononucleosis     diagnosed in 1960s    High cholesterol     Hypertension     IBS (irritable bowel syndrome)     Osteoporosis     Other and unspecified hyperlipidemia     Personal history of irradiation, presenting hazards to health 01/01/2004    mammosite    Sleep disturbance     Stool incontinence     Type II or unspecified type diabetes mellitus without mention of complication, not stated as uncontrolled     Wears glasses        Current Diet Consistency: NPO        History of Recent: No recent respiratory difficulty Imaging results:   CTA Brain + CTA Carotids from 10/17/23 revealed:  CONCLUSION:       1. Redemonstration of changes of previous right frontal craniotomy. Surgical clips are again noted along the right scalp along with postprocedural pneumocephalus. There is a mixed attenuation postoperative subdural collection again noted along the  right cerebral convexity measuring up to 5 mm in thickness, previously 6 mm. 2. There are stable areas of hypoattenuation and parenchymal hemorrhage noted in the right frontal lobe. The largest area is again noted along the posterior right frontal lobe with a reference measurement of approximately 2.2 x 0.9 cm. There are stable   small areas of subarachnoid hemorrhage also noted along the right frontal sulci. 3. Stable appearance of the evolving small left cerebellar infarcts. 4. Stable old lacunar infarct in the right caudate nucleus. Stable minimal chronic microvascular ischemic changes elsewhere in the cerebral white matter. 5. No evidence of intracranial aneurysm, flow-limiting stenosis, or focal arterial occlusion. No specific findings to suggest hemodynamically significant intracranial vasospasm. 6. Suboptimal CTA of the neck due to patient motion. No evidence of occlusion, obvious acute dissection, or flow-limiting stenosis in the cervical vertebral or carotid arteries within the limitations of the exam. No evidence of hemodynamically  significant carotid stenosis by NASCET criteria. 7. Changes of fibromuscular dysplasia in the cervical vertebral arteries. Please see above for further details. LOCATION:  [facility]        Dictated by (CST): Lacie Cooper MD on 10/17/2023 at 7:38 AM      Finalized by (CST): Lacie Cooper MD on 10/17/2023 at 7:50 AM      Reason for Referral: Stroke protocol  Assess oropharyngeal swallow function post stroke    Family/Patient Goals:   To eat and drink     ASSESSMENT   DYSPHAGIA ASSESSMENT  Test completed in conjunction with Radiologist.  Patient Positioned: Upright;Midline;[facility] chair (head turned to right). Patient Viewed: Lateral.  Patient Alertness: Fully alert. Consistencies Presented: Nectar thick liquids/ Mildly thick; Honey thick liquids/ Moderately thick;Puree to assess oropharyngeal swallow function and assess for compensatory strategies to improve safe swallow function. NECTAR THICK LIQUIDS/ MILDLY THICK  Method of Presentation: Teaspoon;Straw (presented by SLP)  Oral Phase of Swallow (VFSS - Nectar Thick Liquids): Impaired  Bolus Retrieval (VFSS - Nectar Thick Liquids): Intact  Bilabial Seal (VFSS - Nectar Thick Liquids): Intact  Bolus Formation (VFSS - Nectar Thick Liquids): Impaired  Bolus Propulsion (VFSS - Nectar Thick Liquids): Intact  Retention (VFSS - Nectar Thick Liquids): Impaired  Triggered at: Valleculae  Residue Severity, Location: Mild;Valleculae  Laryngeal Penetration: None  Tracheal Aspiration: None  Cough/Throat Clear Response: Yes (unrelated to any airway invasion)  Strategy(ies) Implemented (Nectar Thick): No straws; Liquids via spoon; Slow rate;Small bites and sips;Multiple swallows  Effectiveness: Yes  HONEY THICK LIQUIDS/ MODERATELY THICK   Method of Presentation: Teaspoon (presented by SLP)  Oral Phase of Swallow (VFSS - Honey Thick Liquids): Impaired  Bolus Retrieval (VFSS - Honey Thick Liquids): Intact  Bilabial Seal (VFSS - Honey Thick Liquids): Intact  Bolus Formation (VFSS - Honey Thick Liquids): Impaired  Bolus Propulsion (VFSS - Honey Thick Liquids): Intact  Retention (VFSS - Honey Thick Liquids): Impaired  Triggered at: Base of tongue  Residue Severity, Location: Mild;Valleculae  Laryngeal Penetration: None  Tracheal Aspiration: None  Cough/Throat Clear Response: Yes (not related to any airway invasion)  Strategy(ies) Implemented (Honey Thick Liquids): No straws; Liquids via spoon; Slow rate;Small bites and sips;Multiple swallows  Effectiveness: Yes  PUREE  Oral Phase of Swallow (VFSS - Puree): Impaired  Bolus Retrieval (VFSS - Puree): Intact  Bilabial Seal (VFSS - Puree): Intact  Bolus Formation (VFSS - Puree): Impaired  Bolus Propulsion (VFSS - Puree): Intact  Retention (VFSS - Puree): Impaired  Triggered at: Base of tongue  Residue Severity, Location: Trace;Valleculae;Pyriform sinuses  Laryngeal Penetration: None  Tracheal Aspiration: None  Cough/Throat Clear Response: Yes (not related to any airway invasion)  Strategy(ies) Implemented (Puree): Slow rate;Small bites and sips  Effectiveness: Yes        Penetration Aspiration Scale Score: Score 1: Material does not enter airway       Overall Impression: Patient presents with moderate oral and mild pharyngeal dysphagia characterized by reduced bolus formation leading to oral residue and reduced oral control with mildly thick liquids, minimally reduced base of tongue retraction and sluggish epiglottic inversion leading to valleculae retention. There was no observed laryngeal penetration or tracheal aspiration before, during or after the swallow. There was a prominent cricopharyngeus at the level of C4-C5 and corresponding incomplete bolus clearance at that level with subsequent mild residue and retrograde flow back to the level of the pyriform sinuses. Patient also noted to exhibit throat clearing during this exam (also noted at bedside) which did not correspond to laryngeal penetration or tracheal aspiration as none was observed for the duration of this exam.               GOALS  Goal #1 The patient will tolerate puree consistency and mildly  thick by tsp liquids without overt signs or symptoms of aspiration with 100 % accuracy over 2-3 session(s). In Progress   Goal #2 The patient/family/caregiver will demonstrate understanding and implementation of aspiration precautions and swallow strategies independently over 2-3 session(s).     In Progress   Goal #3 The patient will tolerate trial upgrade of soft/solid consistency and thin liquids without overt signs or symptoms of aspiration with 100 % accuracy over 1-2 session(s). In Progress   Goal #4 Patient will participate in communication assessment    In Progress     PLAN: Dysphagia therapy and communication evaluation    EDUCATION/INSTRUCTION  Reviewed results and recommendations with patient. Agreement/Understanding verbalized and all questions answered to their apparent satisfaction. INTERDISCIPLINARY COMMUNICATION  Reviewed results with Radiologist; agreement verbalized.   Updated RN                     FOLLOW UP  Treatment Plan/Recommendations: Dysphagia therapy  Number of Visits to Meet Established Goals: 3    Thank you for your referral.   If you have any questions, please contact aLyla [phone] [facility]  Pager [phone]

## 2023-10-20 NOTE — PLAN OF CARE
Assumed care at 1930  A&O x 4  RA  Tele- NSR  Denies pain  Q4 Neuro checks, no new deficits. See flowsheets  TF running per orders at goal  Abd binder in place  US of RUE + for DVT. Midline removed per orders.  PIV inserted  Resting comfortably  Call light in reach  Needs met    Plan: video swallow today

## 2023-10-20 NOTE — CM/SW NOTE
HEMA left for Sarah for DC planning. Aware that the rehab referral list will be available to her for [facility] choice. Will need insurance auth prior to DC.      HUSAM Saez  Discharge 2011 [facility]

## 2023-10-20 NOTE — PLAN OF CARE
Assumed care at 0730  Orientated x4, NSR, RA  Denies pain     VSS; afebrile   Nq4; no new neuro deficits   Video swallow passed; see diet order   Tolerating diet   Tube feedings per order   2 BM today  Needs met   Family update on plan of care     Plan for [facility] at discharge

## 2023-10-20 NOTE — PROGRESS NOTES
Progress Note:     76year old female admitted with ICH s/p craniotomy with US confirming RUE DVT and superficial vein thrombophlebitis. Patient has a RUE midline.      Hold FD anticoagulation as it is contraindicated until further discussion with neurosurgery   Remove midline; do not use if a PIV can be placed  Currently on room air    DW with MARCUS Olivia

## 2023-10-21 LAB
ANION GAP SERPL CALC-SCNC: 5 MMOL/L (ref 0–18)
BUN BLD-MCNC: 21 MG/DL (ref 7–18)
CALCIUM BLD-MCNC: 8.6 MG/DL (ref 8.5–10.1)
CHLORIDE SERPL-SCNC: 108 MMOL/L (ref 98–112)
CO2 SERPL-SCNC: 31 MMOL/L (ref 21–32)
CREAT BLD-MCNC: 0.79 MG/DL
EGFRCR SERPLBLD CKD-EPI 2021: 76 ML/MIN/1.73M2 (ref 60–?)
GLUCOSE BLD-MCNC: 144 MG/DL (ref 70–99)
GLUCOSE BLD-MCNC: 154 MG/DL (ref 70–99)
GLUCOSE BLD-MCNC: 162 MG/DL (ref 70–99)
GLUCOSE BLD-MCNC: 178 MG/DL (ref 70–99)
GLUCOSE BLD-MCNC: 178 MG/DL (ref 70–99)
GLUCOSE BLD-MCNC: 186 MG/DL (ref 70–99)
OSMOLALITY SERPL CALC.SUM OF ELEC: 304 MOSM/KG (ref 275–295)
POTASSIUM SERPL-SCNC: 3.1 MMOL/L (ref 3.5–5.1)
POTASSIUM SERPL-SCNC: 3.1 MMOL/L (ref 3.5–5.1)
SODIUM SERPL-SCNC: 144 MMOL/L (ref 136–145)

## 2023-10-21 PROCEDURE — 99232 SBSQ HOSP IP/OBS MODERATE 35: CPT | Performed by: HOSPITALIST

## 2023-10-21 RX ORDER — POTASSIUM CHLORIDE 1.5 G/1.58G
40 POWDER, FOR SOLUTION ORAL EVERY 4 HOURS
Status: COMPLETED | OUTPATIENT
Start: 2023-10-21 | End: 2023-10-21

## 2023-10-21 NOTE — PHYSICAL THERAPY NOTE
PHYSICAL THERAPY TREATMENT NOTE - INPATIENT    Room Number: 6586/6235-Z       Session: 2     Number of Visits to Meet Established Goals: 5    Presenting Problem: IPH s/p R frontal craniotomy on 10/11  Co-Morbidities : DM, ocular migraine, IBS, CKD, GERD, HTN, anxiety, left breast cancer  ASSESSMENT     Pt provided LLE and LUE PROM/manual flexibility. Pt was able to utilize RUE for assistance during LUE elbow flexion and L hand pumps. Recommend mariel lift for OOB mobility. At this time, Pt. presents with decreased balance, impaired strength, difficulty with gait/transfers resulting in downgrade of overall functional mobility. Due to above deficits, Pt will benefit from continued IP PT, so that patient may achieve highest functional independence/return to baseline. DISCHARGE RECOMMENDATIONS  PT Discharge Recommendations: Acute rehabilitation     PLAN  PT Treatment Plan: Bed mobility; Body mechanics; Endurance; Patient education; Neuromuscular re-educate;Range of motion;Strengthening;Stair training;Transfer training;Balance training  Rehab Potential : Good  Frequency (Obs): 3-5x/week    CURRENT GOALS     Goal #1 Patient is able to demonstrate supine - sit EOB @ level: maximum assistance      Goal #2 Patient is able to demonstrate transfers Sit to/from Stand at assistance level: dependent assistance      Goal #3 Patient is able to maintain static unsupported sit x5 minutes with SBA. Goal #4     Goal #5     Goal #6     Goal Comments: Goals established on 10/16/2023    10/21/2023 all goals ongoing      SUBJECTIVE  \"Thanks\"    OBJECTIVE  Precautions: Drain(s); Bed/chair alarm;Seizure;Aspiration (SBP: 110-150; DOBHOFF tube)    WEIGHT BEARING RESTRICTION  Weight Bearing Restriction: None                PAIN ASSESSMENT   Rating: Unable to rate  Location: noticeable pain response during LLE manual stretching       BALANCE Static Sitting: Poor  Dynamic Sitting: Dependent           Static Standing: Not tested  Dynamic Standing: Not tested    ACTIVITY TOLERANCE                         O2 WALK         AM-PAC '6-Clicks' INPATIENT SHORT FORM - BASIC MOBILITY  How much difficulty does the patient currently have. .. Patient Difficulty: Turning over in bed (including adjusting bedclothes, sheets and blankets)?: Unable   Patient Difficulty: Sitting down on and standing up from a chair with arms (e.g., wheelchair, bedside commode, etc.): Unable   Patient Difficulty: Moving from lying on back to sitting on the side of the bed?: Unable   How much help from another person does the patient currently need. .. Help from Another: Moving to and from a bed to a chair (including a wheelchair)?: Total   Help from Another: Need to walk in hospital room?: Total   Help from Another: Climbing 3-5 steps with a railing?: Total       AM-PAC Score:  Raw Score: 6   Approx Degree of Impairment: 100%   Standardized Score (AM-PAC Scale): 23.55   CMS Modifier (G-Code): CN    FUNCTIONAL ABILITY STATUS  Gait Assessment   Functional Mobility/Gait Assessment  Gait Assistance: Not tested    Skilled Therapy Provided    Bed Mobility:  Rolling: Max A x 2   Supine<>Sit: Max A x 2   Sit<>Supine: Max A x 2       Therapist's Comments: worked on cervical flexibility, PROM. Pt easily fatigues - required max verbal stimuli      Patient End of Session: In bed;Needs met;Call light within reach;RN aware of session/findings; All patient questions and concerns addressed; Alarm set    PT Session Time: 15 minutes  Gait Trainin minutes  Therapeutic Activity: 0 minutes  Therapeutic Exercise: 15 minutes   Neuromuscular Re-education: 0 minutes

## 2023-10-21 NOTE — CM/SW NOTE
10/21/23 1012   Choice of Post-Acute Provider   Informed patient of right to choose their preferred provider Yes   List of appropriate post-acute services provided to patient/family with quality data Yes   Information given to Other (comment)  (Friend Jaspreet Benz))     Spoke with pt's friend, Bakari Alcaraz, via phone to follow up on discharge planning. Bakari Alcaraz confirms receipt of [facility] choice list and has elected Rema Crabtree of [facility]. She has friends that have been in the facility and feels this would be the best choice for pt. Informed Bakari Alcaraz of next steps including insurance authorization process and need for medical clearance prior to transfer to [facility]. Bakari Alcaraz verbalized understanding. Per Lilia Cardenas at facility, they do allow pet visits. Norah Benítez [facility] reserved in [place] and requested they submit insurance authorization. Due to the weekend, likely not to be received until early next week. Updated pt's RN. CM/SW to remain available to assist with discharge planning. Long Mckinney at [place] confirmed she submitted insurance auth and it is pending. She will update CM/SW when determination is received.     Ildefonso Horner, BSN, RN-BC    [id number]

## 2023-10-21 NOTE — PLAN OF CARE
Assumed care of pt around 1900. A/o x4. RA. NSR on tele. Neuros q 4hr. See flowsheets for full assessment. No new deficits noted. Seizure precautions. Did not eat dinner, poor appetite. TF infusing per order. UA sent. Incontinent, voiding per purewick. Resting in bed w/ safety precautions in place.

## 2023-10-21 NOTE — PLAN OF CARE
Assumed care of pt 0730  A+Ox4, RA, NSR/ST, no c/o pain  Neuro qshift   -no changes  K replaced per protocol  TF @ goal  Daily cares PRN  POC: staples out Monday per NeuroSx -> [facility]  Rounding increased, needs addressed

## 2023-10-22 ENCOUNTER — APPOINTMENT (OUTPATIENT)
Dept: CT IMAGING | Facility: HOSPITAL | Age: 78
DRG: 023 | End: 2023-10-22
Attending: NEUROLOGICAL SURGERY

## 2023-10-22 ENCOUNTER — NURSE ONLY (OUTPATIENT)
Dept: ELECTROPHYSIOLOGY | Facility: HOSPITAL | Age: 78
DRG: 023 | End: 2023-10-22
Attending: Other
Payer: MEDICARE

## 2023-10-22 LAB
ANION GAP SERPL CALC-SCNC: 3 MMOL/L (ref 0–18)
BUN BLD-MCNC: 22 MG/DL (ref 7–18)
CALCIUM BLD-MCNC: 8.4 MG/DL (ref 8.5–10.1)
CHLORIDE SERPL-SCNC: 106 MMOL/L (ref 98–112)
CO2 SERPL-SCNC: 31 MMOL/L (ref 21–32)
CREAT BLD-MCNC: 0.7 MG/DL
EGFRCR SERPLBLD CKD-EPI 2021: 88 ML/MIN/1.73M2 (ref 60–?)
ERYTHROCYTE [DISTWIDTH] IN BLOOD BY AUTOMATED COUNT: 14.5 %
GLUCOSE BLD-MCNC: 124 MG/DL (ref 70–99)
GLUCOSE BLD-MCNC: 151 MG/DL (ref 70–99)
GLUCOSE BLD-MCNC: 158 MG/DL (ref 70–99)
GLUCOSE BLD-MCNC: 162 MG/DL (ref 70–99)
GLUCOSE BLD-MCNC: 174 MG/DL (ref 70–99)
HCT VFR BLD AUTO: 32.2 %
HGB BLD-MCNC: 10.2 G/DL
MCH RBC QN AUTO: 29.9 PG (ref 26–34)
MCHC RBC AUTO-ENTMCNC: 31.7 G/DL (ref 31–37)
MCV RBC AUTO: 94.4 FL
OSMOLALITY SERPL CALC.SUM OF ELEC: 297 MOSM/KG (ref 275–295)
PLATELET # BLD AUTO: 109 10(3)UL (ref 150–450)
POTASSIUM SERPL-SCNC: 3.8 MMOL/L (ref 3.5–5.1)
POTASSIUM SERPL-SCNC: 4.1 MMOL/L (ref 3.5–5.1)
RBC # BLD AUTO: 3.41 X10(6)UL
SODIUM SERPL-SCNC: 140 MMOL/L (ref 136–145)
WBC # BLD AUTO: 9.7 X10(3) UL (ref 4–11)

## 2023-10-22 PROCEDURE — 99232 SBSQ HOSP IP/OBS MODERATE 35: CPT | Performed by: HOSPITALIST

## 2023-10-22 PROCEDURE — 70450 CT HEAD/BRAIN W/O DYE: CPT | Performed by: NEUROLOGICAL SURGERY

## 2023-10-22 PROCEDURE — 95816 EEG AWAKE AND DROWSY: CPT | Performed by: OTHER

## 2023-10-22 RX ORDER — CALCIUM CARBONATE 500 MG/1
1000 TABLET, CHEWABLE ORAL 3 TIMES DAILY PRN
Status: DISCONTINUED | OUTPATIENT
Start: 2023-10-22 | End: 2023-10-25

## 2023-10-22 RX ORDER — POTASSIUM CHLORIDE 1.5 G/1.58G
40 POWDER, FOR SOLUTION ORAL ONCE
Status: COMPLETED | OUTPATIENT
Start: 2023-10-22 | End: 2023-10-22

## 2023-10-22 RX ORDER — FAMOTIDINE 10 MG/ML
20 INJECTION, SOLUTION INTRAVENOUS 2 TIMES DAILY PRN
Status: DISCONTINUED | OUTPATIENT
Start: 2023-10-22 | End: 2023-10-25

## 2023-10-22 NOTE — PLAN OF CARE
Seizure activity noted overnight, see previous note for details. Assumed care of pt around 1900. A/o x4. RA. NSR/ST w/ PVCs on tele. Neuros q 4hr. See flowsheets for full neuro assessment. Lethargic after seizure activity. BP elevated. PRN IV Hydralazine x1. Took few bites of dinner, otherwise poor appetite. TF infusing per order. C/o heartburn. Adequate relief of symptoms w/ PRN Reglan. Moderate neck pain, repositioned & PRN tylenol given. Seizure precautions in place. Incontinent episodes. Resting in bed w/ safety precautions in place.

## 2023-10-22 NOTE — PLAN OF CARE
Assumed care of pt 0730  A+Ox2, RA, NSR, no c/o pain   -lethargic, seizure activity overnight  EEG order  Neuro check   -no changes  Seizure precautions  TF per order   -@ goal  K replaced  Monitor in place, needs addressed

## 2023-10-22 NOTE — PLAN OF CARE
Seizure activity noted. Pt smacking lips and blinking eyes. PRN IV Ativan 1mg given. Dr. Shirley Calero (Neurosurg) updated. STAT CTH. Consult to general neurology. One time dose valproic acid 500mg IV administered.    EEG ordered for the AM.

## 2023-10-22 NOTE — PROCEDURES
EEG REPORT;    Reason for Examination: Intracerebral bleed    Technical Summary:   18 Channels of EEG and 1 Channel of EKG was performed utilizing internation 10/20 method. Background Activity: The background activity consisted of 9 hz waveforms, reactive to eye opening/ external stimulation. Abnormality:  Mild intermittent low to medium voltage, polymorphic 3 to 6 Hz slow activity noted diffusely over both hemispheres. Activation:    Hyperventilation:   Not Performed. Photic Stimulation:  Driving response seen. No       Sleep:  Stage I sleep seen. Impression: This is a Abnormal EEG. Mild to moderate diffuse slowing into delta and theta range was noted. This constellation of findings can be seen in encephalopathy due to metabolic/toxic etiology, medication effects or diffuse cerebral injury. No focal, lateralized or generalized epileptiform activity seen. Clinical correlation is recommended. Ching De Leon MD  [facility].

## 2023-10-23 LAB
GLUCOSE BLD-MCNC: 131 MG/DL (ref 70–99)
GLUCOSE BLD-MCNC: 132 MG/DL (ref 70–99)
GLUCOSE BLD-MCNC: 151 MG/DL (ref 70–99)
GLUCOSE BLD-MCNC: 161 MG/DL (ref 70–99)
POTASSIUM SERPL-SCNC: 4.2 MMOL/L (ref 3.5–5.1)

## 2023-10-23 PROCEDURE — 99232 SBSQ HOSP IP/OBS MODERATE 35: CPT | Performed by: HOSPITALIST

## 2023-10-23 RX ORDER — CHLORPROMAZINE HYDROCHLORIDE 25 MG/ML
25 INJECTION INTRAMUSCULAR ONCE
Status: COMPLETED | OUTPATIENT
Start: 2023-10-23 | End: 2023-10-23

## 2023-10-23 NOTE — PHYSICAL THERAPY NOTE
PHYSICAL THERAPY TREATMENT NOTE - INPATIENT    Room Number: 0404/8153-S    Session: 3  Number of Visits to Meet Established Goals: 5    Presenting Problem: IPH s/p R frontal craniotomy on 10/11  Co-Morbidities : DM, ocular migraine, IBS, CKD, GERD, HTN, anxiety, left breast cancer  ASSESSMENT     The pt continues to exhibit impaired muscular strength, with Left U/LE hemiparetic, impaired static and dynamic sitting balance, right sided gaze preference, left sided neglect. Overall activity tolerance remains limited, with pt fatiguing quickly in sitting. Anticipate the pt will require increased time and slow progression toward therapy goals, thus will change recommendation to [facility]. DISCHARGE RECOMMENDATIONS  PT Discharge Recommendations: Sub-acute rehabilitation     Equipment Recommendations  Recommend mariel lift for OOB mobility    PLAN  PT Treatment Plan: Bed mobility; Body mechanics; Endurance; Patient education; Neuromuscular re-educate;Range of motion;Strengthening;Stair training;Transfer training;Balance training  Rehab Potential : Good  Frequency (Obs): 3-5x/week    CURRENT GOALS     Goal #1 Patient is able to demonstrate supine - sit EOB @ level: maximum assistance      Goal #2 Patient is able to demonstrate transfers Sit to/from Stand at assistance level: dependent assistance      Goal #3 Patient is able to maintain static unsupported sit x5 minutes with SBA. Goal #4     Goal #5     Goal #6     Goal Comments: Goals established on 10/16/2023    10/21/2023 all goals ongoing      SUBJECTIVE  \"My stomach hurts. \"    OBJECTIVE  Precautions: Bed/chair alarm;Seizure ([place], Aspiration Precautions, -150)    WEIGHT BEARING RESTRICTION  Weight Bearing Restriction: None                PAIN ASSESSMENT   Rating:  (not rated, \"bad\")  Location: \"my stomach hurts\"       BALANCE                                                                                                                       Static Sitting: Poor  Dynamic Sitting: Dependent           Static Standing: Not tested  Dynamic Standing: Not tested      AM-PAC '6-Clicks' INPATIENT SHORT FORM - BASIC MOBILITY  How much difficulty does the patient currently have. .. Patient Difficulty: Turning over in bed (including adjusting bedclothes, sheets and blankets)?: Unable   Patient Difficulty: Sitting down on and standing up from a chair with arms (e.g., wheelchair, bedside commode, etc.): Unable   Patient Difficulty: Moving from lying on back to sitting on the side of the bed?: Unable   How much help from another person does the patient currently need. .. Help from Another: Moving to and from a bed to a chair (including a wheelchair)?: Total   Help from Another: Need to walk in hospital room?: Total   Help from Another: Climbing 3-5 steps with a railing?: Total       AM-PAC Score:  Raw Score: 6   Approx Degree of Impairment: 100%   Standardized Score (AM-PAC Scale): 23.55   CMS Modifier (G-Code): CN    FUNCTIONAL ABILITY STATUS  Gait Assessment   Functional Mobility/Gait Assessment  Gait Assistance: Not tested    Skilled Therapy Provided    Bed Mobility:  Rolling: Max A x 2   Supine<>Sit: Max A x 2   Sit<>Supine: Max A x 2       Therapist's Comments: Pt completed supine>sit to EOB with Max A x2. Pt able to sit upright at EOB with Mod A x5 minutes. Pt complete lateral trunk flexion with forearm weight bearing x10 second hold, x2 reps bilaterally. Pt required total assist to initiate return to midline from left. Pt initiated return to midline from right, but required Mod-Max A to achieve. After 5 minutes pt exhibits signs of fatigue and returned to supine with Max A x2 person. Upon return to bed pt noted to have left involuntary facial twitching which progressed to chin and R cheek. RN to bedside. Twitching lasted 4 minutes.       THERAPEUTIC EXERCISES  Lower Extremity In supine:  RLE ankle pumps, hip ab/adduction, heel slides    In sitting:  RLE LAQ, hip flexion  LLE PROM knee extension/flexion, ankle DF/PF  AROM cervical rotation to the left x5  Stretch bilaterally: AROM with overpressure, cervical lateral flexion x2 reps, 10-20 second hold     Upper Extremity      Position Sitting and Supine     Repetitions x10   Sets x1         Patient End of Session: In bed;Needs met; With [facility] staff;Call light within reach;RN aware of session/findings; All patient questions and concerns addressed; Alarm set    PT Session Time: 25 minutes  Therapeutic Activity: 15 minutes  Therapeutic Exercise: 10 minutes

## 2023-10-23 NOTE — SLP NOTE
SPEECH DAILY NOTE - INPATIENT    ASSESSMENT & PLAN   ASSESSMENT  Pt seen for dysphagia tx to assess tolerance with recommended diet, ensure proper utilization of aspiration precautions and provide pt/family education. Patient alert (though does appear a bit lethargic this morning) and up in bed with staff assisting with feeding breakfast. Patient with limited po intake but good overt tolerance. Minimal left oral residual noted but able to clear easily. Current diet is appropriate at this time. Will continue to follow. Diet Recommendations - Solids: Puree (1:1 feeding assistance)  Diet Recommendations - Liquids: Nectar thick liquids/ Mildly thick (by tsp only)          Medication Administration Recommendations: Crushed in puree                     Discharge Recommendations  Discharge Recommendations/Plan: Undetermined    Treatment Plan  Treatment Plan/Recommendations: Dysphagia therapy    Interdisciplinary Communication: Disussed with other staff            GOALS  Goal #1 The patient will tolerate puree consistency and mildly  thick by tsp liquids without overt signs or symptoms of aspiration with 100 % accuracy over 2-3 session(s). In Progress   Goal #2 The patient/family/caregiver will demonstrate understanding and implementation of aspiration precautions and swallow strategies independently over 2-3 session(s). In Progress   Goal #3 The patient will tolerate trial upgrade of soft/solid consistency and thin liquids without overt signs or symptoms of aspiration with 100 % accuracy over 1-2 session(s).   In Progress   Goal #4 Patient will participate in communication assessment     In Progress     FOLLOW UP  Follow Up Needed (Documentation Required): Yes  SLP Follow-up Date: 10/24/23  Number of Visits to Meet Established Goals: 3    Session: 1 of 3    If you have any questions, please contact [facility] Marquita Enrique  Pager [phone]

## 2023-10-23 NOTE — PLAN OF CARE
Assumed care at 1900. Alert and oriented x2-3 when awake; drowsy at times. NSR; vitals within range. PRN medication administered for generalized pain management. PRN Pepcid and Tums administered for heartburn. Neuro assessments completed with no new deficits noted. No seizures noted during shift. PEG tube intact and gauze changed. Tube feedings at goal and tolerated. Staples to R frontal incision are clean, dry and intact. Call light within reach. Problem: NEUROLOGICAL - ADULT  Goal: Achieves stable or improved neurological status  Description: INTERVENTIONS  - Assess for and report changes in neurological status  - Initiate measures to prevent increased intracranial pressure  - Maintain blood pressure and fluid volume within ordered parameters to optimize cerebral perfusion and minimize risk of hemorrhage  - Monitor temperature, glucose, and sodium.  Initiate appropriate interventions as ordered  Outcome: Progressing  Goal: Achieves maximal functionality and self care  Description: INTERVENTIONS  - Monitor swallowing and airway patency with patient fatigue and changes in neurological status  - Encourage and assist patient to increase activity and self care with guidance from PT/OT  - Encourage visually impaired, hearing impaired and aphasic patients to use assistive/communication devices  Outcome: Progressing  Goal: Absence of seizures  Description: INTERVENTIONS  - Monitor for seizure activity  - Administer anti-seizure medications as ordered  - Monitor neurological status  Outcome: Progressing  Goal: Remains free of injury related to seizure activity  Description: INTERVENTIONS:  - Maintain airway, patient safety  and administer oxygen as ordered  - Monitor patient for seizure activity, document and report duration and description of seizure to MD/LIP  - If seizure occurs, turn patient to side and suction secretions as needed  - Reorient patient post seizure  - Seizure pads on all 4 side rails  - Instruct patient/family to notify RN of any seizure activity  - Instruct patient/family to call for assistance with activity based on assessment  Outcome: Progressing     Problem: Diabetes/Glucose Control  Goal: Glucose maintained within prescribed range  Description: INTERVENTIONS:  - Monitor Blood Glucose as ordered  - Assess for signs and symptoms of hyperglycemia and hypoglycemia  - Administer ordered medications to maintain glucose within target range  - Assess barriers to adequate nutritional intake and initiate nutrition consult as needed  - Instruct patient on self management of diabetes  Outcome: Progressing

## 2023-10-23 NOTE — PLAN OF CARE
Assumed care at 0730  A&Ox3, VSS, total assist   Doni Dietrich in place   Tylenol given for pain   Pepcid given for complaints of heartburn   Head staples removed by Neurosurgery   No change in neuro status   Tube feeds infusing per order- tolerating well   Pt having hiccups all day   Episode of L facial twitching lasting 4 minutes- neuro surgery notified, general neuro consulted- Dr. Radha Alvares notified at [phone], responded at [phone] aware of consult   PT/OT/ST eval complete  Seizure and aspiration precautions maintained   Patient and family updated on POC   All questions answered   Call light within reach \

## 2023-10-23 NOTE — CM/SW NOTE
[facility] referral checked-- auth remains pending for [employer]. Auth ID: # [id number]. SW inquired if staples do not get removed prior to dc if they are able to transport for outpt apt. ADDENDUM 4:00-     Insurance auth approved 10/23 - 10/29 for [facility]. Notified RN. Pt is not medically cleared.     Teena Dick, HUSAM  Discharge 2011 [facility]

## 2023-10-24 ENCOUNTER — NURSE ONLY (OUTPATIENT)
Dept: ELECTROPHYSIOLOGY | Facility: HOSPITAL | Age: 78
DRG: 023 | End: 2023-10-24
Attending: INTERNAL MEDICINE

## 2023-10-24 LAB
ERYTHROCYTE [DISTWIDTH] IN BLOOD BY AUTOMATED COUNT: 14.4 %
GLUCOSE BLD-MCNC: 135 MG/DL (ref 70–99)
GLUCOSE BLD-MCNC: 141 MG/DL (ref 70–99)
GLUCOSE BLD-MCNC: 157 MG/DL (ref 70–99)
GLUCOSE BLD-MCNC: 165 MG/DL (ref 70–99)
HCT VFR BLD AUTO: 33.3 %
HGB BLD-MCNC: 10.7 G/DL
MCH RBC QN AUTO: 30 PG (ref 26–34)
MCHC RBC AUTO-ENTMCNC: 32.1 G/DL (ref 31–37)
MCV RBC AUTO: 93.3 FL
PLATELET # BLD AUTO: 135 10(3)UL (ref 150–450)
RBC # BLD AUTO: 3.57 X10(6)UL
WBC # BLD AUTO: 12.9 X10(3) UL (ref 4–11)

## 2023-10-24 PROCEDURE — 99239 HOSP IP/OBS DSCHRG MGMT >30: CPT | Performed by: HOSPITALIST

## 2023-10-24 PROCEDURE — 99233 SBSQ HOSP IP/OBS HIGH 50: CPT | Performed by: INTERNAL MEDICINE

## 2023-10-24 NOTE — PLAN OF CARE
Assumed care at approx 0730. Patient is alert to self and time, disoriented to situation and place, lethargic with mild aphasia. L upper and lower extremities flaccid, R upper and lower extremities following commands appropriately. No Facial twitch noted. No seizure activity noted. Neuro consulted via phone call @1411, EEG started, to continue overnight. Valporic acid dose/freq increased. On room air, respirations are even and unlabored with clear lung sounds. NSR w/ occasional PACs on tele. Meds given via J tube. Tube feeding tubing changed. Abdominal binder in place. Client denies pain, reports heartburn. Repositioned, tx with tums and pepcid x2. Safety precautions maintained, patient reports needs met, call light is within reach. Plan: Ok to discharge from a neurosurg standpoint, neuro now following. Will go to [facility] when discharged. Problem: NEUROLOGICAL - ADULT  Goal: Achieves stable or improved neurological status  Description: INTERVENTIONS  - Assess for and report changes in neurological status  - Initiate measures to prevent increased intracranial pressure  - Maintain blood pressure and fluid volume within ordered parameters to optimize cerebral perfusion and minimize risk of hemorrhage  - Monitor temperature, glucose, and sodium.  Initiate appropriate interventions as ordered  Outcome: Progressing  Goal: Achieves maximal functionality and self care  Description: INTERVENTIONS  - Monitor swallowing and airway patency with patient fatigue and changes in neurological status  - Encourage and assist patient to increase activity and self care with guidance from PT/OT  - Encourage visually impaired, hearing impaired and aphasic patients to use assistive/communication devices  Outcome: Progressing  Goal: Absence of seizures  Description: INTERVENTIONS  - Monitor for seizure activity  - Administer anti-seizure medications as ordered  - Monitor neurological status  Outcome: Progressing  Goal: Remains free of injury related to seizure activity  Description: INTERVENTIONS:  - Maintain airway, patient safety  and administer oxygen as ordered  - Monitor patient for seizure activity, document and report duration and description of seizure to MD/LIP  - If seizure occurs, turn patient to side and suction secretions as needed  - Reorient patient post seizure  - Seizure pads on all 4 side rails  - Instruct patient/family to notify RN of any seizure activity  - Instruct patient/family to call for assistance with activity based on assessment  Outcome: Progressing     Problem: Diabetes/Glucose Control  Goal: Glucose maintained within prescribed range  Description: INTERVENTIONS:  - Monitor Blood Glucose as ordered  - Assess for signs and symptoms of hyperglycemia and hypoglycemia  - Administer ordered medications to maintain glucose within target range  - Assess barriers to adequate nutritional intake and initiate nutrition consult as needed  - Instruct patient on self management of diabetes  Outcome: Progressing

## 2023-10-24 NOTE — CM/SW NOTE
Care Progression Note:  Length of stay: 13  GMLOS: 4.6  Avoidable Delays: None  Code Status: FULL    Acute Medical Issue/Factors:   Intracranial hemorrhage ([address] - POD#14 craniotomy (staples removed 10/23), active seizure activity 10/22, L facial twitching/[id number]/24 - EEG ordered/Depakote increased  Dysphagia - s/p PEG placement    Discharge Barriers: Neuro clearance  Expected discharge date: 1-2 days  Expected next site of care: Sub-acute Rehab at [facility]. [facility] has insurance auth until 10/29. / available for discharge planning.      SHELDON PegueroN, RN-BC    [id number]

## 2023-10-24 NOTE — CM/SW NOTE
10/24/23 1540   Discharge disposition   Expected discharge disposition subacute   [facility]   Discharge transportation [place] [phone] for consults to clear, potential DC today. SW proactively set up amb transport for 5:30 PM, PCS completed. SW updated CM in case assistance needed. If pt is not medically cleared by all consults please call "[facility] at [id number] to cancel transport for tonight and place on will call for tomorrow for SW/CM to set up. If pt DC tonight, RN to call Barbara Beckett at [phone] for report. ADDENDUM 4:00- SW placed amb on will call for tomorrow.      HUSAM Valencia  Discharge 2011 [facility]

## 2023-10-25 VITALS
SYSTOLIC BLOOD PRESSURE: 139 MMHG | RESPIRATION RATE: 20 BRPM | HEART RATE: 94 BPM | WEIGHT: 155 LBS | BODY MASS INDEX: 32 KG/M2 | TEMPERATURE: 98 F | DIASTOLIC BLOOD PRESSURE: 48 MMHG | OXYGEN SATURATION: 96 %

## 2023-10-25 LAB
GLUCOSE BLD-MCNC: 145 MG/DL (ref 70–99)
GLUCOSE BLD-MCNC: 162 MG/DL (ref 70–99)
GLUCOSE BLD-MCNC: 171 MG/DL (ref 70–99)
GLUCOSE BLD-MCNC: 179 MG/DL (ref 70–99)
VALPROATE SERPL-MCNC: 67.1 UG/ML (ref 50–100)

## 2023-10-25 PROCEDURE — 99232 SBSQ HOSP IP/OBS MODERATE 35: CPT

## 2023-10-25 PROCEDURE — 99239 HOSP IP/OBS DSCHRG MGMT >30: CPT | Performed by: HOSPITALIST

## 2023-10-25 NOTE — PROGRESS NOTES
Patient AOX3, some aphasia. LUE and LLE flaccid. O2 sat > 90% on room air. NSR on tele. VSS. Complains of pain to lower back and neck, PRN Tylenol administered. Tube feeds running at goal rate. Continuous EEG monitoring in place. No seizure activity observed. Neuro to see in a.m. Medications crushed, administered via peg tube. Plan for discharge to [facility]. Patient updated on plan of care.

## 2023-10-25 NOTE — DISCHARGE INSTRUCTIONS
Please leave abdominal binder on at all time.   Please complete head CT prior to shelby w/ neuro surgery SHELBY

## 2023-10-25 NOTE — PLAN OF CARE
Assumed care around 1930. Drowsy, Ox3-4. On RA w/ sats >90. Monitor shows NSR. Incontinent of bowel and bladder. No reports of pain. Pt max assist. Plan to con EEG monitoring, tube feeds in place, neuro to see. Safety precautions in place, call light within reach, bed alarm on. Problem: NEUROLOGICAL - ADULT  Goal: Achieves stable or improved neurological status  Description: INTERVENTIONS  - Assess for and report changes in neurological status  - Initiate measures to prevent increased intracranial pressure  - Maintain blood pressure and fluid volume within ordered parameters to optimize cerebral perfusion and minimize risk of hemorrhage  - Monitor temperature, glucose, and sodium.  Initiate appropriate interventions as ordered  Outcome: Progressing  Goal: Achieves maximal functionality and self care  Description: INTERVENTIONS  - Monitor swallowing and airway patency with patient fatigue and changes in neurological status  - Encourage and assist patient to increase activity and self care with guidance from PT/OT  - Encourage visually impaired, hearing impaired and aphasic patients to use assistive/communication devices  Outcome: Progressing  Goal: Absence of seizures  Description: INTERVENTIONS  - Monitor for seizure activity  - Administer anti-seizure medications as ordered  - Monitor neurological status  Outcome: Progressing  Goal: Remains free of injury related to seizure activity  Description: INTERVENTIONS:  - Maintain airway, patient safety  and administer oxygen as ordered  - Monitor patient for seizure activity, document and report duration and description of seizure to MD/LIP  - If seizure occurs, turn patient to side and suction secretions as needed  - Reorient patient post seizure  - Seizure pads on all 4 side rails  - Instruct patient/family to notify RN of any seizure activity  - Instruct patient/family to call for assistance with activity based on assessment  Outcome: Progressing     Problem: Diabetes/Glucose Control  Goal: Glucose maintained within prescribed range  Description: INTERVENTIONS:  - Monitor Blood Glucose as ordered  - Assess for signs and symptoms of hyperglycemia and hypoglycemia  - Administer ordered medications to maintain glucose within target range  - Assess barriers to adequate nutritional intake and initiate nutrition consult as needed  - Instruct patient on self management of diabetes  Outcome: Progressing

## 2023-10-25 NOTE — CM/SW NOTE
10/25/23 1400   Discharge disposition   Expected discharge disposition subacute   Post Acute Care [facility]   Discharge transportation [facility]     Notified by RN pt is cleared for DC by all consults. [facility] to schedule for transport. 5:30 transport scheduled, PCS completed. Spoke to brother, Milagros Irvin and updated on plan. VM left for friend Elly Henley on DC time. RN to call Norah Benítez at [phone] for report.        HUSAM Cardenas  Discharge 2011 [facility]

## 2023-10-25 NOTE — PLAN OF CARE
Assumed care around 0730. AxO x3-4. Q4 neuro, no acute changes. EEG discontinued this am, no seizure activity for this RN. NSR on tele, RA, VSS. Denies pain. PEG tube in place w/ TF. Abd binder in place. Pt preferred contacts updated via poc and at bedside. Pt needs met, call light within reach. All consults cleared for discharge. Discharging to [facility] via ambulance. Pt purse returned from security. IV removed. Tele removed. Report called to receiving nurse at [facility].

## 2023-10-26 ENCOUNTER — PATIENT OUTREACH (OUTPATIENT)
Dept: INTERNAL MEDICINE CLINIC | Facility: CLINIC | Age: 78
End: 2023-10-26

## 2023-10-26 ENCOUNTER — SNF VISIT (OUTPATIENT)
Dept: INTERNAL MEDICINE CLINIC | Age: 78
End: 2023-10-26

## 2023-10-26 DIAGNOSIS — I15.2 HYPERTENSION ASSOCIATED WITH DIABETES: ICD-10-CM

## 2023-10-26 DIAGNOSIS — E11.59 HYPERTENSION ASSOCIATED WITH DIABETES: ICD-10-CM

## 2023-10-26 DIAGNOSIS — E11.9 DIET-CONTROLLED DIABETES MELLITUS (HCC): ICD-10-CM

## 2023-10-26 DIAGNOSIS — M81.0 AGE-RELATED OSTEOPOROSIS WITHOUT CURRENT PATHOLOGICAL FRACTURE: ICD-10-CM

## 2023-10-26 DIAGNOSIS — E66.9 DIABETES MELLITUS TYPE 2 IN OBESE: ICD-10-CM

## 2023-10-26 DIAGNOSIS — K21.9 GASTROESOPHAGEAL REFLUX DISEASE, UNSPECIFIED WHETHER ESOPHAGITIS PRESENT: ICD-10-CM

## 2023-10-26 DIAGNOSIS — I63.9 RIGHT-SIDED CEREBROVASCULAR ACCIDENT (CVA) (HCC): ICD-10-CM

## 2023-10-26 DIAGNOSIS — E11.69 HYPERLIPIDEMIA ASSOCIATED WITH TYPE 2 DIABETES MELLITUS: ICD-10-CM

## 2023-10-26 DIAGNOSIS — R29.898 SEVERE MUSCLE DECONDITIONING: ICD-10-CM

## 2023-10-26 DIAGNOSIS — E78.5 HYPERLIPIDEMIA ASSOCIATED WITH TYPE 2 DIABETES MELLITUS: ICD-10-CM

## 2023-10-26 DIAGNOSIS — Z98.890 HISTORY OF CRANIOTOMY: ICD-10-CM

## 2023-10-26 DIAGNOSIS — E11.69 DIABETES MELLITUS TYPE 2 IN OBESE: ICD-10-CM

## 2023-10-26 DIAGNOSIS — R20.2 PARESTHESIA: ICD-10-CM

## 2023-10-26 DIAGNOSIS — I62.9 INTRACRANIAL HEMORRHAGE (HCC): Primary | ICD-10-CM

## 2023-10-26 PROCEDURE — 3075F SYST BP GE 130 - 139MM HG: CPT | Performed by: NURSE PRACTITIONER

## 2023-10-26 PROCEDURE — 3079F DIAST BP 80-89 MM HG: CPT | Performed by: NURSE PRACTITIONER

## 2023-10-26 PROCEDURE — 1160F RVW MEDS BY RX/DR IN RCRD: CPT | Performed by: NURSE PRACTITIONER

## 2023-10-26 PROCEDURE — 99310 SBSQ NF CARE HIGH MDM 45: CPT | Performed by: NURSE PRACTITIONER

## 2023-10-26 PROCEDURE — 1159F MED LIST DOCD IN RCRD: CPT | Performed by: NURSE PRACTITIONER

## 2023-10-26 PROCEDURE — 1111F DSCHRG MED/CURRENT MED MERGE: CPT | Performed by: NURSE PRACTITIONER

## 2023-10-26 NOTE — PROGRESS NOTES
Called Nurse Joana Robertson w/ [facility] in [place] and confirmed scheduled appts :    Nacho Yost., APRN  [address] [facility]  [address] [facility]  [phone]  Schedule an appointment as soon as possible for a visit in 2 week(s)  Please complete repeat head CT prior to shelby  Wednesday 11/8 @1pm w/ Autumn Hudson MD  [address] [place] [address]  [phone]  Call in 3 day(s)  MD office will contact Nurse Joana Robertson at [facility] in [place], as they are not certain if a follow-up appt is needed    Nurse verbalized that she understands & confirms.     Closing encounter
HEMA received; Christy Holm calling from [facility] in [place] requesting assistance w/scheduling appt.
Returned call to Jaycob Rosenberg nurse at [facility] in [place].   She confirmed needing assistance with scheduling the following appts for patient :    Tammie Gonzales., APRN  [address]  [address] [phone]  Schedule an appointment as soon as possible for a visit in 2 week(s)  Please complete repeat head CT prior to shelby       Ada Nuñez, [address]  [address] [place] [address] [phone]  Call in 3 day(s)
You can access the FollowMyHealth Patient Portal offered by [facility] by registering at the following website: [email or web address]. By joining [employer]’s FollowMyHealth portal, you will also be able to view your health information using other applications (apps) compatible with our system.

## 2023-10-28 VITALS
HEART RATE: 82 BPM | WEIGHT: 143.19 LBS | DIASTOLIC BLOOD PRESSURE: 80 MMHG | RESPIRATION RATE: 18 BRPM | OXYGEN SATURATION: 92 % | BODY MASS INDEX: 30 KG/M2 | TEMPERATURE: 97 F | SYSTOLIC BLOOD PRESSURE: 138 MMHG

## 2023-10-28 NOTE — PROGRESS NOTES
[address] [facility]  11/10/1945. APRN Encounter  10/26/23. Late entry    Met with patient at beside with staff nurse. Patient's HOB elevated; receiving Osmolite via G - tube at 50 ml/hour from 1 pm to 9am. G-Tube dressing clean, dry and intact. Patient sleeping; aroused easily; answering yes / no questions. Answered no to pain; no to wanting to eat; no to nausea. Left sided weakness noted s/p nontraumatic IC hemorrhage. No seizures reported. Renee Leon  : 11/10/1945.  76year old  female is admitted to [address] [place] [address] for G-tube feedings, medication management and  rehabilitation. [facility] Admission:       10/11/23  Discharged to [address]:  10/25/23    Chief complaint: Poor appetite; G tube feedings. Confusion S/P IC hemorrhage s/p Craniotomy. weakness    HPI  - HOSPITAL NOTE:   pt seen post-op from craniotomy, intubated and sedated, unable to provide other history. Per ER sign out, pt noted to have dysarthria and hemiplegia on evaluation, CTA findings c/f ICH and taken to OR for craniotomy. Pt had MRI brain ordered as outpt for eval for migraine history which showed findings c/f SAH, advised to f/u with ER for STAT CTA and developed dysarthria and hemiplegia during transit  Pt admitted w/ acute ICH. Angio did not show source of bleed but shoed vasospasm. Was started on Valproate; seizures noted initially but resolved on repeat EEG. Pt showed some recovery; stabilized & discharged to [facility]. Pt is to f/u with Neuro in 2 weeks.     Past Medical History:   Diagnosis Date    Anxiety     Breast CA ([facility] [address]     CANCER     lft breast lumpectomy pre cancerous cells stage 0    Diabetes mellitus ([facility] [address]     Diarrhea, unspecified     Ductal carcinoma in situ of breast     left breast    Flatulence/gas pain/belching     GERD     H/O infectious mononucleosis     diagnosed in     High cholesterol     Hypertension     IBS (irritable bowel syndrome)     Osteoporosis     Other and unspecified hyperlipidemia     Personal history of irradiation, presenting hazards to health 2004    mammosite    Sleep disturbance     Stool incontinence     Type II or unspecified type diabetes mellitus without mention of complication, not stated as uncontrolled     Wears glasses      Past Surgical History:   Procedure Laterality Date    APPENDECTOMY      89    COLONOSCOPY       hiatal hernia, sm internal hem    COLONOSCOPY  2005    fiberoptic    D & C      IR ANGIOGRAM CEREBRAL CAROTID BILATERAL  10/12/2023    LUMPECTOMY LEFT  2004    breast    OTHER SURGICAL HISTORY      lumpectomy  lft breast stage 0    RADIATION LEFT      Mammosite    TONSILLECTOMY          UPPER GI ENDOSCOPY,EXAM      2005     Family History   Problem Relation Age of Onset    Heart Disorder Father          of heart attack age 62    Alcohol and Other Disorders Associated Father     Arthritis Father     Other (Other) Father     Heart Disease Mother         CHF age [de-identified]    Arthritis Mother     Other (Other) Mother     Diabetes Sister         mellitus    Heart Disease Maternal Grandfather     Cancer Maternal Grandmother         brain tumor    Breast Cancer Self      Social History     Socioeconomic History    Marital status:    Tobacco Use    Smoking status: Former     Packs/day: 1.00     Years: 20.00     Additional pack years: 0.00     Total pack years: 20.00     Types: Cigarettes     Quit date: 3/1/2006     Years since quittin.6    Smokeless tobacco: Never   Vaping Use    Vaping Use: Never used   Substance and Sexual Activity    Alcohol use: No     Alcohol/week: 0.0 standard drinks of alcohol    Drug use: No   Other Topics Concern    Caffeine Concern No     Comment: tries to drink decaf drinks    Exercise Yes     Comment: jazzercise 3x/wk, tennis 1-2x/wk     IMMUNIZATIONS  Immunization History  Administered            Date(s) Administered    Covid-19 Vaccine Moderna 100 mcg/0.5 ml 02/10/2021  03/10/2021  10/22/2021                            04/05/2022      Covid-19 Vaccine Moderna Bivalent 50mcg/0.5mL 12+ years                          09/13/2022      DTAP                  04/24/2007      FLU VAC High Dose 65 YRS & Older PRSV Free (14705)                          10/24/2016  09/15/2020      FLUAD High Dose 72 yr and older (09653)                          10/11/2019  09/28/2021      Fluzone Vaccine Medicare ()                          10/28/2013  10/24/2016  09/15/2020      HIGH DOSE FLU 65 YRS AND OLDER PRSV FREE SINGLE D (31903) FLU CLINIC                          09/27/2022      Influenza             10/24/2012  10/23/2015  10/20/2017                            10/05/2018      Pneumococcal (Prevnar 13)                          09/02/2016      Pneumococcal (Prevnar 7)                          04/24/2010      Pneumovax 23          09/29/2017      TDAP                  09/23/2019      Zoster Vaccine Live (Zostavax)                          09/19/2012      Zoster Vaccine Recombinant Adjuvanted (Shingrix)                          03/06/2023 05/23/2023       ALLERGIES:  Ampicillin              NAUSEA ONLY    Comment:Caps  Codeine [Opioid Julia*        Comment:Derivatives  Syncope  Cortisone [Cortison*        Comment:Injection into L shoulder             Syncope, stomach cramps    CODE STATUS:  Full Code    ADVANCED CARE PLANNING TEAM: Will need  family updates to arrange a safe discharge. Lives alone  in a 2-story home. CURRENT MEDICATIONS - reviewed and updated on SNF [facility]  Valproic acid 750 tid via G tube  Fosamax 70 weekly  Aspirin 81 daily  Atorvastatin 80 mg daily  Calcium carbonate 500 daily  Lisinopril 5 mg daily  Clobetasol 2 x weekly prn rash     SUBJECTIVE: says no to pain, SOB, nausea, vomiting.      PHYSICAL EXAM:  GENERAL HEALTH:well developed, well nourished, in no apparent distress   LINES, TUBES, DRAINS:  G-Tube  SKIN: pale, warm, dry  WOUND: see wound assessment per staff,   EYES: Pupils round and equal; no drainage from eyes  HENT: no nasal drainage, mucous membranes pink, moist,   NECK: supple; FROM  BREAST: hx: L breast cancer, previous lumpectomy  RESPIRATORY:lungs clear  CARDIOVASCULAR: RRR, rate 82  ABDOMEN:  active BS+, soft, G-tube functioning. nontender, no guarding, dressing clean and dry. :Deferred  LYMPHATIC:no lymphedema  MUSCULOSKELETAL:  hemiparesis; left neglect. EXTREMITIES/VASCULAR:  able to move right side. NEUROLOGIC:follows simple commands and answers simple questions  PSYCHIATRIC: depressed compliant    DIAGNOSTICS REVIEWED AT THIS VISIT:  Lab Results   Component Value Date    WBC 12.9 (H) 10/24/2023    RBC 3.57 (L) 10/24/2023    HGB 10.7 (L) 10/24/2023    HCT 33.3 (L) 10/24/2023    MCV 93.3 10/24/2023    MCH 30.0 10/24/2023    MCHC 32.1 10/24/2023    RDW 14.4 10/24/2023    .0 (L) 10/24/2023    MPV 10.8 (H) 07/19/2012     Lab Results   Component Value Date     (H) 10/22/2023    BUN 22 (H) 10/22/2023    CREATSERUM 0.70 10/22/2023    BUNCREA SEE NOTE: 08/30/2023    ANIONGAP 3 10/22/2023    GFRAA 58 (L) 07/07/2022    GFRNAA 50 (L) 07/07/2022    CA 8.4 (L) 10/22/2023     10/22/2023    K 4.2 10/23/2023     10/22/2023    CO2 31.0 10/22/2023    OSMOCALC 297 (H) 10/22/2023     Assessment / Plan    Nontraumatic ICH.  S/P craniotomy  S/P cerbral angio negative for source of bleed; showed Right MCA vasospasm  Valproic acid 750 tid via G tube  BP q shift  EEG negative  Aspirin 81 mg daily  PT/OT/ST  Phsysiatry on consult  F/U Neuro    Type 2 DM - accucheks qid    HTN:  - Lisinopri 5 mg daily    GERD -  Ca+ Supplement:  TUMS 1000 daily    Osteoporosis:  Fosamax 70 mg weekly    Dysphagia:  Tube feedings 50 ml / hr x 20 hrs per days; 1pm - 9 am.  Speech therapy following  Dietician following    Hypernatremia - prn IV fluids  Weekly labs    Weakness / Deconditioning - PT/OTST/Physiatry to follow    Dispo: to be determined after PT/OT/ST recommendations. Lives alone in a 2 story home in  [facility]. FOLLOW UP APPOINTMENTS   *Follow-Up with PCP within 1-2 weeks following [facility] discharge. *Follow-Up with specialists as recommended. Future Appointments   Date Time Provider Maral Rivera   11/8/2023  1:00 PM HYACINTH Hernandez [facility] [place]   11/13/2023  2:30 PM [facility] MA[facility] Suzanne Sos   12/5/2023 11:00 AM Leesa Nurse, MD [facility] [address] [facility] [address]     *Greater than 45 minutes spent w/ patient and staff, including but not limited to/ reviewing present status, needs, abilities with disciplines, reviewing medical records, vital signs, labs, completing med rec and entering orders to establish plan of care in [facility]. Note to patient: The [facility] makes medical notes like these available to patients in the interest of transparency. However, this is a medical document intended as peer to peer communication. It is written in medical language and may contain abbreviations or verbiage that are unfamiliar. It may appear blunt or direct. Medical documents are intended to carry relevant information, facts as evident, and the clinical opinion of the practitioner who signs the document.     HYACINTH Jackson  10/26/23 2 pm  [facility] Post Acute Care Team

## 2023-11-02 ENCOUNTER — SNF VISIT (OUTPATIENT)
Dept: INTERNAL MEDICINE CLINIC | Age: 78
End: 2023-11-02

## 2023-11-02 DIAGNOSIS — R29.898 SEVERE MUSCLE DECONDITIONING: ICD-10-CM

## 2023-11-02 DIAGNOSIS — E11.59 HYPERTENSION ASSOCIATED WITH DIABETES: ICD-10-CM

## 2023-11-02 DIAGNOSIS — Z98.890 HISTORY OF CRANIOTOMY: ICD-10-CM

## 2023-11-02 DIAGNOSIS — I15.2 HYPERTENSION ASSOCIATED WITH DIABETES: ICD-10-CM

## 2023-11-02 DIAGNOSIS — I63.9 RIGHT-SIDED CEREBROVASCULAR ACCIDENT (CVA) (HCC): ICD-10-CM

## 2023-11-02 DIAGNOSIS — E87.6 HYPOKALEMIA: ICD-10-CM

## 2023-11-02 DIAGNOSIS — Z43.1 ATTENTION TO G-TUBE (HCC): ICD-10-CM

## 2023-11-02 DIAGNOSIS — D72.829 LEUKOCYTOSIS, UNSPECIFIED TYPE: ICD-10-CM

## 2023-11-02 DIAGNOSIS — R91.8 PULMONARY INFILTRATES ON CXR: ICD-10-CM

## 2023-11-02 DIAGNOSIS — I62.9 INTRACRANIAL HEMORRHAGE (HCC): Primary | ICD-10-CM

## 2023-11-02 PROCEDURE — 3074F SYST BP LT 130 MM HG: CPT | Performed by: NURSE PRACTITIONER

## 2023-11-02 PROCEDURE — 1111F DSCHRG MED/CURRENT MED MERGE: CPT | Performed by: NURSE PRACTITIONER

## 2023-11-02 PROCEDURE — 1159F MED LIST DOCD IN RCRD: CPT | Performed by: NURSE PRACTITIONER

## 2023-11-02 PROCEDURE — 3078F DIAST BP <80 MM HG: CPT | Performed by: NURSE PRACTITIONER

## 2023-11-02 PROCEDURE — 99310 SBSQ NF CARE HIGH MDM 45: CPT | Performed by: NURSE PRACTITIONER

## 2023-11-02 PROCEDURE — 1160F RVW MEDS BY RX/DR IN RCRD: CPT | Performed by: NURSE PRACTITIONER

## 2023-11-03 ENCOUNTER — TELEPHONE (OUTPATIENT)
Dept: SURGERY | Facility: CLINIC | Age: 78
End: 2023-11-03

## 2023-11-03 NOTE — TELEPHONE ENCOUNTER
Attempting to reach Pt to provide new appt information. There has been no answer and there is no VM. Appt has been rescheduled from 11/8/23 with Trevor Evangelista to 11/14/23 @ 1:15 with Denisha. I will continue calling until I reach her.

## 2023-11-04 VITALS
SYSTOLIC BLOOD PRESSURE: 88 MMHG | WEIGHT: 145.81 LBS | HEART RATE: 106 BPM | RESPIRATION RATE: 22 BRPM | BODY MASS INDEX: 30 KG/M2 | OXYGEN SATURATION: 90 % | TEMPERATURE: 98 F | DIASTOLIC BLOOD PRESSURE: 52 MMHG

## 2023-11-04 RX ORDER — LEVOFLOXACIN 500 MG/1
500 TABLET, FILM COATED ORAL DAILY
COMMUNITY
Start: 2023-11-03

## 2023-11-04 RX ORDER — POTASSIUM CHLORIDE 1.5 G/1.58G
20 POWDER, FOR SOLUTION ORAL DAILY
COMMUNITY

## 2023-11-04 NOTE — PROGRESS NOTES
Zeina Garrison.  11/10/1945. APRN Encounter 23     Staff nurses asked this APN to assess Mrs. Simpson's G-Tube site. Nurse stated she changed G-tube dressing twice d/t purulent drainage. WBC count 13.02.  Occasional c/o SOB requiring prn oxygen. New Orders Entered:  Culture G-tube drainage. Change dressing bid; cleanse w/ N/S apply Bactroban around exit site. STAT chest xray reported infiltrates Right lower lobe  Start Levaquin 500 mg daily x 10 days. Obtain U/A C&S  Oxygen prn - keep pulse Ox > 92%  Repeat CBC, CMP    Osmolite ordered via G - tube at 50 ml/hour from 1 pm to 9am. Patient sleeping  Answered no to pain; no to wanting to eat; no to nausea. Left upper extremity DVT negative for DVT. Eating few bites of pureed diet/honey thick liquids. Santiago Tsang  : 11/10/1945.  76year old  female is admitted to [address] [place] [address] for G-tube feedings, medication management and  rehabilitation. [facility] Admission:       10/11/23  Discharged to [address]:  10/25/23    Chief complaint: Poor appetite; G tube drainage @ exit site. S/P IC hemorrhage with Craniotomy. Increased weakness    HPI  - HOSPITAL NOTE:   pt s/p craniotomy, intubated and sedated, unable to provide history. Per ER, pt noted to have dysarthria and hemiplegia . CTA findings c/w ICH; taken to the OR for craniotomy. Pt had MRI brain ordered as outpt for eval for migraine history which showed findings c/f SAH, advised to f/u with ER for STAT CTA and developed dysarthria and hemiplegia during transit. Pt admitted w/ acute ICH. Angio did not show source of bleed but showed vasospasm. Was started on Valproate; seizures noted initially but resolved on repeat EEG. Pt showed some recovery; stabilized & discharged to [facility]. Pt is to f/u with Neuro.     Past Medical History:   Diagnosis Date    Anxiety     Breast CA ([facility] [address]     CANCER     lft breast lumpectomy pre cancerous cells stage 0    Diabetes mellitus ([facility] [address]     Diarrhea, unspecified     Ductal carcinoma in situ of breast     left breast    Flatulence/gas pain/belching     GERD     H/O infectious mononucleosis     diagnosed in 1960s    High cholesterol     Hypertension     IBS (irritable bowel syndrome)     Osteoporosis     Other and unspecified hyperlipidemia     Personal history of irradiation, presenting hazards to health 2004    mammosite    Sleep disturbance     Stool incontinence     Type II or unspecified type diabetes mellitus without mention of complication, not stated as uncontrolled     Wears glasses      Past Surgical History:   Procedure Laterality Date    APPENDECTOMY      194    COLONOSCOPY       hiatal hernia, sm internal hem    COLONOSCOPY  2005    fiberoptic    D & C      IR ANGIOGRAM CEREBRAL CAROTID BILATERAL  10/12/2023    LUMPECTOMY LEFT  2004    breast    OTHER SURGICAL HISTORY      lumpectomy  lft breast stage 0    RADIATION LEFT      Mammosite    TONSILLECTOMY          UPPER GI ENDOSCOPY,EXAM      2005     Family History   Problem Relation Age of Onset    Heart Disorder Father          of heart attack age 62    Alcohol and Other Disorders Associated Father     Arthritis Father     Other (Other) Father     Heart Disease Mother         CHF age [de-identified]    Arthritis Mother     Other (Other) Mother     Diabetes Sister         mellitus    Heart Disease Maternal Grandfather     Cancer Maternal Grandmother         brain tumor    Breast Cancer Self      Social History     Socioeconomic History    Marital status:    Tobacco Use    Smoking status: Former     Packs/day: 1.00     Years: 20.00     Additional pack years: 0.00     Total pack years: 20.00     Types: Cigarettes     Quit date: 3/1/2006     Years since quittin.6    Smokeless tobacco: Never   Vaping Use    Vaping Use: Never used   Substance and Sexual Activity    Alcohol use: No     Alcohol/week: 0.0 standard drinks of alcohol    Drug use: No   Other Topics Concern    Caffeine Concern No     Comment: tries to drink decaf drinks    Exercise Yes     Comment: jazzercise 3x/wk, tennis 1-2x/wk     IMMUNIZATIONS  Immunization History  Administered            Date(s) Administered    Covid-19 Vaccine Moderna 100 mcg/0.5 ml                          02/10/2021  03/10/2021  10/22/2021                            04/05/2022      Covid-19 Vaccine Moderna Bivalent 50mcg/0.5mL 12+ years                          09/13/2022      DTAP                  04/24/2007      FLU VAC High Dose 65 YRS & Older PRSV Free (12576)                          10/24/2016  09/15/2020      FLUAD High Dose 72 yr and older (06936)                          10/11/2019  09/28/2021      Fluzone Vaccine Medicare ()                          10/28/2013  10/24/2016  09/15/2020      HIGH DOSE FLU 65 YRS AND OLDER PRSV FREE SINGLE D (14894) FLU CLINIC                          09/27/2022      Influenza             10/24/2012  10/23/2015  10/20/2017                            10/05/2018      Pneumococcal (Prevnar 13)                          09/02/2016      Pneumococcal (Prevnar 7)                          04/24/2010      Pneumovax 23          09/29/2017      TDAP                  09/23/2019      Zoster Vaccine Live (Zostavax)                          09/19/2012      Zoster Vaccine Recombinant Adjuvanted (Shingrix)                          03/06/2023 05/23/2023       ALLERGIES:  Ampicillin              NAUSEA ONLY    Comment:Caps  Codeine [Opioid Julia*        Comment:Derivatives  Syncope  Cortisone [Cortison*        Comment:Injection into L shoulder             Syncope, stomach cramps    CODE STATUS:  Full Code    ADVANCED CARE PLANNING TEAM: Will need  frequent updates to arrange a safe discharge. Lives alone in a 2-story home.      CURRENT MEDICATIONS - reviewed and updated on SNF EMAR  Valproic acid 750 tid via G tube  Fosamax 70 weekly  Aspirin 81 daily  Atorvastatin 80 mg daily  Calcium carbonate 500 daily  Lisinopril 5 mg daily  Clobetasol 2 x weekly prn rash  Potassium 20 meq daily  Levaquin 500mg daily x 10 days for + infiltrates L Lung w/leukocytosis. Bactroban bid to G-tube exit site     SUBJECTIVE: says no to pain, + occasional SOB, no to nausea, vomiting. PHYSICAL EXAM:  GENERAL HEALTH:well developed, well nourished, in no apparent distress   LINES, TUBES, DRAINS:  G-Tube drainage  SKIN: pale, warm, dry  WOUND: see wound assessment per staff,   EYES: Pupils round and equal; no jaundice; no drainage from eyes  HENT: no nasal drainage, mucous membranes pink, moist,   NECK: supple; FROM  BREAST: hx: L breast cancer, previous lumpectomy. RESPIRATORY: Diminished breath sounds bilaterally; HOB up for feedings. CARDIOVASCULAR: RRR, rate 106  ABDOMEN:  active BS+, soft, G-tube functioning; + purulent drainage around site. :Deferred  LYMPHATIC:no lymphedema  MUSCULOSKELETAL:  hemiparesis; left neglect. . L upper extremity edema: negative US for DVT. EXTREMITIES/VASCULAR:  able to move right side. L arm edema  NEUROLOGIC:follows simple commands and answers simple questions  PSYCHIATRIC: depressed compliant    Lab Results:  10/26/23  WBC 13.02. HGB 10.3. Plts 197. A 141. K 3.4 (Potassium 20 meq daily added)  Cl 104. CO2 32. BUN 19. Creat 0.7. Alb 2.7. Labs pending    Assessment / Plan    Nontraumatic ICH.  S/P craniotomy  S/P cerbral angio negative for source of bleed; showed Right MCA vasospasm  Valproic acid 750 tid via G tube  BP q shift  EEG negative  Aspirin 81 mg daily  PT/OT/ST  Phsysiatry on consult  F/U Neuro  11/14/23    + Infiltrates per CXR - w/ Leukocytes  Levaquin 500 mg daily x 10 days  Repeat labs    Leukocytosis  Culture G-tube site - bactroban bid  U/A w/ Reflex Culture  Repeat labs    Type 2 DM - accucheks qid    HTN:  - Lisinopri 5 mg daily    GERD -  Ca+ Supplement:  TUMS 1000 daily    Osteoporosis:  Fosamax 70 mg weekly    Dysphagia:  Tube feedings 50 ml / hr x 20 hrs per days; 1pm - 9 am.  Speech therapy following  Aspiration precautions - HOB elevated during feedings  Pureed diet w/ honey thick liquids   Dietician following    Hypernatremia - prn IV fluids    Hypokalemia - K+ 20 meq dailsy    Weakness / Deconditioning - PT/OTST/Physiatry following    Dispo: to be determined after PT/OT/ST recommendations. Lives alone in a 2 story home in  [facility]. FOLLOW UP APPOINTMENTS   *Follow-Up with PCP within 1-2 weeks following [facility] discharge. Future Appointments   Date Time Provider Maral Rivera   11/8/2023  1:00 PM HYACINTH [facility] [place]   11/13/2023  2:30 PM [facility] CT MAIN RM3 [facility] CT [place]   12/5/2023 11:00 AM Bridget Martinez MD [address] EM[address]     *Greater than 35 minutes spent w/ patient and staff, including but not limited to/ reviewing present status, needs, abilities with disciplines, reviewing medical records, vital signs, labs, completing med rec and entering orders to establish plan of care in [facility] Note to patient: The [facility] makes medical notes like these available to patients in the interest of transparency. However, this is a medical document intended as peer to peer communication. It is written in medical language and may contain abbreviations or verbiage that are unfamiliar. It may appear blunt or direct. Medical documents are intended to carry relevant information, facts as evident, and the clinical opinion of the practitioner who signs the document.     Irvin Stevenson  Glena Scott) HYACINTH Garcia  11/2/23  1030 am  [facility] Team

## 2023-11-07 ENCOUNTER — SNF VISIT (OUTPATIENT)
Dept: INTERNAL MEDICINE CLINIC | Age: 78
End: 2023-11-07

## 2023-11-07 DIAGNOSIS — I63.9 RIGHT-SIDED CEREBROVASCULAR ACCIDENT (CVA) (HCC): ICD-10-CM

## 2023-11-07 DIAGNOSIS — E11.69 DIABETES MELLITUS TYPE 2 IN OBESE: ICD-10-CM

## 2023-11-07 DIAGNOSIS — R29.898 SEVERE MUSCLE DECONDITIONING: ICD-10-CM

## 2023-11-07 DIAGNOSIS — D72.829 LEUKOCYTOSIS, UNSPECIFIED TYPE: ICD-10-CM

## 2023-11-07 DIAGNOSIS — R91.8 PULMONARY INFILTRATES ON CXR: ICD-10-CM

## 2023-11-07 DIAGNOSIS — E66.9 DIABETES MELLITUS TYPE 2 IN OBESE: ICD-10-CM

## 2023-11-07 DIAGNOSIS — Z43.1 ATTENTION TO G-TUBE (HCC): ICD-10-CM

## 2023-11-07 DIAGNOSIS — E87.6 HYPOKALEMIA: ICD-10-CM

## 2023-11-07 DIAGNOSIS — Z98.890 HISTORY OF CRANIOTOMY: ICD-10-CM

## 2023-11-07 DIAGNOSIS — I62.9 INTRACRANIAL HEMORRHAGE (HCC): Primary | ICD-10-CM

## 2023-11-07 PROCEDURE — 99309 SBSQ NF CARE MODERATE MDM 30: CPT | Performed by: NURSE PRACTITIONER

## 2023-11-07 PROCEDURE — 3078F DIAST BP <80 MM HG: CPT | Performed by: NURSE PRACTITIONER

## 2023-11-07 PROCEDURE — 3075F SYST BP GE 130 - 139MM HG: CPT | Performed by: NURSE PRACTITIONER

## 2023-11-07 PROCEDURE — 1160F RVW MEDS BY RX/DR IN RCRD: CPT | Performed by: NURSE PRACTITIONER

## 2023-11-07 PROCEDURE — 1159F MED LIST DOCD IN RCRD: CPT | Performed by: NURSE PRACTITIONER

## 2023-11-07 PROCEDURE — 1111F DSCHRG MED/CURRENT MED MERGE: CPT | Performed by: NURSE PRACTITIONER

## 2023-11-09 ENCOUNTER — SNF VISIT (OUTPATIENT)
Dept: INTERNAL MEDICINE CLINIC | Age: 78
End: 2023-11-09

## 2023-11-09 DIAGNOSIS — Z98.890 HISTORY OF CRANIOTOMY: ICD-10-CM

## 2023-11-09 DIAGNOSIS — R29.898 SEVERE MUSCLE DECONDITIONING: ICD-10-CM

## 2023-11-09 DIAGNOSIS — R13.10 DYSPHAGIA, UNSPECIFIED TYPE: ICD-10-CM

## 2023-11-09 DIAGNOSIS — I63.9 RIGHT-SIDED CEREBROVASCULAR ACCIDENT (CVA) (HCC): ICD-10-CM

## 2023-11-09 DIAGNOSIS — Z43.1 ATTENTION TO G-TUBE (HCC): ICD-10-CM

## 2023-11-09 DIAGNOSIS — E66.9 DIABETES MELLITUS TYPE 2 IN OBESE: ICD-10-CM

## 2023-11-09 DIAGNOSIS — D72.829 LEUKOCYTOSIS, UNSPECIFIED TYPE: ICD-10-CM

## 2023-11-09 DIAGNOSIS — R91.8 PULMONARY INFILTRATES ON CXR: ICD-10-CM

## 2023-11-09 DIAGNOSIS — I62.9 INTRACRANIAL HEMORRHAGE (HCC): Primary | ICD-10-CM

## 2023-11-09 DIAGNOSIS — R12 CHRONIC HEARTBURN: ICD-10-CM

## 2023-11-09 DIAGNOSIS — E11.69 DIABETES MELLITUS TYPE 2 IN OBESE: ICD-10-CM

## 2023-11-09 PROCEDURE — 99309 SBSQ NF CARE MODERATE MDM 30: CPT | Performed by: NURSE PRACTITIONER

## 2023-11-09 PROCEDURE — 1111F DSCHRG MED/CURRENT MED MERGE: CPT | Performed by: NURSE PRACTITIONER

## 2023-11-09 PROCEDURE — 3078F DIAST BP <80 MM HG: CPT | Performed by: NURSE PRACTITIONER

## 2023-11-09 PROCEDURE — 3074F SYST BP LT 130 MM HG: CPT | Performed by: NURSE PRACTITIONER

## 2023-11-12 VITALS
WEIGHT: 142.38 LBS | TEMPERATURE: 97 F | RESPIRATION RATE: 18 BRPM | OXYGEN SATURATION: 94 % | SYSTOLIC BLOOD PRESSURE: 105 MMHG | HEART RATE: 113 BPM | DIASTOLIC BLOOD PRESSURE: 45 MMHG | BODY MASS INDEX: 30 KG/M2

## 2023-11-12 VITALS
DIASTOLIC BLOOD PRESSURE: 56 MMHG | SYSTOLIC BLOOD PRESSURE: 133 MMHG | HEART RATE: 113 BPM | RESPIRATION RATE: 18 BRPM | OXYGEN SATURATION: 95 % | TEMPERATURE: 98 F | BODY MASS INDEX: 30 KG/M2 | WEIGHT: 145.81 LBS

## 2023-11-13 ENCOUNTER — HOSPITAL ENCOUNTER (OUTPATIENT)
Dept: CT IMAGING | Facility: HOSPITAL | Age: 78
Discharge: HOME | End: 2023-11-13
Attending: Other
Payer: MEDICARE

## 2023-11-13 PROCEDURE — 70496 CT ANGIOGRAPHY HEAD: CPT | Performed by: OTHER

## 2023-11-13 PROCEDURE — 70498 CT ANGIOGRAPHY NECK: CPT | Performed by: OTHER

## 2023-11-13 NOTE — PROGRESS NOTES
Amanda Trimble.  11/10/1945. APRN 23    Met with Mrs Wilfred Jeffery at bedside. Nurses have called with elevated blood glucose readings; Lispro insulin ordered for coverage. Osmolite tube feedings at 50ml/hr with flushing before and after for meds and after feedings at 9 am    Recent orders 23  Culture G-tube drainage. Change dressing bid; cleanse w/ N/S apply Bactroban around exit site. STAT chest xray reported infiltrates right lower lobe  Levaquin 500 mg daily x 10 days; end 23. Obtain U/A C&S  Oxygen prn - keep pulse Ox > 92%  Repeat CBC, CMP 23    Patient eating few bites of pureed diet/honey thick liquids. Blas [facility]  : 11/10/1945.  [de-identified]year old  female is admitted to [address] [place] [address] for G-tube feedings, medication management and  rehabilitation. [facility] Admission:       10/11/23  Discharged to [address]:   10/25/23    Chief complaint: Poor appetite; G tube drainage, occasional cough/infiltrates. S/P IC hemorrhage with Craniotomy. weakness    HPI  - HOSPITAL NOTE:   pt s/p craniotomy, intubated and sedated, unable to provide history. Per ER, pt noted to have dysarthria and hemiplegia . CTA findings c/w ICH; taken to the OR for craniotomy. Pt had MRI brain ordered as outpt for eval for migraine history which showed findings c/f SAH, advised to f/u with ER for STAT CTA and developed dysarthria and hemiplegia during transit. Pt admitted w/ acute ICH. Angio did not show source of bleed but showed vasospasm. Was started on Valproate; seizures noted initially but resolved on repeat EEG. Pt showed some recovery; stabilized & discharged to [facility]. Pt is to f/u with Neuro.     Past Medical History:   Diagnosis Date    Anxiety     Breast CA ([facility] [address]     CANCER     lft breast lumpectomy pre cancerous cells stage 0    Diabetes mellitus ([facility] [address]     Diarrhea, unspecified     Ductal carcinoma in situ of breast     left breast    Flatulence/gas pain/belching GERD     H/O infectious mononucleosis     diagnosed in 1960s    High cholesterol     Hypertension     IBS (irritable bowel syndrome)     Osteoporosis     Other and unspecified hyperlipidemia     Personal history of irradiation, presenting hazards to health 2004    mammosite    Sleep disturbance     Stool incontinence     Type II or unspecified type diabetes mellitus without mention of complication, not stated as uncontrolled     Wears glasses      Past Surgical History:   Procedure Laterality Date    APPENDECTOMY      194    COLONOSCOPY       hiatal hernia, sm internal hem    COLONOSCOPY  2005    fiberoptic    D & C      IR ANGIOGRAM CEREBRAL CAROTID BILATERAL  10/12/2023    LUMPECTOMY LEFT  2004    breast    OTHER SURGICAL HISTORY      lumpectomy  lft breast stage 0    RADIATION LEFT      Mammosite    TONSILLECTOMY          UPPER GI ENDOSCOPY,EXAM      2005     Family History   Problem Relation Age of Onset    Heart Disorder Father          of heart attack age 62    Alcohol and Other Disorders Associated Father     Arthritis Father     Other (Other) Father     Heart Disease Mother         CHF age [de-identified]    Arthritis Mother     Other (Other) Mother     Diabetes Sister         mellitus    Heart Disease Maternal Grandfather     Cancer Maternal Grandmother         brain tumor    Breast Cancer Self      Social History     Socioeconomic History    Marital status:    Tobacco Use    Smoking status: Former     Packs/day: 1.00     Years: 20.00     Additional pack years: 0.00     Total pack years: 20.00     Types: Cigarettes     Quit date: 3/1/2006     Years since quittin.7    Smokeless tobacco: Never   Vaping Use    Vaping Use: Never used   Substance and Sexual Activity    Alcohol use: No     Alcohol/week: 0.0 standard drinks of alcohol    Drug use: No   Other Topics Concern    Caffeine Concern No     Comment: tries to drink decaf drinks    Exercise Yes     Comment: jazzercise 3x/wk, tennis 1-2x/wk     IMMUNIZATIONS  Immunization History   Administered Date(s) Administered    Covid-19 Vaccine Moderna 100 mcg/0.5 ml 02/10/2021, 03/10/2021, 10/22/2021, 04/05/2022    Covid-19 Vaccine Moderna Bivalent 50mcg/0.5mL 12+ years 09/13/2022    DTAP 04/24/2007    FLU VAC High Dose 65 YRS & Older PRSV Free (23172) 10/24/2016, 09/15/2020    FLUAD High Dose 72 yr and older (86979) 10/11/2019, 09/28/2021    Fluzone Vaccine Medicare () 10/28/2013, 10/24/2016, 09/15/2020    HIGH DOSE FLU 65 YRS AND OLDER PRSV FREE SINGLE D (72431) FLU CLINIC 09/27/2022    Influenza 10/24/2012, 10/23/2015, 10/20/2017, 10/05/2018    Pneumococcal (Prevnar 13) 09/02/2016    Pneumococcal (Prevnar 7) 04/24/2010    Pneumovax 23 09/29/2017    TDAP 09/23/2019    Zoster Vaccine Live (Zostavax) 09/19/2012    Zoster Vaccine Recombinant Adjuvanted (Shingrix) 03/06/2023, 05/23/2023      ALLERGIES:  Allergies   Allergen Reactions    Ampicillin NAUSEA ONLY     Caps    Codeine [Opioid Analgesics]      Derivatives  Syncope    Cortisone [Cortisone Acetate]      Injection into L shoulder  Syncope, stomach cramps       CODE STATUS:  Full Code    ADVANCED CARE PLANNING TEAM: Will need  frequent updates to arrange a safe discharge. Lives alone in a 2-story home. CURRENT MEDICATIONS - reviewed and updated on SNF EMAR  Valproic acid 750 tid via G tube  Fosamax 70 weekly  Aspirin 81 daily  Atorvastatin 80 mg daily  Calcium carbonate 500 daily  Lisinopril 5 mg daily  Clobetasol 2 x weekly prn rash  SKIN: pale, warm, dry    Physical Exam:  133/56. P 113. T 97.8. RR 18. POx 95%. Weight 145.8#. blood sugar 233  General: well developed, well nourished [de-identified] year male in no acute distress. IC hemorrhage s/p craniotomy w/ residual dysphasia, dysphagia and weakness.    WOUND: see wound assessment per staff,   EYES: Pupils round and equal; no jaundice; no drainage from eyes  HENT: no nasal drainage, mucous membranes pink, moist,   NECK: supple; FROM  BREAST: hx: L breast cancer, previous lumpectomy. RESPIRATORY: Diminished breath sounds bilaterally; HOB up for feedings and medications via g-tube. [place] CARDIOVASCULAR: RRR, rate 113  ABDOMEN:  active BS+, soft, G-tube functioning; + drainage around site. :Deferred  LYMPHATIC:no lymphedema  MUSCULOSKELETAL: L  hemiparesis. L upper extremity edema: negative US for DVT. EXTREMITIES/VASCULAR:  able to move right side. L side weakness. NEUROLOGIC:follows simple commands and answers simple questions  PSYCHIATRIC: depressed compliant    Lab Results:  11/7/23   WBC 14. HGB 8.9. Plts 174. Na 145. K 3.8   (Potassium 20 meq daily added)  Cl 111. CO2 27. BUN 30. Creat 0.5. Assessment / Plan    Nontraumatic ICH. S/P craniotomy. S/P cerbral angio negative for source of bleed; showed Right MCA vasospasm  Valproic acid 750 tid via G tube  BP q shift  EEG negative  Aspirin 81 mg daily  PT/OT/ST  Phsysiatry on consult  F/U Neuro  11/14/23; CT 11/13/23.    + Infiltrates per CXR - w/ Leukocytes  Levaquin 500 mg daily x 10 days  Repeat labs 11/14/23    Leukocytosis  Culture G-tube site - bactroban bid  + contaminants/normal eagle    Type 2 DM - accucheks qid    HTN:  - Lisinopri 5 mg daily    GERD -  Ca+ Supplement:  TUMS 1000 daily    Osteoporosis:  Fosamax 70 mg weekly    Dysphagia:  Tube feedings 50 ml / hr x 20 hrs per days; 1pm - 9 am.  Speech therapy following  Aspiration precautions - HOB elevated during feedings  Pureed diet w/ honey thick liquids   Dietician following    Hypernatremia - prn IV fluids    Hypokalemia - K+ 20 meq daily    Weakness / Deconditioning - PT/OTST/Physiatry following    Dispo: to be determined after PT/OT/ST recommendations. Lives alone in a 2 story home in  [facility]. FOLLOW UP APPOINTMENTS   *Follow-Up with PCP within 1-2 weeks following [facility] discharge.     Future Appointments   Date Time Provider [facility] MELVIN Duron   11/13/2023  2:30 PM [facility] CT MAIN RM3 [facility] Tian Diaz   12/5/2023 11:00 AM Suellen Shepard MD EM[address] EM[address]     *Greater than 35 minutes spent w/ patient and staff, including but not limited to/ reviewing present status, needs, abilities with disciplines, reviewing medical records, vital signs, labs, completing med rec and entering orders to establish plan of care in [facility]. Note to patient: The [facility] makes medical notes like these available to patients in the interest of transparency. However, this is a medical document intended as peer to peer communication. It is written in medical language and may contain abbreviations or verbiage that are unfamiliar. It may appear blunt or direct. Medical documents are intended to carry relevant information, facts as evident, and the clinical opinion of the practitioner who signs the document.     Scarlet Duff, HYACINTH  11/7/23  1145 am   [facility] Team

## 2023-11-13 NOTE — PROGRESS NOTES
Marcus Pablo  11/10/1945. APRN Encounter 23 1230p    Met with Mrs. Yates at bedside with report of blood glucose readings running high, more so at night. Low Dose Sliding Scale ordered and entered for coverage. Patient c/o heartburn with burning after taking medications. Carafate ordered tid and prn. Blood pressures have been low; today 105/45. HOB up during g-tube feedings from 1 pm to 9 am.  Eating few bites of pureed diet and honey thick liquids. Previous orders from  23  Culture G-tube drainage - no growth; likely contaminant/normal eagle. Change g-tube dressing bid; cleanse w/ N/S apply Bactroban around exit site. chest xray reported infiltrates right lower lobe  Levaquin 500 mg daily x 10 days; end 23. Obtain U/A C&S - no urine results noted  O2 prn. keep pulse Ox > 92%  Repeat CBC, CMP 23   Patient eating few bites of pureed diet/honey thick liquids. Elisabet Bauer  : 11/10/1945.  [de-identified]year old  female is admitted to [address] [place] [address] for G-tube feedings, medication management and  rehabilitation. [facility] Admission:       10/11/23  Discharged to [address]:   10/25/23    Chief complaint: Poor appetite; G tube drainage, occasional cough/infiltrates. S/P IC hemorrhage with Craniotomy. Weakness, elevated BS readidng; ssi in place. HPI  - HOSPITAL NOTE:   pt s/p craniotomy, intubated and sedated, unable to provide history. Per ER, pt noted to have dysarthria and hemiplegia . CTA findings c/w ICH; taken to the OR for craniotomy. Pt had MRI brain ordered as outpt for eval for migraine history which showed findings c/f SAH, advised to f/u with ER for STAT CTA and developed dysarthria and hemiplegia during transit. Pt admitted w/ acute ICH. Angio did not show source of bleed but showed vasospasm. Was started on Valproate; seizures noted initially but resolved on repeat EEG. Pt showed some recovery; stabilized & discharged to [facility].  Pt is to f/u with Neuro.     Past Medical History:   Diagnosis Date    Anxiety     Breast CA ([facility] [address] 2004    CANCER     lft breast lumpectomy pre cancerous cells stage 0    Diabetes mellitus ([facility] [address]     Diarrhea, unspecified     Ductal carcinoma in situ of breast     left breast    Flatulence/gas pain/belching     GERD     H/O infectious mononucleosis     diagnosed in 1960s    High cholesterol     Hypertension     IBS (irritable bowel syndrome)     Osteoporosis     Other and unspecified hyperlipidemia     Personal history of irradiation, presenting hazards to health 2004    mammosite    Sleep disturbance     Stool incontinence     Type II or unspecified type diabetes mellitus without mention of complication, not stated as uncontrolled     Wears glasses      Past Surgical History:   Procedure Laterality Date    APPENDECTOMY      194    COLONOSCOPY       hiatal hernia, sm internal hem    COLONOSCOPY  2005    fiberoptic    D & C      IR ANGIOGRAM CEREBRAL CAROTID BILATERAL  10/12/2023    LUMPECTOMY LEFT  2004    breast    OTHER SURGICAL HISTORY      lumpectomy 2004 lft breast stage 0    RADIATION LEFT      Mammosite    TONSILLECTOMY          UPPER GI ENDOSCOPY,EXAM      2005     Family History   Problem Relation Age of Onset    Heart Disorder Father          of heart attack age 62    Alcohol and Other Disorders Associated Father     Arthritis Father     Other (Other) Father     Heart Disease Mother         CHF age [de-identified]    Arthritis Mother     Other (Other) Mother     Diabetes Sister         mellitus    Heart Disease Maternal Grandfather     Cancer Maternal Grandmother         brain tumor    Breast Cancer Self      Social History     Socioeconomic History    Marital status:    Tobacco Use    Smoking status: Former     Packs/day: 1.00     Years: 20.00     Additional pack years: 0.00     Total pack years: 20.00     Types: Cigarettes     Quit date: 3/1/2006     Years since quitting: 17.7    Smokeless tobacco: Never   Vaping Use    Vaping Use: Never used   Substance and Sexual Activity    Alcohol use: No     Alcohol/week: 0.0 standard drinks of alcohol    Drug use: No   Other Topics Concern    Caffeine Concern No     Comment: tries to drink decaf drinks    Exercise Yes     Comment: jazzercise 3x/wk, tennis 1-2x/wk     IMMUNIZATIONS  Immunization History   Administered Date(s) Administered    Covid-19 Vaccine Moderna 100 mcg/0.5 ml 02/10/2021, 03/10/2021, 10/22/2021, 04/05/2022    Covid-19 Vaccine Moderna Bivalent 50mcg/0.5mL 12+ years 09/13/2022    DTAP 04/24/2007    FLU VAC High Dose 65 YRS & Older PRSV Free (38234) 10/24/2016, 09/15/2020    FLUAD High Dose 65 yr and older (27848) 10/11/2019, 09/28/2021    Fluzone Vaccine Medicare () 10/28/2013, 10/24/2016, 09/15/2020    HIGH DOSE FLU 65 YRS AND OLDER PRSV FREE SINGLE D (08522) FLU CLINIC 09/27/2022    Influenza 10/24/2012, 10/23/2015, 10/20/2017, 10/05/2018    Pneumococcal (Prevnar 13) 09/02/2016    Pneumococcal (Prevnar 7) 04/24/2010    Pneumovax 23 09/29/2017    TDAP 09/23/2019    Zoster Vaccine Live (Zostavax) 09/19/2012    Zoster Vaccine Recombinant Adjuvanted (Shingrix) 03/06/2023, 05/23/2023      ALLERGIES:  Allergies   Allergen Reactions    Ampicillin NAUSEA ONLY     Caps    Codeine [Opioid Analgesics]      Derivatives  Syncope    Cortisone [Cortisone Acetate]      Injection into L shoulder  Syncope, stomach cramps       CODE STATUS:  Full Code    ADVANCED CARE PLANNING TEAM: Will need  frequent updates to arrange a safe discharge. Lives alone in a 2-story home.      CURRENT MEDICATIONS - reviewed and updated on SNF [facility]  Valproic acid 750 tid via G tube  Fosamax 70 weekly  Aspirin 81 daily  Atorvastatin 80 mg daily  Calcium carbonate 500 daily  Lisinopril 5 mg daily  Clobetasol 2 x weekly prn rash   Levaquin ends 11/13/23   Lisinopril 5 mg daily  Mupirocin 2% ointment twice daiy - g-tube side  Potassium 20 meq daily  Valproic acid 250/5ml solution - 15 ml tid    Physical Exam:  105/45. P 113. T 97.4. RR 18. POx 94%. Weight 142.4#.   General: well developed, well nourished [de-identified] year female in no acute distress. IC hemorrhage s/p craniotomy w/ residual dysphasia, dysphagia and weakness. WOUND: see wound assessment per staff,   EYES: Pupils round and equal; no jaundice; no drainage from eyes  HENT: no nasal drainage, mucous membranes pink, moist,   NECK: supple; FROM  BREAST: hx: L breast cancer, previous lumpectomy. RESPIRATORY: Diminished breath sounds bilaterally; HOB up for feedings and medications via g-tube. Bala Patton CARDIOVASCULAR: RRR, rate 113  ABDOMEN:  active BS+, soft, G-tube functioning; + drainage around site. :Deferred  LYMPHATIC:no lymphedema  MUSCULOSKELETAL: L  hemiparesis. L upper extremity edema: negative US for DVT. EXTREMITIES/VASCULAR:  able to move right side. L sided weakness. NEUROLOGIC:follows simple commands and answering questions with longer responses. Talked about her do she misses. PSYCHIATRIC: depressed compliant    Lab Results:  11/7/23   WBC 14. HGB 8.9. Plts 174. Na 145. K 3.8   (Potassium 20 meq daily added)  Cl 111. CO2 27. BUN 30. Creat 0.5. Assessment / Plan    Nontraumatic ICH. S/P craniotomy. S/P cerbral angio negative for source of bleed; showed R MCA vasospasm  Valproic acid 750 tid via G tube  BP q shift  EEG negative  Aspirin 81 mg daily  PT/OT/ST  Phsysiatry on consult  F/U Neuro  11/14/23; CT 11/13/23.    + Infiltrates per CXR - w/ Leukocytes  Levaquin 500 mg daily x 10 days  Repeat labs 11/14/23    Leukocytosis  Culture G-tube site - bactroban bid  + contaminants/normal eagle    Type 2 DM - accucheks qid. Initiated sliding scale.     HTN:  - Lisinopri 5 mg daily    GERD -  Ca+ Supplement:  TUMS 1000 daily    Osteoporosis:  Fosamax 70 mg weekly    Dysphagia:  Tube feedings 50 ml / hr x 20 hrs per day; 1pm - 9 am.  Speech therapy following  Aspiration precautions - HOB elevated during feedings  Pureed diet w/ honey thick liquids   Dietician following    Hypernatremia - prn IV fluids    Hypokalemia - K+ 20 meq daily    Weakness / Deconditioning - PT/OTST/Physiatry following    Dispo: to be determined after PT/OT/ST recommendations. Lives alone in a 2 story home in  [facility]. FOLLOW UP APPOINTMENTS   *Follow-Up with PCP within 1-2 weeks following [facility] discharge. Future Appointments   Date Time Provider [address] [facility] [place]   11/13/2023  2:30 PM [facility] CT MAIN RM3 [facility] CT [place]   12/5/2023 11:00 AM Edy Riddle MD [facility] [address] [facility] [address]     *Greater than 35 minutes spent w/ patient and staff, including but not limited to/ reviewing present status, needs, abilities with disciplines, reviewing medical records, vital signs, labs, completing med rec and entering orders to establish plan of care in [facility]. Note to patient: The [facility] makes medical notes like these available to patients in the interest of transparency. However, this is a medical document intended as peer to peer communication. It is written in medical language and may contain abbreviations or verbiage that are unfamiliar. It may appear blunt or direct. Medical documents are intended to carry relevant information, facts as evident, and the clinical opinion of the practitioner who signs the document.     Ricardo Das, APRN  11/9/23  1230 pm   [facility] Post Acute Care Team

## 2023-11-14 ENCOUNTER — OFFICE VISIT (OUTPATIENT)
Dept: SURGERY | Facility: CLINIC | Age: 78
End: 2023-11-14
Payer: MEDICARE

## 2023-11-14 VITALS
BODY MASS INDEX: 29.81 KG/M2 | SYSTOLIC BLOOD PRESSURE: 106 MMHG | HEIGHT: 58 IN | WEIGHT: 142 LBS | DIASTOLIC BLOOD PRESSURE: 58 MMHG | HEART RATE: 120 BPM

## 2023-11-14 DIAGNOSIS — Z98.890 S/P CRANIOTOMY: Primary | ICD-10-CM

## 2023-11-14 DIAGNOSIS — I62.9 INTRACRANIAL HEMORRHAGE (HCC): ICD-10-CM

## 2023-11-14 DIAGNOSIS — Z98.890 POST-OPERATIVE STATE: ICD-10-CM

## 2023-11-14 PROCEDURE — 1160F RVW MEDS BY RX/DR IN RCRD: CPT | Performed by: PHYSICIAN ASSISTANT

## 2023-11-14 PROCEDURE — 3078F DIAST BP <80 MM HG: CPT | Performed by: PHYSICIAN ASSISTANT

## 2023-11-14 PROCEDURE — 1111F DSCHRG MED/CURRENT MED MERGE: CPT | Performed by: PHYSICIAN ASSISTANT

## 2023-11-14 PROCEDURE — 3008F BODY MASS INDEX DOCD: CPT | Performed by: PHYSICIAN ASSISTANT

## 2023-11-14 PROCEDURE — 1159F MED LIST DOCD IN RCRD: CPT | Performed by: PHYSICIAN ASSISTANT

## 2023-11-14 PROCEDURE — 99024 POSTOP FOLLOW-UP VISIT: CPT | Performed by: PHYSICIAN ASSISTANT

## 2023-11-14 PROCEDURE — 3074F SYST BP LT 130 MM HG: CPT | Performed by: PHYSICIAN ASSISTANT

## 2023-11-14 RX ORDER — SUCRALFATE ORAL 1 G/10ML
1 SUSPENSION ORAL
Status: ON HOLD | COMMUNITY

## 2023-11-14 NOTE — PROGRESS NOTES
Patient: Estela Ruiz  Medical Record Number: [id number]  YOB: 1945  PCP: Dois Carrel, MD    Reason for visit: Hospital follow-up, status post craniotomy, right frontal hemorrhage    HISTORY OF CHIEF COMPLAINT:    Estela Ruiz is a very pleasant [de-identified]year old female who presents today for: Hospital follow-up, status post craniotomy, right frontal hemorrhage  Status post 10/11/2023: Dr. Watkins Chance: Craniotomy for right frontal hemorrhage  Patient without significant change of LUE and LLE strength. No headaches. Intermittent memory issues. No new neurologic complaints. No incisional complaints.      Past Medical History:   Diagnosis Date    Anxiety     Breast CA ([facility] [address] 2004    CANCER     lft breast lumpectomy pre cancerous cells stage 0    Diabetes mellitus ([facility] [address]     Diarrhea, unspecified     Ductal carcinoma in situ of breast     left breast    Flatulence/gas pain/belching     GERD     H/O infectious mononucleosis     diagnosed in 1960s    High cholesterol     Hypertension     IBS (irritable bowel syndrome)     Osteoporosis     Other and unspecified hyperlipidemia     Personal history of irradiation, presenting hazards to health 2004    mammosite    Sleep disturbance     Stool incontinence     Type II or unspecified type diabetes mellitus without mention of complication, not stated as uncontrolled     Wears glasses       Past Surgical History:   Procedure Laterality Date    APPENDECTOMY          COLONOSCOPY      2005 hiatal hernia, sm internal hem    COLONOSCOPY  2005    fiberoptic    D & C      IR ANGIOGRAM CEREBRAL CAROTID BILATERAL  10/12/2023    LUMPECTOMY LEFT  2004    breast    OTHER SURGICAL HISTORY      lumpectomy 2004 lft breast stage 0    RADIATION LEFT      Mammosite    TONSILLECTOMY          UPPER GI ENDOSCOPY,EXAM      2005      Family History   Problem Relation Age of Onset    Heart Disorder Father          of heart attack age 62    Alcohol and Other Disorders Associated Father     Arthritis Father     Other (Other) Father     Heart Disease Mother         CHF age [de-identified]    Arthritis Mother     Other (Other) Mother     Diabetes Sister         mellitus    Heart Disease Maternal Grandfather     Cancer Maternal Grandmother         brain tumor    Breast Cancer Self       Social History     Socioeconomic History    Marital status:    Tobacco Use    Smoking status: Former     Packs/day: 1.00     Years: 20.00     Additional pack years: 0.00     Total pack years: 20.00     Types: Cigarettes     Quit date: 3/1/2006     Years since quittin.7    Smokeless tobacco: Never   Vaping Use    Vaping Use: Never used   Substance and Sexual Activity    Alcohol use: No     Alcohol/week: 0.0 standard drinks of alcohol    Drug use: No   Other Topics Concern    Caffeine Concern No     Comment: tries to drink decaf drinks    Exercise Yes     Comment: jazzercise 3x/wk, tennis 1-2x/wk      Allergies   Allergen Reactions    Ampicillin NAUSEA ONLY     Caps    Codeine [Opioid Analgesics]      Derivatives  Syncope    Cortisone [Cortisone Acetate]      Injection into L shoulder  Syncope, stomach cramps      Current Medications:  Current Outpatient Medications   Medication Sig Dispense Refill    levoFLOXacin 500 MG Oral Tab Take 1 tablet (500 mg total) by mouth daily. Take x 10 days      mupirocin 2 % External Ointment Apply 1 Application topically 2 (two) times daily. Around G-tube site      potassium chloride 20 MEQ Oral Powd Pack Take 20 mEq by mouth daily. valproic acid 250 MG/5ML Oral Solution Take 15 mL (750 mg total) by mouth 3 (three) times daily. 300 mL 0    alendronate 70 MG Oral Tab Take 1 tablet (70 mg total) by mouth once a week. 12 tablet 3    lisinopril 5 MG Oral Tab Take 1 tablet (5 mg total) by mouth daily. 90 tablet 0    atorvastatin 80 MG Oral Tab Take 1 tablet (80 mg total) by mouth daily.  Replaces 40 mg 90 tablet 1    clobetasol 0.05 % External Cream Two times/week 15 g 0    ACCU-CHEK FASTCLIX LANCETS Does not apply Misc USE 1 LANCET TO STICK FINGER DAILY TO TEST BLOOD. 102 each 1    Calcium Carbonate Antacid (TUMS) 500 MG Oral Chew Tab Chew 1 tablet (500 mg total) by mouth daily. ASPIRIN 81 MG OR TABS 1 TABLET DAILY          REVIEW OF SYSTEMS   Comprehensive review of systems done. Negative except what is outlined in the above HPI. PHYSICAL EXAMIMATION    vitals were not taken for this visit. GENERAL: Very pleasant patient is in no apparent distress. Sitting comfortably in the wheel chair. HEENT: Normocephalic, atraumatic. RESPIRATORY RATE: Easy and Even   SKIN: Warm and dry  NEURO: Awake, alert and orientated to person. Not orientated to situation. Reports the year is 2003. Speech fluent without apparent dysarthria. Follows commands. Answer questions. Left facial droop. Right gaze preference. Negative drift right, unable to assess left secondary to left hemiplegia    SPINE:  Gait/Coordination: Gait deferred. 5/5 strength of RUE and RLE. Left sided hemiplegia. DATA:   None    IMAGING:   Study Result    Narrative   PROCEDURE:  CTA BRAIN + CTA CAROTIDS (QUM=[id number])     [place]PARISON:  [place] BRAIN OR HEAD ([id number]), 10/22/2023, 1:23 AM.  [facility] , CT, CTA BRAIN + CTA CAROTIDS (LPV=[id number]), 10/17/2023, 4:30 AM.  [facility] , CT, CT BRAIN OR HEAD ([id number]), 10/13/2023, 4:24 PM.  [facility] , CT, CT BRAIN OR HEAD ([id number]),  10/12/2023, 2:38 PM.     INDICATIONS:  I60.9 SAH (subarachnoid hemorrhage) ([address]     TECHNIQUE:  CT angiography of the head and neck were obtained with non-ionic contrast.  3D volume rendering images were obtained by the technologist as well as the radiologist on an independent workstation. Multiplanar 3D reformatted imaging including  multiplanar MIP images were obtained. Curved planar reformats were performed through the carotid and vertebral arteries.  All measurements obtained in this exam were performed using NASCET criteria. Dose reduction techniques were used. Dose information  is transmitted to the [facility]) NRDR ([address]) which includes the Dose Index Registry. PATIENT STATED HISTORY:(As transcribed by Technologist)  Patient has a history of stroke. Currently experiences migraines and has difficult movement. Could not provide written history      CONTRAST USED:  75cc of Isovue 370     FINDINGS:    VASCULATURE:  No significant stenosis. No visible aneurysm or vascular malformation. There is mild atherosclerotic plaque of the internal carotid arteries. The vertebral arteries are codominant. There is no evidence of large vessel occlusion. Hypoplastic transverse and sigmoid sinus on the right which is likely developmental.  VENTRICLES:  There is mild diffuse prominence of the ventricles and sulci consistent with atrophy. CEREBRUM:  Resolved intraparenchymal hemorrhage noted within the right frontal lobe. There is low-attenuation within the right frontal lobe related to an area of old infarct and the patient's previous hemorrhage. The extra-axial fluid collection has  also completely resolved. There is no evidence of subarachnoid blood. There is no evidence of acute infarct or mass lesion. There is no significant mass effect. Diffuse low-attenuation the periventricular white matter consistent with small vessel  ischemic change. CEREBELLUM:  Findings of old left cerebellar infarcts along the inferior cerebellum are stable. BRAINSTEM:  Normal for age. No excessive atrophy, mass, or hemorrhage, or abnormal enhancement. BASAL CISTERNS:  No subarachnoid hemorrhage or effacement. SKULL:  Negative. LEFT  INTERNAL CAROTID:  No hemodynamically significant stenosis or dissection. EXTERNAL CAROTID:  No hemodynamically significant stenosis or dissection  COMMON CAROTID:  No hemodynamically significant stenosis or dissection.   There is mild calcified plaque of the carotid bulb. VERTEBRAL:  There is beaded irregular appearance to the distal internal carotid artery before entering the skull base best seen on image number 284 consistent with fibromuscular dysplasia. RIGHT  INTERNAL CAROTID:   No hemodynamically significant stenosis or dissection  EXTERNAL CAROTID:    No hemodynamically significant stenosis or dissection  COMMON CAROTID:   No hemodynamically significant stenosis or dissection. There is minimal calcified plaque of the right carotid bulb. VERTEBRAL:   Stable beaded contour of the left distal vertebral artery best seen on image [id number] consistent with fibromuscular dysplasia. OTHER:  There is moderate layering right pleural effusion with atelectasis at the right greater than left posterior lower lobes. The visualized soft tissues of the neck are also unremarkable. Impression   CONCLUSION:       1. Resolved intraparenchymal and subarachnoid hemorrhage along the right frontal lobe. 2. There is stable encephalomalacia change of the right frontal lobe related to areas of prior intraparenchymal hemorrhage. 3. Unremarkable CTA of the [id number]-[id number] without evidence of vascular malformation, aneurysm, or large vessel occlusion. 4. Stable appearance of the vertebral arteries with fibromuscular dysplasia involving the distal V3 segments of the vertebral arteries bilaterally. 5. Stable old left cerebellar infarcts. LOCATION:  [facility]        Dictated by (CST): Che Bass MD on 11/13/2023 at 3:46 PM      Finalized by (CST): Che Bass MD on 11/13/2023 at 3:58 PM       MEDICAL DECISION MAKING:     ASSESSMENT and PLAN:    ICD-10-CM    1. S/P craniotomy  Z98.890     Status post 10/11/2023: Dr. Yuni Robles: Craniotomy for right frontal hemorrhage      2. Post-operative state  Z98.890       3. Intracranial hemorrhage (HCC)  I62.9         PLAN:   1. Medication: None prescribed  2.  Imaging:    - Reviewed today:    - CTA head:     - Agree with radiology, resolved IPH and SAH along the right frontal lobe   - Ordered today:    -No further imaging from a neurosurgical standpoint unless a change in neuro status. 3. Activity:    -Continue to monitor incision site for signs/symptoms of infection, including but not limited to, discharge and fevers  4. Neurology:   - Follow up 11/17 at 11:40am with Dr. Anna Merida for hospital follow up, seizure and stroke management  5. Follow up in 3 weeks with Dr. Minerva Rdz for post operative visit or call or follow up sooner or go to the ED for any new, worsening or concerning signs or symptoms     Dr. Minerva Rdz and I reviewed imaging and discussed the plan. Dr. Minerva Rdz agrees with the plan. I reviewed imaging and discussed the plan with the patient. The patient agrees with the plan, verbalized understanding and is appreciative. All questions were sought out and thoroughly answered to satisfaction. Total visit time: 30 min  More than 50% spent coordinating care, providing patient education, reviewing imaging, discussing neurology follow up and counseling. Denisha Parker M.S., BRIJESH ALEMAN [address] [place], [address] [facility]  [phone]  11/14/2023 1:10 PM    Dragon speech recognition software was used to prepare this note. If a word or phrase is confusing, it is likely due to a failure of recognition. Please contact me with any questions or clarifications.

## 2023-11-14 NOTE — PROGRESS NOTES
Established patient:  Reason for follow up:   Sx 10/11/23     Numeric Rating Scale:   Pain at Present:  0/10

## 2023-11-17 ENCOUNTER — TELEPHONE (OUTPATIENT)
Dept: NEUROLOGY | Facility: CLINIC | Age: 78
End: 2023-11-17

## 2023-11-17 ENCOUNTER — OFFICE VISIT (OUTPATIENT)
Dept: NEUROLOGY | Facility: CLINIC | Age: 78
End: 2023-11-17
Payer: MEDICARE

## 2023-11-17 VITALS
HEART RATE: 110 BPM | BODY MASS INDEX: 30 KG/M2 | SYSTOLIC BLOOD PRESSURE: 110 MMHG | DIASTOLIC BLOOD PRESSURE: 62 MMHG | RESPIRATION RATE: 16 BRPM | WEIGHT: 142 LBS

## 2023-11-17 DIAGNOSIS — I60.9 SAH (SUBARACHNOID HEMORRHAGE) (HCC): Primary | ICD-10-CM

## 2023-11-17 PROCEDURE — 1159F MED LIST DOCD IN RCRD: CPT | Performed by: OTHER

## 2023-11-17 PROCEDURE — 3078F DIAST BP <80 MM HG: CPT | Performed by: OTHER

## 2023-11-17 PROCEDURE — 99215 OFFICE O/P EST HI 40 MIN: CPT | Performed by: OTHER

## 2023-11-17 PROCEDURE — 1111F DSCHRG MED/CURRENT MED MERGE: CPT | Performed by: OTHER

## 2023-11-17 PROCEDURE — 3074F SYST BP LT 130 MM HG: CPT | Performed by: OTHER

## 2023-11-17 RX ORDER — ALPRAZOLAM 0.25 MG/1
0.25 TABLET ORAL NIGHTLY PRN
Status: ON HOLD | COMMUNITY

## 2023-11-17 NOTE — TELEPHONE ENCOUNTER
Today's office visit notes faxed to [facility]) with fax confirmation received.

## 2023-11-20 ENCOUNTER — APPOINTMENT (OUTPATIENT)
Dept: CT IMAGING | Facility: HOSPITAL | Age: 78
End: 2023-11-20
Attending: EMERGENCY MEDICINE
Payer: MEDICARE

## 2023-11-20 ENCOUNTER — APPOINTMENT (OUTPATIENT)
Dept: ULTRASOUND IMAGING | Facility: HOSPITAL | Age: 78
End: 2023-11-20
Attending: EMERGENCY MEDICINE
Payer: MEDICARE

## 2023-11-20 ENCOUNTER — APPOINTMENT (OUTPATIENT)
Dept: GENERAL RADIOLOGY | Facility: HOSPITAL | Age: 78
End: 2023-11-20
Attending: EMERGENCY MEDICINE
Payer: MEDICARE

## 2023-11-20 ENCOUNTER — APPOINTMENT (OUTPATIENT)
Dept: GENERAL RADIOLOGY | Facility: HOSPITAL | Age: 78
DRG: 377 | End: 2023-11-20
Attending: EMERGENCY MEDICINE
Payer: MEDICARE

## 2023-11-20 ENCOUNTER — APPOINTMENT (OUTPATIENT)
Dept: CT IMAGING | Facility: HOSPITAL | Age: 78
DRG: 377 | End: 2023-11-20
Attending: EMERGENCY MEDICINE
Payer: MEDICARE

## 2023-11-20 ENCOUNTER — APPOINTMENT (OUTPATIENT)
Dept: ULTRASOUND IMAGING | Facility: HOSPITAL | Age: 78
DRG: 377 | End: 2023-11-20
Attending: EMERGENCY MEDICINE
Payer: MEDICARE

## 2023-11-20 ENCOUNTER — HOSPITAL ENCOUNTER (INPATIENT)
Facility: HOSPITAL | Age: 78
LOS: 4 days | Discharge: NURSING, REHAB OR LONG-TERM CARE | DRG: 377 | End: 2023-11-24
Attending: EMERGENCY MEDICINE | Admitting: HOSPITALIST
Payer: MEDICARE

## 2023-11-20 ENCOUNTER — HOSPITAL ENCOUNTER (INPATIENT)
Facility: HOSPITAL | Age: 78
LOS: 4 days | Discharge: NURSING, REHAB OR LONG-TERM CARE | End: 2023-11-24
Attending: EMERGENCY MEDICINE | Admitting: HOSPITALIST
Payer: MEDICARE

## 2023-11-20 DIAGNOSIS — K92.2 GASTROINTESTINAL HEMORRHAGE, UNSPECIFIED GASTROINTESTINAL HEMORRHAGE TYPE: Primary | ICD-10-CM

## 2023-11-20 DIAGNOSIS — J18.9 COMMUNITY ACQUIRED PNEUMONIA, UNSPECIFIED LATERALITY: ICD-10-CM

## 2023-11-20 DIAGNOSIS — I95.9 TRANSIENT HYPOTENSION: ICD-10-CM

## 2023-11-20 DIAGNOSIS — D64.9 ANEMIA, UNSPECIFIED TYPE: ICD-10-CM

## 2023-11-20 PROBLEM — E86.1 HYPOTENSION DUE TO HYPOVOLEMIA: Status: ACTIVE | Noted: 2023-11-20

## 2023-11-20 PROBLEM — D62 ACUTE BLOOD LOSS ANEMIA: Status: ACTIVE | Noted: 2023-11-20

## 2023-11-20 PROBLEM — I95.89 HYPOTENSION DUE TO HYPOVOLEMIA: Status: ACTIVE | Noted: 2023-11-20

## 2023-11-20 LAB
ALBUMIN SERPL-MCNC: 1.6 G/DL (ref 3.4–5)
ALBUMIN/GLOB SERPL: 0.4 {RATIO} (ref 1–2)
ALP LIVER SERPL-CCNC: 73 U/L
ALT SERPL-CCNC: 18 U/L
ANION GAP SERPL CALC-SCNC: 8 MMOL/L (ref 0–18)
ANTIBODY SCREEN: NEGATIVE
APTT PPP: 24.2 SECONDS (ref 23.3–35.6)
AST SERPL-CCNC: 34 U/L (ref 15–37)
ATRIAL RATE: 112 BPM
BASOPHILS # BLD AUTO: 0.05 X10(3) UL (ref 0–0.2)
BASOPHILS NFR BLD AUTO: 0.3 %
BILIRUB SERPL-MCNC: 0.3 MG/DL (ref 0.1–2)
BUN BLD-MCNC: 29 MG/DL (ref 9–23)
CALCIUM BLD-MCNC: 8.7 MG/DL (ref 8.5–10.1)
CHLORIDE SERPL-SCNC: 101 MMOL/L (ref 98–112)
CO2 SERPL-SCNC: 26 MMOL/L (ref 21–32)
CREAT BLD-MCNC: 0.82 MG/DL
EGFRCR SERPLBLD CKD-EPI 2021: 72 ML/MIN/1.73M2 (ref 60–?)
EOSINOPHIL # BLD AUTO: 0.19 X10(3) UL (ref 0–0.7)
EOSINOPHIL NFR BLD AUTO: 1.1 %
ERYTHROCYTE [DISTWIDTH] IN BLOOD BY AUTOMATED COUNT: 19.1 %
GLOBULIN PLAS-MCNC: 4.5 G/DL (ref 2.8–4.4)
GLUCOSE BLD-MCNC: 152 MG/DL (ref 70–99)
HCT VFR BLD AUTO: 27.9 %
HGB BLD-MCNC: 8.5 G/DL
IMM GRANULOCYTES # BLD AUTO: 0.3 X10(3) UL (ref 0–1)
IMM GRANULOCYTES NFR BLD: 1.8 %
INR BLD: 1.06 (ref 0.8–1.2)
LIPASE SERPL-CCNC: 16 U/L (ref 13–75)
LYMPHOCYTES # BLD AUTO: 2.51 X10(3) UL (ref 1–4)
LYMPHOCYTES NFR BLD AUTO: 14.7 %
MCH RBC QN AUTO: 30.2 PG (ref 26–34)
MCHC RBC AUTO-ENTMCNC: 30.5 G/DL (ref 31–37)
MCV RBC AUTO: 99.3 FL
MONOCYTES # BLD AUTO: 1.75 X10(3) UL (ref 0.1–1)
MONOCYTES NFR BLD AUTO: 10.3 %
NEUTROPHILS # BLD AUTO: 12.23 X10 (3) UL (ref 1.5–7.7)
NEUTROPHILS # BLD AUTO: 12.23 X10(3) UL (ref 1.5–7.7)
NEUTROPHILS NFR BLD AUTO: 71.8 %
OSMOLALITY SERPL CALC.SUM OF ELEC: 289 MOSM/KG (ref 275–295)
P AXIS: 53 DEGREES
P-R INTERVAL: 118 MS
PLATELET # BLD AUTO: 310 10(3)UL (ref 150–450)
POTASSIUM SERPL-SCNC: 4.9 MMOL/L (ref 3.5–5.1)
PROT SERPL-MCNC: 6.1 G/DL (ref 6.4–8.2)
PROTHROMBIN TIME: 13.9 SECONDS (ref 11.6–14.8)
Q-T INTERVAL: 314 MS
QRS DURATION: 66 MS
QTC CALCULATION (BEZET): 428 MS
R AXIS: 1 DEGREES
RBC # BLD AUTO: 2.81 X10(6)UL
RH BLOOD TYPE: POSITIVE
SARS-COV-2 RNA RESP QL NAA+PROBE: NOT DETECTED
SODIUM SERPL-SCNC: 135 MMOL/L (ref 136–145)
T AXIS: 27 DEGREES
TROPONIN I SERPL HS-MCNC: 14 NG/L
VENTRICULAR RATE: 112 BPM
WBC # BLD AUTO: 17 X10(3) UL (ref 4–11)

## 2023-11-20 PROCEDURE — 74177 CT ABD & PELVIS W/CONTRAST: CPT | Performed by: EMERGENCY MEDICINE

## 2023-11-20 PROCEDURE — 99223 1ST HOSP IP/OBS HIGH 75: CPT | Performed by: HOSPITALIST

## 2023-11-20 PROCEDURE — 93971 EXTREMITY STUDY: CPT | Performed by: EMERGENCY MEDICINE

## 2023-11-20 PROCEDURE — 30233N1 TRANSFUSION OF NONAUTOLOGOUS RED BLOOD CELLS INTO PERIPHERAL VEIN, PERCUTANEOUS APPROACH: ICD-10-PCS | Performed by: STUDENT IN AN ORGANIZED HEALTH CARE EDUCATION/TRAINING PROGRAM

## 2023-11-20 PROCEDURE — 71045 X-RAY EXAM CHEST 1 VIEW: CPT | Performed by: EMERGENCY MEDICINE

## 2023-11-20 PROCEDURE — 70450 CT HEAD/BRAIN W/O DYE: CPT | Performed by: EMERGENCY MEDICINE

## 2023-11-20 RX ORDER — SODIUM CHLORIDE 9 MG/ML
INJECTION, SOLUTION INTRAVENOUS CONTINUOUS
Status: DISCONTINUED | OUTPATIENT
Start: 2023-11-20 | End: 2023-11-20

## 2023-11-20 RX ORDER — ENEMA 19; 7 G/133ML; G/133ML
1 ENEMA RECTAL ONCE AS NEEDED
Status: DISCONTINUED | OUTPATIENT
Start: 2023-11-20 | End: 2023-11-24

## 2023-11-20 RX ORDER — ACETAMINOPHEN 500 MG
500 TABLET ORAL EVERY 4 HOURS PRN
Status: DISCONTINUED | OUTPATIENT
Start: 2023-11-20 | End: 2023-11-24

## 2023-11-20 RX ORDER — BISACODYL 10 MG
10 SUPPOSITORY, RECTAL RECTAL
Status: DISCONTINUED | OUTPATIENT
Start: 2023-11-20 | End: 2023-11-24

## 2023-11-20 RX ORDER — ONDANSETRON 2 MG/ML
4 INJECTION INTRAMUSCULAR; INTRAVENOUS EVERY 6 HOURS PRN
Status: DISCONTINUED | OUTPATIENT
Start: 2023-11-20 | End: 2023-11-24

## 2023-11-20 RX ORDER — POLYETHYLENE GLYCOL 3350 17 G/17G
17 POWDER, FOR SOLUTION ORAL DAILY PRN
Status: DISCONTINUED | OUTPATIENT
Start: 2023-11-20 | End: 2023-11-24

## 2023-11-20 RX ORDER — SODIUM CHLORIDE 9 MG/ML
INJECTION, SOLUTION INTRAVENOUS CONTINUOUS
Status: ACTIVE | OUTPATIENT
Start: 2023-11-20 | End: 2023-11-22

## 2023-11-20 RX ORDER — SODIUM CHLORIDE 9 MG/ML
1000 INJECTION, SOLUTION INTRAVENOUS ONCE
Status: COMPLETED | OUTPATIENT
Start: 2023-11-20 | End: 2023-11-20

## 2023-11-20 RX ORDER — SENNOSIDES 8.6 MG
17.2 TABLET ORAL NIGHTLY PRN
Status: DISCONTINUED | OUTPATIENT
Start: 2023-11-20 | End: 2023-11-24

## 2023-11-20 NOTE — ED QUICK NOTES
Orders for admission, patient is aware of plan and ready to go upstairs. Any questions, please call ED RN Roxanna at extension [phone]. Patient Covid vaccination status: Fully vaccinated     COVID Test Ordered in ED: Rapid SARS-CoV-2 by PCR    COVID Suspicion at Admission: N/A    Running Infusions:  Azithromycin, blood     Mental Status/LOC at time of transport: A&Ox3, forgetful, talks nonsense at times. Other pertinent information: Hx of stroke with L sided weakness.   CIWA score: N/A   NIH score:  N/A

## 2023-11-21 ENCOUNTER — ANESTHESIA (OUTPATIENT)
Dept: ENDOSCOPY | Facility: HOSPITAL | Age: 78
End: 2023-11-21
Payer: MEDICARE

## 2023-11-21 ENCOUNTER — ANESTHESIA EVENT (OUTPATIENT)
Dept: ENDOSCOPY | Facility: HOSPITAL | Age: 78
End: 2023-11-21
Payer: MEDICARE

## 2023-11-21 LAB
ALBUMIN SERPL-MCNC: 1.4 G/DL (ref 3.4–5)
ALBUMIN/GLOB SERPL: 0.4 {RATIO} (ref 1–2)
ALP LIVER SERPL-CCNC: 64 U/L
ALT SERPL-CCNC: 14 U/L
ANION GAP SERPL CALC-SCNC: 7 MMOL/L (ref 0–18)
AST SERPL-CCNC: 24 U/L (ref 15–37)
BILIRUB SERPL-MCNC: 0.3 MG/DL (ref 0.1–2)
BLOOD TYPE BARCODE: 6200
BUN BLD-MCNC: 28 MG/DL (ref 9–23)
CALCIUM BLD-MCNC: 8 MG/DL (ref 8.5–10.1)
CHLORIDE SERPL-SCNC: 108 MMOL/L (ref 98–112)
CO2 SERPL-SCNC: 24 MMOL/L (ref 21–32)
CREAT BLD-MCNC: 0.66 MG/DL
EGFRCR SERPLBLD CKD-EPI 2021: 89 ML/MIN/1.73M2 (ref 60–?)
ERYTHROCYTE [DISTWIDTH] IN BLOOD BY AUTOMATED COUNT: 16.3 %
GLOBULIN PLAS-MCNC: 3.7 G/DL (ref 2.8–4.4)
GLUCOSE BLD-MCNC: 114 MG/DL (ref 70–99)
GLUCOSE BLD-MCNC: 126 MG/DL (ref 70–99)
GLUCOSE BLD-MCNC: 137 MG/DL (ref 70–99)
GLUCOSE BLD-MCNC: 154 MG/DL (ref 70–99)
HCT VFR BLD AUTO: 40.4 %
HGB BLD-MCNC: 13.7 G/DL
HGB BLD-MCNC: 14 G/DL
HGB BLD-MCNC: 7.7 G/DL
MCH RBC QN AUTO: 30.8 PG (ref 26–34)
MCHC RBC AUTO-ENTMCNC: 33.9 G/DL (ref 31–37)
MCV RBC AUTO: 90.8 FL
OSMOLALITY SERPL CALC.SUM OF ELEC: 294 MOSM/KG (ref 275–295)
PLATELET # BLD AUTO: 155 10(3)UL (ref 150–450)
POTASSIUM SERPL-SCNC: 4.2 MMOL/L (ref 3.5–5.1)
PROCALCITONIN SERPL-MCNC: 0.2 NG/ML (ref ?–0.16)
PROT SERPL-MCNC: 5.1 G/DL (ref 6.4–8.2)
RBC # BLD AUTO: 4.45 X10(6)UL
SODIUM SERPL-SCNC: 139 MMOL/L (ref 136–145)
UNIT VOLUME: 350 ML
WBC # BLD AUTO: 7.5 X10(3) UL (ref 4–11)

## 2023-11-21 PROCEDURE — 0D20XUZ CHANGE FEEDING DEVICE IN UPPER INTESTINAL TRACT, EXTERNAL APPROACH: ICD-10-PCS | Performed by: STUDENT IN AN ORGANIZED HEALTH CARE EDUCATION/TRAINING PROGRAM

## 2023-11-21 PROCEDURE — 99232 SBSQ HOSP IP/OBS MODERATE 35: CPT | Performed by: STUDENT IN AN ORGANIZED HEALTH CARE EDUCATION/TRAINING PROGRAM

## 2023-11-21 PROCEDURE — 0DB98ZX EXCISION OF DUODENUM, VIA NATURAL OR ARTIFICIAL OPENING ENDOSCOPIC, DIAGNOSTIC: ICD-10-PCS | Performed by: STUDENT IN AN ORGANIZED HEALTH CARE EDUCATION/TRAINING PROGRAM

## 2023-11-21 PROCEDURE — 0DB68ZX EXCISION OF STOMACH, VIA NATURAL OR ARTIFICIAL OPENING ENDOSCOPIC, DIAGNOSTIC: ICD-10-PCS | Performed by: STUDENT IN AN ORGANIZED HEALTH CARE EDUCATION/TRAINING PROGRAM

## 2023-11-21 DEVICE — * MIC* G FEEDING TUBE
Type: IMPLANTABLE DEVICE | Status: FUNCTIONAL
Brand: AVANOS

## 2023-11-21 RX ORDER — LIDOCAINE HYDROCHLORIDE 10 MG/ML
INJECTION, SOLUTION EPIDURAL; INFILTRATION; INTRACAUDAL; PERINEURAL AS NEEDED
Status: DISCONTINUED | OUTPATIENT
Start: 2023-11-21 | End: 2023-11-21 | Stop reason: SURG

## 2023-11-21 RX ORDER — METOCLOPRAMIDE HYDROCHLORIDE 5 MG/ML
10 INJECTION INTRAMUSCULAR; INTRAVENOUS EVERY 8 HOURS PRN
Status: DISCONTINUED | OUTPATIENT
Start: 2023-11-21 | End: 2023-11-21 | Stop reason: HOSPADM

## 2023-11-21 RX ORDER — SODIUM CHLORIDE, SODIUM LACTATE, POTASSIUM CHLORIDE, CALCIUM CHLORIDE 600; 310; 30; 20 MG/100ML; MG/100ML; MG/100ML; MG/100ML
INJECTION, SOLUTION INTRAVENOUS CONTINUOUS
Status: DISCONTINUED | OUTPATIENT
Start: 2023-11-21 | End: 2023-11-24

## 2023-11-21 RX ORDER — DIPHENHYDRAMINE HYDROCHLORIDE 50 MG/ML
12.5 INJECTION INTRAMUSCULAR; INTRAVENOUS AS NEEDED
Status: DISCONTINUED | OUTPATIENT
Start: 2023-11-21 | End: 2023-11-21 | Stop reason: HOSPADM

## 2023-11-21 RX ORDER — HYDROMORPHONE HYDROCHLORIDE 1 MG/ML
0.2 INJECTION, SOLUTION INTRAMUSCULAR; INTRAVENOUS; SUBCUTANEOUS EVERY 5 MIN PRN
Status: DISCONTINUED | OUTPATIENT
Start: 2023-11-21 | End: 2023-11-21 | Stop reason: HOSPADM

## 2023-11-21 RX ORDER — HYDROMORPHONE HYDROCHLORIDE 1 MG/ML
0.4 INJECTION, SOLUTION INTRAMUSCULAR; INTRAVENOUS; SUBCUTANEOUS EVERY 5 MIN PRN
Status: DISCONTINUED | OUTPATIENT
Start: 2023-11-21 | End: 2023-11-21 | Stop reason: HOSPADM

## 2023-11-21 RX ORDER — HYDROCODONE BITARTRATE AND ACETAMINOPHEN 5; 325 MG/1; MG/1
1 TABLET ORAL ONCE AS NEEDED
Status: DISCONTINUED | OUTPATIENT
Start: 2023-11-21 | End: 2023-11-21 | Stop reason: HOSPADM

## 2023-11-21 RX ORDER — HYDROCODONE BITARTRATE AND ACETAMINOPHEN 5; 325 MG/1; MG/1
2 TABLET ORAL ONCE AS NEEDED
Status: DISCONTINUED | OUTPATIENT
Start: 2023-11-21 | End: 2023-11-21 | Stop reason: HOSPADM

## 2023-11-21 RX ORDER — ONDANSETRON 2 MG/ML
4 INJECTION INTRAMUSCULAR; INTRAVENOUS EVERY 6 HOURS PRN
Status: DISCONTINUED | OUTPATIENT
Start: 2023-11-21 | End: 2023-11-21 | Stop reason: HOSPADM

## 2023-11-21 RX ORDER — SODIUM CHLORIDE 9 MG/ML
INJECTION, SOLUTION INTRAVENOUS ONCE
Status: COMPLETED | OUTPATIENT
Start: 2023-11-21 | End: 2023-11-21

## 2023-11-21 RX ORDER — ACETAMINOPHEN 500 MG
1000 TABLET ORAL ONCE AS NEEDED
Status: DISCONTINUED | OUTPATIENT
Start: 2023-11-21 | End: 2023-11-21 | Stop reason: HOSPADM

## 2023-11-21 RX ORDER — NICOTINE POLACRILEX 4 MG
30 LOZENGE BUCCAL
Status: DISCONTINUED | OUTPATIENT
Start: 2023-11-21 | End: 2023-11-21 | Stop reason: HOSPADM

## 2023-11-21 RX ORDER — SODIUM BICARBONATE 325 MG/1
325 TABLET ORAL AS NEEDED
Status: DISCONTINUED | OUTPATIENT
Start: 2023-11-21 | End: 2023-11-24

## 2023-11-21 RX ORDER — NICOTINE POLACRILEX 4 MG
15 LOZENGE BUCCAL
Status: DISCONTINUED | OUTPATIENT
Start: 2023-11-21 | End: 2023-11-21 | Stop reason: HOSPADM

## 2023-11-21 RX ORDER — MIDAZOLAM HYDROCHLORIDE 1 MG/ML
1 INJECTION INTRAMUSCULAR; INTRAVENOUS EVERY 5 MIN PRN
Status: DISCONTINUED | OUTPATIENT
Start: 2023-11-21 | End: 2023-11-21 | Stop reason: HOSPADM

## 2023-11-21 RX ORDER — NALOXONE HYDROCHLORIDE 0.4 MG/ML
0.08 INJECTION, SOLUTION INTRAMUSCULAR; INTRAVENOUS; SUBCUTANEOUS ONCE AS NEEDED
Status: DISCONTINUED | OUTPATIENT
Start: 2023-11-21 | End: 2023-11-21 | Stop reason: HOSPADM

## 2023-11-21 RX ORDER — DEXTROSE MONOHYDRATE 25 G/50ML
50 INJECTION, SOLUTION INTRAVENOUS
Status: DISCONTINUED | OUTPATIENT
Start: 2023-11-21 | End: 2023-11-21 | Stop reason: HOSPADM

## 2023-11-21 RX ORDER — NALOXONE HYDROCHLORIDE 0.4 MG/ML
80 INJECTION, SOLUTION INTRAMUSCULAR; INTRAVENOUS; SUBCUTANEOUS AS NEEDED
Status: DISCONTINUED | OUTPATIENT
Start: 2023-11-21 | End: 2023-11-21 | Stop reason: HOSPADM

## 2023-11-21 RX ORDER — HYDROMORPHONE HYDROCHLORIDE 1 MG/ML
0.6 INJECTION, SOLUTION INTRAMUSCULAR; INTRAVENOUS; SUBCUTANEOUS EVERY 5 MIN PRN
Status: DISCONTINUED | OUTPATIENT
Start: 2023-11-21 | End: 2023-11-21 | Stop reason: HOSPADM

## 2023-11-21 RX ADMIN — LIDOCAINE HYDROCHLORIDE 50 MG: 10 INJECTION, SOLUTION EPIDURAL; INFILTRATION; INTRACAUDAL; PERINEURAL at 12:21:00

## 2023-11-21 NOTE — PLAN OF CARE
Pt ax2-3 from [facility] in [place]. O2 sat 94% on room air Tele is NSR. Pt is a total assist. Not ambulatory. NPO for suspected GI bleed. Pt has a G tube this is \"out of place\" via ct scan. Home meds not ordered. Contacted night MDHayley and stated that the day MD can order them. Pt recheck hgb @0000 was 7.7, 2 units of Prbcs transfused. Problem: SAFETY ADULT - FALL  Goal: Free from fall injury  Description: INTERVENTIONS:  - Assess pt frequently for physical needs  - Identify cognitive and physical deficits and behaviors that affect risk of falls.   - Farner fall precautions as indicated by assessment.  - Educate pt/family on patient safety including physical limitations  - Instruct pt to call for assistance with activity based on assessment  - Modify environment to reduce risk of injury  - Provide assistive devices as appropriate  - Consider OT/PT consult to assist with strengthening/mobility  - Encourage toileting schedule  Outcome: Progressing

## 2023-11-21 NOTE — PHYSICAL THERAPY NOTE
Order received for PT eval and chart reviewed. Pt admitted with GI bleed and PEG tube displacement. Pt currently off floor for EGD to assess source of bleeding and re-evaluate PEG tube placement. PT will continue to follow.

## 2023-11-21 NOTE — ANESTHESIA POSTPROCEDURE EVALUATION
[facility] Patient Status:  Inpatient   Age/Gender [de-identified]year old female MRN [id number]   Location [address] [address] Attending Willian Mckeon, *   Hosp Day # 1 PCP Ravi Olivier MD       Anesthesia Post-op Note    ESOPHAGOGASTRODUODENOSCOPY (EGD) with PERCUTANEOUS ENDOSCOPIC GASTROSTOMY TUBE REPLACEMENT and biopsies    Procedure Summary       Date: 11/21/23 Room / Location: [facility] ENDOSCOPY 04 / [facility] ENDOSCOPY    Anesthesia Start: 1219 Anesthesia Stop:     Procedures:       ESOPHAGOGASTRODUODENOSCOPY (EGD) with PERCUTANEOUS ENDOSCOPIC GASTROSTOMY TUBE REPLACEMENT and biopsies      PERCUTANEOUS ENDOSCOPIC GASTROSTOMY TUBE REPLACEMENT Diagnosis: (hiatal hernia, burried bumper with ulceration, ulceresophagitis, gastritis, erosive duodenitis)    Surgeons: Colin Beach DO Anesthesiologist: Marichuy Edge MD    Anesthesia Type: MAC ASA Status: 3            Anesthesia Type: MAC    Vitals Value Taken Time   BP 95/54 11/21/23 1251   Temp  11/21/23 1253   Pulse 95 11/21/23 1252   Resp 22 11/21/23 1252   SpO2 97 % 11/21/23 1252   Vitals shown include unfiled device data. Patient Location: PACU    Anesthesia Type: MAC    Airway Patency: patent    Postop Pain Control: adequate    Mental Status: mildly sedated but able to meaningfully participate in the post-anesthesia evaluation    Nausea/Vomiting: none    Cardiopulmonary/Hydration status: stable euvolemic    Complications: no apparent anesthesia related complications    Postop vital signs: stable    Dental Exam: Unchanged from Preop    Patient to be discharged from PACU when criteria met.

## 2023-11-21 NOTE — PLAN OF CARE
Pt alert to self. Poor historian unsure why she is at the hospital.   Had loose BM x 1 on this shift , black in color   2 units of PRBC infused rpt hgb 13. 7   Pt scheduled for EGD today , un able to sign the consent . Call light within reach. Goal: Free from fall injury  Description: INTERVENTIONS:  - Assess pt frequently for physical needs  - Identify cognitive and physical deficits and behaviors that affect risk of falls.   - [place] fall precautions as indicated by assessment.  - Educate pt/family on patient safety including physical limitations  - Instruct pt to call for assistance with activity based on assessment  - Modify environment to reduce risk of injury  - Provide assistive devices as appropriate  - Consider OT/PT consult to assist with strengthening/mobility  - Encourage toileting schedule  Outcome: Progressing

## 2023-11-21 NOTE — CDS QUERY
Veto Pineda  Dear Dr Jeramie Hanson,   Clinical information (provided below) includes a diagnosis of hyponatremia with a glucose of 152. Based on your clinical judgement and the clinical indicators below  Please clarify the diagnosis of hyponatremia:  (X ) Hyponatremia is ruled out   ( ) Hyponatremia is confirmed (please provide additional clinical information in the medical record supportive of the diagnosis)  ( ) Other (please specify):   ____________________________________________________________________________________  Documentation from the Medical Record:    Risk Factors: coffee ground emesis    Clinical Indicators: 11/20 ED 78 Y/F- From SNF with coffee ground emesis, hypotension. -11/20: Dorcus Odum. Corrected Na: 136  -11/21: Sodium- 139, Glu- 114. Corrected Na: 139  -11/20-11/21 Hospitalist PN- #Hyponatremia- IVF    Treatment: IVF, monitor and trend      If you have any questions, please contact Clinical : Aram Huffman RN #[id number].   THIS FORM IS A PERMANENT PART OF THE MEDICAL RECORD

## 2023-11-21 NOTE — OPERATIVE REPORT
EGD Operative Report  Patient Name: He Ford  YOB: 1945  MRN: [id number]  Procedure: Esophagogastroduodenoscopy (EGD) with biopsy and G-tube replacement  Pre-operative Diagnosis & Indication: Hematemesis, dislodgment of G-tube noted on CT  Post-operative Diagnosis:   Severe LA grade D ulcerative esophagitis  5 cm hiatal hernia  Buried G-tube mushroom bumper with ulceration noted underneath. Removed using gentle traction and replaced with a 20F CHACORTA balloon bumper tube with bumper inflated with 10 ml of sterile water  Gastritis status post biopsies to rule out H. pylori  Erosive duodenitis status post biopsies to rule out celiac disease  Attending Endoscopist: Lucy Villa D.O. Informed Consent: The planned procedure(s), the explanation of the procedure, its expected benefits, the potential complications and risks and possible alternatives and their benefits and risks were discussed with the patient or the patient's surrogate. The discussion of risks, not limited to but including bleeding, infection, perforation, adverse effects from anesthesia, need for emergency surgery/prolonged hospitalization,  cardiac arrhythmias,  and aspiration were discussed with patient. Pt and/or surrogate understood the proposed procedure(s), its risks, benefits and alternatives and wish to proceed with procedure(s). All questions answered in full. After all questions were answered to their satisfaction, a signed, informed, and witnessed consent was obtained. A TIME OUT WAS COMPLETED prior to the procedure to confirm the patient, procedure(s) and complete endoscopy safety procedure. Sedation: Monitored Anesthesia Care; ASA class per anesthesiology team   Monitoring: Pulsoximetry, pulse, respirations, and blood pressure , vitals were monitored throughout the entire procedure under monitored anesthesia care. Procedure:  The patient was then brought to the endoscopy suite where his/her pulse, pulse oximetry and blood pressure were monitored. The patient was placed in the left lateral decubitus position and deep sedation was administered. Once adequate sedation was achieved, a bite block was placed and a lubricated tip of an Olympus video upper endoscope was inserted through the oropharynx and gently manipulated through the esophagus into the stomach and the second portion of the duodenum. Upon withdrawal of the endoscope, careful visualization of the mucosa was performed. The endoscope was then withdrawn into the gastric antrum and placed in a retroflexed position. The endoscope was then righted, and air was suctioned from the stomach. The endoscope was then withdrawn from the patient, with careful visual inspection of the mucosa. The patient left the procedure room in stable condition for recovery. Findings and endotherapy as listed below  Toleration: Patient tolerated procedure well  Complications: No immediate complications  Technical Difficulty: The procedure was not technically difficult   Estimated Blood Loss: Minimal, less than 5mL of estimated blood loss. Findings and Therapeutics:  Esophagus: The mucosa was ulcerated from 20 cm down to GE junction at 30 cm endoscopic insistent with LA grade D ulcerative esophagitis  There were no strictures or stenosis. GEJ junction traversed with endoscope without resistance. The Z-line was irregular, appreciated at 30 cm from the incisors with diaphragmatic pinch at 35 cm. Stomach: The gastric body, antrum, fundus, cardia, and angularis were erythematous endoscopic insistent with mild gastritis. G-tube with mushroom bumper was noted on the lesser curvature and was noted to be buried through the gastric wall. This was removed using gentle traction. Ulceration was noted underneath the bumper site.   A new 20 [place] CHACORTA tube with balloon bumper was then inserted through the abdominal wall through the prior G-tube tract under direct visualization into the stomach and bumper was inflated using 10 cc of sterile water. External bumper was then tightened to 4 cm. Endoscope was placed in a retroflexion view in the stomach. There was evidence of a hiatal hernia. Biopsies with cold forceps were obtained to rule out H. pylori. Duodenum:   The entire examined duodenum was erythematous with 2 small erosions noted endoscopic assessment with erosive duodenitis. Biopsies with cold forceps were obtained to rule out celiac disease. Recommendations:  Post EGD precautions, watch for bleeding, infection, perforation, adverse drug reactions   Follow-up biopsies  Pantoprazole 40 mg IV twice daily while inpatient and transition to omeprazole 40 mg twice daily through G-tube for at least 2 months upon discharge  Avoid non-aspirin NSAID  Repeat EGD in 8-12 weeks. Okay to restart tube feeds  Repeat hemoglobin this afternoon and if stable then okay to discharge from GI perspective. Follow-up with GI nurse practitioner or myself in 3 to 4 weeks.     Scarlett Rivera DO  11/21/2023  12:52 PM

## 2023-11-22 LAB
ANION GAP SERPL CALC-SCNC: 8 MMOL/L (ref 0–18)
BASOPHILS # BLD AUTO: 0.03 X10(3) UL (ref 0–0.2)
BASOPHILS NFR BLD AUTO: 0.6 %
BLOOD TYPE BARCODE: 6200
BUN BLD-MCNC: 22 MG/DL (ref 9–23)
CALCIUM BLD-MCNC: 7.5 MG/DL (ref 8.5–10.1)
CHLORIDE SERPL-SCNC: 110 MMOL/L (ref 98–112)
CO2 SERPL-SCNC: 24 MMOL/L (ref 21–32)
CREAT BLD-MCNC: 0.5 MG/DL
EGFRCR SERPLBLD CKD-EPI 2021: 95 ML/MIN/1.73M2 (ref 60–?)
EOSINOPHIL # BLD AUTO: 0.22 X10(3) UL (ref 0–0.7)
EOSINOPHIL NFR BLD AUTO: 4.2 %
ERYTHROCYTE [DISTWIDTH] IN BLOOD BY AUTOMATED COUNT: 16.5 %
GLUCOSE BLD-MCNC: 141 MG/DL (ref 70–99)
GLUCOSE BLD-MCNC: 145 MG/DL (ref 70–99)
GLUCOSE BLD-MCNC: 148 MG/DL (ref 70–99)
GLUCOSE BLD-MCNC: 182 MG/DL (ref 70–99)
HCT VFR BLD AUTO: 32.2 %
HGB BLD-MCNC: 10.6 G/DL
HGB BLD-MCNC: 10.6 G/DL
HGB BLD-MCNC: 12.4 G/DL
HGB BLD-MCNC: 12.5 G/DL
IMM GRANULOCYTES # BLD AUTO: 0.05 X10(3) UL (ref 0–1)
IMM GRANULOCYTES NFR BLD: 1 %
LYMPHOCYTES # BLD AUTO: 1.08 X10(3) UL (ref 1–4)
LYMPHOCYTES NFR BLD AUTO: 20.7 %
MAGNESIUM SERPL-MCNC: 1.9 MG/DL (ref 1.6–2.6)
MCH RBC QN AUTO: 31.4 PG (ref 26–34)
MCHC RBC AUTO-ENTMCNC: 32.9 G/DL (ref 31–37)
MCV RBC AUTO: 95.3 FL
MONOCYTES # BLD AUTO: 1.12 X10(3) UL (ref 0.1–1)
MONOCYTES NFR BLD AUTO: 21.5 %
NEUTROPHILS # BLD AUTO: 2.72 X10 (3) UL (ref 1.5–7.7)
NEUTROPHILS # BLD AUTO: 2.72 X10(3) UL (ref 1.5–7.7)
NEUTROPHILS NFR BLD AUTO: 52 %
OSMOLALITY SERPL CALC.SUM OF ELEC: 300 MOSM/KG (ref 275–295)
PHOSPHATE SERPL-MCNC: 2.6 MG/DL (ref 2.5–4.9)
PLATELET # BLD AUTO: 126 10(3)UL (ref 150–450)
POTASSIUM SERPL-SCNC: 3.5 MMOL/L (ref 3.5–5.1)
RBC # BLD AUTO: 3.38 X10(6)UL
SODIUM SERPL-SCNC: 142 MMOL/L (ref 136–145)
UNIT VOLUME: 350 ML
WBC # BLD AUTO: 5.2 X10(3) UL (ref 4–11)

## 2023-11-22 PROCEDURE — 99232 SBSQ HOSP IP/OBS MODERATE 35: CPT | Performed by: STUDENT IN AN ORGANIZED HEALTH CARE EDUCATION/TRAINING PROGRAM

## 2023-11-22 RX ORDER — AMOXICILLIN AND CLAVULANATE POTASSIUM 875; 125 MG/1; MG/1
1 TABLET, FILM COATED ORAL 2 TIMES DAILY
Qty: 6 TABLET | Refills: 0 | Status: SHIPPED | OUTPATIENT
Start: 2023-11-22 | End: 2023-11-24

## 2023-11-22 NOTE — PHYSICAL THERAPY NOTE
PHYSICAL THERAPY EVALUATION - INPATIENT     Room Number: 1419/0563-M  Evaluation Date: 11/22/2023  Type of Evaluation: Initial  Physician Order: PT Eval and Treat    Presenting Problem: s/p G tube replacement 11/21/23  Co-Morbidities : IPH s/p craniotomy  Reason for Therapy: Mobility Dysfunction and Discharge Planning    History related to current admission: Patient is a [de-identified]year old female admitted on 11/20/2023 from [facility] for coffee ground emesis. Pt diagnosed with G tube displacement. Pt s/p EGD with G tube replacement 11/21/23. Recent admission:   10/11-10/25/23: IPH s/p R frontal craniotomy (from home and [facility])    ASSESSMENT   In this PT evaluation, the patient presents with the following impairments L UE/LE strength deficits, poor trunk control, sitting/standing balance impairments, poor midline positioning, limited endurance, coordination impairments, poor motor planning, and decreased insight into impairments. These impairments and comorbidities manifest themselves as functional limitations in independent bed mobility, transfers, and gait. The patient is below baseline and would benefit from skilled inpatient PT to address the above deficits to assist patient in returning to prior to level of function. Functional outcome measures completed include [facility]. The -PAC '6-Clicks' Inpatient Basic Mobility Short Form was completed and this patient is demonstrating a Approx Degree of Impairment: 81.38%  degree of impairment in mobility. Research supports that patients with this level of impairment may benefit from RADHA. DISCHARGE RECOMMENDATIONS  PT Discharge Recommendations: Sub-acute rehabilitation    PLAN  PT Treatment Plan: Bed mobility; Endurance; Energy conservation;Patient education;Gait training;Transfer training;Balance training;Strengthening  Rehab Potential : Good  Frequency (Obs): 3-5x/week  Number of Visits to Meet Established Goals: 6      CURRENT GOALS    Goal #1 Patient is able to demonstrate supine - sit EOB @ level: moderate assistance     Goal #2 Patient is able to demonstrate transfers Sit to/from Stand at assistance level: moderate assistance     Goal #3 Patient is able to transfer to bedside chair with MAX assist     Goal #4    Goal #5    Goal #6    Goal Comments: Goals established on 2023    HOME SITUATION  Type of Home: [address]: Two level                Lives With: Alone  Drives: Yes          Prior Level of [place]: Pt lives alone in 2 story home. Pt typically independent with ADL and mobility. SUBJECTIVE  \"I haven't been getting much rehab. \"       OBJECTIVE  Precautions: Bed/chair alarm  Fall Risk: High fall risk    WEIGHT BEARING RESTRICTION  Weight Bearing Restriction: None                PAIN ASSESSMENT  Ratin          COGNITION  Orientation Level:  oriented to place, oriented to time, and oriented to person  Memory:  decreased recall of recent events and decreased short term memory  Following Commands:  follows one step commands with increased time and follows one step commands with repetition  Initiation: hand over hand to initiate tasks  Motor Planning: impaired  Safety Judgement:  decreased awareness of need for assistance and decreased awareness of need for safety  Awareness of Errors:  decreased awareness of errors     RANGE OF MOTION AND STRENGTH ASSESSMENT  Upper extremity ROM and strength- see OT eval    Lower extremity ROM is within functional limits     Lower extremity strength is within functional limits except for the following:    Right Knee extension  3+/5  Left Knee extension  1/5  Right Dorsiflexion  4/5  Left Dorsiflexion  2-/5      BALANCE  Static Sitting: Poor +  Dynamic Sitting: Poor  Static Standing: Poor -  Dynamic Standing: Poor -    ADDITIONAL TESTS                                    ACTIVITY TOLERANCE                         O2 WALK       NEUROLOGICAL FINDINGS                        AM-PAC '6-Clicks' INPATIENT SHORT FORM - BASIC MOBILITY  How much difficulty does the patient currently have. .. Patient Difficulty: Turning over in bed (including adjusting bedclothes, sheets and blankets)?: A Lot   Patient Difficulty: Sitting down on and standing up from a chair with arms (e.g., wheelchair, bedside commode, etc.): A Lot   Patient Difficulty: Moving from lying on back to sitting on the side of the bed?: A Lot   How much help from another person does the patient currently need. .. Help from Another: Moving to and from a bed to a chair (including a wheelchair)?: Total   Help from Another: Need to walk in hospital room?: Total   Help from Another: Climbing 3-5 steps with a railing?: Total       AM-PAC Score:  Raw Score: 9   Approx Degree of Impairment: 81.38%   Standardized Score (AM-PAC Scale): 30.55   CMS Modifier (G-Code): CM    FUNCTIONAL ABILITY STATUS  Gait Assessment   Functional Mobility/Gait Assessment  Gait Assistance: Not tested  Distance (ft): 0    Skilled Therapy Provided   Pt received supine in bed and is agreeable to therapy. Pt oriented to place and time. Pt following simple commands with increased time. Pt demonstrates L inattention and given VC for looking positioning of L UE. Pt educated on use R UE to reposition L UE. Pt performed AROM/AAROM in supine with good tolerance. Pt supine-sit with MAX assist x 2P for LE/trunk support. Pt given VC for UE placement and midline positioning. Pt demonstrates poor static sitting balance with L lateral lean and anteropulsion. Pt given VC for midline and posture. Pt requires intermittent assist for trunk support. Pt performed 4 reps sit-stand with HHA/MOD assist x 2P for force generation and balance. Pt stood statically each rep for 10-sec with MAX assist for static standing balance. Pt given VC for upright posture and hip extension. Pt attempted to perform R foot clearance for gait initiation but demonstrates significant L knee buckling. Pt returned to sitting EOB.  Pt able to scoot laterally along bedside with MAX assist for hip placement and L LE positioning. Pt reports fatigue. Pt returned to supine in bed with MAX assist x 2L for LE/trunk support. Pt assisted with repositioning. Pt educated on POC and recommendations. Pt left with call light in reach and alarm donned. Bed Mobility:  Rolling: MAX  Supine to sit: MAX x 2P   Sit to supine: MAX x 2P     Transfer Mobility:  Sit to stand: MOD x 2P   Stand to sit: MOD x 2P  Gait = NT    Therapist's Comments: Recommend total lift for OOB mobility. Exercise/Education Provided:  Bed mobility  Energy conservation  Functional activity tolerated  Posture  Strengthening  Transfer training    Patient End of Session: In bed;Needs met;Call light within reach;RN aware of session/findings; All patient questions and concerns addressed; Alarm set; Discussed recommendations with /      Patient Evaluation Complexity Level:  History High - 3 or more personal factors and/or co-morbidities   Examination of body systems Moderate - addressing a total of 3 or more elements   Clinical Presentation Moderate - Evolving   Clinical Decision Making Moderate - Evolving       PT Session Time: 30 minutes    Therapeutic Activity: 15 minutes

## 2023-11-22 NOTE — PLAN OF CARE
Assumed care at 0730. Pt is A&Ox2-3, is confused. Awaiting psych eval. Pt is on RA lung sounds clear;diminished. NSR on tele, VSS. No complaints of cardiac symptoms. Incontinent of B&B, diaper in place. BM was a smear when PCT was changing her. Denies pain at this time. Reached feeding goal of 40mL. Total care. Plan of care reviewed with patient, verbalizes understanding, all needs addressed at this time. Bed in lowest position and locked. Call light at bedside.     POC:  IV Rocephin q24-pna  LR @ 100mL/hr  Hgb q8h  IV PPI  Psych consult      Problem: Patient/Family Goals  Goal: Patient/Family Long Term Goal  Description: Patient's Long Term Goal: \"leave hospital\"     Interventions:  - medications as ordered by physician   - testing as ordered by physician   - See additional Care Plan goals for specific interventions  Outcome: Progressing  Goal: Patient/Family Short Term Goal  Description: Patient's Short Term Goal: \"maintain hgb >7\"    Interventions:   - medications as ordered by physician   - testing as ordered by physician  - hgb checks, see GI   - See additional Care Plan goals for specific interventions  Outcome: Progressing     Problem: SKIN/TISSUE INTEGRITY - ADULT  Goal: Skin integrity remains intact  Description: INTERVENTIONS  - Assess and document risk factors for pressure ulcer development  - Assess and document skin integrity  - Monitor for areas of redness and/or skin breakdown  - Initiate interventions, skin care algorithm/standards of care as needed  Outcome: Progressing  Goal: Incision(s), wounds(s) or drain site(s) healing without S/S of infection  Description: INTERVENTIONS:  - Assess and document risk factors for pressure ulcer development  - Assess and document skin integrity  - Assess and document dressing/incision, wound bed, drain sites and surrounding tissue  - Implement wound care per orders  - Initiate isolation precautions as appropriate  - Initiate Pressure Ulcer prevention bundle as indicated  Outcome: Progressing     Problem: SAFETY ADULT - FALL  Goal: Free from fall injury  Description: INTERVENTIONS:  - Assess pt frequently for physical needs  - Identify cognitive and physical deficits and behaviors that affect risk of falls.   - [place] fall precautions as indicated by assessment.  - Educate pt/family on patient safety including physical limitations  - Instruct pt to call for assistance with activity based on assessment  - Modify environment to reduce risk of injury  - Provide assistive devices as appropriate  - Consider OT/PT consult to assist with strengthening/mobility  - Encourage toileting schedule  Outcome: Progressing

## 2023-11-22 NOTE — CM/SW NOTE
11/22/23 1100   CM/SORIN Referral Data   Referral Source Social Work (self-referral)   Reason for Referral Discharge planning;Readmission   Informant EMR;Sibling   Readmission Assessment   Was full assessment completed by SW/CM on prior admission? Yes   Was the recommended discharge plan achieved? Yes   Patient Info   Patient's Home Environment Subacute Rehab   Post Acute Care Provider Upon [facility]   Discharge Needs   Anticipated D/C needs Subacute rehab;Transportation services   Services Requested   PASRR Level 1 Submitted No - Current within 90 days       Patient is an [de-identified] y/o female who admitted with Gastrointestinal hemorrhage. Spoke with patient's brother Cecil Mcbride) to discuss discharge planning. Discussed PT recommending return to [facility]. He was agreeable with patient returning to [facility] for [facility]. Discussed need for insurance auth in order for her to return, he understood. He requested SW update patient's friend Hali Bird) as well. Sent referral to [facility] in aidin. Left message for Carolina Gutierrez at [place]. Will send therapy notes once available. SW/CM to remain available for support and/or discharge planning. Addendum 3:10pm: Confirmed with Carolina Gutierrez from [place] that insurance Jason Charsharon is pending, # [id number]. Await insurance auth.       [facility]  [address], [facility]  Discharge Planner  [phone]

## 2023-11-22 NOTE — PLAN OF CARE
Assumed caree of pt at [address] on 11/21. A/Ox2, oriented to person and place. Room air w/ adequate o2. NSR on tele. No complaints of chest pain. Incontinent of bladder and bowel. Voiding per purewick. Last BM 11/21. Receiving IV abx as ordered. IVF as ordered. Redness to sacrum w/ mepilex in place. Fall precautions in place. Call light in reach. Pt updated on plan of care. Problem: SAFETY ADULT - FALL  Goal: Free from fall injury  Description: INTERVENTIONS:  - Assess pt frequently for physical needs  - Identify cognitive and physical deficits and behaviors that affect risk of falls.   - [place] fall precautions as indicated by assessment.  - Educate pt/family on patient safety including physical limitations  - Instruct pt to call for assistance with activity based on assessment  - Modify environment to reduce risk of injury  - Provide assistive devices as appropriate  - Consider OT/PT consult to assist with strengthening/mobility  - Encourage toileting schedule  Outcome: Progressing     Problem: Patient/Family Goals  Goal: Patient/Family Long Term Goal  Description: Patient's Long Term Goal: \"leave hospital\"     Interventions:  - medications as ordered by physician   - testing as ordered by physician   - See additional Care Plan goals for specific interventions  Outcome: Progressing  Goal: Patient/Family Short Term Goal  Description: Patient's Short Term Goal: \"maintain hgb >7\"    Interventions:   - medications as ordered by physician   - testing as ordered by physician  - hgb checks, see GI   - See additional Care Plan goals for specific interventions  Outcome: Progressing     Problem: SKIN/TISSUE INTEGRITY - ADULT  Goal: Skin integrity remains intact  Description: INTERVENTIONS  - Assess and document risk factors for pressure ulcer development  - Assess and document skin integrity  - Monitor for areas of redness and/or skin breakdown  - Initiate interventions, skin care algorithm/standards of care as needed  Outcome: Progressing  Goal: Incision(s), wounds(s) or drain site(s) healing without S/S of infection  Description: INTERVENTIONS:  - Assess and document risk factors for pressure ulcer development  - Assess and document skin integrity  - Assess and document dressing/incision, wound bed, drain sites and surrounding tissue  - Implement wound care per orders  - Initiate isolation precautions as appropriate  - Initiate Pressure Ulcer prevention bundle as indicated  Outcome: Progressing

## 2023-11-23 LAB
ANION GAP SERPL CALC-SCNC: 3 MMOL/L (ref 0–18)
BASOPHILS # BLD AUTO: 0.02 X10(3) UL (ref 0–0.2)
BASOPHILS NFR BLD AUTO: 0.4 %
BUN BLD-MCNC: 18 MG/DL (ref 9–23)
CALCIUM BLD-MCNC: 7.2 MG/DL (ref 8.5–10.1)
CHLORIDE SERPL-SCNC: 109 MMOL/L (ref 98–112)
CO2 SERPL-SCNC: 28 MMOL/L (ref 21–32)
CREAT BLD-MCNC: 0.53 MG/DL
EGFRCR SERPLBLD CKD-EPI 2021: 93 ML/MIN/1.73M2 (ref 60–?)
EOSINOPHIL # BLD AUTO: 0.15 X10(3) UL (ref 0–0.7)
EOSINOPHIL NFR BLD AUTO: 3.2 %
ERYTHROCYTE [DISTWIDTH] IN BLOOD BY AUTOMATED COUNT: 15.9 %
GLUCOSE BLD-MCNC: 128 MG/DL (ref 70–99)
GLUCOSE BLD-MCNC: 129 MG/DL (ref 70–99)
GLUCOSE BLD-MCNC: 149 MG/DL (ref 70–99)
GLUCOSE BLD-MCNC: 152 MG/DL (ref 70–99)
GLUCOSE BLD-MCNC: 167 MG/DL (ref 70–99)
HCT VFR BLD AUTO: 35.8 %
HGB BLD-MCNC: 12.1 G/DL
IMM GRANULOCYTES # BLD AUTO: 0.03 X10(3) UL (ref 0–1)
IMM GRANULOCYTES NFR BLD: 0.6 %
LYMPHOCYTES # BLD AUTO: 1.19 X10(3) UL (ref 1–4)
LYMPHOCYTES NFR BLD AUTO: 25 %
MAGNESIUM SERPL-MCNC: 1.7 MG/DL (ref 1.6–2.6)
MCH RBC QN AUTO: 31 PG (ref 26–34)
MCHC RBC AUTO-ENTMCNC: 33.8 G/DL (ref 31–37)
MCV RBC AUTO: 91.8 FL
MONOCYTES # BLD AUTO: 1.18 X10(3) UL (ref 0.1–1)
MONOCYTES NFR BLD AUTO: 24.8 %
NEUTROPHILS # BLD AUTO: 2.19 X10 (3) UL (ref 1.5–7.7)
NEUTROPHILS # BLD AUTO: 2.19 X10(3) UL (ref 1.5–7.7)
NEUTROPHILS NFR BLD AUTO: 46 %
OSMOLALITY SERPL CALC.SUM OF ELEC: 295 MOSM/KG (ref 275–295)
PHOSPHATE SERPL-MCNC: 2 MG/DL (ref 2.5–4.9)
PLATELET # BLD AUTO: 132 10(3)UL (ref 150–450)
POTASSIUM SERPL-SCNC: 3.8 MMOL/L (ref 3.5–5.1)
POTASSIUM SERPL-SCNC: 3.8 MMOL/L (ref 3.5–5.1)
RBC # BLD AUTO: 3.9 X10(6)UL
SODIUM SERPL-SCNC: 140 MMOL/L (ref 136–145)
WBC # BLD AUTO: 4.8 X10(3) UL (ref 4–11)

## 2023-11-23 PROCEDURE — 99232 SBSQ HOSP IP/OBS MODERATE 35: CPT | Performed by: STUDENT IN AN ORGANIZED HEALTH CARE EDUCATION/TRAINING PROGRAM

## 2023-11-23 RX ORDER — MELATONIN
3 NIGHTLY PRN
Status: DISCONTINUED | OUTPATIENT
Start: 2023-11-23 | End: 2023-11-24

## 2023-11-23 RX ORDER — SUCRALFATE ORAL 1 G/10ML
1 SUSPENSION ORAL
Status: DISCONTINUED | OUTPATIENT
Start: 2023-11-23 | End: 2023-11-24

## 2023-11-23 RX ORDER — QUETIAPINE FUMARATE 25 MG/1
25 TABLET, FILM COATED ORAL EVERY 8 HOURS PRN
Status: DISCONTINUED | OUTPATIENT
Start: 2023-11-23 | End: 2023-11-24

## 2023-11-23 RX ORDER — POTASSIUM CHLORIDE 1.5 G/1.58G
40 POWDER, FOR SOLUTION ORAL EVERY 4 HOURS
Status: COMPLETED | OUTPATIENT
Start: 2023-11-23 | End: 2023-11-23

## 2023-11-23 NOTE — PLAN OF CARE
Shift Note:  Assumed care of patient. Patient alert and oriented x2/3. Patient on room air, denies difficulty breathing, lung sounds diminished. Denies any cardiac symptoms, NSR on tele. Denies pain at this time. Incontinent of bowel and bladder. Currently on tube feedings per Shweta, NPO for now, SLP rec puree/honey thick liquids. Notified GI no SLP diet recs, okay with advancing diet. Able to roll side to side with assistance, call light within reach, tolerating care well.      POC:  - Pending discharge back to [facility]  - Feedings

## 2023-11-23 NOTE — SLP NOTE
ADULT SWALLOWING EVALUATION    ASSESSMENT    PERTINENT BACKGROUND INFORMATION:    Patient is an [de-identified]year-old female who was admitted on 11/20/23 with Transient hypotension [I95.9], Gastrointestinal hemorrhage, unspecified gastrointestinal hemorrhage type [K92.2], Anemia, unspecified type [D64.9], Community acquired pneumonia, unspecified laterality [J18.9]. Imaging results reviewed and noted below. Currently on RA with adequate SPO2 saturations. Hospital course  with s/p EGD 11/21 with Dr. Saniya Eason revealed severe LA grade D ulcerative esophagitis, 5cm hiatal hernia, gastritis, erosive duodenitis and buried Gtube mushroom bumper with ulceration underneath. Gtube was removed and replaced. PMH is significant for s/p craniotomy  d/t intracranial hemorrhage (10/11/23), GERD, gas pain/belching, dysphagia with recent VFSS conducted 10/2023, h/o PEG and anxiety which may impact care. Patient resides at [place], [place]. Prior to this hospitalization, patient was consuming a puree diet with nectar-thick liquids. Patient is familiar with this ST department; see ST history described below. Patient indicates chief complaint of needing to belch and the feeling of gas pain due to needing to belch. Patient is currently NPO. Patient is a poor historian and was unable to stated detailed dysphagia history. Patient was sleeping upon arrival and awoken easily. Able to maintain midline position with adequate trunk and head control when upright in bed. Oral motor mechanism exam was unremarkable. Patient presented with an oropharyngeal dysphagia with infrequent throat clearing during the controlled conditions of this exam. This was also noted last month during clinical swallow exam and subsequent VFSS confirmed no aspiration. Patient did c/o needing to belch and some associated gas pain before and during this evaluation. Please see below objective section for details.   Dysphagia management recommended to reduce the likelihood of adverse events and optimize nutrition. RECOMMENDATIONS/PLAN:     -  Recommend modified diet (if medically cleared from GI) with implementation of  swallow support strategies outlined below. Diet Recommendations - Solids: Puree   Diet Recommendations - Liquids: Nectar thick liquids/ Mildly thick (tsp only)  Follow GERD precautions from GI  Aspiration Precautions: Upright position; Slow rate;Small bites and sips  Medication Administration Recommendations: Crushed in puree  Treatment Plan/Recommendations: Aspiration precautions  Discharge Recommendations/Plan: Undetermined  - Speech therapy services are recommended for diet texture analysis of modified diet and patient tolerance; monitor swallowing and airway patency with patient fatigue and changes in neurological status, and compensatory swallow support strategy practice to improve safety, independence for return to previous level of function and least restrictive diet. -  Patient/family education and training in utilization of compensatory techniques to minimize risk of aspiration  - Oral care 2-3x/day. EDUCATION     - Patient verbalized understanding to results and recommendations of this evaluation and was in agreement. - Spoke with MARCUS Warren.          HISTORY   MEDICAL HISTORY  Reason for Referral: Altered diet consistency    Problem List  Principal Problem:    Gastrointestinal hemorrhage, unspecified gastrointestinal hemorrhage type  Active Problems:    Acute blood loss anemia    Hypotension due to hypovolemia    Transient hypotension    Community acquired pneumonia, unspecified laterality    Anemia, unspecified type    GI bleed      Past Medical History  Past Medical History:   Diagnosis Date    Anxiety     Breast CA ([facility] [address] 2004    CANCER     lft breast lumpectomy pre cancerous cells stage 0    Diabetes mellitus ([facility] [address]     Diarrhea, unspecified     Ductal carcinoma in situ of breast 2004    left breast    Flatulence/gas pain/belching GERD     H/O infectious mononucleosis     diagnosed in 1960s    High cholesterol     Hypertension     IBS (irritable bowel syndrome)     Osteoporosis     Other and unspecified hyperlipidemia     Personal history of irradiation, presenting hazards to health 01/01/2004    mammosite    Sleep disturbance     Stool incontinence     Type II or unspecified type diabetes mellitus without mention of complication, not stated as uncontrolled     Wears glasses        Past Surgical History:   Procedure Laterality Date    APPENDECTOMY      1948    COLONOSCOPY      2005 hiatal hernia, sm internal hem    COLONOSCOPY  01/01/2005    fiberoptic    D & C      IR ANGIOGRAM CEREBRAL CAROTID BILATERAL  10/12/2023    LUMPECTOMY LEFT  01/01/2004    breast    OTHER SURGICAL HISTORY      lumpectomy 2004 lft breast stage 0    OTHER SURGICAL HISTORY  10/11/2023    CRANIOTOMY FOR RIGHT FRONTAL HEMORRHAGE    RADIATION LEFT      Mammosite    TONSILLECTOMY      1949    UPPER GI ENDOSCOPY,EXAM      01/01/2005       Prior Living Situation: Skilled nursing facility ([facility])  Diet Prior to Admission: Puree;Nectar thick liquids/ Mildly thick (magic cup in afternoon per SNF transfer papers, g-tube feedings)  Precautions: Aspiration; Reflux    Patient/Family Goals: per RN, patient asking to eat and have \"Cheerios\"    SWALLOWING HISTORY  Current Diet Consistency: NPO (g-tube)  Dysphagia History: VFSS 10/20/23 by Melissa Carter SLP:  Overall Impression:  Patient presents with moderate oral and mild pharyngeal dysphagia characterized by reduced bolus formation leading to oral residue and reduced oral control with mildly thick liquids, minimally reduced base of tongue retraction and sluggish epiglottic inversion leading to valleculae retention. There was no observed laryngeal penetration or tracheal aspiration before, during or after the swallow.  There was a prominent cricopharyngeus at the level of C4-C5 and corresponding incomplete bolus clearance at that level with subsequent mild residue and retrograde flow back to the level of the pyriform sinuses. Patient also noted to exhibit throat clearing during this exam (also noted at bedside) which did not correspond to laryngeal penetration or tracheal aspiration as none was observed for the duration of this exam.    Diet Recommendations - Solids: Puree (1:1 feeding assistance)  Diet Recommendations - Liquids: Nectar thick liquids/ Mildly thick (by tsp only)  Medication Administration Recommendations: Crushed in puree  Treatment Plan/Recommendations: Dysphagia therapy      IMAGING  CT BRAIN OR HEAD CONCLUSION:     1. Negative for acute intracranial process. LOCATION:  [facility]      Dictated by (CST): Angelito Benitez MD on 11/20/2023 at 3:08 PM       Finalized by (CST): Angelito Benitez MD on 11/20/2023 at 3:12 PM      CXR CONCLUSION:    1. Focal consolidation left lung base could represent pneumonia or atelectasis. 2. Diffuse scattered reticular scar-like densities in both lungs are otherwise unchanged. LOCATION:  THE [facility]      Dictated by (CST): Carmelo Yip MD on 11/20/2023 at 3:14 PM       Finalized by (CST): Carmelo Yip MD on 11/20/2023 at 3:15 PM      SUBJECTIVE   Patient sleeping upon arrival and awoken easily. No family was present.       OBJECTIVE   ORAL MOTOR EXAMINATION  Dentition: Natural;Functional  Symmetry: Within Functional Limits        Range of Motion: Within Functional Limits  Rate of Motion: Reduced    Voice Quality: Clear  Respiratory Status: Unlabored  Consistencies Trialed: Puree;Nectar thick liquids (ice chips)  Method of Presentation: Staff/Clinician assistance;Spoon;Cup;Single sips  Patient Positioning: Upright;Midline    Oral Phase of Swallow: Impaired  Bolus Retrieval: Intact  Bilabial Seal: Intact  Bolus Formation: Impaired  Bolus Propulsion: Impaired     Retention: Impaired    Pharyngeal Phase of Swallow: Impaired  Laryngeal Elevation Timing: Appears impaired  Laryngeal Elevation Strength: Appears impaired  Laryngeal Elevation Coordination: Appears impaired  (Please note: Silent aspiration cannot be evaluated clinically. Videofluoroscopic Swallow Study is required to rule-out silent aspiration.)    Esophageal Phase of Swallow: Complaints consistent with possible esophageal involvement  Comments: Extensive esophageal and GI history; under care of GI              GOALS  Goal #1 The patient will tolerate puree consistency and nectar-thick liquids without overt signs or symptoms of aspiration with 95 % accuracy over 2-3 session(s). New goal 11/23   Goal #2 The patient/family/caregiver will demonstrate understanding and implementation of aspiration precautions and swallow strategies independently over 2-3 session(s).     New goal 11/23     FOLLOW UP  Treatment Plan/Recommendations: Aspiration precautions  Number of Visits to Meet Established Goals: 3  Follow Up Needed (Documentation Required): Yes  SLP Follow-up Date: 11/24/23    Thank you for your referral.   If you have any questions, please contact Adama Payne, SLP

## 2023-11-23 NOTE — PLAN OF CARE
Patient alert and oriented x 2 with confusion. On room air. sinus on cardiac monitor. V/s good  and afebrile, Patient denies any chest pain or chest discomfort at this time. Patient voids, last BM today. Soft pasty ,No bleeding noted ,hemoglobin monitoring, last hemoglobin 12.5 , low potassium replaced , tube feeding running 40 ml/hr , new bag and tubing changed, ivf   ML/HR infusing Q 2 hr reposition,incontinence care, skin care provided and made comfortable, Plan of care updated, all questions answered. Safety precautions in place. Bed alarm on. Will continue to monitor tele/labs/vital signs closely. Plan:   Monitor blood count and electrolyte level , safety precaution and fall prevention     Problem: CARDIOVASCULAR - ADULT  Goal: Maintains optimal cardiac output and hemodynamic stability  Description: INTERVENTIONS:  - Monitor vital signs, rhythm, and trends  - Monitor for bleeding, hypotension and signs of decreased cardiac output  - Evaluate effectiveness of vasoactive medications to optimize hemodynamic stability  - Monitor arterial and/or venous puncture sites for bleeding and/or hematoma  - Assess quality of pulses, skin color and temperature  - Assess for signs of decreased coronary artery perfusion - ex.  Angina  - Evaluate fluid balance, assess for edema, trend weights  Outcome: Progressing     Problem: GASTROINTESTINAL - ADULT  Goal: Minimal or absence of nausea and vomiting  Description: INTERVENTIONS:  - Maintain adequate hydration with IV or PO as ordered and tolerated  - Nasogastric tube to low intermittent suction as ordered  - Evaluate effectiveness of ordered antiemetic medications  - Provide nonpharmacologic comfort measures as appropriate  - Advance diet as tolerated, if ordered  - Obtain nutritional consult as needed  - Evaluate fluid balance  Outcome: Progressing  Goal: Achieves appropriate nutritional intake (bariatric)  Description: INTERVENTIONS:  - Monitor for over-consumption  - Identify factors contributing to increased intake, treat as appropriate  - Monitor I&O, WT and lab values  - Obtain nutritional consult as needed  - Evaluate psychosocial factors contributing to over-consumption  Outcome: Progressing     Problem: SKIN/TISSUE INTEGRITY - ADULT  Goal: Skin integrity remains intact  Description: INTERVENTIONS  - Assess and document risk factors for pressure ulcer development  - Assess and document skin integrity  - Monitor for areas of redness and/or skin breakdown  - Initiate interventions, skin care algorithm/standards of care as needed  11/23/2023 0325 by Lennox Fisherman, RN  Outcome: Progressing  11/23/2023 0324 by Lennox Fisherman, RN  Outcome: Progressing  Goal: Incision(s), wounds(s) or drain site(s) healing without S/S of infection  Description: INTERVENTIONS:  - Assess and document risk factors for pressure ulcer development  - Assess and document skin integrity  - Assess and document dressing/incision, wound bed, drain sites and surrounding tissue  - Implement wound care per orders  - Initiate isolation precautions as appropriate  - Initiate Pressure Ulcer prevention bundle as indicated  11/23/2023 0325 by Lennox Fisherman, RN  Outcome: Progressing  11/23/2023 0324 by Lennox Fisherman, RN  Outcome: Progressing  Goal: Oral mucous membranes remain intact  Description: INTERVENTIONS  - Assess oral mucosa and hygiene practices  - Implement preventative oral hygiene regimen  - Implement oral medicated treatments as ordered  Outcome: Progressing     Problem: METABOLIC/FLUID AND ELECTROLYTES - ADULT  Goal: Glucose maintained within prescribed range  Description: INTERVENTIONS:  - Monitor Blood Glucose as ordered  - Assess for signs and symptoms of hyperglycemia and hypoglycemia  - Administer ordered medications to maintain glucose within target range  - Assess barriers to adequate nutritional intake and initiate nutrition consult as needed  - Instruct patient on self management of diabetes  Outcome: Progressing  Goal: Electrolytes maintained within normal limits  Description: INTERVENTIONS:  - Monitor labs and rhythm and assess patient for signs and symptoms of electrolyte imbalances  - Administer electrolyte replacement as ordered  - Monitor response to electrolyte replacements, including rhythm and repeat lab results as appropriate  - Fluid restriction as ordered  - Instruct patient on fluid and nutrition restrictions as appropriate  Outcome: Progressing  Goal: Hemodynamic stability and optimal renal function maintained  Description: INTERVENTIONS:  - Monitor labs and assess for signs and symptoms of volume excess or deficit  - Monitor intake, output and patient weight  - Monitor urine specific gravity, serum osmolarity and serum sodium as indicated or ordered  - Monitor response to interventions for patient's volume status, including labs, urine output, blood pressure (other measures as available)  - Encourage oral intake as appropriate  - Instruct patient on fluid and nutrition restrictions as appropriate  Outcome: Progressing     Problem: SAFETY ADULT - FALL  Goal: Free from fall injury  Description: INTERVENTIONS:  - Assess pt frequently for physical needs  - Identify cognitive and physical deficits and behaviors that affect risk of falls.   - [place] fall precautions as indicated by assessment.  - Educate pt/family on patient safety including physical limitations  - Instruct pt to call for assistance with activity based on assessment  - Modify environment to reduce risk of injury  - Provide assistive devices as appropriate  - Consider OT/PT consult to assist with strengthening/mobility  - Encourage toileting schedule  11/23/2023 0325 by Evelia Leblanc RN  Outcome: Progressing  11/23/2023 0324 by Evelia Leblanc RN  Outcome: Progressing     Problem: DISCHARGE PLANNING  Goal: Discharge to home or other facility with appropriate resources  Description: INTERVENTIONS:  - Identify barriers to discharge w/pt and caregiver  - Include patient/family/discharge partner in discharge planning  - Arrange for needed discharge resources and transportation as appropriate  - Identify discharge learning needs (meds, wound care, etc)  - Arrange for interpreters to assist at discharge as needed  - Consider post-discharge preferences of patient/family/discharge partner  - Complete POLST form as appropriate  - Assess patient's ability to be responsible for managing their own health  - Refer to Case Management Department for coordinating discharge planning if the patient needs post-hospital services based on physician/LIP order or complex needs related to functional status, cognitive ability or social support system  Outcome: Progressing     Problem: RISK FOR INFECTION - ADULT  Goal: Absence of fever/infection during anticipated neutropenic period  Description: INTERVENTIONS  - Monitor WBC  - Administer growth factors as ordered  - Implement neutropenic guidelines  Outcome: Progressing     Problem: PAIN - ADULT  Goal: Verbalizes/displays adequate comfort level or patient's stated pain goal  Description: INTERVENTIONS:  - Encourage pt to monitor pain and request assistance  - Assess pain using appropriate pain scale  - Administer analgesics based on type and severity of pain and evaluate response  - Implement non-pharmacological measures as appropriate and evaluate response  - Consider cultural and social influences on pain and pain management  - Manage/alleviate anxiety  - Utilize distraction and/or relaxation techniques  - Monitor for opioid side effects  - Notify MD/LIP if interventions unsuccessful or patient reports new pain  - Anticipate increased pain with activity and pre-medicate as appropriate  Outcome: Progressing     Problem: Altered Communication/Language Barrier  Goal: Patient/Family is able to understand and participate in their care  Description: Interventions:  - Assess communication ability and preferred communication style  - Implement communication aides and strategies  - Use visual cues when possible  - Listen attentively, be patient, do not interrupt  - Minimize distractions  - Allow time for understanding and response  - Establish method for patient to ask for assistance (call light)  - Provide an  as needed  - Communicate barriers and strategies to overcome with those who interact with patient  Outcome: Progressing

## 2023-11-24 VITALS
SYSTOLIC BLOOD PRESSURE: 123 MMHG | RESPIRATION RATE: 16 BRPM | OXYGEN SATURATION: 96 % | DIASTOLIC BLOOD PRESSURE: 59 MMHG | BODY MASS INDEX: 28.72 KG/M2 | WEIGHT: 142.44 LBS | HEIGHT: 59 IN | HEART RATE: 93 BPM | TEMPERATURE: 98 F

## 2023-11-24 LAB
BASOPHILS # BLD AUTO: 0.02 X10(3) UL (ref 0–0.2)
BASOPHILS NFR BLD AUTO: 0.4 %
EOSINOPHIL # BLD AUTO: 0.21 X10(3) UL (ref 0–0.7)
EOSINOPHIL NFR BLD AUTO: 4.6 %
ERYTHROCYTE [DISTWIDTH] IN BLOOD BY AUTOMATED COUNT: 15.1 %
GLUCOSE BLD-MCNC: 119 MG/DL (ref 70–99)
GLUCOSE BLD-MCNC: 134 MG/DL (ref 70–99)
GLUCOSE BLD-MCNC: 139 MG/DL (ref 70–99)
HCT VFR BLD AUTO: 36.7 %
HGB BLD-MCNC: 11.8 G/DL
IMM GRANULOCYTES # BLD AUTO: 0.02 X10(3) UL (ref 0–1)
IMM GRANULOCYTES NFR BLD: 0.4 %
LYMPHOCYTES # BLD AUTO: 1.29 X10(3) UL (ref 1–4)
LYMPHOCYTES NFR BLD AUTO: 28.4 %
MCH RBC QN AUTO: 30.9 PG (ref 26–34)
MCHC RBC AUTO-ENTMCNC: 32.2 G/DL (ref 31–37)
MCV RBC AUTO: 96.1 FL
MONOCYTES # BLD AUTO: 0.99 X10(3) UL (ref 0.1–1)
MONOCYTES NFR BLD AUTO: 21.8 %
NEUTROPHILS # BLD AUTO: 2.01 X10 (3) UL (ref 1.5–7.7)
NEUTROPHILS # BLD AUTO: 2.01 X10(3) UL (ref 1.5–7.7)
NEUTROPHILS NFR BLD AUTO: 44.4 %
PLATELET # BLD AUTO: 110 10(3)UL (ref 150–450)
RBC # BLD AUTO: 3.82 X10(6)UL
WBC # BLD AUTO: 4.5 X10(3) UL (ref 4–11)

## 2023-11-24 PROCEDURE — 99239 HOSP IP/OBS DSCHRG MGMT >30: CPT | Performed by: STUDENT IN AN ORGANIZED HEALTH CARE EDUCATION/TRAINING PROGRAM

## 2023-11-24 RX ORDER — AMOXICILLIN AND CLAVULANATE POTASSIUM 875; 125 MG/1; MG/1
1 TABLET, FILM COATED ORAL 2 TIMES DAILY
Qty: 6 TABLET | Refills: 0 | Status: SHIPPED | OUTPATIENT
Start: 2023-11-24 | End: 2023-11-25

## 2023-11-24 NOTE — PLAN OF CARE
.Received patient at 1. Alert and oriented x 2. Pt was able to communicate her needs and followed commends. Tele Rhythm NSR, oxygen maintained on room air. Breath sounds clear, diminished. Skin -mepilex changed over sacrum due to redness. Last bowel movement 11/23. Patient is incontinent of bowel and bladder. Patient reports  No chest pain or shortness of breath. C/o neck pain-pain meds provided. Pt was repositioned several times throughout the night. Bed is locked and in low position. Call light and personal items within reach. All needs met. Problem: SAFETY ADULT - FALL  Goal: Free from fall injury  Description: INTERVENTIONS:  - Assess pt frequently for physical needs  - Identify cognitive and physical deficits and behaviors that affect risk of falls.   - [place]uebbering fall precautions as indicated by assessment.  - Educate pt/family on patient safety including physical limitations  - Instruct pt to call for assistance with activity based on assessment  - Modify environment to reduce risk of injury  - Provide assistive devices as appropriate  - Consider OT/PT consult to assist with strengthening/mobility  - Encourage toileting schedule  Outcome: Progressing     Problem: Patient/Family Goals  Goal: Patient/Family Long Term Goal  Description: Patient's Long Term Goal: \"leave hospital\"     Interventions:  - medications as ordered by physician   - testing as ordered by physician   - See additional Care Plan goals for specific interventions  Outcome: Progressing  Goal: Patient/Family Short Term Goal  Description: Patient's Short Term Goal: \"maintain hgb >7\"    Interventions:   - medications as ordered by physician   - testing as ordered by physician  - hgb checks, see GI   - See additional Care Plan goals for specific interventions  Outcome: Progressing     Problem: SKIN/TISSUE INTEGRITY - ADULT  Goal: Skin integrity remains intact  Description: INTERVENTIONS  - Assess and document risk factors for pressure ulcer development  - Assess and document skin integrity  - Monitor for areas of redness and/or skin breakdown  - Initiate interventions, skin care algorithm/standards of care as needed  Outcome: Progressing  Goal: Incision(s), wounds(s) or drain site(s) healing without S/S of infection  Description: INTERVENTIONS:  - Assess and document risk factors for pressure ulcer development  - Assess and document skin integrity  - Assess and document dressing/incision, wound bed, drain sites and surrounding tissue  - Implement wound care per orders  - Initiate isolation precautions as appropriate  - Initiate Pressure Ulcer prevention bundle as indicated  Outcome: Progressing  Goal: Oral mucous membranes remain intact  Description: INTERVENTIONS  - Assess oral mucosa and hygiene practices  - Implement preventative oral hygiene regimen  - Implement oral medicated treatments as ordered  Outcome: Progressing     Problem: PAIN - ADULT  Goal: Verbalizes/displays adequate comfort level or patient's stated pain goal  Description: INTERVENTIONS:  - Encourage pt to monitor pain and request assistance  - Assess pain using appropriate pain scale  - Administer analgesics based on type and severity of pain and evaluate response  - Implement non-pharmacological measures as appropriate and evaluate response  - Consider cultural and social influences on pain and pain management  - Manage/alleviate anxiety  - Utilize distraction and/or relaxation techniques  - Monitor for opioid side effects  - Notify MD/LIP if interventions unsuccessful or patient reports new pain  - Anticipate increased pain with activity and pre-medicate as appropriate  Outcome: Progressing     Problem: RISK FOR INFECTION - ADULT  Goal: Absence of fever/infection during anticipated neutropenic period  Description: INTERVENTIONS  - Monitor WBC  - Administer growth factors as ordered  - Implement neutropenic guidelines  Outcome: Progressing     Problem: DISCHARGE PLANNING  Goal: Discharge to home or other facility with appropriate resources  Description: INTERVENTIONS:  - Identify barriers to discharge w/pt and caregiver  - Include patient/family/discharge partner in discharge planning  - Arrange for needed discharge resources and transportation as appropriate  - Identify discharge learning needs (meds, wound care, etc)  - Arrange for interpreters to assist at discharge as needed  - Consider post-discharge preferences of patient/family/discharge partner  - Complete POLST form as appropriate  - Assess patient's ability to be responsible for managing their own health  - Refer to Case Management Department for coordinating discharge planning if the patient needs post-hospital services based on physician/LIP order or complex needs related to functional status, cognitive ability or social support system  Outcome: Progressing     Problem: Altered Communication/Language Barrier  Goal: Patient/Family is able to understand and participate in their care  Description: Interventions:  - Assess communication ability and preferred communication style  - Implement communication aides and strategies  - Use visual cues when possible  - Listen attentively, be patient, do not interrupt  - Minimize distractions  - Allow time for understanding and response  - Establish method for patient to ask for assistance (call light)  - Provide an  as needed  - Communicate barriers and strategies to overcome with those who interact with patient  Outcome: Progressing     Problem: CARDIOVASCULAR - ADULT  Goal: Maintains optimal cardiac output and hemodynamic stability  Description: INTERVENTIONS:  - Monitor vital signs, rhythm, and trends  - Monitor for bleeding, hypotension and signs of decreased cardiac output  - Evaluate effectiveness of vasoactive medications to optimize hemodynamic stability  - Monitor arterial and/or venous puncture sites for bleeding and/or hematoma  - Assess quality of pulses, skin color and temperature  - Assess for signs of decreased coronary artery perfusion - ex.  Angina  - Evaluate fluid balance, assess for edema, trend weights  Outcome: Progressing     Problem: RESPIRATORY - ADULT  Goal: Achieves optimal ventilation and oxygenation  Description: INTERVENTIONS:  - Assess for changes in respiratory status  - Assess for changes in mentation and behavior  - Position to facilitate oxygenation and minimize respiratory effort  - Oxygen supplementation based on oxygen saturation or ABGs  - Provide Smoking Cessation handout, if applicable  - Encourage broncho-pulmonary hygiene including cough, deep breathe, Incentive Spirometry  - Assess the need for suctioning and perform as needed  - Assess and instruct to report SOB or any respiratory difficulty  - Respiratory Therapy support as indicated  - Manage/alleviate anxiety  - Monitor for signs/symptoms of CO2 retention  Outcome: Progressing     Problem: GASTROINTESTINAL - ADULT  Goal: Minimal or absence of nausea and vomiting  Description: INTERVENTIONS:  - Maintain adequate hydration with IV or PO as ordered and tolerated  - Nasogastric tube to low intermittent suction as ordered  - Evaluate effectiveness of ordered antiemetic medications  - Provide nonpharmacologic comfort measures as appropriate  - Advance diet as tolerated, if ordered  - Obtain nutritional consult as needed  - Evaluate fluid balance  Outcome: Progressing  Goal: Achieves appropriate nutritional intake (bariatric)  Description: INTERVENTIONS:  - Monitor for over-consumption  - Identify factors contributing to increased intake, treat as appropriate  - Monitor I&O, WT and lab values  - Obtain nutritional consult as needed  - Evaluate psychosocial factors contributing to over-consumption  Outcome: Progressing     Problem: METABOLIC/FLUID AND ELECTROLYTES - ADULT  Goal: Glucose maintained within prescribed range  Description: INTERVENTIONS:  - Monitor Blood Glucose as ordered  - Assess for signs and symptoms of hyperglycemia and hypoglycemia  - Administer ordered medications to maintain glucose within target range  - Assess barriers to adequate nutritional intake and initiate nutrition consult as needed  - Instruct patient on self management of diabetes  Outcome: Progressing  Goal: Electrolytes maintained within normal limits  Description: INTERVENTIONS:  - Monitor labs and rhythm and assess patient for signs and symptoms of electrolyte imbalances  - Administer electrolyte replacement as ordered  - Monitor response to electrolyte replacements, including rhythm and repeat lab results as appropriate  - Fluid restriction as ordered  - Instruct patient on fluid and nutrition restrictions as appropriate  Outcome: Progressing  Goal: Hemodynamic stability and optimal renal function maintained  Description: INTERVENTIONS:  - Monitor labs and assess for signs and symptoms of volume excess or deficit  - Monitor intake, output and patient weight  - Monitor urine specific gravity, serum osmolarity and serum sodium as indicated or ordered  - Monitor response to interventions for patient's volume status, including labs, urine output, blood pressure (other measures as available)  - Encourage oral intake as appropriate  - Instruct patient on fluid and nutrition restrictions as appropriate  Outcome: Progressing

## 2023-11-24 NOTE — PHYSICAL THERAPY NOTE
PHYSICAL THERAPY TREATMENT NOTE - INPATIENT    Room Number: 2607/2607-A     Session: 1     Number of Visits to Meet Established Goals: 6    Presenting Problem: s/p G tube replacement 11/21/23  Co-Morbidities : IPH s/p craniotomy  ASSESSMENT     The patient continues to present with the following impairments L UE/LE strength deficits, poor trunk control, sitting balance impairments, poor midline positioning, limited endurance, coordination impairments, poor motor planning, and decreased insight into impairments and L side inattention. Pt able to contract LLE to initiate movement with decreased strength to push through gravity. Pt performed rolling w Mod A, supine/sit Max A due to LUE/LLE weakness. Pt able to pay attention to left side when given VC. The patient continues to be below baseline and would continue to benefit from skilled inpatient PT to address the above deficits to assist patient in returning to prior to level of function. DISCHARGE RECOMMENDATIONS  PT Discharge Recommendations: Sub-acute rehabilitation     PLAN  PT Treatment Plan: Bed mobility; Endurance; Energy conservation;Patient education;Gait training;Transfer training;Balance training;Strengthening  Rehab Potential : Good  Frequency (Obs): 3-5x/week    CURRENT GOALS     Goal #1 Patient is able to demonstrate supine - sit EOB @ level: moderate assistance     Goal #2 Patient is able to demonstrate transfers Sit to/from Stand at assistance level: moderate assistance     Goal #3 Patient is able to transfer to bedside chair with MAX assist     Goal #4    Goal #5    Goal #6    Goal Comments: Goals established on 11/22/2023 11/24/2023 all goals ongoing    SUBJECTIVE  \"My left hand hurts. \"    OBJECTIVE  Precautions: Bed/chair alarm    WEIGHT BEARING RESTRICTION  Weight Bearing Restriction: None                PAIN ASSESSMENT   Unable to rate  Location : L hand          BALANCE Static Sitting: Poor +  Dynamic Sitting: Poor           Static Standing: Poor -  Dynamic Standing: Poor -    ACTIVITY TOLERANCE                         O2 WALK         AM-PAC '6-Clicks' INPATIENT SHORT FORM - BASIC MOBILITY  How much difficulty does the patient currently have. .. Patient Difficulty: Turning over in bed (including adjusting bedclothes, sheets and blankets)?: A Lot   Patient Difficulty: Sitting down on and standing up from a chair with arms (e.g., wheelchair, bedside commode, etc.): A Lot   Patient Difficulty: Moving from lying on back to sitting on the side of the bed?: A Lot   How much help from another person does the patient currently need. .. Help from Another: Moving to and from a bed to a chair (including a wheelchair)?: Total   Help from Another: Need to walk in hospital room?: Total   Help from Another: Climbing 3-5 steps with a railing?: Total       AM-PAC Score:  Raw Score: 9   Approx Degree of Impairment: 81.38%   Standardized Score (AM-PAC Scale): 30.55   CMS Modifier (G-Code): CM    FUNCTIONAL ABILITY STATUS  Gait Assessment   Functional Mobility/Gait Assessment  Gait Assistance: Not tested  Distance (ft): 0    Skilled Therapy Provided    Bed Mobility:  Rolling: Mod A   Supine<>Sit: Max A   Sit<>Supine: Max A     Therapist's Comments: Pt received in supine, agreeable for therapy. Pt oriented to place and time. Pt c/o pain in L hand, visually swollen, soft tissue massage improved swelling. Pt tolerated PROM/AAROM on L side well, showing muscle activation in L Quad muscle. Tolerated supine therex well. Readjusted binder to correct position. Pt demonstrated Supine/Sitting Max A with HOB raised. Pt unable to maintain midline posture tends to lean to L side, pt performed weight shifting leans on R side and OH reaching repetitions to promote midline posture. Pt given VC to look over L side throughout session.  Pt fatigued, needing max A to return to supine and boosted to [facility], alarm donned. RN made aware. THERAPEUTIC EXERCISES  Lower Extremity Hip AB/AD  Hip adduction squeezes  Leg slides  Quad sets     Upper Extremity Elbow flex/ext,  - open/close, OH Reaching, and Wrist flex/ext     Position Supine     Repetitions   10   Sets   1     Patient End of Session: In bed; With [facility] staff;Needs met;Call light within reach;RN aware of session/findings; All patient questions and concerns addressed; Alarm set    PT Session Time: 30 minutes  Therapeutic Activity: 15 minutes  Therapeutic Exercise: 10 minutes   Neuromuscular Re-education: 5 minutes

## 2023-11-24 NOTE — PROGRESS NOTES
Discharge Note:     Patient tele discontinued, IV discontinued with catheter intact. Report given to [facility] from [facility] ([facility] [address]), answered all questions. Discharge paperwork provided to EMS, printed script attached. Patient escorted out via stretcher to [facility].

## 2023-11-24 NOTE — PLAN OF CARE
Shift Note:  Assumed care of patient. Patient alert and oriented x3. Short term memory loss, Hx of stroke, left sided weakness. Patient on room air, denies difficulty breathing, lung sounds diminished. Denies any cardiac symptoms, NSR on tele. Denies pain at this time. Incontinent of bowel and bladder, last BM 11/23  Able to roll side to side with assistance, call light within reach, tolerating care well.    Participated with ROM exercises with PT today, declined to stand     POC:  - Discharge back to [facility], insurance auth pending   - Feedings

## 2023-11-25 ENCOUNTER — INITIAL APN SNF VISIT (OUTPATIENT)
Dept: INTERNAL MEDICINE CLINIC | Age: 78
End: 2023-11-25

## 2023-11-25 VITALS
RESPIRATION RATE: 18 BRPM | BODY MASS INDEX: 28 KG/M2 | SYSTOLIC BLOOD PRESSURE: 113 MMHG | OXYGEN SATURATION: 98 % | WEIGHT: 140.13 LBS | DIASTOLIC BLOOD PRESSURE: 97 MMHG | HEART RATE: 94 BPM | TEMPERATURE: 98 F

## 2023-11-25 DIAGNOSIS — K29.90 GASTRITIS AND DUODENITIS: ICD-10-CM

## 2023-11-25 DIAGNOSIS — I62.9 INTRACRANIAL HEMORRHAGE (HCC): ICD-10-CM

## 2023-11-25 DIAGNOSIS — K21.9 GASTROESOPHAGEAL REFLUX DISEASE, UNSPECIFIED WHETHER ESOPHAGITIS PRESENT: ICD-10-CM

## 2023-11-25 DIAGNOSIS — Z98.890 HISTORY OF CRANIOTOMY: ICD-10-CM

## 2023-11-25 DIAGNOSIS — K29.90 GASTRITIS AND DUODENITIS: Primary | ICD-10-CM

## 2023-11-25 DIAGNOSIS — R13.10 DYSPHAGIA, UNSPECIFIED TYPE: ICD-10-CM

## 2023-11-25 DIAGNOSIS — R91.8 PULMONARY INFILTRATES ON CXR: ICD-10-CM

## 2023-11-25 DIAGNOSIS — I63.9 RIGHT-SIDED CEREBROVASCULAR ACCIDENT (CVA) (HCC): ICD-10-CM

## 2023-11-25 DIAGNOSIS — Z93.1 GASTROINTESTINAL TUBE PRESENT (HCC): Primary | ICD-10-CM

## 2023-11-25 DIAGNOSIS — R12 CHRONIC HEARTBURN: ICD-10-CM

## 2023-11-25 DIAGNOSIS — R29.898 SEVERE MUSCLE DECONDITIONING: ICD-10-CM

## 2023-11-25 PROCEDURE — 1160F RVW MEDS BY RX/DR IN RCRD: CPT | Performed by: NURSE PRACTITIONER

## 2023-11-25 PROCEDURE — 1123F ACP DISCUSS/DSCN MKR DOCD: CPT | Performed by: NURSE PRACTITIONER

## 2023-11-25 PROCEDURE — 3074F SYST BP LT 130 MM HG: CPT | Performed by: NURSE PRACTITIONER

## 2023-11-25 PROCEDURE — 1111F DSCHRG MED/CURRENT MED MERGE: CPT | Performed by: NURSE PRACTITIONER

## 2023-11-25 PROCEDURE — 99310 SBSQ NF CARE HIGH MDM 45: CPT | Performed by: NURSE PRACTITIONER

## 2023-11-25 PROCEDURE — 1159F MED LIST DOCD IN RCRD: CPT | Performed by: NURSE PRACTITIONER

## 2023-11-25 PROCEDURE — 3080F DIAST BP >= 90 MM HG: CPT | Performed by: NURSE PRACTITIONER

## 2023-11-25 RX ORDER — LEVOFLOXACIN 500 MG/1
500 TABLET, FILM COATED ORAL DAILY
COMMUNITY

## 2023-11-25 NOTE — PROGRESS NOTES
Dl Bloom.   [birth year]. APRN  readmission encounter 23. 2 pm    Mrs Lexis Damon seen at bedside with staff nurse and CNA. Patient with mild agitation calling out for her friends and . CNA changed brief; large soft BM. G Tube replaced while in the hospital; dressing c/d/i. Ashley Concepcion  : 11/10/1945  Age [de-identified]year old  female patient is admitted to [address] [place] [address] for rehabilitation and strengthening. [facility] Admission: 23  Discharged:         23 to [facility]    Diagnoses:  s/p Endoscopy. *Ulcerative Esophagitis. *5 cm Hiatal Hernia  *G Tube mushroom bumper with ulceration underneath. *New 20F CHACORTA balloon bumper tube w/bumper inflated w/10 ml of sterile water. *Gastritis s/p biopsies to r/o H pylori  *Erosive Duodenitis     Chief complaint on admission:   Melena    HPI  [de-identified] y/o with a PMHx of breast cancer, GERD, HTN, HL, diverticulosis, IBS, DMII, s/p craniotomy  d/t intracranial hemorrhage (10/11/23) who presented to ED from SNF for reports of abdominal pain and melena stools. Per NH report \"patient vomited dark red and black contents\". No history of recent anticoagulant/NSAID use per medication history; takes ASA 81mg daily. Hgb on arrival 8.5, repeat 7.7 now 13.7 post 2units of pRBC (Hgb 10.7 on 10/24). CT notes gastric wall thickening and peg tube displacement w/external bumper noted 7cm from skin. Patient had an EGD noted unremarkable with Peg insertion on 10/18. Colonoscopy in  with Dr. Ish Sanchez noted diverticulosis and internal hemorrhoids  She is a poor historian, pertinent health information obtained from Krzysztof Joe (brother n law) who resides in [place] but has been acting as Ms. [place]. Patient was stable for discharge and discharged to [facility] on tube feedings.     Past Medical History:   Diagnosis Date    Anxiety     Breast CA ([facility] [address]     CANCER     lft breast lumpectomy pre cancerous cells stage 0    Diabetes mellitus ([facility] [address] Diarrhea, unspecified     Ductal carcinoma in situ of breast     left breast    Flatulence/gas pain/belching     GERD     H/O infectious mononucleosis     diagnosed in 1960s    High cholesterol     Hypertension     IBS (irritable bowel syndrome)     Osteoporosis     Other and unspecified hyperlipidemia     Personal history of irradiation, presenting hazards to health 2004    mammosite    Sleep disturbance     Stool incontinence     Type II or unspecified type diabetes mellitus without mention of complication, not stated as uncontrolled     Wears glasses      Past Surgical History:   Procedure Laterality Date    APPENDECTOMY      194    COLONOSCOPY       hiatal hernia, sm internal hem    COLONOSCOPY  2005    fiberoptic    D & C      IR ANGIOGRAM CEREBRAL CAROTID BILATERAL  10/12/2023    LUMPECTOMY LEFT  2004    breast    OTHER SURGICAL HISTORY      lumpectomy  lft breast stage 0    OTHER SURGICAL HISTORY  10/11/2023    CRANIOTOMY FOR RIGHT FRONTAL HEMORRHAGE    RADIATION LEFT      Mammosite    TONSILLECTOMY          UPPER GI ENDOSCOPY,EXAM      2005     Family History   Problem Relation Age of Onset    Heart Disorder Father          of heart attack age 62    Alcohol and Other Disorders Associated Father     Arthritis Father     Other (Other) Father     Heart Disease Mother         CHF age [de-identified]    Arthritis Mother     Other (Other) Mother     Diabetes Sister         mellitus    Heart Disease Maternal Grandfather     Cancer Maternal Grandmother         brain tumor    Breast Cancer Self      Social History     Socioeconomic History    Marital status:    Tobacco Use    Smoking status: Former     Packs/day: 1.00     Years: 20.00     Additional pack years: 0.00     Total pack years: 20.00     Types: Cigarettes     Quit date: 3/1/2006     Years since quittin.7    Smokeless tobacco: Never   Vaping Use    Vaping Use: Never used   Substance and Sexual Activity    Alcohol use: No     Alcohol/week: 0.0 standard drinks of alcohol    Drug use: No   Other Topics Concern    Caffeine Concern No     Comment: tries to drink decaf drinks    Exercise Yes     Comment: jazzercise 3x/wk, tennis 1-2x/wk     Social Determinants of Health     Food Insecurity: No Food Insecurity (11/20/2023)    Food Insecurity     Food Insecurity: Never true   Transportation Needs: No Transportation Needs (11/20/2023)    Transportation Needs     Lack of Transportation: No   Housing Stability: Low Risk  (11/20/2023)    Housing Stability     Housing Instability: No     IMMUNIZATIONS  Immunization History   Administered Date(s) Administered    Covid-19 Vaccine Moderna 100 mcg/0.5 ml 02/10/2021, 03/10/2021, 10/22/2021, 04/05/2022    Covid-19 Vaccine Moderna Bivalent 50mcg/0.5mL 12+ years 09/13/2022    DTAP 04/24/2007    FLU VAC High Dose 65 YRS & Older PRSV Free (52262) 10/24/2016, 09/15/2020    FLUAD High Dose 65 yr and older (75954) 10/11/2019, 09/28/2021    Fluzone Vaccine Medicare () 10/28/2013, 10/24/2016, 09/15/2020    HIGH DOSE FLU 65 YRS AND OLDER PRSV FREE SINGLE D (39559) FLU CLINIC 09/27/2022    Influenza 10/24/2012, 10/23/2015, 10/20/2017, 10/05/2018    Pneumococcal (Prevnar 13) 09/02/2016    Pneumococcal (Prevnar 7) 04/24/2010    Pneumovax 23 09/29/2017    TDAP 09/23/2019    Zoster Vaccine Live (Zostavax) 09/19/2012    Zoster Vaccine Recombinant Adjuvanted (Shingrix) 03/06/2023, 05/23/2023      ALLERGIES:  Allergies   Allergen Reactions    Ampicillin NAUSEA ONLY     Caps    Codeine [Opioid Analgesics]      Derivatives  Syncope    Cortisone [Cortisone Acetate]      Injection into L shoulder  Syncope, stomach cramps     CODE STATUS:  Full Code    ADVANCED CARE PLANNING TEAM: Will need care plan mtgs to discuss discharge.     CURRENT MEDICATIONS - reviewed and updated on SNF [facility]  Tylenol 325 suppos q 4 hrs prn  Alendronate 70 mg weekly  Alprazolam 0.25 mg q hs  Atorvastatin 80 mg daily  TUMS 500 - one daily  Clobetasol 0.05% cream prn  Levaquin 500 mg x 3 days (PNA)  Mupirocin 2% ointment - g-tube bid  prn  Osmolite 1.5 kavin - 70 ml/hr over 13 hrs daily  Omeprazole 40 mg bid  K+ 20 meq daily  Valproic acid 250/5ml - tid     SUBJECTIVE: Denies headache, chest pain. C/O mild SOB. Denies n/v/c. + large soft BM. PHYSICAL EXAM: 113/97. P 94  RR 18. POx 98%. Weight 1140.1#  GENERAL HEALTH:well developed, well nourished, in no apparent distress   LINES, TUBES, DRAINS:  G-tube  SKIN: warm, dry  WOUND: see wound assessment,   EYES: Pupils round and equal  conjunctiva normal; no drainage from eyes  HENT:  mucous membranes pink, moist,    NECK  supple; FROM  BREAST: deferred exam   RESPIRATORY: diminished at the bases  CARDIOVASCULAR: rrr, rate 94  ABDOMEN:   BS+, soft, nondistended; + new G tube in place. Dressing c/d/I.  :Deferred  LYMPHATIC:no lymphedema  MUSCULOSKELETAL: weakness, wheelchair confined; bed confined. EXTREMITIES/VASCULAR: no edema  NEUROLOGIC:follows commands pleasantly confused  PSYCHIATRIC: agitated restless during encounter trying to get out of bed    MEDICAL DECISION MAKING: brother in law assists w/complex medical decisions.     DIAGNOSTICS REVIEWED AT THIS VISIT:  Lab Results   Component Value Date    WBC 4.5 11/24/2023    RBC 3.82 11/24/2023    HGB 11.8 (L) 11/24/2023    HCT 36.7 11/24/2023    MCV 96.1 11/24/2023    MCH 30.9 11/24/2023    MCHC 32.2 11/24/2023    RDW 15.1 11/24/2023    .0 (L) 11/24/2023    MPV 10.8 (H) 07/19/2012     Lab Results   Component Value Date     (H) 11/23/2023    BUN 18 11/23/2023    CREATSERUM 0.53 (L) 11/23/2023    BUNCREA SEE NOTE: 08/30/2023    ANIONGAP 3 11/23/2023    GFRAA 58 (L) 07/07/2022    GFRNAA 50 (L) 07/07/2022    CA 7.2 (L) 11/23/2023     11/23/2023    K 3.8 11/23/2023    K 3.8 11/23/2023     11/23/2023    CO2 28.0 11/23/2023    OSMOCALC 295 11/23/2023     Assessment / Plan    GI Bleed  resolved    Omeprazole 40n mg bid x 2 months   * repeat EGD in 8-12 weeks   Tube feedings 70 ml / hr over 13 hrs   Weekly labs and prn    New G-Tube replaced  S/p esophagitis w/buried bumper; etiology of melena  Biopsies for H pylori pending  Weekly labs      Confusion/delirium   Improved; w/ occasional anxiety. likely d/t acute illness and underlying ICH and craniotomy. Acute blood loss anemia  Weekly labs     PNA-   Levaquin 500 mg daily x 3 days  Allergy to ampicillin (nausea)     PEG out of place, s/p replacement per GI   Resume tube feedings     CT with incidental possible DVT  -LE dopplers negative      Recent ICH and craniotomy last month   cont valproic acid 25mg tid via g tube    Weakness/deconditioning  PT/OT/ST eval and treat    Dispo:  Short term rehab    FOLLOW UP APPOINTMENTS   *Follow-Up with PCP within 1-2 weeks following [facility] discharge. *Follow-Up with specialists as recommended. Future Appointments   Date Time Provider Maral Rivera   2/14/2024 10:20 AM Ryan Alba MD [facility] [place]     *Greater than 45 minutes spent w/ patient and staff, including but not limited to/ reviewing present status, needs, abilities with disciplines, reviewing medical records, vital signs, labs, completing med rec and entering orders to establish plan of care in [facility]. Note to patient: The [address] makes medical notes like these available to patients in the interest of transparency. However, this is a medical document intended as peer to peer communication. It is written in medical language and may contain abbreviations or verbiage that are unfamiliar. It may appear blunt or direct. Medical documents are intended to carry relevant information, facts as evident, and the clinical opinion of the practitioner who signs the document.     [employer] [place] [employer]) Amanda Robert, APRN  11/25/23 2 pm   [facility] Post Acute Care Team

## 2023-11-26 NOTE — DISCHARGE SUMMARY
[facility] HOSPITALIST  DISCHARGE SUMMARY     Beny Matthews Patient Status:  Inpatient    11/10/1945 MRN [id number]   [facility] 2NE-A Attending No att. providers found   Hosp Day # 4 PCP Elida Gagnon MD     Date of Admission: 2023  Date of Discharge: 2023  Discharge Disposition: SNF Subacute Rehab    Admitting Diagnosis:   Transient hypotension [I95.9]  Gastrointestinal hemorrhage, unspecified gastrointestinal hemorrhage type [K92.2]  Anemia, unspecified type [D64.9]  Community acquired pneumonia, unspecified laterality [J18.9]    Hospital Discharge Diagnoses:   GI bleed  Acute blood loss anemia  PEG tube displacement  Recent intracranial hemorrhage and craniotomy  Hyponatremia    Lace+ Score: 77  59-90 High Risk  29-58 Medium Risk  0-28   Low Risk. TCM Follow-Up Recommendation:  LACE > 58: High Risk of readmission after discharge from the hospital.        Discharge Diagnosis:   GI bleed    History of Present Illness: Beny Matthews is a [de-identified]year old female with upset stomach. Patient states been going on for about a week. Patient vomited dark red and black contents in the nursing home which led her to emergency department. She is unaware of any melena or gross hematochezia. She had a recent intracranial hemorrhage last month status post craniotomy. She has an occasional cough. She denies shortness of breath or chest pain. She denies fevers or chills. Brief Synopsis: Patient presented with hematemesis, GI consulted, kept n.p.o., started IV PPI. Underwent EGD with findings of severe LA grade D ulcerative esophagitis, 5 cm hiatal hernia and.  G-tube bumper with ulceration noted underneath, erosive duodenitis. Likely etiology of bleed secondary to above esophagitis, duodenitis and.  G-tube bumper. G-tube bumper was loosened during EGD. Following this procedure, patient had no further episodes of hematemesis, melena, hematochezia. Repeat hemoglobin remained stable. Cleared for discharge per GI, discharged to subacute rehab. Procedures during hospitalization:   EGD    Incidental or significant findings and recommendations (brief descriptions):  EGD with findings of esophagitis, duodenitis,.  G-tube bumper which was adjusted. Plan for PPI twice daily till GI follow-up. Lab/Test results pending at Discharge:   EGD biopsies to evaluate for H. pylori and celiac disease    Consultants:  Gastroenterology    Discharge Medication List:     Discharge Medications        START taking these medications        Instructions Prescription details   omeprazole 2 mg/mL Susp  Commonly known as: PriLOSEC      20 mL (40 mg total) by Per G Tube route 2 (two) times daily before meals. Stop taking on: February 22, 2024  Quantity: 3600 mL  Refills: 0            CONTINUE taking these medications        Instructions Prescription details   Accu-Chek FastClix Lancets [facility]      USE 1 LANCET TO STICK FINGER DAILY TO TEST BLOOD. Quantity: 102 each  Refills: 1     acetaminophen 325 MG Supp  Commonly known as: Tylenol      Place 1 suppository (325 mg total) rectally every 4 (four) hours as needed for Fever. Refills: 0     alendronate 70 MG Tabs  Commonly known as: Fosamax      Take 1 tablet (70 mg total) by mouth once a week. Quantity: 12 tablet  Refills: 3     ALPRAZolam 0.25 MG Tabs  Commonly known as: Xanax      Take 1 tablet (0.25 mg total) by mouth nightly as needed. Refills: 0     atorvastatin 80 MG Tabs  Commonly known as: Lipitor      Take 1 tablet (80 mg total) by mouth daily. Replaces 40 mg   Quantity: 90 tablet  Refills: 1     calcium carbonate 500 MG Chew  Commonly known as: Tums      Chew 1 tablet (500 mg total) by mouth daily. Refills: 0     clobetasol 0.05 % Crea  Commonly known as: Temovate      Two times/week   Quantity: 15 g  Refills: 0     mupirocin 2 % Oint  Commonly known as: Bactroban      Apply 1 Application topically 2 (two) times daily.  Around G-tube site   Refills: 0     OSMOLITE 1.5 ALYX OR      by Peg Tube route daily. Refills: 0     potassium chloride 20 MEQ Pack  Commonly known as: Klor-Con      Take 20 mEq by mouth daily. Refills: 0     valproic acid 250 MG/5ML Soln  Commonly known as: Depakene      Take 15 mL (750 mg total) by mouth 3 (three) times daily. Quantity: 300 mL  Refills: 0            STOP taking these medications      aspirin 81 MG Tabs        lisinopril 5 MG Tabs  Commonly known as: Prinivil; Zestril        sucralfate 1 GM/10ML Susp  Commonly known as: Carafate                  Where to Get Your Medications        Please  your prescriptions at the location directed by your doctor or nurse    Bring a paper prescription for each of these medications  omeprazole 2 mg/mL Susp         [place] prescribed discharge    Follow-up appointment:   Rashawn Parry, [address] [place] [address] Dr Sara Ennis [phone]    Go in 3 week(s)  for a follow-up office visit with GI. The office will call you to arrange the date/time of your visit.     Martina Hernandez MD  [address] [place] [id number] [phone]    Schedule an appointment as soon as possible for a visit in 1 week(s)  For routine follow-up after hospitilization      Vital signs:       Physical Exam:    See exam from day of discharge note  -----------------------------------------------------------------------------------------------  PATIENT DISCHARGE INSTRUCTIONS: See electronic chart    Willian Helms MD 11/25/2023    Time spent:  > 30 minutes

## 2023-11-27 VITALS
OXYGEN SATURATION: 98 % | DIASTOLIC BLOOD PRESSURE: 97 MMHG | SYSTOLIC BLOOD PRESSURE: 113 MMHG | BODY MASS INDEX: 28 KG/M2 | WEIGHT: 140.13 LBS | TEMPERATURE: 98 F | HEART RATE: 94 BPM | RESPIRATION RATE: 18 BRPM

## 2023-11-27 NOTE — PAYOR COMM NOTE
--------------  DISCHARGE REVIEW    Payor: Colten Schofield 673 #:  [id number]  Authorization Number: [id number]    Admit date: 23  Admit time:   6:52 PM  Discharge Date: 2023  1:40 PM     Admitting Physician: Marycruz Horton MD  Attending Physician:  No att. providers found  Primary Care Physician: Rafi Peralta MD          Discharge Summary Notes        Discharge Summary signed by Thomas Camarillo MD at 2023  7:05 PM       Author: Thomas Camarillo MD Specialty: HOSPITALIST Author Type: Physician    Filed: 2023  7:05 PM Date of Service: 2023  7:01 PM Status: Signed    : Thomas Camarillo MD (Physician)           [address] Patient Status:  Inpatient    11/10/1945 MRN [id number]   [facility] 2NE-A Attending No att. providers found    Day # 4 PCP Erlinda Patel MD     Date of Admission: 2023  Date of Discharge: 2023  Discharge Disposition: SNF Subacute Rehab    Admitting Diagnosis:   Transient hypotension [I95.9]  Gastrointestinal hemorrhage, unspecified gastrointestinal hemorrhage type [K92.2]  Anemia, unspecified type [D64.9]  Community acquired pneumonia, unspecified laterality [J18.9]    Hospital Discharge Diagnoses:   GI bleed  Acute blood loss anemia  PEG tube displacement  Recent intracranial hemorrhage and craniotomy  Hyponatremia    Lace+ Score: 77  59-90 High Risk  29-58 Medium Risk  0-28   Low Risk. TCM Follow-Up Recommendation:  LACE > 58: High Risk of readmission after discharge from the hospital.        Discharge Diagnosis:   GI bleed    History of Present Illness: Santiago Tsang is a [de-identified]year old female with upset stomach. Patient states been going on for about a week. Patient vomited dark red and black contents in the nursing home which led her to emergency department. She is unaware of any melena or gross hematochezia.   She had a recent intracranial hemorrhage last month status post craniotomy. She has an occasional cough. She denies shortness of breath or chest pain. She denies fevers or chills. Brief Synopsis: Patient presented with hematemesis, GI consulted, kept n.p.o., started IV PPI. Underwent EGD with findings of severe LA grade D ulcerative esophagitis, 5 cm hiatal hernia and.  G-tube bumper with ulceration noted underneath, erosive duodenitis. Likely etiology of bleed secondary to above esophagitis, duodenitis and.  G-tube bumper. G-tube bumper was loosened during EGD. Following this procedure, patient had no further episodes of hematemesis, melena, hematochezia. Repeat hemoglobin remained stable. Cleared for discharge per GI, discharged to subacute rehab. Procedures during hospitalization:   EGD    Incidental or significant findings and recommendations (brief descriptions):  EGD with findings of esophagitis, duodenitis,.  G-tube bumper which was adjusted. Plan for PPI twice daily till GI follow-up. Lab/Test results pending at Discharge:   EGD biopsies to evaluate for H. pylori and celiac disease    Consultants:  Gastroenterology    Discharge Medication List:     Discharge Medications        START taking these medications        Instructions Prescription details   omeprazole 2 mg/mL Susp  Commonly known as: PriLOSEC      20 mL (40 mg total) by Per G Tube route 2 (two) times daily before meals. Stop taking on: February 22, 2024  Quantity: 3600 mL  Refills: 0            CONTINUE taking these medications        Instructions Prescription details   Accu-Chek FastClix Lancets [facility]      USE 1 LANCET TO STICK FINGER DAILY TO TEST BLOOD. Quantity: 102 each  Refills: 1     acetaminophen 325 MG Supp  Commonly known as: Tylenol      Place 1 suppository (325 mg total) rectally every 4 (four) hours as needed for Fever.    Refills: 0     alendronate 70 MG Tabs  Commonly known as: Fosamax      Take 1 tablet (70 mg total) by mouth once a week. Quantity: 12 tablet  Refills: 3     ALPRAZolam 0.25 MG Tabs  Commonly known as: Xanax      Take 1 tablet (0.25 mg total) by mouth nightly as needed. Refills: 0     atorvastatin 80 MG Tabs  Commonly known as: Lipitor      Take 1 tablet (80 mg total) by mouth daily. Replaces 40 mg   Quantity: 90 tablet  Refills: 1     calcium carbonate 500 MG Chew  Commonly known as: Tums      Chew 1 tablet (500 mg total) by mouth daily. Refills: 0     clobetasol 0.05 % Crea  Commonly known as: Temovate      Two times/week   Quantity: 15 g  Refills: 0     mupirocin 2 % Oint  Commonly known as: Bactroban      Apply 1 Application topically 2 (two) times daily. Around G-tube site   Refills: 0     OSMOLITE 1.5 ALYX OR      by Peg Tube route daily. Refills: 0     potassium chloride 20 MEQ Pack  Commonly known as: Klor-Con      Take 20 mEq by mouth daily. Refills: 0     valproic acid 250 MG/5ML Soln  Commonly known as: Depakene      Take 15 mL (750 mg total) by mouth 3 (three) times daily. Quantity: 300 mL  Refills: 0            STOP taking these medications      aspirin 81 MG Tabs        lisinopril 5 MG Tabs  Commonly known as: Prinivil; Zestril        sucralfate 1 GM/10ML Susp  Commonly known as: Carafate                  Where to Get Your Medications        Please  your prescriptions at the location directed by your doctor or nurse    Bring a paper prescription for each of these medications  omeprazole 2 mg/mL Susp         [place] discharge    Follow-up appointment:   Xiao Alvarado, [address] [place] [address]   [address] [place] [phone]    Go in 3 week(s)  for a follow-up office visit with GI. The office will call you to arrange the date/time of your visit.     Homer Shi MD  [address] [place] [address]  [phone]    Schedule an appointment as soon as possible for a visit in 1 week(s)  For routine follow-up after hospitilization      Vital signs:       Physical Exam:    See exam from day of discharge note  -----------------------------------------------------------------------------------------------  PATIENT DISCHARGE INSTRUCTIONS: See electronic chart    Willian Live MD 11/25/2023    Time spent:  > 30 minutes      Electronically signed by Adam Collier MD on 11/25/2023  7:05 PM         REVIEWER COMMENTS

## 2023-11-30 ENCOUNTER — SNF VISIT (OUTPATIENT)
Dept: INTERNAL MEDICINE CLINIC | Age: 78
End: 2023-11-30

## 2023-11-30 DIAGNOSIS — R12 CHRONIC HEARTBURN: ICD-10-CM

## 2023-11-30 DIAGNOSIS — I62.9 INTRACRANIAL HEMORRHAGE (HCC): ICD-10-CM

## 2023-11-30 DIAGNOSIS — Z98.890 HISTORY OF CRANIOTOMY: ICD-10-CM

## 2023-11-30 DIAGNOSIS — K29.90 GASTRITIS AND DUODENITIS: ICD-10-CM

## 2023-11-30 DIAGNOSIS — E11.59 HYPERTENSION ASSOCIATED WITH DIABETES: ICD-10-CM

## 2023-11-30 DIAGNOSIS — I63.9 RIGHT-SIDED CEREBROVASCULAR ACCIDENT (CVA) (HCC): ICD-10-CM

## 2023-11-30 DIAGNOSIS — R13.10 DYSPHAGIA, UNSPECIFIED TYPE: ICD-10-CM

## 2023-11-30 DIAGNOSIS — I15.2 HYPERTENSION ASSOCIATED WITH DIABETES: ICD-10-CM

## 2023-11-30 DIAGNOSIS — K22.89 ESOPHAGEAL PAIN: Primary | ICD-10-CM

## 2023-11-30 DIAGNOSIS — K21.9 GASTROESOPHAGEAL REFLUX DISEASE, UNSPECIFIED WHETHER ESOPHAGITIS PRESENT: ICD-10-CM

## 2023-11-30 DIAGNOSIS — K20.90 ESOPHAGITIS: ICD-10-CM

## 2023-11-30 DIAGNOSIS — R29.898 SEVERE MUSCLE DECONDITIONING: ICD-10-CM

## 2023-11-30 DIAGNOSIS — Z93.1 GASTROINTESTINAL TUBE PRESENT (HCC): ICD-10-CM

## 2023-11-30 PROCEDURE — 99310 SBSQ NF CARE HIGH MDM 45: CPT | Performed by: NURSE PRACTITIONER

## 2023-11-30 PROCEDURE — 3075F SYST BP GE 130 - 139MM HG: CPT | Performed by: NURSE PRACTITIONER

## 2023-11-30 PROCEDURE — 1159F MED LIST DOCD IN RCRD: CPT | Performed by: NURSE PRACTITIONER

## 2023-11-30 PROCEDURE — 3078F DIAST BP <80 MM HG: CPT | Performed by: NURSE PRACTITIONER

## 2023-11-30 PROCEDURE — 1160F RVW MEDS BY RX/DR IN RCRD: CPT | Performed by: NURSE PRACTITIONER

## 2023-11-30 PROCEDURE — 1111F DSCHRG MED/CURRENT MED MERGE: CPT | Performed by: NURSE PRACTITIONER

## 2023-12-01 VITALS
HEART RATE: 95 BPM | BODY MASS INDEX: 30 KG/M2 | OXYGEN SATURATION: 97 % | WEIGHT: 149.63 LBS | TEMPERATURE: 98 F | DIASTOLIC BLOOD PRESSURE: 71 MMHG | SYSTOLIC BLOOD PRESSURE: 132 MMHG | RESPIRATION RATE: 18 BRPM

## 2023-12-01 RX ORDER — SUCRALFATE ORAL 1 G/10ML
1 SUSPENSION ORAL 2 TIMES DAILY
COMMUNITY

## 2023-12-02 NOTE — PROGRESS NOTES
Javier Youssef.  11/10/1945. APRN  Encounter 23. 4pm    Mrs. Yates seen during therapy session and later in at bedside. During therapy, she was tearful throughout the session but determined to continue exercises given by therapist.  She stated she in not sure how all this happened to her and doesn't know how to get better. Therapists and staff supportive of patient. Complaint during therapy was burning as she pointed to her throat. C/O pain during swallowig and after. GI physician Dr. Rl Aburto contacted at ; left message to make an appointment as soon as possible. Scheduled for 23 at 1 pm.  Dr. Rl Aburto later called this APN back and ordered to restart Carafate 1G/10 ml and give orally 1-2 hrs prior to meals. Order entered. First dose given prior to tonight's dinner. Francisco Floresita  : 11/10/1945.  [de-identified]year old  female patient is admitted to [address] [place] [address] for rehabilitation and strengthening. [address] [place] [address] Admission: 23  Discharged:         23 to [facility]    Diagnoses:  s/p Endoscopy. *Ulcerative Esophagitis. *5 cm Hiatal Hernia  *G Tube mushroom bumper with ulceration underneath. *New 20F CHACORTA balloon bumper tube w/bumper inflated w/10 ml of sterile water. *Gastritis s/p biopsies to r/o H pylori  *Erosive Duodenitis     Chief complaint on admission: esophageal pain, burning, pain on swallowing. HPI  [de-identified] y/o with a PMHx of breast cancer, GERD, HTN, HL, diverticulosis, IBS, DMII, s/p craniotomy  d/t intracranial hemorrhage (10/11/23) who presented to ED from SNF for reports of abdominal pain and melena stools. Per NH report \"patient vomited dark red and black contents\". No history of recent anticoagulant/NSAID use per medication history; takes ASA 81mg daily. Hgb on arrival 8.5, repeat 7.7 now 13.7 post 2units of pRBC (Hgb 10.7 on 10/24). CT notes gastric wall thickening and peg tube displacement w/external bumper noted 7cm from skin.  Patient had an EGD noted unremarkable with Peg insertion on 10/18. Colonoscopy in  with Dr. Mercedes Bland noted diverticulosis and internal hemorrhoids  She is a poor historian, pertinent health information obtained from Oswaldo Muro (brother n law) who resides in [place] but has been acting as Ms. [place]. Patient was stable for discharge and discharged to [facility] on tube feedings.     Past Medical History:   Diagnosis Date    Anxiety     Breast CA ([facility] [address] 2004    CANCER     lft breast lumpectomy pre cancerous cells stage 0    Diabetes mellitus ([facility] [address]     Diarrhea, unspecified     Ductal carcinoma in situ of breast     left breast    Flatulence/gas pain/belching     GERD     H/O infectious mononucleosis     diagnosed in 1960s    High cholesterol     Hypertension     IBS (irritable bowel syndrome)     Osteoporosis     Other and unspecified hyperlipidemia     Personal history of irradiation, presenting hazards to health 2004    mammosite    Sleep disturbance     Stool incontinence     Type II or unspecified type diabetes mellitus without mention of complication, not stated as uncontrolled     Wears glasses      Past Surgical History:   Procedure Laterality Date    APPENDECTOMY      194    COLONOSCOPY       hiatal hernia, sm internal hem    COLONOSCOPY  2005    fiberoptic    D & C      IR ANGIOGRAM CEREBRAL CAROTID BILATERAL  10/12/2023    LUMPECTOMY LEFT  2004    breast    OTHER SURGICAL HISTORY      lumpectomy 2004 lft breast stage 0    OTHER SURGICAL HISTORY  10/11/2023    CRANIOTOMY FOR RIGHT FRONTAL HEMORRHAGE    RADIATION LEFT      Mammosite    TONSILLECTOMY          UPPER GI ENDOSCOPY,EXAM      2005     Family History   Problem Relation Age of Onset    Heart Disorder Father          of heart attack age 62    Alcohol and Other Disorders Associated Father     Arthritis Father     Other (Other) Father     Heart Disease Mother         CHF age [de-identified]    Arthritis Mother     Other (Other) Mother     Diabetes Sister         mellitus    Heart Disease Maternal Grandfather     Cancer Maternal Grandmother         brain tumor    Breast Cancer Self      Social History     Socioeconomic History    Marital status:    Tobacco Use    Smoking status: Former     Packs/day: 1.00     Years: 20.00     Additional pack years: 0.00     Total pack years: 20.00     Types: Cigarettes     Quit date: 3/1/2006     Years since quittin.7    Smokeless tobacco: Never   Vaping Use    Vaping Use: Never used   Substance and Sexual Activity    Alcohol use: No     Alcohol/week: 0.0 standard drinks of alcohol    Drug use: No   Other Topics Concern    Caffeine Concern No     Comment: tries to drink decaf drinks    Exercise Yes     Comment: jazzercise 3x/wk, tennis 1-2x/wk     Social Determinants of Health     Food Insecurity: No Food Insecurity (2023)    Food Insecurity     Food Insecurity: Never true   Transportation Needs: No Transportation Needs (2023)    Transportation Needs     Lack of Transportation: No   Housing Stability: Low Risk  (2023)    Housing Stability     Housing Instability: No     IMMUNIZATIONS  Immunization History   Administered Date(s) Administered    Covid-19 Vaccine Moderna 100 mcg/0.5 ml 02/10/2021, 03/10/2021, 10/22/2021, 2022    Covid-19 Vaccine Moderna Bivalent 50mcg/0.5mL 12+ years 2022    DTAP 2007    FLU VAC High Dose 65 YRS & Older PRSV Free (28594) 10/24/2016, 09/15/2020    FLUAD High Dose 72 yr and older (20670) 10/11/2019, 2021    Fluzone Vaccine Medicare () 10/28/2013, 10/24/2016, 09/15/2020    HIGH DOSE FLU 65 YRS AND OLDER PRSV FREE SINGLE D (65756) FLU CLINIC 2022    Influenza 10/24/2012, 10/23/2015, 10/20/2017, 10/05/2018    Pneumococcal (Prevnar 13) 2016    Pneumococcal (Prevnar 7) 2010    Pneumovax 23 2017    TDAP 2019    Zoster Vaccine Live (Zostavax) 2012    Zoster Vaccine Recombinant Adjuvanted (Shingrix) 03/06/2023, 05/23/2023      ALLERGIES:  Allergies   Allergen Reactions    Ampicillin NAUSEA ONLY     Caps    Codeine [Opioid Analgesics]      Derivatives  Syncope    Cortisone [Cortisone Acetate]      Injection into L shoulder  Syncope, stomach cramps     CODE STATUS:  Full Code    ADVANCED CARE PLANNING TEAM: Will need care plan mtgs to discuss discharge. CURRENT MEDICATIONS - reviewed and updated on SNF EMAR  Tylenol 325 suppos q 4 hrs prn  Alendronate 70 mg weekly  Alprazolam 0.25 mg q hs  Atorvastatin 80 mg daily  TUMS 500 - one daily  Clobetasol 0.05% cream prn  Levaquin 500 mg x 3 days (PNA)  Mupirocin 2% ointment - g-tube bid  prn  Osmolite 1.5 kavin - 70 ml/hr over 13 hrs daily  Omeprazole 40 mg bid  K+ 20 meq daily  Valproic acid 250/5ml - tid  Carafate 1G/10ml. Twice daily before breakfast and dinner. SUBJECTIVE: Denies headache, chest pain. C/O mild SOB. Denies n/v/c. Main complaint is esophageal pain, burning and pain when swallowing. [facility] PHYSICAL EXAM: 133/71. P 95. RR 18. Pox 97%. T 97.8. Wgt 149.6#. . GENERAL HEALTH:well developed, well nourished, in no apparent distress   LINES, TUBES, DRAINS:  + G-tube feedings over 13 hours  SKIN: warm, dry  WOUND: see wound assessment,   EYES: Pupils round and equal  conjunctiva normal; no drainage from eyes  HENT:  mucous membranes moist,  No visible thrush. NECK  supple; FROM  BREAST: deferred exam   RESPIRATORY: diminished at the bases  CARDIOVASCULAR: rrr, rate 95  ABDOMEN:   BS+, soft, nondistended; + new G tube in replaced. Dressing c/d/I.  :Deferred  LYMPHATIC:no lymphedema  MUSCULOSKELETAL: weakness, wheelchair confined; bed confined. EXTREMITIES/VASCULAR: no edema  NEUROLOGIC:follows commands pleasantly confused  PSYCHIATRIC: tearful throughout encounter and with therapists. MEDICAL DECISION MAKING: brother in law assists w/complex medical decisions. Lab Results: 11/28/23  Wbc 7.3.. hgb 11. 4. plts 111.  Na 143. K 4.3. Cl 106 CO2 27. Bun 17. Creat 0.42. gfr >60. Valproic acid 48. Repeat 12/5/23    Assessment / Plan    Esophageal Pain, heartburn, pain with swallowing. Per Dr. Дмитрий Boudreaux, start Carafate orally 1G/10ml  Ordered entered to take 1-2 hrs prior to breakfast and dinner. F/U with GI 12/8/23 1 pm    GI Bleed  resolved    Omeprazole 40n mg bid x 2 months   * repeat EGD in 8-12 weeks   Tube feedings 70 ml / hr over 13 hrs   Weekly labs and prn    New G-Tube replaced  S/p esophagitis w/buried bumper; etiology of melena  Biopsies for H pylori pending  Weekly labs      Confusion/delirium   Improved; w/ residual anxiety and tearful.  likely d/t acute illness and underlying ICH and craniotomy. Acute blood loss anemia  Weekly labs     PNA-   Levaquin 500 mg daily x 3 days (completed)  Allergy to ampicillin (nausea)     PEG out of place, s/p replacement per GI   Resume tube feedings  Pureed meals to supplement tube feedings. CT with incidental possible DVT  -LE dopplers negative      Recent ICH and craniotomy last month   cont valproic acid 25mg tid via g tube    Weakness/deconditioning  PT/OT/ST eval and treat    Dispo:  Short term rehab    FOLLOW UP APPOINTMENTS   *Follow-Up with PCP within 1-2 weeks following [facility] discharge. *Follow-Up with specialists as recommended. Future Appointments   Date Time Provider Maral Rivera   2/14/2024 10:20 AM Tate Chapa MD [facility] MELVIN [facility]     *Greater than 35 minutes spent w/ patient and staff, including but not limited to/ reviewing present status, needs, abilities with disciplines, reviewing medical records, vital signs, labs, completing med rec and entering orders to establish plan of care in [facility]. Note to patient: The [id number] makes medical notes like these available to patients in the interest of transparency. However, this is a medical document intended as peer to peer communication.  It is written in medical language and may contain abbreviations or verbiage that are unfamiliar. It may appear blunt or direct. Medical documents are intended to carry relevant information, facts as evident, and the clinical opinion of the practitioner who signs the document.     Dejon Grijalva, HYACINTH  11/30/23  4pm   [facility] Post Acute Care Team

## 2023-12-05 ENCOUNTER — HOSPITAL ENCOUNTER (OUTPATIENT)
Facility: HOSPITAL | Age: 78
Setting detail: OBSERVATION
Discharge: NURSING, REHAB OR LONG-TERM CARE | End: 2023-12-07
Attending: EMERGENCY MEDICINE | Admitting: HOSPITALIST
Payer: MEDICARE

## 2023-12-05 ENCOUNTER — APPOINTMENT (OUTPATIENT)
Dept: GENERAL RADIOLOGY | Facility: HOSPITAL | Age: 78
End: 2023-12-05
Attending: EMERGENCY MEDICINE
Payer: MEDICARE

## 2023-12-05 ENCOUNTER — APPOINTMENT (OUTPATIENT)
Dept: CT IMAGING | Facility: HOSPITAL | Age: 78
End: 2023-12-05
Attending: EMERGENCY MEDICINE
Payer: MEDICARE

## 2023-12-05 ENCOUNTER — SNF VISIT (OUTPATIENT)
Dept: INTERNAL MEDICINE CLINIC | Age: 78
End: 2023-12-05

## 2023-12-05 DIAGNOSIS — R12 CHRONIC HEARTBURN: ICD-10-CM

## 2023-12-05 DIAGNOSIS — E87.6 HYPOKALEMIA: Primary | ICD-10-CM

## 2023-12-05 DIAGNOSIS — R13.10 DYSPHAGIA, UNSPECIFIED TYPE: ICD-10-CM

## 2023-12-05 DIAGNOSIS — E86.0 DEHYDRATION: ICD-10-CM

## 2023-12-05 DIAGNOSIS — E87.3 METABOLIC ALKALOSIS: ICD-10-CM

## 2023-12-05 DIAGNOSIS — R60.0 LOCALIZED EDEMA: ICD-10-CM

## 2023-12-05 DIAGNOSIS — Z98.890 HISTORY OF CRANIOTOMY: ICD-10-CM

## 2023-12-05 DIAGNOSIS — R41.0 DELIRIUM: Primary | ICD-10-CM

## 2023-12-05 DIAGNOSIS — I63.9 RIGHT-SIDED CEREBROVASCULAR ACCIDENT (CVA) (HCC): ICD-10-CM

## 2023-12-05 DIAGNOSIS — R29.898 SEVERE MUSCLE DECONDITIONING: ICD-10-CM

## 2023-12-05 DIAGNOSIS — R53.83 LETHARGY: ICD-10-CM

## 2023-12-05 DIAGNOSIS — I62.9 INTRACRANIAL HEMORRHAGE (HCC): ICD-10-CM

## 2023-12-05 PROBLEM — R73.9 HYPERGLYCEMIA: Status: ACTIVE | Noted: 2023-12-05

## 2023-12-05 LAB
ALBUMIN SERPL-MCNC: 1.4 G/DL (ref 3.4–5)
ALBUMIN/GLOB SERPL: 0.3 {RATIO} (ref 1–2)
ALP LIVER SERPL-CCNC: 83 U/L
ALT SERPL-CCNC: 10 U/L
ANION GAP SERPL CALC-SCNC: 6 MMOL/L (ref 0–18)
AST SERPL-CCNC: 20 U/L (ref 15–37)
BASOPHILS # BLD AUTO: 0.04 X10(3) UL (ref 0–0.2)
BASOPHILS NFR BLD AUTO: 0.4 %
BILIRUB SERPL-MCNC: 0.3 MG/DL (ref 0.1–2)
BILIRUB UR QL STRIP.AUTO: NEGATIVE
BUN BLD-MCNC: 14 MG/DL (ref 9–23)
CALCIUM BLD-MCNC: 8.7 MG/DL (ref 8.5–10.1)
CHLORIDE SERPL-SCNC: 99 MMOL/L (ref 98–112)
CLARITY UR REFRACT.AUTO: CLEAR
CO2 SERPL-SCNC: 34 MMOL/L (ref 21–32)
COLOR UR AUTO: YELLOW
CREAT BLD-MCNC: 0.81 MG/DL
EGFRCR SERPLBLD CKD-EPI 2021: 73 ML/MIN/1.73M2 (ref 60–?)
EOSINOPHIL # BLD AUTO: 0.06 X10(3) UL (ref 0–0.7)
EOSINOPHIL NFR BLD AUTO: 0.6 %
ERYTHROCYTE [DISTWIDTH] IN BLOOD BY AUTOMATED COUNT: 14.3 %
GLOBULIN PLAS-MCNC: 4.9 G/DL (ref 2.8–4.4)
GLUCOSE BLD-MCNC: 125 MG/DL (ref 70–99)
GLUCOSE UR STRIP.AUTO-MCNC: NORMAL MG/DL
HCT VFR BLD AUTO: 41.6 %
HGB BLD-MCNC: 13.9 G/DL
HYALINE CASTS #/AREA URNS AUTO: PRESENT /LPF
IMM GRANULOCYTES # BLD AUTO: 0.07 X10(3) UL (ref 0–1)
IMM GRANULOCYTES NFR BLD: 0.7 %
LEUKOCYTE ESTERASE UR QL STRIP.AUTO: NEGATIVE
LYMPHOCYTES # BLD AUTO: 1.56 X10(3) UL (ref 1–4)
LYMPHOCYTES NFR BLD AUTO: 16 %
MAGNESIUM SERPL-MCNC: 2 MG/DL (ref 1.6–2.6)
MCH RBC QN AUTO: 30.8 PG (ref 26–34)
MCHC RBC AUTO-ENTMCNC: 33.4 G/DL (ref 31–37)
MCV RBC AUTO: 92 FL
MONOCYTES # BLD AUTO: 1.23 X10(3) UL (ref 0.1–1)
MONOCYTES NFR BLD AUTO: 12.6 %
NEUTROPHILS # BLD AUTO: 6.8 X10 (3) UL (ref 1.5–7.7)
NEUTROPHILS # BLD AUTO: 6.8 X10(3) UL (ref 1.5–7.7)
NEUTROPHILS NFR BLD AUTO: 69.7 %
NITRITE UR QL STRIP.AUTO: NEGATIVE
OSMOLALITY SERPL CALC.SUM OF ELEC: 290 MOSM/KG (ref 275–295)
PH UR STRIP.AUTO: 6 [PH] (ref 5–8)
PLATELET # BLD AUTO: 151 10(3)UL (ref 150–450)
POTASSIUM SERPL-SCNC: 3.2 MMOL/L (ref 3.5–5.1)
PROT SERPL-MCNC: 6.3 G/DL (ref 6.4–8.2)
PROT UR STRIP.AUTO-MCNC: 50 MG/DL
RBC # BLD AUTO: 4.52 X10(6)UL
RBC UR QL AUTO: NEGATIVE
SODIUM SERPL-SCNC: 139 MMOL/L (ref 136–145)
SP GR UR STRIP.AUTO: 1.03 (ref 1–1.03)
UROBILINOGEN UR STRIP.AUTO-MCNC: NORMAL MG/DL
VALPROATE SERPL-MCNC: 102.5 UG/ML (ref 50–100)
WBC # BLD AUTO: 9.8 X10(3) UL (ref 4–11)

## 2023-12-05 PROCEDURE — 70450 CT HEAD/BRAIN W/O DYE: CPT | Performed by: EMERGENCY MEDICINE

## 2023-12-05 PROCEDURE — 3079F DIAST BP 80-89 MM HG: CPT | Performed by: NURSE PRACTITIONER

## 2023-12-05 PROCEDURE — 1159F MED LIST DOCD IN RCRD: CPT | Performed by: HOSPITALIST

## 2023-12-05 PROCEDURE — 99223 1ST HOSP IP/OBS HIGH 75: CPT | Performed by: HOSPITALIST

## 2023-12-05 PROCEDURE — 99310 SBSQ NF CARE HIGH MDM 45: CPT | Performed by: NURSE PRACTITIONER

## 2023-12-05 PROCEDURE — 1111F DSCHRG MED/CURRENT MED MERGE: CPT | Performed by: HOSPITALIST

## 2023-12-05 PROCEDURE — 3074F SYST BP LT 130 MM HG: CPT | Performed by: HOSPITALIST

## 2023-12-05 PROCEDURE — 3078F DIAST BP <80 MM HG: CPT | Performed by: HOSPITALIST

## 2023-12-05 PROCEDURE — 1111F DSCHRG MED/CURRENT MED MERGE: CPT | Performed by: NURSE PRACTITIONER

## 2023-12-05 PROCEDURE — 71045 X-RAY EXAM CHEST 1 VIEW: CPT | Performed by: EMERGENCY MEDICINE

## 2023-12-05 PROCEDURE — 3075F SYST BP GE 130 - 139MM HG: CPT | Performed by: NURSE PRACTITIONER

## 2023-12-05 RX ORDER — ACETAMINOPHEN 160 MG/5ML
15 SOLUTION ORAL ONCE
Status: COMPLETED | OUTPATIENT
Start: 2023-12-05 | End: 2023-12-05

## 2023-12-05 RX ORDER — POTASSIUM CHLORIDE 20 MEQ/1
40 TABLET, EXTENDED RELEASE ORAL ONCE
Status: COMPLETED | OUTPATIENT
Start: 2023-12-05 | End: 2023-12-05

## 2023-12-05 RX ORDER — NICOTINE POLACRILEX 4 MG
15 LOZENGE BUCCAL
Status: DISCONTINUED | OUTPATIENT
Start: 2023-12-05 | End: 2023-12-07

## 2023-12-05 RX ORDER — SODIUM CHLORIDE 9 MG/ML
1000 INJECTION, SOLUTION INTRAVENOUS ONCE
Status: COMPLETED | OUTPATIENT
Start: 2023-12-05 | End: 2023-12-06

## 2023-12-05 RX ORDER — NICOTINE POLACRILEX 4 MG
30 LOZENGE BUCCAL
Status: DISCONTINUED | OUTPATIENT
Start: 2023-12-05 | End: 2023-12-07

## 2023-12-05 RX ORDER — DEXTROSE MONOHYDRATE 25 G/50ML
50 INJECTION, SOLUTION INTRAVENOUS
Status: DISCONTINUED | OUTPATIENT
Start: 2023-12-05 | End: 2023-12-07

## 2023-12-05 RX ORDER — INSULIN ASPART 100 [IU]/ML
INJECTION, SOLUTION INTRAVENOUS; SUBCUTANEOUS
Status: DISCONTINUED | OUTPATIENT
Start: 2023-12-06 | End: 2023-12-06 | Stop reason: SDUPTHER

## 2023-12-05 NOTE — ED INITIAL ASSESSMENT (HPI)
Initial SW/CM Assessment/Plan of Care Note     Baseline Assessment  88 year old admitted 10/24/2020 as Inpatient with a diagnosis of PE.   Prior to admission patient was living with Alone and residing at House. Patient’s Primary Care Provider is Donna Herzog MD.     Medical History  Past Medical History:   Diagnosis Date   • Abdominal discomfort    • Dyslipidemia    • Hypertension    • Lung cancer (CMS/HCC)    • Lung cancer (CMS/HCC)    • Malignant neoplasm (CMS/HCC)        Prior to Admission Status  Functional Status  Ambulation: Independent/Self  Bathing: Independent/Self  Dressing: Independent/Self  Toileting: Independent/Self  Meal Preparation: Independent/Self  Shopping: Independent/Self  Medication Preparation: Independent/Self  Medication Administration: Independent/Self  Housekeeping: Independent/Self  Laundry: Independent/Self  Laundry Location: Other Level  Transportation: Independent/Self    Agency/Support  Type of Services Prior to Hospitalization: None  Support Systems: Family members  Home Devices/Equipment: None  Mobility Assist Devices: None  Sensory Support Devices: Eyeglasses    Current Status  PT Ambulation Tips:    PT Transfer Tips:     OT Bathing Tips:    OT Dressing Tips:    OT Toileting Tips:    OT Feeding Tips:    SLP Swallow/Feeding Tips:    SLP Comm/Cog Tips:    Current Mental Status: Cooperative, Pleasant  Stressors:      Insurance  Primary: MEDICARE  Secondary: BLUE [facility]    Barriers to Discharge  Identified Barriers to Discharge/Transition Planning:      Progress Note  Patient admitted with pain and shortness of breath; has a PE.  She is 88 yrs old and lives alone.  She drives and is independent. No dme or HH at present. Plan is home at [place].    Plan  SW/CM - Recommendations for Discharge:     PT - Recommendations for Discharge:      OT - Recommendations for Discharge:      SLP - Recommendations for Discharge:     Anticipate patient will need post-hospital services.  Pt arrives via EMS from [address] of abnormal labs. Pt had labs drawn this morning that showed a Potassium of 2.6. Pt aox1 per her baseline. Necessary services are available.  Anticipate patient can return to the environment from which patient entered the hospital.   Anticipate patient can provide self-care at discharge.    Refer to / Flowsheet for Goals and objective data.

## 2023-12-06 VITALS
WEIGHT: 150 LBS | RESPIRATION RATE: 18 BRPM | BODY MASS INDEX: 30 KG/M2 | SYSTOLIC BLOOD PRESSURE: 134 MMHG | DIASTOLIC BLOOD PRESSURE: 84 MMHG | OXYGEN SATURATION: 97 % | HEART RATE: 78 BPM | TEMPERATURE: 97 F

## 2023-12-06 PROBLEM — E87.3 METABOLIC ALKALOSIS: Status: ACTIVE | Noted: 2023-12-06

## 2023-12-06 PROBLEM — A49.8 CLOSTRIDIUM DIFFICILE INFECTION: Status: ACTIVE | Noted: 2023-12-06

## 2023-12-06 PROBLEM — E86.0 DEHYDRATION: Status: ACTIVE | Noted: 2023-12-06

## 2023-12-06 LAB
ATRIAL RATE: 117 BPM
C DIFF GDH + TOXINS A+B STL QL IA.RAPID: NOT DETECTED
C DIFF TOX B STL QL: POSITIVE
GLUCOSE BLD-MCNC: 113 MG/DL (ref 70–99)
GLUCOSE BLD-MCNC: 114 MG/DL (ref 70–99)
GLUCOSE BLD-MCNC: 123 MG/DL (ref 70–99)
P AXIS: 64 DEGREES
P-R INTERVAL: 116 MS
Q-T INTERVAL: 324 MS
QRS DURATION: 56 MS
QTC CALCULATION (BEZET): 451 MS
R AXIS: 37 DEGREES
SARS-COV-2 RNA RESP QL NAA+PROBE: NOT DETECTED
T AXIS: 22 DEGREES
VENTRICULAR RATE: 117 BPM

## 2023-12-06 PROCEDURE — 99232 SBSQ HOSP IP/OBS MODERATE 35: CPT | Performed by: INTERNAL MEDICINE

## 2023-12-06 RX ORDER — SENNOSIDES 8.8 MG/5ML
17.6 LIQUID ORAL NIGHTLY PRN
Status: DISCONTINUED | OUTPATIENT
Start: 2023-12-06 | End: 2023-12-07

## 2023-12-06 RX ORDER — MELATONIN
3 NIGHTLY PRN
Status: DISCONTINUED | OUTPATIENT
Start: 2023-12-06 | End: 2023-12-07

## 2023-12-06 RX ORDER — ENEMA 19; 7 G/133ML; G/133ML
1 ENEMA RECTAL ONCE AS NEEDED
Status: DISCONTINUED | OUTPATIENT
Start: 2023-12-06 | End: 2023-12-07

## 2023-12-06 RX ORDER — METOCLOPRAMIDE HYDROCHLORIDE 5 MG/ML
10 INJECTION INTRAMUSCULAR; INTRAVENOUS EVERY 8 HOURS PRN
Status: DISCONTINUED | OUTPATIENT
Start: 2023-12-06 | End: 2023-12-07

## 2023-12-06 RX ORDER — ATORVASTATIN CALCIUM 80 MG/1
80 TABLET, FILM COATED ORAL DAILY
Status: DISCONTINUED | OUTPATIENT
Start: 2023-12-06 | End: 2023-12-07

## 2023-12-06 RX ORDER — ECHINACEA PURPUREA EXTRACT 125 MG
1 TABLET ORAL
Status: DISCONTINUED | OUTPATIENT
Start: 2023-12-06 | End: 2023-12-07

## 2023-12-06 RX ORDER — POTASSIUM CHLORIDE 1.5 G/1.58G
40 POWDER, FOR SOLUTION ORAL ONCE
Qty: 2 PACKET | Refills: 0 | Status: COMPLETED | OUTPATIENT
Start: 2023-12-06 | End: 2023-12-06

## 2023-12-06 RX ORDER — ENOXAPARIN SODIUM 100 MG/ML
40 INJECTION SUBCUTANEOUS DAILY
Status: DISCONTINUED | OUTPATIENT
Start: 2023-12-06 | End: 2023-12-07

## 2023-12-06 RX ORDER — ONDANSETRON 2 MG/ML
4 INJECTION INTRAMUSCULAR; INTRAVENOUS EVERY 6 HOURS PRN
Status: DISCONTINUED | OUTPATIENT
Start: 2023-12-06 | End: 2023-12-07

## 2023-12-06 RX ORDER — BISACODYL 10 MG
10 SUPPOSITORY, RECTAL RECTAL
Status: DISCONTINUED | OUTPATIENT
Start: 2023-12-06 | End: 2023-12-07

## 2023-12-06 RX ORDER — ACETAMINOPHEN 160 MG/5ML
1000 SOLUTION ORAL EVERY 4 HOURS PRN
Status: DISCONTINUED | OUTPATIENT
Start: 2023-12-06 | End: 2023-12-07

## 2023-12-06 RX ORDER — CALCIUM CARBONATE 500 MG/1
500 TABLET, CHEWABLE ORAL DAILY
Status: DISCONTINUED | OUTPATIENT
Start: 2023-12-06 | End: 2023-12-07

## 2023-12-06 RX ORDER — SUCRALFATE ORAL 1 G/10ML
1 SUSPENSION ORAL 2 TIMES DAILY
Status: DISCONTINUED | OUTPATIENT
Start: 2023-12-06 | End: 2023-12-07

## 2023-12-06 RX ORDER — POLYETHYLENE GLYCOL 3350 17 G/17G
17 POWDER, FOR SOLUTION ORAL DAILY PRN
Status: DISCONTINUED | OUTPATIENT
Start: 2023-12-06 | End: 2023-12-07

## 2023-12-06 RX ORDER — LANSOPRAZOLE 30 MG/1
30 TABLET, ORALLY DISINTEGRATING, DELAYED RELEASE ORAL
Status: DISCONTINUED | OUTPATIENT
Start: 2023-12-06 | End: 2023-12-07

## 2023-12-06 RX ORDER — VANCOMYCIN HYDROCHLORIDE 50 MG/ML
125 KIT ORAL EVERY 6 HOURS
Status: DISCONTINUED | OUTPATIENT
Start: 2023-12-06 | End: 2023-12-06 | Stop reason: SDUPTHER

## 2023-12-06 RX ORDER — SODIUM CHLORIDE 9 MG/ML
INJECTION, SOLUTION INTRAVENOUS CONTINUOUS
Status: ACTIVE | OUTPATIENT
Start: 2023-12-06 | End: 2023-12-06

## 2023-12-06 RX ORDER — VANCOMYCIN HYDROCHLORIDE 50 MG/ML
125 KIT ORAL EVERY 6 HOURS
Status: DISCONTINUED | OUTPATIENT
Start: 2023-12-06 | End: 2023-12-07

## 2023-12-06 RX ORDER — SODIUM CHLORIDE, SODIUM LACTATE, POTASSIUM CHLORIDE, CALCIUM CHLORIDE 600; 310; 30; 20 MG/100ML; MG/100ML; MG/100ML; MG/100ML
INJECTION, SOLUTION INTRAVENOUS CONTINUOUS
Status: ACTIVE | OUTPATIENT
Start: 2023-12-06 | End: 2023-12-06

## 2023-12-06 RX ORDER — ELECTROLYTES/DEXTROSE
SOLUTION, ORAL ORAL DAILY
Status: DISCONTINUED | OUTPATIENT
Start: 2023-12-06 | End: 2023-12-06

## 2023-12-06 NOTE — SLP NOTE
ADULT SWALLOWING EVALUATION    ASSESSMENT    ASSESSMENT/OVERALL IMPRESSION:  Patient seen for swallowing evaluation as she has a history of dysphagia with altered diet consistency. Patient admitted due to abnormal labs. She is well known to this department from previous hospitalizations. She was admitted in October of this year with right frontal ICH/SAH. She underwent PEG placement due to dysphagia and mental status. She was able to participate in VFSS as her mental status improved and she was recommended to consume puree solids and mildly thick liquids. There was no tracheal aspiration noted on aforementioned VFSS with any consistency (including thin liquids). Diet recommendation at that time was cautious given extent of deficits and lethargy. Of note, she demonstrated significant oral deficits at that time which impacted both swallowing and motor speech. Today, patient fully alert, continues to have right visual gaze preference but her motor speech is significantly improved. She is 100% intelligible. Oral mechanism exam remarkable for mild reduction in left facial tone, strength and ROM. She is cooperative and motivated. Patient with intact oral retrieval and containment with all po trials (puree, soft solid, mildly thick liquids and thin liquids). Patient with increased oral prep and transit time but with complete oral clearance. Laryngeal excursion judged to be of adequate strength but questionable timeliness given noted increased oral prep and transit time. There were no overt signs of aspiration with patient self presenting soft solid trials, thin and mildly thick liquids by cup and SLP presenting puree. When self presenting liquids, patient did well with independently self presenting single sips though SLP did support cup when patient was self presenting in order to avoid potential spillage.      Recommend patient advance to soft and bite size solid and thin liquids bu cup only with assistance with feeding as needed. Patient should be upright for all po and remind for slow rate, small bites and sips and alternating consistencies as needed. Should patient exhibit any overt signs of aspiration with thin liquids, may resume mildly thick liquids. Discussed above with patient and RN. Will continue to follow. RECOMMENDATIONS   Diet Recommendations - Solids: Mechanical soft chopped/ Soft & Bite Sized (supervise and assist with feeding as needed)  Diet Recommendations - Liquids: Thin Liquids (no straw, single sips)                        Compensatory Strategies Recommended: No straws; Slow rate;Small bites and sips; Alternate consistencies  Aspiration Precautions: Upright position; Slow rate;Small bites and sips; No straw  Medication Administration Recommendations: Crushed in puree  Treatment Plan/Recommendations: Dysphagia therapy; Aspiration precautions  Discharge Recommendations/Plan: Undetermined    HISTORY   MEDICAL HISTORY  Reason for Referral: Altered diet consistency    Problem List  Principal Problem:    Delirium  Active Problems:    Hyperglycemia    Dehydration    Metabolic alkalosis      Past Medical History  Past Medical History:   Diagnosis Date    Anxiety     Breast CA ([facility] [address] 2004    CANCER     lft breast lumpectomy pre cancerous cells stage 0    Diabetes mellitus ([facility] [address]     Diarrhea, unspecified     Ductal carcinoma in situ of breast 2004    left breast    Flatulence/gas pain/belching     GERD     H/O infectious mononucleosis     diagnosed in 1960s    High cholesterol     Hypertension     IBS (irritable bowel syndrome)     Osteoporosis     Other and unspecified hyperlipidemia     Personal history of irradiation, presenting hazards to health 01/01/2004    mammosite    Sleep disturbance     Stool incontinence     Stroke ([facility] [address]     Type II or unspecified type diabetes mellitus without mention of complication, not stated as uncontrolled     Wears glasses        Prior Living Situation: Skilled nursing facility  Diet Prior to Admission: Puree;Nectar thick liquids/ Mildly thick  Precautions: Aspiration    Patient/Family Goals: to get better    SWALLOWING HISTORY  Current Diet Consistency: NPO  Dysphagia History: as above  Imaging Results:   Brain CT from 12/5/23 revealed:  CONCLUSION:  Stable chronic changes within the right cerebral hemisphere with right frontal encephalomalacia from old infarct or hemorrhage. No acute intracranial process. LOCATION:  [facility]         Dictated by (CST): Hali Heredia MD on 12/05/2023 at 9:02 PM       Finalized by (CST): Hali Heredia MD on 12/05/2023 at 9:06 PM     CXR from 12/5/23 revealed:  CONCLUSION:  Minimal pleural scarring or effusion at the left lung base. LOCATION:  [facility]                  Dictated by (CST): Hali Heredia MD on 12/05/2023 at 7:59 PM       Finalized by (CST): Hali Heredia MD on 12/05/2023 at 8:00 PM     SUBJECTIVE       OBJECTIVE   ORAL MOTOR EXAMINATION  Dentition: Functional  Symmetry: Reduced left facial  Strength: Reduced left facial  Tone: Reduced left facial  Range of Motion: Reduced left facial  Rate of Motion: Reduced    Voice Quality: Clear  Respiratory Status: Unlabored  Consistencies Trialed: Thin liquids; Nectar thick liquids;Puree; Soft solid  Method of Presentation: Self presentation;Staff/Clinician assistance;Cup;Single sips  Patient Positioning: Upright;Midline (in bed)    Oral Phase of Swallow: Impaired  Bolus Retrieval: Intact  Bilabial Seal: Intact  Bolus Formation: Intact  Bolus Propulsion: Impaired     Retention: Intact    Pharyngeal Phase of Swallow: Impaired  Laryngeal Elevation Timing: Appears impaired  Laryngeal Elevation Strength: Appears intact  Laryngeal Elevation Coordination: Appears intact  (Please note: Silent aspiration cannot be evaluated clinically.  Videofluoroscopic Swallow Study is required to rule-out silent aspiration.)    Esophageal Phase of Swallow: No complaints consistent with possible esophageal involvement              GOALS  Goal #1 The patient will tolerate soft and bite side consistency and thin liquids without overt signs or symptoms of aspiration with 100 % accuracy over 2-3 session(s). In Progress   Goal #2 The patient/family/caregiver will demonstrate understanding and implementation of aspiration precautions and swallow strategies independently over 2-3 session(s). In Progress   Goal #3 The patient will tolerate trial upgrade of soft/regular consistency and NA liquids without overt signs or symptoms of aspiration with 95 % accuracy over 1-2 session(s). In Progress     FOLLOW UP  Treatment Plan/Recommendations: Dysphagia therapy; Aspiration precautions  Number of Visits to Meet Established Goals: 2  Follow Up Needed (Documentation Required): Yes  SLP Follow-up Date: 12/07/23    Thank you for your referral.   If you have any questions, please contact Slim Michael   Pager [phone]

## 2023-12-06 NOTE — PLAN OF CARE
Patient is alert and orientated 2-3, VSS, RA, afebrile and complained of pain stomach and left arm. Removed arm band, gave ice pack and tylenol. Speech evaluation done and patient is ok toe soft, small bite diet and clear liquid diet. Patient is also on tube feedings overnight. All meds given per G-tube or if liquid can give oral, if ok with patient. Call light and safety precautions in place. Problem: Diabetes/Glucose Control  Goal: Glucose maintained within prescribed range  Description: INTERVENTIONS:  - Monitor Blood Glucose as ordered  - Assess for signs and symptoms of hyperglycemia and hypoglycemia  - Administer ordered medications to maintain glucose within target range  - Assess barriers to adequate nutritional intake and initiate nutrition consult as needed  - Instruct patient on self management of diabetes  Outcome: Progressing     Problem: Patient/Family Goals  Goal: Patient/Family Long Term Goal  Description: Patient's Long Term Goal:    Interventions:    - See additional Care Plan goals for specific interventions  Outcome: Progressing  Goal: Patient/Family Short Term Goal  Description: Patient's Short Term Goal:     Interventions:     - See additional Care Plan goals for specific interventions  Outcome: Progressing     Problem: SAFETY ADULT - FALL  Goal: Free from fall injury  Description: INTERVENTIONS:  - Assess pt frequently for physical needs  - Identify cognitive and physical deficits and behaviors that affect risk of falls.   - [place] fall precautions as indicated by assessment.  - Educate pt/family on patient safety including physical limitations  - Instruct pt to call for assistance with activity based on assessment  - Modify environment to reduce risk of injury  - Provide assistive devices as appropriate  - Consider OT/PT consult to assist with strengthening/mobility  - Encourage toileting schedule  Outcome: Progressing

## 2023-12-06 NOTE — PLAN OF CARE
Patient admitted from [facility] for abnormal labs, K2.6, in ED K 3.2, replaced. Patient is alert and oriented to person, place, and time, forgetful. Ivf infusing for 10 hrs. Patients presents with PEG tube, nutrition consulted for tube feedings. Patient is bed bound, denies pain or SOB. Vitals stable, no fever overnight. Safety precautions in place, plan of care ongoing. Problem: Diabetes/Glucose Control  Goal: Glucose maintained within prescribed range  Description: INTERVENTIONS:  - Monitor Blood Glucose as ordered  - Assess for signs and symptoms of hyperglycemia and hypoglycemia  - Administer ordered medications to maintain glucose within target range  - Assess barriers to adequate nutritional intake and initiate nutrition consult as needed  - Instruct patient on self management of diabetes  Outcome: Progressing     Problem: SAFETY ADULT - FALL  Goal: Free from fall injury  Description: INTERVENTIONS:  - Assess pt frequently for physical needs  - Identify cognitive and physical deficits and behaviors that affect risk of falls.   - [place] fall precautions as indicated by assessment.  - Educate pt/family on patient safety including physical limitations  - Instruct pt to call for assistance with activity based on assessment  - Modify environment to reduce risk of injury  - Provide assistive devices as appropriate  - Consider OT/PT consult to assist with strengthening/mobility  - Encourage toileting schedule  Outcome: Progressing

## 2023-12-06 NOTE — CM/SW NOTE
SW noted that patient admitted from [address] [place] [address]. SW confirmed with patient's brother that the plan is for her to return there at DC. SW sent updated clinical information to [address] [place] [address] to confirm that they are able to re-accept patient. SW will continue to follow for plan of care changes and remain available for any additional DC needs or concerns.      Clarita Gee [facility]  Discharge Planner   [id number]

## 2023-12-06 NOTE — ED QUICK NOTES
Rounding Completed. Report from [facility]. Pt resting in bed, friend at bedside. She is A/Ox1-2    Plan of Care reviewed. Waiting for results. Elimination needs assessed. Provided updates. Bed is locked and in lowest position. Call light within reach.

## 2023-12-06 NOTE — ED QUICK NOTES
Orders for admission, patient is aware of plan and ready to go upstairs. Any questions, please call ED MARCUS Munoz at extension [phone].      Patient Covid vaccination status: Fully vaccinated     COVID Test Ordered in ED: None    COVID Suspicion at Admission: N/A    Running Infusions:      Mental Status/LOC at time of transport: A/Ox1-2    Other pertinent information:   CIWA score: N/A   NIH score:  N/A

## 2023-12-06 NOTE — PHYSICAL THERAPY NOTE
PHYSICAL THERAPY EVALUATION - INPATIENT     Room Number: 407/407-A  Evaluation Date: 12/6/2023  Type of Evaluation: Initial  Physician Order: PT Eval and Treat    Presenting Problem: delirium  Co-Morbidities : anxiety, BRCA, HTN, DM, GERD  Reason for Therapy: Mobility Dysfunction and Discharge Planning    History related to current admission: Patient is a [de-identified]year old female admitted on 12/5/2023: Presents with hypokalemia with acute delirium dx dehydration. Previous Admits    11/20-11/24/23: s/p PEG,from [facility] > [facility]  10/11-10/25/23: IPH s/p R frontal craniotomy (from home and [facility])       ASSESSMENT   In this PT evaluation, the patient presents with the following impairments 1) Consistent with documentation from previous admits pt demonstrated impaired LUE/LE strength, poor midline positioning/sitting balance, L inattention. 2) Pt disoriented to place and situation - re-oriented appropriately. Able to demonstrate STS transfer with raphael MARTINE reports that is what she has been working on at [place]. These impairments and comorbidities manifest themselves as functional limitations in independent bed mobility, transfers, and gait. The patient is below baseline and would benefit from skilled inpatient PT to address the above deficits to assist patient in returning to prior to level of function. Functional outcome measures completed include [facility]. The AM-PAC '6-Clicks' Inpatient Basic Mobility Short Form was completed and this patient is demonstrating a Approx Degree of Impairment: 81.38%  degree of impairment in mobility. Research supports that patients with this level of impairment may benefit from [facility]. DISCHARGE RECOMMENDATIONS  PT Discharge Recommendations: Sub-acute rehabilitation    PLAN  PT Treatment Plan: Bed mobility; Body mechanics; Coordination; Endurance; Energy conservation;Patient education; Family education;Gait training;Range of motion; Neuromuscular re-educate;Strengthening;Transfer training;Balance training  Rehab Potential : Good  Frequency (Obs): 3-5x/week  Number of Visits to Meet Established Goals: 5      CURRENT GOALS    Goal #1 Patient is able to demonstrate supine - sit EOB @ level: moderate assistance     Goal #2 Patient is able to demonstrate transfers Sit to/from Stand at assistance level: moderate assistance     Goal #3 Patient is able to  perform stand pivot transfer with mod A X2. Goal #4    Goal #5    Goal #6    Goal Comments: Goals established on 12/6/2023    HOME SITUATION  Type of Home: House (Pt admitted from [facility])   Home Layout: Two level                Lives With: Alone  Drives: Yes  Patient Owned Equipment: None       Prior Level of [place]: Pt reports she has been working on therapy performing UE and LE exercises in sitting and working on STS transfers. Last hospital admit she was performing SPT with assist x2 inconsistently. Prior to 10/2023 she was completely indep, living alone, and driving. SUBJECTIVE  \"Do you know why they brought me here? \"       OBJECTIVE  Precautions: Limb alert - left; Seizure;Bed/chair alarm;Colostomy  Fall Risk: High fall risk    WEIGHT BEARING RESTRICTION  Weight Bearing Restriction: None                PAIN ASSESSMENT  Rating: Unable to rate  Location: L hand  Management Techniques: Activity promotion; Body mechanics;Repositioning    COGNITION  Overall Cognitive Status:  Impaired  Orientation Level:  oriented to person, disoriented to place, disoriented to situation, and able to accurately state first and last name of her friend from [employer] who was present during session   Following Commands:  follows all commands and directions without difficulty    RANGE OF MOTION AND STRENGTH ASSESSMENT  Upper extremity ROM and strength are within functional limits except for the following:    LUE flaccid    Lower extremity ROM is within functional limits     Lower extremity strength is within functional limits on the R, below on the L -  able to perform toe flex/ext which she reports was previously unable to perform      BALANCE  Static Sitting: Poor +  Dynamic Sitting: Poor +  Static Standing: Poor -  Dynamic Standing: Not tested    ADDITIONAL TESTS                                    ACTIVITY TOLERANCE                         O2 WALK       NEUROLOGICAL FINDINGS                        AM-PAC '6-Clicks' INPATIENT SHORT FORM - BASIC MOBILITY  How much difficulty does the patient currently have. .. Patient Difficulty: Turning over in bed (including adjusting bedclothes, sheets and blankets)?: A Lot   Patient Difficulty: Sitting down on and standing up from a chair with arms (e.g., wheelchair, bedside commode, etc.): A Lot   Patient Difficulty: Moving from lying on back to sitting on the side of the bed?: A Lot   How much help from another person does the patient currently need. .. Help from Another: Moving to and from a bed to a chair (including a wheelchair)?: Total   Help from Another: Need to walk in hospital room?: Total   Help from Another: Climbing 3-5 steps with a railing?: Total       AM-PAC Score:  Raw Score: 9   Approx Degree of Impairment: 81.38%   Standardized Score (AM-PAC Scale): 30.55   CMS Modifier (G-Code): CM    FUNCTIONAL ABILITY STATUS  Gait Assessment   Functional Mobility/Gait Assessment  Gait Assistance: Not tested  Distance (ft): 0    Skilled Therapy Provided     Bed Mobility:  Rolling: mod A   Supine to sit: mod A x2   Sit to supine: mod A x2    Transfer Mobility:  Sit to stand: max A *first trial pt able to achieve partial stand with minimal clearance, 2nd trial with therapist more closely managing pts LUE in modified sling position pt able to perform full stand with significant L lateral lean therapist assisting with weight shift to midline.  Tearful after second transfer trial expressing frustration in deficits- emotional support provided    Stand to sit: mod A    Therapist's Comments: Pt initially demonstrating posterior lean after transfer to unsupported sitting EOB - participated in assisted sit ups x10 and able to achieve appropriate sitting balance fair (-) after exercise. Pt with L inattention and L lateral lean in sitting - addressed with cuing for multiplanar reaching outside of STEPHANIE with RUE, tactile shifts to midline with visual cuing to maintain ~50% improvement. Exercise/Education Provided:  Bed mobility  Body mechanics  Functional activity tolerated  Neuromuscular re-educate  Posture  Transfer training    Patient End of Session: In bed;Needs met;Call light within reach;RN aware of session/findings; All patient questions and concerns addressed; Alarm set; Family present      Patient Evaluation Complexity Level:  History Moderate - 1 or 2 personal factors and/or co-morbidities   Examination of body systems Moderate - addressing a total of 3 or more elements   Clinical Presentation Moderate - Evolving   Clinical Decision Making Moderate - Evolving       PT Session Time: 30 minutes  Gait Training:  minutes  Therapeutic Activity: 10 minutes  Neuromuscular Re-education:  minutes  Therapeutic Exercise: 5 minutes

## 2023-12-06 NOTE — ED QUICK NOTES
Pt incontinent of stool, (+) mod amount of loose stools. Pt kept clean and dry. New adult brief applied    Pt straight cath aseptically per policy with a return of 200 ml yvon urine.  Pt tolerated well

## 2023-12-06 NOTE — ED PROVIDER NOTES
Patient Seen in: [facility] Emergency Department      History     Chief Complaint   Patient presents with    Abnormal Result     Stated Complaint: Low potassium    Subjective:   HPI    Sent for low potassium from SNF   Has PEG tube  Recent admission for Peg pl;acement after Gtube bleeding and dislodgement   Not eating a tlot by mouth       Objective:   Past Medical History:   Diagnosis Date    Anxiety     Breast CA ([facility] [address] 2004    CANCER     lft breast lumpectomy pre cancerous cells stage 0    Diabetes mellitus ([facility] [address]     Diarrhea, unspecified     Ductal carcinoma in situ of breast 2004    left breast    Flatulence/gas pain/belching     GERD     H/O infectious mononucleosis     diagnosed in 1960s    High cholesterol     Hypertension     IBS (irritable bowel syndrome)     Osteoporosis     Other and unspecified hyperlipidemia     Personal history of irradiation, presenting hazards to health 01/01/2004    mammosite    Sleep disturbance     Stool incontinence     Stroke ([facility] [address]     Type II or unspecified type diabetes mellitus without mention of complication, not stated as uncontrolled     Wears glasses               Past Surgical History:   Procedure Laterality Date    APPENDECTOMY      1948    COLONOSCOPY      2005 hiatal hernia, sm internal hem    COLONOSCOPY  01/01/2005    fiberoptic    D & C      IR ANGIOGRAM CEREBRAL CAROTID BILATERAL  10/12/2023    LUMPECTOMY LEFT  01/01/2004    breast    OTHER SURGICAL HISTORY      lumpectomy 2004 lft breast stage 0    OTHER SURGICAL HISTORY  10/11/2023    CRANIOTOMY FOR RIGHT FRONTAL HEMORRHAGE    RADIATION LEFT      Mammosite    TONSILLECTOMY      1949    UPPER GI ENDOSCOPY,EXAM      01/01/2005                Social History     Socioeconomic History    Marital status:    Tobacco Use    Smoking status: Former     Packs/day: 1.00     Years: 20.00     Additional pack years: 0.00     Total pack years: 20.00     Types: Cigarettes     Quit date: 3/1/2006     Years since quittin.7    Smokeless tobacco: Never   Vaping Use    Vaping Use: Never used   Substance and Sexual Activity    Alcohol use: No     Alcohol/week: 0.0 standard drinks of alcohol    Drug use: No   Other Topics Concern    Caffeine Concern No     Comment: tries to drink decaf drinks    Exercise Yes     Comment: jazzercise 3x/wk, tennis 1-2x/wk     Social Determinants of Health     Food Insecurity: No Food Insecurity (2023)    Food Insecurity     Food Insecurity: Never true   Transportation Needs: No Transportation Needs (2023)    Transportation Needs     Lack of Transportation: No   Housing Stability: Low Risk  (2023)    Housing Stability     Housing Instability: No              Review of Systems    Positive for stated complaint: Low potassium  Other systems are as noted in HPI. Constitutional and vital signs reviewed. All other systems reviewed and negative except as noted above. Physical Exam     ED Triage Vitals [23 1612]   /89   Pulse 116   Resp (!) 27   Temp    Temp src    SpO2 96 %   O2 Device None (Room air)       Current:/90   Pulse 107   Resp 20   SpO2 96%         Physical Exam    ***      ED Course     Labs Reviewed   COMP METABOLIC PANEL (14) - Abnormal; Notable for the following components:       Result Value    Glucose 125 (*)     Potassium 3.2 (*)     CO2 34.0 (*)     ALT 10 (*)     Total Protein 6.3 (*)     Albumin 1.4 (*)     Globulin  4.9 (*)     A/G Ratio 0.3 (*)     All other components within normal limits   CBC W/ DIFFERENTIAL - Abnormal; Notable for the following components:    Monocyte Absolute 1.23 (*)     All other components within normal limits   MAGNESIUM - Normal   CBC WITH DIFFERENTIAL WITH PLATELET    Narrative: The following orders were created for panel order CBC With Differential With Platelet.   Procedure                               Abnormality         Status                     --------- -----------         ------                     CBC W/ DIFFERENTIAL[[id number]]          Abnormal            Final result                 Please view results for these tests on the individual orders. VALPROIC ACID, (DEPAKENE)   RAINBOW DRAW LAVENDER   RAINBOW DRAW LIGHT GREEN   RAINBOW DRAW BLUE   RAINBOW DRAW GOLD     EKG    Rate, intervals and axes as noted on EKG Report. Rate: ***  Rhythm: {EDW ED FDQJZI:331443}  Reading: ***                 ***         MDM      ***                                   MDM    Disposition and Plan     Clinical Impression:  No diagnosis found. Disposition:  There is no disposition on file for this visit. There is no disposition time on file for this visit. Follow-up:  No follow-up provider specified.         Medications Prescribed:  Current Discharge Medication List CBC W/ DIFFERENTIAL - Abnormal; Notable for the following components:    Monocyte Absolute 1.23 (*)     All other components within normal limits   MAGNESIUM - Normal   MAGNESIUM - Normal   POCT GLUCOSE - Normal   RAPID SARS-COV-2 BY PCR - Normal   C. DIFF QUICK CHECK BY EIA - Normal   CBC WITH DIFFERENTIAL WITH PLATELET    Narrative: The following orders were created for panel order CBC With Differential With Platelet. Procedure                               Abnormality         Status                     ---------                               -----------         ------                     CBC W/ DIFFERENTIAL[[id number]]          Abnormal            Final result                 Please view results for these tests on the individual orders. RAINBOW DRAW LAVENDER   RAINBOW DRAW LIGHT GREEN   RAINBOW DRAW BLUE   RAINBOW DRAW GOLD     EKG    Rate, intervals and axes as noted on EKG Report. Rate: 117  Rhythm: Sinus Rhythm  Reading: Sinus rhythm with tachycardia and multiple PACs that is an abnormal EKG rate has significantly increased intervals are normal            Appears dry, dehydrated     Chest x-ray is independently reviewed by myself shows fairly benign lung fields, mild cardiomegaly, no signs of pneumonia, no signs of large pleural effusion CT of the brain read done given the altered mental status    CT BRAIN OR HEAD (02073)   Final Result   PROCEDURE:  CT BRAIN OR HEAD ([id number])       COMPARISON:  [facility] , CT, CT BRAIN OR HEAD ([id number]), 11/20/2023, 2:42 PM.     [facility] , CT, CTA BRAIN + CTA CAROTIDS (PIT=[id number]), 11/13/2023, 3:07    PM.  [facility] , CT, CT BRAIN OR HEAD ([id number]), 10/22/2023, 1:23 AM.  [facility] ,    CT, CTA BRAIN + CTA CAROTIDS    (EUK=[id number]), 10/17/2023, 4:30 AM.       INDICATIONS:  Mental status changes. TECHNIQUE:  Noncontrast CT scanning is performed through the brain. Dose    reduction techniques were used.  Dose information is transmitted to the [facility]) [facility] ([address])    which includes the Dose Index    Registry. PATIENT STATED HISTORY: (As transcribed by Technologist)  Pt arrives via    EMS from [address] of abnormal labs. Pt had labs drawn this    morning that showed a Potassium of 2.6. Pt aox1 per her baseline. FINDINGS:     VENTRICLES/SULCI:  Ventricles and sulci are mildly prominent consistent    with atrophy. INTRACRANIAL:  There is confluent cortical and subcortical low attenuation    involving the right frontal lobe extending to the Broca's area consistent    area of old infarct or encephalomalacia change. Periventricular low    attenuation consistent small vessel    ischemic change. There is no evidence of acute hemorrhage, infarct, or    mass lesion. There is no mass effect. SINUSES:           No sign of acute sinusitis. MASTOIDS:          No sign of acute inflammation. SKULL:             No evidence for fracture or osseous abnormality. OTHER:             None.                         =====   CONCLUSION:  Stable chronic changes within the right cerebral hemisphere    with right frontal encephalomalacia from old infarct or hemorrhage. No    acute intracranial process. LOCATION:  [facility]           Dictated by (CST): Corinna Bell MD on 12/05/2023 at 9:02 PM        Finalized by (CST): Corinna Bell MD on 12/05/2023 at 9:06 PM         XR CHEST AP PORTABLE  (CPT=71045)   Final Result   PROCEDURE:  XR CHEST AP PORTABLE  (CPT=71045)       TECHNIQUE:  AP chest radiograph was obtained. COMPARISON:  [facility], XR CHEST AP PORTABLE  (CPT=[id number]), 11/20/2023,    2:53 PM.       INDICATIONS:  Low potassium       PATIENT STATED HISTORY: (As transcribed by Technologist)  Pt. with    difficulty breathing. FINDINGS:  Calcification in the region of the mitral annulus. No acute    airspace disease. Subsegmental atelectasis left lung base.   Mild pleural    blunting consistent scarring or effusion on the left.                         =====   CONCLUSION:  Minimal pleural scarring or effusion at the left lung base. LOCATION:  [facility]                       Dictated by (CST): Keith Newman MD on 12/05/2023 at 7:59 PM        Finalized by (CST): Keith Newman MD on 12/05/2023 at 8:00 PM                    MDM      In a fairly strange turn of events this 23-year-old was sent to the emergency department from the nursing facility where she is now residing after unfortunately having a fairly large stroke requiring a craniotomy. She was sent supposedly for a potassium of less than 3 but upon arrival patient's friend reports that this patient has a significant altered mental status from her baseline but normally she is talking and interactive and does not just normally hallucinate and with inappropriate speech and difficult to awaken. She states that even though she is not walking any longer and she is been really kind of depressed and downtrodden about that this is certainly an acute change in what the patient appears. To me the patient appears quite dehydrated. Differential diagnosis here would include rebleed intracranially, UTI, COVID-19, delirium from infectious source, ongoing seizures as patient is having these shuddering episodes occasionally that are not sure what to make of those. While the potassium is certainly not shocking, the presentation it is. Large workup ensued, blood work is fairly reassuring, IV fluids were started as patient is clinically dry appearing but she does have some point sort of dependent edema in her extremities. Depakote level ordered because the patient is taking Depakote for seizure prevention they were not aware of her having had seizures in the past.  Upon deep review of the medical chart I do not see where the patient has ever been described clinically as the patient that I am evaluating here.   She is generally interacting even since the stroke and the craniotomy with the care team.  Discussed this with the friends who are the patient's support system at bedside. Thankfully CT of the brain does not show any acute abnormalities but I do think that this patient is going to need ongoing workup here in the hospital to determine the etiology of this sudden delirious change. Family expressed understanding                                 Medical Decision Making      Disposition and Plan     Clinical Impression:  1. Delirium    2. Dehydration    3. Metabolic alkalosis         Disposition:  Admit  12/15/2023  3:25 pm    Follow-up:  Demetria Hull MD  [address] [place] [id number] [phone]    Follow up in 2 week(s)  Follow up          Medications Prescribed:  Discharge Medication List as of 12/7/2023 12:53 PM        START taking these medications    Details   vancomycin 50 mg/mL Oral Recon Soln 2.5 mL (125 mg total) by Per G Tube route every 6 (six) hours for 13 days. , Normal, Disp-130 mL, R-0Can be interchanged to oral capsules if suspension is not covered by [facility] Problems       Present on Admission  Date Reviewed: 12/11/2023            ICD-10-CM Noted POA    * (Principal) Delirium R41.0 12/5/2023 Unknown    Clostridium difficile infection A49.8 12/6/2023 Unknown    Dehydration E86.0 12/6/2023 Unknown    Hyperglycemia R73.9 45/9/3528 Yes    Metabolic alkalosis S87.9 37/1/2118 Unknown

## 2023-12-06 NOTE — DISCHARGE INSTRUCTIONS
Please give an ensure or equivalent three times daily and have nutrutionist evaluate dietary needs- will need significant supplementation through G-tube going forward    Potassium     Diet Recommendations - Solids: Mechanical soft chopped/ Soft & Bite Sized (supervise and assist with feeding as needed)  Diet Recommendations - Liquids: Thin Liquids (no straw, single sips)     Compensatory Strategies Recommended: No straws; Slow rate;Small bites and sips; Alternate consistencies  Aspiration Precautions: Upright position; Slow rate;Small bites and sips; No straw  Medication Administration Recommendations: Crushed in puree

## 2023-12-07 ENCOUNTER — INITIAL APN SNF VISIT (OUTPATIENT)
Dept: INTERNAL MEDICINE CLINIC | Age: 78
End: 2023-12-07

## 2023-12-07 VITALS
BODY MASS INDEX: 32 KG/M2 | DIASTOLIC BLOOD PRESSURE: 73 MMHG | WEIGHT: 159.5 LBS | SYSTOLIC BLOOD PRESSURE: 97 MMHG | HEART RATE: 91 BPM | OXYGEN SATURATION: 98 % | RESPIRATION RATE: 18 BRPM | TEMPERATURE: 97 F

## 2023-12-07 DIAGNOSIS — R60.0 LOCALIZED EDEMA: ICD-10-CM

## 2023-12-07 DIAGNOSIS — I63.9 RIGHT-SIDED CEREBROVASCULAR ACCIDENT (CVA) (HCC): ICD-10-CM

## 2023-12-07 DIAGNOSIS — R13.10 DYSPHAGIA, UNSPECIFIED TYPE: ICD-10-CM

## 2023-12-07 DIAGNOSIS — E87.6 HYPOKALEMIA: Primary | ICD-10-CM

## 2023-12-07 DIAGNOSIS — R19.7 DIARRHEA WITH DEHYDRATION: ICD-10-CM

## 2023-12-07 DIAGNOSIS — R41.0 DELIRIUM: ICD-10-CM

## 2023-12-07 DIAGNOSIS — A04.72 C. DIFFICILE DIARRHEA: ICD-10-CM

## 2023-12-07 DIAGNOSIS — Z98.890 HISTORY OF CRANIOTOMY: ICD-10-CM

## 2023-12-07 DIAGNOSIS — I62.9 INTRACRANIAL HEMORRHAGE (HCC): ICD-10-CM

## 2023-12-07 LAB
ANION GAP SERPL CALC-SCNC: 5 MMOL/L (ref 0–18)
BUN BLD-MCNC: 19 MG/DL (ref 9–23)
CALCIUM BLD-MCNC: 7.2 MG/DL (ref 8.5–10.1)
CHLORIDE SERPL-SCNC: 110 MMOL/L (ref 98–112)
CO2 SERPL-SCNC: 27 MMOL/L (ref 21–32)
CREAT BLD-MCNC: 0.55 MG/DL
EGFRCR SERPLBLD CKD-EPI 2021: 93 ML/MIN/1.73M2 (ref 60–?)
GLUCOSE BLD-MCNC: 166 MG/DL (ref 70–99)
GLUCOSE BLD-MCNC: 191 MG/DL (ref 70–99)
GLUCOSE BLD-MCNC: 98 MG/DL (ref 70–99)
MAGNESIUM SERPL-MCNC: 1.7 MG/DL (ref 1.6–2.6)
OSMOLALITY SERPL CALC.SUM OF ELEC: 301 MOSM/KG (ref 275–295)
POTASSIUM SERPL-SCNC: 3.3 MMOL/L (ref 3.5–5.1)
POTASSIUM SERPL-SCNC: 3.3 MMOL/L (ref 3.5–5.1)
SODIUM SERPL-SCNC: 142 MMOL/L (ref 136–145)

## 2023-12-07 PROCEDURE — 3074F SYST BP LT 130 MM HG: CPT | Performed by: INTERNAL MEDICINE

## 2023-12-07 PROCEDURE — 1160F RVW MEDS BY RX/DR IN RCRD: CPT | Performed by: NURSE PRACTITIONER

## 2023-12-07 PROCEDURE — 3078F DIAST BP <80 MM HG: CPT | Performed by: INTERNAL MEDICINE

## 2023-12-07 PROCEDURE — 99239 HOSP IP/OBS DSCHRG MGMT >30: CPT | Performed by: INTERNAL MEDICINE

## 2023-12-07 PROCEDURE — 3074F SYST BP LT 130 MM HG: CPT | Performed by: NURSE PRACTITIONER

## 2023-12-07 PROCEDURE — 1159F MED LIST DOCD IN RCRD: CPT | Performed by: NURSE PRACTITIONER

## 2023-12-07 PROCEDURE — 99310 SBSQ NF CARE HIGH MDM 45: CPT | Performed by: NURSE PRACTITIONER

## 2023-12-07 PROCEDURE — 3078F DIAST BP <80 MM HG: CPT | Performed by: NURSE PRACTITIONER

## 2023-12-07 RX ORDER — VANCOMYCIN HYDROCHLORIDE 50 MG/ML
125 KIT ORAL EVERY 6 HOURS
Qty: 130 ML | Refills: 0 | Status: SHIPPED | OUTPATIENT
Start: 2023-12-07 | End: 2023-12-20

## 2023-12-07 RX ORDER — POTASSIUM CHLORIDE 20 MEQ/1
40 TABLET, EXTENDED RELEASE ORAL ONCE
Status: COMPLETED | OUTPATIENT
Start: 2023-12-07 | End: 2023-12-07

## 2023-12-07 RX ORDER — MAGNESIUM OXIDE 400 MG/1
400 TABLET ORAL ONCE
Status: COMPLETED | OUTPATIENT
Start: 2023-12-07 | End: 2023-12-07

## 2023-12-07 NOTE — SLP NOTE
SPEECH DAILY NOTE - INPATIENT    ASSESSMENT & PLAN   ASSESSMENT  Pt seen for dysphagia tx to assess tolerance with recommended diet, ensure proper utilization of aspiration precautions and provide pt/family education. Patient alert and up in bed with staff beginning to feed her breakfast. Patient with good oral retrieval and containment with all po trials. Mastication was a bit prolonged but adequate. Patient did continue to chew on a small bit of food though suspect this is more behavioral rather than due to muscular weakness or dysfunction. She was able to clear with liquid assist. Patient with limited po intake of both solids and liquids but with good overall overt tolerance with po she did accept. Current diet is appropriate though she would benefit from SLP to continue to follow upon return to discharge facility. Diet Recommendations - Solids: Mechanical soft chopped/ Soft & Bite Sized (supervise and assist with feeding as needed)  Diet Recommendations - Liquids: Thin Liquids (no straw, single sips)    Compensatory Strategies Recommended: No straws; Slow rate;Small bites and sips; Alternate consistencies  Aspiration Precautions: Upright position; Slow rate;Small bites and sips; No straw  Medication Administration Recommendations: Crushed in puree    Patient Experiencing Pain: No                Discharge Recommendations  Discharge Recommendations/Plan: Undetermined    Treatment Plan  Treatment Plan/Recommendations:  (SLP to continue to follow at [facility])    Interdisciplinary Communication: Disussed with other staff            GOALS  Goal #1 The patient will tolerate soft and bite side consistency and thin liquids without overt signs or symptoms of aspiration with 100 % accuracy over 2-3 session(s). Met   Goal #2 The patient/family/caregiver will demonstrate understanding and implementation of aspiration precautions and swallow strategies independently over 2-3 session(s).      Met   Goal #3 The patient will tolerate trial upgrade of soft/regular consistency and NA liquids without overt signs or symptoms of aspiration with 95 % accuracy over 1-2 session(s).   Deferred     FOLLOW UP  Follow Up Needed (Documentation Required): No (note planned discharge later today)  SLP Follow-up Date: 12/07/23  Number of Visits to Meet Established Goals: 2    Session: 1 of 2    If you have any questions, please contact Osbaldo Michael   Pager [phone]

## 2023-12-07 NOTE — PLAN OF CARE
Patient alert and oriented x3, on RA, vitals stable, no fever overnight. Still having diarrhea, on TF x 13 hrs at night. Patient very anxious overnight, complaining of back and neck pain, Tylenol given. Patient changed and repositioned, no new complaints overnight. Safety precautions in place, plan of care ongoing. Problem: Diabetes/Glucose Control  Goal: Glucose maintained within prescribed range  Description: INTERVENTIONS:  - Monitor Blood Glucose as ordered  - Assess for signs and symptoms of hyperglycemia and hypoglycemia  - Administer ordered medications to maintain glucose within target range  - Assess barriers to adequate nutritional intake and initiate nutrition consult as needed  - Instruct patient on self management of diabetes  Outcome: Progressing     Problem: SAFETY ADULT - FALL  Goal: Free from fall injury  Description: INTERVENTIONS:  - Assess pt frequently for physical needs  - Identify cognitive and physical deficits and behaviors that affect risk of falls.   - [place] fall precautions as indicated by assessment.  - Educate pt/family on patient safety including physical limitations  - Instruct pt to call for assistance with activity based on assessment  - Modify environment to reduce risk of injury  - Provide assistive devices as appropriate  - Consider OT/PT consult to assist with strengthening/mobility  - Encourage toileting schedule  Outcome: Progressing

## 2023-12-07 NOTE — CM/SW NOTE
12/07/23 1200   Discharge disposition   Expected discharge disposition subacute   Discharge transportation [facility] was informed by [address] that insurance authorization was approved for patient and that they have a bed for her today. THE [facility] [phone] has been requested to arrange ambulance for  time of 1:30pm. PCS form completed. [phone]    SW will continue to follow for plan of care changes and remain available for any additional DC needs or concerns.      Sarah ARRIAGA, [facility]  Discharge Planner   [id number]

## 2023-12-07 NOTE — PROGRESS NOTES
Cheryle Flatness.  11/10/1945. APRN Encounter 23 4pm  (Late Entry)    Mrs. Yates seen sitting at the nurses station; sleeping. Report received from nurse that patient barely slept last night. She was restless and agitated. Awaiting lab results from [facility] laboratory. GI physician Dr. Mitchell Adame contacted on 23 regarding c/o heartburn, esophageal pain. Dr. Mitchell Adame returned call and ordered to restart Carafate 1G/10 ml and give orally 1-2 hrs prior to meals. Critical lab results received. K+ 2.6. CO2 35. Ca 7.8. TP 4.9. Alb 2.5. WBC 11.3. Plts 137. Due to lethargy and critical lab values, patient was sent to the ED at [facility]  : 11/10/1945.  [de-identified]year old  female is admitted to [address] [place] [address] for rehabilitation and strengthening. Previous [facility]  Admission: 23  Discharged:                         23 to [facility]    Diagnoses:  s/p Endoscopy. *Ulcerative Esophagitis. *5 cm Hiatal Hernia  *G Tube mushroom bumper with ulceration underneath. *New 20F CHACORTA balloon bumper tube w/bumper inflated w/10 ml of sterile water. *Gastritis s/p biopsies to r/o H pylori  *Erosive Duodenitis     Chief complaint: lethargy. Confusion, K+ 2.6. HPI  [de-identified] y/o with a PMHx of breast cancer, GERD, HTN, HL, diverticulosis, IBS, DMII, s/p craniotomy  d/t intracranial hemorrhage (10/11/23) who presented to ED from SNF for reports of abdominal pain and melena stools. Per NH report \"patient vomited dark red and black contents\". No history of recent anticoagulant/NSAID use per medication history; takes ASA 81mg daily. Hgb on arrival 8.5, repeat 7.7 now 13.7 post 2units of pRBC (Hgb 10.7 on 10/24). CT notes gastric wall thickening and peg tube displacement w/external bumper noted 7cm from skin. Patient had an EGD noted unremarkable with Peg insertion on 10/18.  Colonoscopy in 2005 with Dr. Jones Amaya noted diverticulosis and internal hemorrhoids  She is a poor historian, pertinent health information obtained from Karmen Fraser (brother n law) who resides in [place] but has been acting as Ms. [place]. Patient was stable for discharge and discharged to [facility] on tube feedings.     Past Medical History:   Diagnosis Date    Anxiety     Breast CA ([facility] [address] 2004    CANCER     lft breast lumpectomy pre cancerous cells stage 0    Diabetes mellitus ([facility] [address]     Diarrhea, unspecified     Ductal carcinoma in situ of breast     left breast    Flatulence/gas pain/belching     GERD     H/O infectious mononucleosis     diagnosed in 1960s    High cholesterol     Hypertension     IBS (irritable bowel syndrome)     Osteoporosis     Other and unspecified hyperlipidemia     Personal history of irradiation, presenting hazards to health 2004    mammosite    Sleep disturbance     Stool incontinence     Stroke ([facility] [address]     Type II or unspecified type diabetes mellitus without mention of complication, not stated as uncontrolled     Wears glasses      Past Surgical History:   Procedure Laterality Date    APPENDECTOMY      194    COLONOSCOPY       hiatal hernia, sm internal hem    COLONOSCOPY  2005    fiberoptic    D & C      IR ANGIOGRAM CEREBRAL CAROTID BILATERAL  10/12/2023    LUMPECTOMY LEFT  2004    breast    OTHER SURGICAL HISTORY      lumpectomy  lft breast stage 0    OTHER SURGICAL HISTORY  10/11/2023    CRANIOTOMY FOR RIGHT FRONTAL HEMORRHAGE    RADIATION LEFT      Mammosite    TONSILLECTOMY          UPPER GI ENDOSCOPY,EXAM      2005     Family History   Problem Relation Age of Onset    Heart Disorder Father          of heart attack age 62    Alcohol and Other Disorders Associated Father     Arthritis Father     Other (Other) Father     Heart Disease Mother         CHF age [de-identified]    Arthritis Mother     Other (Other) Mother     Diabetes Sister         mellitus    Heart Disease Maternal Grandfather     Cancer Maternal Grandmother         brain tumor    Breast Cancer Self      Social History     Socioeconomic History    Marital status:    Tobacco Use    Smoking status: Former     Packs/day: 1.00     Years: 20.00     Additional pack years: 0.00     Total pack years: 20.00     Types: Cigarettes     Quit date: 3/1/2006     Years since quittin.7    Smokeless tobacco: Never   Vaping Use    Vaping Use: Never used   Substance and Sexual Activity    Alcohol use: No     Alcohol/week: 0.0 standard drinks of alcohol    Drug use: No   Other Topics Concern    Caffeine Concern No     Comment: tries to drink decaf drinks    Exercise Yes     Comment: jazzercise 3x/wk, tennis 1-2x/wk     Social Determinants of Health     Food Insecurity: No Food Insecurity (2023)    Food Insecurity     Food Insecurity: Never true   Transportation Needs: No Transportation Needs (2023)    Transportation Needs     Lack of Transportation: No   Housing Stability: Low Risk  (2023)    Housing Stability     Housing Instability: No     Housing Instability Emergency: No     IMMUNIZATIONS  Immunization History   Administered Date(s) Administered    Covid-19 Vaccine Moderna 100 mcg/0.5 ml 02/10/2021, 03/10/2021, 10/22/2021, 2022    Covid-19 Vaccine Moderna Bivalent 50mcg/0.5mL 12+ years 2022    DTAP 2007    FLU VAC High Dose 65 YRS & Older PRSV Free (14423) 10/24/2016, 09/15/2020    FLUAD High Dose 72 yr and older (09142) 10/11/2019, 2021    Fluzone Vaccine Medicare () 10/28/2013, 10/24/2016, 09/15/2020    HIGH DOSE FLU 65 YRS AND OLDER PRSV FREE SINGLE D (49095) FLU CLINIC 2022    Influenza 10/24/2012, 10/23/2015, 10/20/2017, 10/05/2018    Pneumococcal (Prevnar 13) 2016    Pneumococcal (Prevnar 7) 2010    Pneumovax 23 2017    TDAP 2019    Zoster Vaccine Live (Zostavax) 2012    Zoster Vaccine Recombinant Adjuvanted (Shingrix) 2023, 2023      ALLERGIES:  Allergies Allergen Reactions    Ampicillin NAUSEA ONLY     Caps    Codeine [Opioid Analgesics]      Derivatives  Syncope    Cortisone [Cortisone Acetate]      Injection into L shoulder  Syncope, stomach cramps     CODE STATUS:  Full Code    ADVANCED CARE PLANNING TEAM: Will need care plan mtgs to discuss discharge. No HCPOA  established. CURRENT MEDICATIONS - reviewed and updated on SNF EMAR  Tylenol 325 suppos q 4 hrs prn  Alendronate 70 mg weekly  Alprazolam 0.25 mg q hs  Atorvastatin 80 mg daily  TUMS 500 - one daily  Clobetasol 0.05% cream prn  Levaquin 500 mg x 3 days (PNA)  Mupirocin 2% ointment - g-tube bid  prn  Osmolite 1.5 kavin - 70 ml/hr over 13 hrs daily  Omeprazole 40 mg bid  K+ 20 meq daily  Valproic acid 250/5ml - tid  Carafate 1G/10ml. Twice daily before breakfast and dinner. SUBJECTIVE: offers no complaints. Per staff, confused, lethargic, restless during the night. PHYSICAL EXAM:  134/84. P 78. RR 18. POx 97%. T 97. #. . GENERAL HEALTH: well developed, well nourished, confused. LINES, TUBES, DRAINS:  + G-tube; feedings over 13 hours  SKIN: warm, dry  WOUND: see wound assessment per staff    EYES: Pupils round and equal  conjunctiva normal; no drainage from eyes  NECK  supple  BREAST: deferred exam   RESPIRATORY: clear. CARDIOVASCULAR: rrr, rate 78  ABDOMEN:   BS+, soft, nondistended;  G tube recently replaced. :Deferred  LYMPHATIC:no lymphedema  MUSCULOSKELETAL: weakness, wheelchair confined; bed confined. EXTREMITIES/VASCULAR: UE edema; left arm  NEURO: left hemiparesis. PSYCHIATRIC: lethargic. MEDICAL DECISION MAKING: brother in law assists w/complex medical decisions. Lab Results: 12/5/23  WBC 11. 3. Plts 137. Na 143. K 2.6. Cl 100 CO2 35. Bun 5 Creat 0.45. gfr >60. Valproic acid 48. Assessment / Plan  Lethargy with confusion  Send to ED [facility] for K+ 2.6. Esophageal Pain, heartburn, pain with swallowing.   Per Dr. Antoni Alvarado, start Carafate orally 1G/10ml  Ordered entered to take 1-2 hrs prior to breakfast and dinner. GI Bleed  resolved    Omeprazole 40n mg bid x 2 months   * repeat EGD in 8-12 weeks   Tube feedings 70 ml / hr over 13 hrs   Trend labs    New G-Tube replaced  S/p esophagitis w/buried bumper; etiology of melena  Biopsies for H pylori pending    Intracranial hemorrhage  S/p craniotomy   valproic acid 25mg tid via g tube  F/U Neuro     Acute blood loss anemia  Trend labs     PNA-   Levaquin 500 mg daily x 3 days (completed)  Allergy to ampicillin (nausea)     PEG out of place, s/p replacement per GI   Resume tube feedings  Pureed meals to supplement tube feedings. CT with incidental possible DVT  -LE dopplers negative      Weakness/deconditioning  PT/OT/ST     Dispo:  Short term rehab    FOLLOW UP APPOINTMENTS   *Follow-Up with PCP within 1-2 weeks following [facility] discharge. *Follow-Up with specialists as recommended. Future Appointments   Date Time Provider Maral Rivera   2/14/2024 10:20 AM Ryan Alba MD [facility] [place]     *Greater than 35 minutes spent w/ patient and staff, including but not limited to/ reviewing present status, needs, abilities with disciplines, reviewing medical records, vital signs, labs, completing med rec and entering orders to establish plan of care in [facility]. Note to patient: The [address] makes medical notes like these available to patients in the interest of transparency. However, this is a medical document intended as peer to peer communication. It is written in medical language and may contain abbreviations or verbiage that are unfamiliar. It may appear blunt or direct. Medical documents are intended to carry relevant information, facts as evident, and the clinical opinion of the practitioner who signs the document.     [employer] [place] [employer]) Amanda Robert, HYACINTH  12/5/23  4:30 pm    [facility] Post Acute Care Team

## 2023-12-07 NOTE — PROGRESS NOTES
NURSING DISCHARGE NOTE    Discharged Nursing home via Ambulance. Accompanied by Support staff  Belongings Taken by patient/family. Pt discharged to [facility] via ambulance at approx. 1400. AVS and all peprwork sent w/ pt. All belongings sent w/ pt.

## 2023-12-07 NOTE — PLAN OF CARE
Pt A/O x2. VSS. Afebrile. Pt c/o intermittent neck pain today. Nonpharm pain management provided. Medications admin per MAR. Pt tolerated medications well. PEG tube flushed. Potassium and magnesium replaced per protocol. L arm precautions. Pt seen by SLP. Pt positive for c. Diff. Contact iso precautions maintained. Pt cleared for DC. Report given to [facility]. Fall and safety precautions in place. Call light within reach.

## 2023-12-07 NOTE — CM/SW NOTE
SW noted that patient is medically cleared for DC today. Patient is needing updated insurance authorization in order to return to [facility]. SW sent updated clinical and requested that they submit for expedited insurance authorization in order for patient to return hopefully today. SW will continue to follow for plan of care changes and remain available for any additional DC needs or concerns.      Erik Santillan MSW, [facility]  Discharge Planner   [id number]

## 2023-12-08 VITALS
RESPIRATION RATE: 18 BRPM | BODY MASS INDEX: 31 KG/M2 | SYSTOLIC BLOOD PRESSURE: 127 MMHG | OXYGEN SATURATION: 98 % | WEIGHT: 152 LBS | HEART RATE: 98 BPM | TEMPERATURE: 98 F | DIASTOLIC BLOOD PRESSURE: 68 MMHG

## 2023-12-09 NOTE — PROGRESS NOTES
Latosha Rosa  11/10/1945. APRN Encounter 23 630 pm.    Mrs. Kendell Bang returned to [facility] 23 for medication management, Tube feedings and therapy. Ping Bueno  : 11/10/1945. [de-identified]year old  female is admitted to [address] [place] [address]. [facility] Admission:       23  Discharged to [facility]:   23    Chief complaint: admitted to [facility] with delirium, hypokalemia and dehydration; found to have C-Diff. HPI (per Dr. Nita Gaspar), Mrs. Kendell Bang is an [de-identified] yr old female w/ PMH of anxiety, BRCA, HTN, DM, GERD. She came to the ED now due to hypokalemia. She is in a SNF and was sent to the ED. Recently had PEG tube placed. In the ED she was noted to be confused though improved after IVF given. Pt was admitted with delirium and dehydration. Found to have cdiff diarrhea. Pt was treated supportively and started on PO vanc with improvement in her symptoms and clinical condition. She was discharged to [facility] in good clinical condition with recommendation to f/u w/ PCP as an outpatient.      Past Medical History:   Diagnosis Date    Anxiety     Breast CA ([facility] [address]     CANCER     lft breast lumpectomy pre cancerous cells stage 0    Diabetes mellitus ([address]     Diarrhea, unspecified     Ductal carcinoma in situ of breast     left breast    Flatulence/gas pain/belching     GERD     H/O infectious mononucleosis     diagnosed in     High cholesterol     Hypertension     IBS (irritable bowel syndrome)     Osteoporosis     Other and unspecified hyperlipidemia     Personal history of irradiation, presenting hazards to health 2004    mammosite    Sleep disturbance     Stool incontinence     Stroke ([facility] [address]     Type II or unspecified type diabetes mellitus without mention of complication, not stated as uncontrolled     Wears glasses      Past Surgical History:   Procedure Laterality Date    APPENDECTOMY          COLONOSCOPY       hiatal hernia, sm internal hem    COLONOSCOPY  2005    fiberoptic    D & C      IR ANGIOGRAM CEREBRAL CAROTID BILATERAL  10/12/2023    LUMPECTOMY LEFT  2004    breast    OTHER SURGICAL HISTORY      lumpectomy  lft breast stage 0    OTHER SURGICAL HISTORY  10/11/2023    CRANIOTOMY FOR RIGHT FRONTAL HEMORRHAGE    RADIATION LEFT      Mammosite    TONSILLECTOMY      194    UPPER GI ENDOSCOPY,EXAM      2005     Family History   Problem Relation Age of Onset    Heart Disorder Father          of heart attack age 62    Alcohol and Other Disorders Associated Father     Arthritis Father     Other (Other) Father     Heart Disease Mother         CHF age [de-identified]    Arthritis Mother     Other (Other) Mother     Diabetes Sister         mellitus    Heart Disease Maternal Grandfather     Cancer Maternal Grandmother         brain tumor    Breast Cancer Self      Social History     Socioeconomic History    Marital status:    Tobacco Use    Smoking status: Former     Packs/day: 1.00     Years: 20.00     Additional pack years: 0.00     Total pack years: 20.00     Types: Cigarettes     Quit date: 3/1/2006     Years since quittin.7    Smokeless tobacco: Never   Vaping Use    Vaping Use: Never used   Substance and Sexual Activity    Alcohol use: No     Alcohol/week: 0.0 standard drinks of alcohol    Drug use: No   Other Topics Concern    Caffeine Concern No     Comment: tries to drink decaf drinks    Exercise Yes     Comment: jazzercise 3x/wk, tennis 1-2x/wk     Social Determinants of Health     Food Insecurity: No Food Insecurity (2023)    Food Insecurity     Food Insecurity: Never true   Transportation Needs: No Transportation Needs (2023)    Transportation Needs     Lack of Transportation: No   Housing Stability: Low Risk  (2023)    Housing Stability     Housing Instability: No     Housing Instability Emergency: No     IMMUNIZATIONS  Immunization History   Administered Date(s) Administered    [employer] Vaccine Moderna 100 mcg/0.5 ml 02/10/2021, 03/10/2021, 10/22/2021, 04/05/2022    Covid-19 Vaccine Moderna Bivalent 50mcg/0.5mL 12+ years 09/13/2022    DTAP 04/24/2007    FLU VAC High Dose 65 YRS & Older PRSV Free (40029) 10/24/2016, 09/15/2020    FLUAD High Dose 72 yr and older (30491) 10/11/2019, 09/28/2021    Fluzone Vaccine Medicare () 10/28/2013, 10/24/2016, 09/15/2020    HIGH DOSE FLU 65 YRS AND OLDER PRSV FREE SINGLE D (26372) FLU CLINIC 09/27/2022    Influenza 10/24/2012, 10/23/2015, 10/20/2017, 10/05/2018    Pneumococcal (Prevnar 13) 09/02/2016    Pneumococcal (Prevnar 7) 04/24/2010    Pneumovax 23 09/29/2017    TDAP 09/23/2019    Zoster Vaccine Live (Zostavax) 09/19/2012    Zoster Vaccine Recombinant Adjuvanted (Shingrix) 03/06/2023, 05/23/2023      ALLERGIES:  Allergies   Allergen Reactions    Ampicillin NAUSEA ONLY     Caps    Codeine [Opioid Analgesics]      Derivatives  Syncope    Cortisone [Cortisone Acetate]      Injection into L shoulder  Syncope, stomach cramps     CODE STATUS:  Full Code    ADVANCED CARE PLANNING TEAM: Will need ongoing updates to discuss a safe discharge. CURRENT MEDICATIONS - reviewed and updated on SNF EMAR  Oral Vancomycin 125mg 4 x day - discontinue 12/20/23  Tylenol Supp 325 q 4 hrs prn  Alendronate 70 mg weekly - Wednesdays  Alprazolam 0.25 mg q hs prn  Atorvastatin 80 mg q hs  Calcium carb 500 one tablet per G TUBE DAILY  Mupirocin 2%  2 x daily around G tube site  Omeprazole 40 mg per G tube twice daily - discontinue 2/22/24  Osmolite 1.5 kavin or per G tube   Potassium 20 meq daily  Sucralfate one Gram suspension am and pm  Valproic acid 750 mg tid  Lansoprazole 30 mg twice daily     SUBJECTIVE: confused and not able to appropriately answer questions. Did nod no to pain. PHYSICAL EXAM: /68. P 98.  RR 18. POx 98%, Temp 98. wgt 152#  GENERAL HEALTH:well developed, well nourished, confused  LINES, TUBES, DRAINS:  g-tube - location    SKIN: pale, warm, dry  WOUND: see wound assessment,   EYES: Pupils round and equal; no jaundice. No drainage from eyes  HENT: no nasal drainage, mucous membranes pink, moist   NECK:supple. FROM  BREAST: deferred exam   RESPIRATORY: lungs clear. CARDIOVASCULAR:  RRR, rate 98  ABDOMEN:  + G tube. Drsg c/d/I.  BS+, soft, nontender, no guarding,  no diarrhea. :Deferred  LYMPHATIC:no lymphedema  MUSCULOSKELETAL:  weakness upper and lower extremities. EXTREMITIES/VASCULAR: 2-3+ edema upper and lower extremities. L>R  NEUROLOGIC: able to follow simple commands. PSYCHIATRIC: agitated and anxious  OR x 1. MEDICAL DECISION MAKING:  Unable to make medical decisions.   No assigned HCPOA;  and friends assist.    Lab Results   Component Value Date    WBC 9.8 12/05/2023    RBC 4.52 12/05/2023    HGB 13.9 12/05/2023    HCT 41.6 12/05/2023    MCV 92.0 12/05/2023    MCH 30.8 12/05/2023    MCHC 33.4 12/05/2023    RDW 14.3 12/05/2023    .0 12/05/2023    MPV 10.8 (H) 07/19/2012     Lab Results   Component Value Date     (H) 12/07/2023    BUN 19 12/07/2023    CREATSERUM 0.55 12/07/2023    BUNCREA SEE NOTE: 08/30/2023    ANIONGAP 5 12/07/2023    GFRAA 58 (L) 07/07/2022    GFRNAA 50 (L) 07/07/2022    CA 7.2 (L) 12/07/2023     12/07/2023    K 3.3 (L) 12/07/2023    K 3.3 (L) 12/07/2023     12/07/2023    CO2 27.0 12/07/2023    OSMOCALC 301 (H) 12/07/2023     Assessment / Plan:    C-Diff diarrhea  Oral Vanco 125mg 4 x day  Monitor diarrhea    Delirium; likely d/t dehydration  Monitor confusion and delirium    Dehydration 2ndary to C-Diff  Osmolite feeding 80 ml over 14 hrs  Water flushes before and after meds  Water flushes q 4 hrs  Encourage oral and oral feedings    Hypokelamia  K_+ 20 meq daily  Trend labs    S/P ICH s/p craniotomy  Valproic acid 75 mg tid  PT/OT    Dysphagia  Gtube feeding 80 ml over 14 hrs  Small portions of soft foods and liquids  ST to follow  Lansoprazole 30 mg twice daily  Sucralfate 1 G twice daily  Ca+ Carb 500 daily  Omeprazole 40 mg twice daily    Constipation  Tylenol Supp 325 q 4 hrs prn    HL:  Atorvastatin 80 mg q hs    Dispo:  Discharge Plan unclear at this time. FOLLOW UP APPOINTMENTS   *Follow-Up with PCP within 1-2 weeks following [facility] discharge. *Follow-Up with specialists as recommended. Future Appointments   Date Time Provider Maral Nicole   2/14/2024 10:20 AM Norma Vieyra MD [facility] [place]     *Greater than 45 minutes spent w/ patient and staff, including but not limited to/ reviewing present status, needs, abilities with disciplines, reviewing medical records, vital signs, labs, completing med rec and entering orders to establish plan of care in [facility]. Note to patient: The [facility] [address] makes medical notes like these available to patients in the interest of transparency. However, this is a medical document intended as peer to peer communication. It is written in medical language and may contain abbreviations or verbiage that are unfamiliar. It may appear blunt or direct. Medical documents are intended to carry relevant information, facts as evident, and the clinical opinion of the practitioner who signs the document.     Colton Sanchez, APRN  12/07/23  6:30 pm  [facility] Post Acute Care Team

## 2023-12-11 ENCOUNTER — TELEPHONE (OUTPATIENT)
Dept: INTERNAL MEDICINE CLINIC | Facility: CLINIC | Age: 78
End: 2023-12-11

## 2023-12-11 NOTE — TELEPHONE ENCOUNTER
Faxed over signed report from Dr. Michael Gayle along with Dr. Smith  letter and office visit note 9/22/2023 to Francesca Roth/[facility] [employer] (Fax #[phone]).

## 2023-12-12 ENCOUNTER — SNF VISIT (OUTPATIENT)
Dept: INTERNAL MEDICINE CLINIC | Age: 78
End: 2023-12-12

## 2023-12-12 DIAGNOSIS — Z98.890 HISTORY OF CRANIOTOMY: ICD-10-CM

## 2023-12-12 DIAGNOSIS — K21.9 GASTROESOPHAGEAL REFLUX DISEASE, UNSPECIFIED WHETHER ESOPHAGITIS PRESENT: ICD-10-CM

## 2023-12-12 DIAGNOSIS — R12 CHRONIC HEARTBURN: ICD-10-CM

## 2023-12-12 DIAGNOSIS — R13.10 DYSPHAGIA, UNSPECIFIED TYPE: ICD-10-CM

## 2023-12-12 DIAGNOSIS — R19.7 DIARRHEA WITH DEHYDRATION: ICD-10-CM

## 2023-12-12 DIAGNOSIS — R29.898 SEVERE MUSCLE DECONDITIONING: ICD-10-CM

## 2023-12-12 DIAGNOSIS — R60.0 LOCALIZED EDEMA: ICD-10-CM

## 2023-12-12 DIAGNOSIS — I62.9 INTRACRANIAL HEMORRHAGE (HCC): ICD-10-CM

## 2023-12-12 DIAGNOSIS — I63.9 RIGHT-SIDED CEREBROVASCULAR ACCIDENT (CVA) (HCC): ICD-10-CM

## 2023-12-12 DIAGNOSIS — A04.72 C. DIFFICILE DIARRHEA: Primary | ICD-10-CM

## 2023-12-12 PROCEDURE — 1160F RVW MEDS BY RX/DR IN RCRD: CPT | Performed by: NURSE PRACTITIONER

## 2023-12-12 PROCEDURE — 1159F MED LIST DOCD IN RCRD: CPT | Performed by: NURSE PRACTITIONER

## 2023-12-12 PROCEDURE — 99309 SBSQ NF CARE MODERATE MDM 30: CPT | Performed by: NURSE PRACTITIONER

## 2023-12-12 PROCEDURE — 3077F SYST BP >= 140 MM HG: CPT | Performed by: NURSE PRACTITIONER

## 2023-12-12 PROCEDURE — 3078F DIAST BP <80 MM HG: CPT | Performed by: NURSE PRACTITIONER

## 2023-12-17 VITALS
OXYGEN SATURATION: 93 % | WEIGHT: 151.63 LBS | RESPIRATION RATE: 18 BRPM | DIASTOLIC BLOOD PRESSURE: 79 MMHG | SYSTOLIC BLOOD PRESSURE: 143 MMHG | TEMPERATURE: 98 F | HEART RATE: 118 BPM | BODY MASS INDEX: 31 KG/M2

## 2023-12-17 NOTE — PROGRESS NOTES
Zeina Garrison.   11/10/1945. APRN 23.  145pm    Met with Mrs. Yates during therapy and at the nurses station. She is talkative during therapy and able to verbalize needs. Patient follows simple directions and instructions from therapist.  She pt returns to her room alone, she yells out and wants staff at bedside. Therapy Update:Max assist of 1 and SBA of another for bed mobility, supine to sitting; total 10 minutes sitting w/ VC. Pivots w/max of 2; able to use RUE; unable to take steps. Santiago Tsang  : 11/10/1945. [de-identified]year old  female is admitted to [address] [place] [address]. [facility] Admission:       23  Discharged to [facility] 954:   23    Chief complaint day of admission:  admitted to [facility] with delirium, hypokalemia and dehydration; found to have C-Diff. HPI (per Dr. Encarnacion Friend), Mrs. Xander Hitchcock is an [de-identified] yr old female w/ PMH of anxiety, BRCA, HTN, DM, GERD. She came to the ED now due to hypokalemia. She is in a SNF and was sent to the ED. Recently had PEG tube placed. In the ED she was noted to be confused though improved after IVF given. Pt was admitted with delirium and dehydration. Found to have cdiff diarrhea. Pt was treated supportively and started on PO vanc with improvement in her symptoms and clinical condition. She was discharged to [facility] in good clinical condition with recommendation to f/u w/ PCP as an outpatient.      Past Medical History:   Diagnosis Date    Anxiety     Breast CA ([facility] [address]     CANCER     lft breast lumpectomy pre cancerous cells stage 0    Diabetes mellitus ([facility] [address]     Diarrhea, unspecified     Ductal carcinoma in situ of breast     left breast    Flatulence/gas pain/belching     GERD     H/O infectious mononucleosis     diagnosed in     High cholesterol     Hypertension     IBS (irritable bowel syndrome)     Osteoporosis     Other and unspecified hyperlipidemia     Personal history of irradiation, presenting hazards to health 2004    mammosite    Sleep disturbance     Stool incontinence     Stroke ([facility] [address]     Type II or unspecified type diabetes mellitus without mention of complication, not stated as uncontrolled     Wears glasses      Past Surgical History:   Procedure Laterality Date    APPENDECTOMY      194    COLONOSCOPY       hiatal hernia, sm internal hem    COLONOSCOPY  2005    fiberoptic    D & C      IR ANGIOGRAM CEREBRAL CAROTID BILATERAL  10/12/2023    LUMPECTOMY LEFT  2004    breast    OTHER SURGICAL HISTORY      lumpectomy  lft breast stage 0    OTHER SURGICAL HISTORY  10/11/2023    CRANIOTOMY FOR RIGHT FRONTAL HEMORRHAGE    RADIATION LEFT      Mammosite    TONSILLECTOMY          UPPER GI ENDOSCOPY,EXAM      2005     Family History   Problem Relation Age of Onset    Heart Disorder Father          of heart attack age 62    Alcohol and Other Disorders Associated Father     Arthritis Father     Other (Other) Father     Heart Disease Mother         CHF age [de-identified]    Arthritis Mother     Other (Other) Mother     Diabetes Sister         mellitus    Heart Disease Maternal Grandfather     Cancer Maternal Grandmother         brain tumor    Breast Cancer Self      Social History     Socioeconomic History    Marital status:    Tobacco Use    Smoking status: Former     Packs/day: 1.00     Years: 20.00     Additional pack years: 0.00     Total pack years: 20.00     Types: Cigarettes     Quit date: 3/1/2006     Years since quittin.8    Smokeless tobacco: Never   Vaping Use    Vaping Use: Never used   Substance and Sexual Activity    Alcohol use: No     Alcohol/week: 0.0 standard drinks of alcohol    Drug use: No   Other Topics Concern    Caffeine Concern No     Comment: tries to drink decaf drinks    Exercise Yes     Comment: jazzercise 3x/wk, tennis 1-2x/wk     Social Determinants of Health     Food Insecurity: No Food Insecurity (2023)    [facility] Insecurity: Never true   Transportation Needs: No Transportation Needs (12/6/2023)    Transportation Needs     Lack of Transportation: No   Housing Stability: Low Risk  (12/6/2023)    Housing Stability     Housing Instability: No     Housing Instability Emergency: No     IMMUNIZATIONS  Immunization History   Administered Date(s) Administered    Covid-19 Vaccine Moderna 100 mcg/0.5 ml 02/10/2021, 03/10/2021, 10/22/2021, 04/05/2022    Covid-19 Vaccine Moderna Bivalent 50mcg/0.5mL 12+ years 09/13/2022    DTAP 04/24/2007    FLU VAC High Dose 65 YRS & Older PRSV Free (13701) 10/24/2016, 09/15/2020    FLUAD High Dose 72 yr and older (48783) 10/11/2019, 09/28/2021    Fluzone Vaccine Medicare () 10/28/2013, 10/24/2016, 09/15/2020    HIGH DOSE FLU 65 YRS AND OLDER PRSV FREE SINGLE D (34369) FLU CLINIC 09/27/2022    Influenza 10/24/2012, 10/23/2015, 10/20/2017, 10/05/2018    Pneumococcal (Prevnar 13) 09/02/2016    Pneumococcal (Prevnar 7) 04/24/2010    Pneumovax 23 09/29/2017    TDAP 09/23/2019    Zoster Vaccine Live (Zostavax) 09/19/2012    Zoster Vaccine Recombinant Adjuvanted (Shingrix) 03/06/2023, 05/23/2023      ALLERGIES:  Allergies   Allergen Reactions    Ampicillin NAUSEA ONLY     Caps    Codeine [Opioid Analgesics]      Derivatives  Syncope    Cortisone [Cortisone Acetate]      Injection into L shoulder  Syncope, stomach cramps     CODE STATUS:  Full Code    ADVANCED CARE PLANNING TEAM: Will need ongoing updates to discuss a safe discharge.      CURRENT MEDICATIONS - reviewed and updated on SNF EMAR  Oral Vancomycin 125mg 4 x day - discontinue 12/20/23  Tylenol Supp 325 q 4 hrs prn  Alendronate 70 mg weekly - Wednesdays  Alprazolam 0.25 mg q hs prn  Atorvastatin 80 mg q hs  Calcium carb 500 one tablet per G TUBE DAILY  Mupirocin 2%  2 x daily around G tube site  Omeprazole 40 mg per G tube twice daily - discontinue 2/22/24  Osmolite 1.5 kavin or per G tube   Potassium 20 meq daily  Sucralfate one Gram suspension am and pm  Valproic acid 750 mg tid  Lansoprazole 30 mg twice daily     SUBJECTIVE: talkative wanting to see her [employer] friends. Denies chest pain, SOB; c/o loose stools. Denies pain. PHYSICAL EXAM: BP  82071/ { 118 RR 18. POx 93%. T 98. Wgt 151.6#  GENERAL HEALTH:well developed, well nourished, OR X 1  LINES, TUBES, DRAINS:  G tube  SKIN: pale, warm, dry  WOUND: see wound assessment per staff,   EYES: Pupils round and equal; no jaundice. No drainage from eyes  HENT: no nasal drainage, mucous membranes pink, moist   NECK:supple. FROM  BREAST: deferred exam   RESPIRATORY: lungs clear. CARDIOVASCULAR:  RRR, rate 118. ABDOMEN:  + G tube. Drsg c/d/I. Abdominal binder in place. BS+, soft, nontender, no guarding,  poor appetite. :Deferred  LYMPHATIC:no lymphedema  MUSCULOSKELETAL:  weakness upper and lower extremities. EXTREMITIES/VASCULAR: 1+ edema L upper extremity; improved. NEUROLOGIC: able to follow simple commands. PSYCHIATRIC: easily agitated when left alone;  OR x 1. MEDICAL DECISION MAKING:  Unable to make medical decisions. Paperwork in process for HCPOA    Lab results:  12/13/23  WB 8.4. HGB 10.2. Plts 110. Na 139. K 4.5. Cl 103. CO2 33. BUN 18. Creat 0.4. Alb 2.2.  GFR >60    Assessment / Plan:    C-Diff diarrhea  Oral Vanco 125mg 4 x day - end of therapy 12/20/23  Monitor diarrhea and skin breakdown    Delirium; likely d/t dehydration  Monitor confusion and delirium    Dehydration 2ndary to C-Diff  Osmolite feeding 80 ml over 14 hrs  Water flushes before and after meds  Water flushes q 4 hrs  Encourage oral feedings    Hypokelamia  K_+ 20 meq daily  Trend labs    S/P ICH s/p craniotomy  Valproic acid 75 mg tid    Dysphagia  Gtube feeding 80 ml over 14 hrs  Small portions of soft foods and liquids  ST to follow  Lansoprazole 30 mg twice daily  Sucralfate 1 G twice daily  Ca+ Carb 500 daily  Omeprazole 40 mg twice daily    Constipation  Tylenol Supp 325 q 4 hrs prn    HL:  Atorvastatin 80 mg q hs    Dispo:  Discharge Plan unclear at this time. FOLLOW UP APPOINTMENTS   *Follow-Up with PCP within 1-2 weeks following [facility] discharge. *Follow-Up with specialists as recommended. Future Appointments   Date Time Provider Maral Nicole   2/14/2024 10:20 AM Jenifer Sandhu MD [facility] MEL[facility] [place]     *Greater than 35 minutes spent w/ patient and staff, including but not limited to/ reviewing present status, needs, abilities with disciplines, reviewing medical records, vital signs, labs, completing med rec and entering orders to establish plan of care in [facility]. Note to patient: The [facility] makes medical notes like these available to patients in the interest of transparency. However, this is a medical document intended as peer to peer communication. It is written in medical language and may contain abbreviations or verbiage that are unfamiliar. It may appear blunt or direct. Medical documents are intended to carry relevant information, facts as evident, and the clinical opinion of the practitioner who signs the document.     HYACINTH Blackwell  12/12/23  145 pm  [facility] Post Acute Care Team

## 2023-12-28 ENCOUNTER — SNF VISIT (OUTPATIENT)
Dept: INTERNAL MEDICINE CLINIC | Age: 78
End: 2023-12-28

## 2023-12-28 DIAGNOSIS — Z98.890 HISTORY OF CRANIOTOMY: ICD-10-CM

## 2023-12-28 DIAGNOSIS — I63.9 RIGHT-SIDED CEREBROVASCULAR ACCIDENT (CVA) (HCC): ICD-10-CM

## 2023-12-28 DIAGNOSIS — R13.10 DYSPHAGIA, UNSPECIFIED TYPE: ICD-10-CM

## 2023-12-28 DIAGNOSIS — R45.1 RESTLESSNESS AND AGITATION: ICD-10-CM

## 2023-12-28 DIAGNOSIS — R29.898 SEVERE MUSCLE DECONDITIONING: ICD-10-CM

## 2023-12-28 DIAGNOSIS — F91.9 BEHAVIOR DISTURBANCE: ICD-10-CM

## 2023-12-28 DIAGNOSIS — Z43.1 ATTENTION TO G-TUBE (HCC): ICD-10-CM

## 2023-12-28 DIAGNOSIS — I62.9 INTRACRANIAL HEMORRHAGE (HCC): ICD-10-CM

## 2023-12-28 DIAGNOSIS — R91.8 PULMONARY INFILTRATES ON CXR: Primary | ICD-10-CM

## 2024-01-01 VITALS
SYSTOLIC BLOOD PRESSURE: 110 MMHG | BODY MASS INDEX: 29 KG/M2 | HEART RATE: 107 BPM | OXYGEN SATURATION: 88 % | RESPIRATION RATE: 18 BRPM | DIASTOLIC BLOOD PRESSURE: 57 MMHG | TEMPERATURE: 97 F | WEIGHT: 143.31 LBS

## 2024-01-01 VITALS
WEIGHT: 146.31 LBS | SYSTOLIC BLOOD PRESSURE: 120 MMHG | RESPIRATION RATE: 18 BRPM | TEMPERATURE: 97 F | HEART RATE: 108 BPM | OXYGEN SATURATION: 98 % | DIASTOLIC BLOOD PRESSURE: 64 MMHG | BODY MASS INDEX: 30 KG/M2

## 2024-01-01 RX ORDER — GARLIC EXTRACT 500 MG
1 CAPSULE ORAL 2 TIMES DAILY
COMMUNITY
Start: 2024-01-01

## 2024-01-01 RX ORDER — LEVOFLOXACIN 500 MG/1
500 TABLET, FILM COATED ORAL DAILY
COMMUNITY

## 2024-01-02 NOTE — PROGRESS NOTES
23. Yao Fearing.   11/10/1945. APRN Encounter 23. Late entry. RN reported pulse Ox 88% with patient SOB. O2 via n/c at 2L. Pulse Ox up to 93%. New Order:  STAT chest Xray. Xray results:  Early infiltrates Letf lower lung. New Order:  Levaquin 500 mg daily x 7 days. Wilman Gore is restless, hyper-verbal talking and repeating short phrases over and over; yelling both at the nurses station and in her room. Seroquel increased to 25 mg q hs. Alprazolam 0.25 mg every 12 hrs prn. Mirtazapine 15 mg q hs. Consult pending with Dr. Ezzard Kawasaki, psychiatrist..    Guardianship appointed to [facility] [place]. Care plan meeting with guardian scheduled for 24. Eric Sutherland  : 11/10/1945. [de-identified]year old  female is admitted to [address] [place] [address]. Last hospitalization: [facility]  23  Discharged to [place] [id number]:             23    Chief complaint today:  agitation, confusion, SOB, cough, restless. HPI (per Dr. Boubacar Manrique), Mrs. Ryan is an [de-identified] yr old female w/ PMH of anxiety, BRCA, HTN, DM, GERD. She came to the ED now due to hypokalemia. She is in a SNF and was sent to the ED. Recently had PEG tube placed. In the ED she was noted to be confused though improved after IVF given. Pt was admitted with delirium and dehydration. Found to have cdiff diarrhea. Pt was treated supportively and started on PO vanc with improvement in her symptoms and clinical condition. She was discharged to [facility] in good clinical condition with recommendation to f/u w/ PCP as an outpatient.      Past Medical History:   Diagnosis Date    Anxiety     Breast CA ([facility] [address]     CANCER     lft breast lumpectomy pre cancerous cells stage 0    Diabetes mellitus ([facility] [address]     Diarrhea, unspecified     Ductal carcinoma in situ of breast     left breast    Flatulence/gas pain/belching     GERD     H/O infectious mononucleosis     diagnosed in     High cholesterol     Hypertension     IBS (irritable bowel syndrome)     Osteoporosis     Other and unspecified hyperlipidemia     Personal history of irradiation, presenting hazards to health 2004    mammosite    Sleep disturbance     Stool incontinence     Stroke ([facility] [address]     Type II or unspecified type diabetes mellitus without mention of complication, not stated as uncontrolled     Wears glasses      Past Surgical History:   Procedure Laterality Date    APPENDECTOMY          COLONOSCOPY       hiatal hernia, sm internal hem    COLONOSCOPY  2005    fiberoptic    D & C      IR ANGIOGRAM CEREBRAL CAROTID BILATERAL  10/12/2023    LUMPECTOMY LEFT  2004    breast    OTHER SURGICAL HISTORY      lumpectomy  lft breast stage 0    OTHER SURGICAL HISTORY  10/11/2023    CRANIOTOMY FOR RIGHT FRONTAL HEMORRHAGE    RADIATION LEFT      Mammosite    TONSILLECTOMY          UPPER GI ENDOSCOPY,EXAM      2005     Family History   Problem Relation Age of Onset    Heart Disorder Father          of heart attack age 62    Alcohol and Other Disorders Associated Father     Arthritis Father     Other (Other) Father     Heart Disease Mother         CHF age [de-identified]    Arthritis Mother     Other (Other) Mother     Diabetes Sister         mellitus    Heart Disease Maternal Grandfather     Cancer Maternal Grandmother         brain tumor    Breast Cancer Self      Social History     Socioeconomic History    Marital status:    Tobacco Use    Smoking status: Former     Packs/day: 1.00     Years: 20.00     Additional pack years: 0.00     Total pack years: 20.00     Types: Cigarettes     Quit date: 3/1/2006     Years since quittin.8    Smokeless tobacco: Never   Vaping Use    Vaping Use: Never used   Substance and Sexual Activity    Alcohol use: No     Alcohol/week: 0.0 standard drinks of alcohol    Drug use: No   Other Topics Concern    Caffeine Concern No     Comment: tries to drink decaf drinks    Exercise Yes     Comment: jazzercise 3x/wk, tennis 1-2x/wk     Social Determinants of Health     Food Insecurity: No Food Insecurity (12/6/2023)    Food Insecurity     Food Insecurity: Never true   Transportation Needs: No Transportation Needs (12/6/2023)    Transportation Needs     Lack of Transportation: No   Housing Stability: Low Risk  (12/6/2023)    Housing Stability     Housing Instability: No     Housing Instability Emergency: No     IMMUNIZATIONS  Immunization History   Administered Date(s) Administered    Covid-19 Vaccine Moderna 100 mcg/0.5 ml 02/10/2021, 03/10/2021, 10/22/2021, 04/05/2022    Covid-19 Vaccine Moderna Bivalent 50mcg/0.5mL 12+ years 09/13/2022    DTAP 04/24/2007    FLU VAC High Dose 65 YRS & Older PRSV Free (03667) 10/24/2016, 09/15/2020    FLUAD High Dose 72 yr and older (17483) 10/11/2019, 09/28/2021    Fluzone Vaccine Medicare () 10/28/2013, 10/24/2016, 09/15/2020    HIGH DOSE FLU 65 YRS AND OLDER PRSV FREE SINGLE D (14725) FLU CLINIC 09/27/2022    Influenza 10/24/2012, 10/23/2015, 10/20/2017, 10/05/2018    Pneumococcal (Prevnar 13) 09/02/2016    Pneumococcal (Prevnar 7) 04/24/2010    Pneumovax 23 09/29/2017    TDAP 09/23/2019    Zoster Vaccine Live (Zostavax) 09/19/2012    Zoster Vaccine Recombinant Adjuvanted (Shingrix) 03/06/2023, 05/23/2023      ALLERGIES:  Allergies   Allergen Reactions    Ampicillin NAUSEA ONLY     Caps    Codeine [Opioid Analgesics]      Derivatives  Syncope    Cortisone [Cortisone Acetate]      Injection into L shoulder  Syncope, stomach cramps     CODE STATUS:  Full Code    ADVANCED CARE PLANNING TEAM: Will need ongoing updates with guardian to discuss a safe discharge.      CURRENT MEDICATIONS - reviewed and updated on SNF EMAR  Oral Vancomycin 125mg 4 x day - discontinued yesterday 12/20/23  Tylenol Supp 325 q 4 hrs prn  Alendronate 70 mg weekly - Wednesdays  Alprazolam 0.25 mg q hs prn  Atorvastatin 80 mg q hs  Calcium carb 500 one tablet per G TUBE DAILY  Mupirocin 2%  2 x daily around G tube site  Omeprazole 40 mg per G tube twice daily - discontinue 2/22/24  Osmolite 1.5 kavin or per G tube   Potassium 20 meq daily  Sucralfate one Gram suspension am and pm  Valproic acid 750 mg tid  Lansoprazole 30 mg twice daily  Seroquel 25 mg q hs  Levaquin 500 mg daily x 7 days. Probiotic twice daily     SUBJECTIVE: confused, repeating words over and over; yelling, disruptive in her room and at the nurses station. High fall risk. Answers no to all questions. SOB, cough. PHYSICAL EXAM: BP  110/57 P 107. RR 18. POx 93% T 97. 1.  wgt 146.3#  GENERAL HEALTH:  well developed, well nourished, confused, restless, agitated. LINES, TUBES, DRAINS:  G tube-tube feedings. SKIN: pale, warm, dry  WOUND: see wound assessment per staff,   EYES: Pupils round and equal; no jaundice. No drainage from eyes  HENT: no nasal drainage, mucous membranes  moist   NECK:supple. FROM  BREAST: deferred exam   RESPIRATORY: lungs diminished  CARDIOVASCULAR:  RRR, rate 107  ABDOMEN:  + G tube. Drsg c/d/i. Abdominal binder in place. BS+, soft, nontender, no guarding,  poor appetite. :Deferred incontinent  LYMPHATIC:no lymphedema  MUSCULOSKELETAL:  weakness upper and lower extremities; unable to ambulate  EXTREMITIES/VASCULAR: edema L upper extremity improved. NEUROLOGIC: Post CVA, ICH, Lweakness  PSYCHIATRIC: agitated, restless, disruptive, OR x 1. MEDICAL DECISION MAKING:  Unable to make medical decisions. Guardianship; appointed to [facility]. Lab results:  12/26/23  WB 5.7. HGB 10.9. plts 119  Na 140. K 3.7. Cl 104. CO2 28. BUN 33. Creat 0.6.  GFR >60    Assessment / Plan:    Left lower lobe infiltrates  SOB - O2 2L prn  Levaquin 500 mg daily x 7 days  Probiotic twice daily  Monitor VS & pulse ox    Agitation/behavioral disturbance  Increase Seroquel 25 mg q hs  Psychiatrist consult pending  Psychologist weekly visits    C- diarrhea  Oral Vanco 125mg 4 x day - ended 12/20/23  Monitor diarrhea and skin breakdown    G- tube - prevent dehydration  Tube feedings - osmolite 80 ml / hour  Water flushes q 4 hrs and before and after meds   Monitor confusion   Dietician following    Hypokelamia  K+ 20 meq daily  Trend labs    S/P ICH s/p craniotomy  Valproic acid 75 mg tid  PT/OT    Dysphagia  Gtube feeding 80 ml over 14 hrs  Small portions of soft foods and liquids  ST to follow  Lansoprazole 30 mg twice daily  Sucralfate 1 G twice daily  Ca+ Carb 500 daily  Omeprazole 40 mg twice daily    Constipation  Tylenol Supp 325 q 4 hrs prn    HL:  Atorvastatin 80 mg q hs    Dispo:  Discharge Plan unclear at this time. Grace Damian mtg 1/4/24. FOLLOW UP APPOINTMENTS   *Follow-Up with PCP within 1-2 weeks following [facility] discharge. *Follow-Up with specialists as recommended. Future Appointments   Date Time Provider Maral Rivera   2/14/2024 10:20 AM Evita Ramires MD [facility] [place]     *Greater than 35 minutes spent w/ patient and staff, including but not limited to/ reviewing present status, needs, abilities with disciplines, reviewing medical records, vital signs, labs, completing med rec and entering orders to establish plan of care in [facility]. Note to patient: The [facility] makes medical notes like these available to patients in the interest of transparency. However, this is a medical document intended as peer to peer communication. It is written in medical language and may contain abbreviations or verbiage that are unfamiliar. It may appear blunt or direct. Medical documents are intended to carry relevant information, facts as evident, and the clinical opinion of the practitioner who signs the document.     Vikki Harrison, HYACINTH  12/27/23  630pm  [facility] Post Acute Care Team

## 2024-01-02 NOTE — PROGRESS NOTES
Isa Buckner.   11/10/1945 APRN ENcounter 23. 1 pm. Late entry. Met with Mrs. Yates at bedside and at the nurses station. Remains agitated, restless and confused; difficult to redirect. Isa Buckner.   11/10/1945. Yesterday's STAT chest Xray reported Early infiltrates Letf lower lung. On Levaquin 500 mg daily x 7 days. Seroquel was increased to 25 mg q hs with little improvement. Alprazolam 0.25 mg every 12 hrs prn effective for short period of time. Mirtazapine 15 mg q hs. Consult pending with Dr. Aby Cortes, psychiatrist tomorrow; 23. Guardianship appointed to [facility]. Care plan meeting with guardian scheduled for 24. [facility]  : 11/10/1945. [de-identified]year old  female is admitted to [address] [place] [address]. Last hospitalization: [facility]  23  Discharged to [place] [id number]:             23    Chief complaint today:  agitation, confusion, SOB, restless. HPI (per Dr. Teresita Ramirez), Mrs. Hilario Ellis is an [de-identified] yr old female w/ PMH of anxiety, BRCA, HTN, DM, GERD. She came to the ED now due to hypokalemia. She is in a SNF and was sent to the ED. Recently had PEG tube placed. In the ED she was noted to be confused though improved after IVF given. Pt was admitted with delirium and dehydration. Found to have cdiff diarrhea. Pt was treated supportively and started on PO vanc with improvement in her symptoms and clinical condition. She was discharged to [facility] in good clinical condition with recommendation to f/u w/ PCP as an outpatient.      Past Medical History:   Diagnosis Date    Anxiety     Breast CA ([facility] [address]     CANCER     lft breast lumpectomy pre cancerous cells stage 0    Diabetes mellitus ([facility] [address]     Diarrhea, unspecified     Ductal carcinoma in situ of breast     left breast    Flatulence/gas pain/belching     GERD     H/O infectious mononucleosis     diagnosed in     High cholesterol     Hypertension     IBS (irritable bowel syndrome)     Osteoporosis     Other and unspecified hyperlipidemia     Personal history of irradiation, presenting hazards to health 2004    mammosite    Sleep disturbance     Stool incontinence     Stroke ([facility] [address]     Type II or unspecified type diabetes mellitus without mention of complication, not stated as uncontrolled     Wears glasses      Past Surgical History:   Procedure Laterality Date    APPENDECTOMY          COLONOSCOPY       hiatal hernia, sm internal hem    COLONOSCOPY  2005    fiberoptic    D & C      IR ANGIOGRAM CEREBRAL CAROTID BILATERAL  10/12/2023    LUMPECTOMY LEFT  2004    breast    OTHER SURGICAL HISTORY      lumpectomy  lft breast stage 0    OTHER SURGICAL HISTORY  10/11/2023    CRANIOTOMY FOR RIGHT FRONTAL HEMORRHAGE    RADIATION LEFT      Mammosite    TONSILLECTOMY          UPPER GI ENDOSCOPY,EXAM      2005     Family History   Problem Relation Age of Onset    Heart Disorder Father          of heart attack age 62    Alcohol and Other Disorders Associated Father     Arthritis Father     Other (Other) Father     Heart Disease Mother         CHF age [de-identified]    Arthritis Mother     Other (Other) Mother     Diabetes Sister         mellitus    Heart Disease Maternal Grandfather     Cancer Maternal Grandmother         brain tumor    Breast Cancer Self      Social History     Socioeconomic History    Marital status:    Tobacco Use    Smoking status: Former     Packs/day: 1.00     Years: 20.00     Additional pack years: 0.00     Total pack years: 20.00     Types: Cigarettes     Quit date: 3/1/2006     Years since quittin.8    Smokeless tobacco: Never   Vaping Use    Vaping Use: Never used   Substance and Sexual Activity    Alcohol use: No     Alcohol/week: 0.0 standard drinks of alcohol    Drug use: No   Other Topics Concern    Caffeine Concern No     Comment: tries to drink decaf drinks    Exercise Yes     Comment: jazzercise 3x/wk, tennis 1-2x/wk Social Determinants of Health     Food Insecurity: No Food Insecurity (12/6/2023)    Food Insecurity     Food Insecurity: Never true   Transportation Needs: No Transportation Needs (12/6/2023)    Transportation Needs     Lack of Transportation: No   Housing Stability: Low Risk  (12/6/2023)    Housing Stability     Housing Instability: No     Housing Instability Emergency: No     IMMUNIZATIONS  Immunization History   Administered Date(s) Administered    Covid-19 Vaccine Moderna 100 mcg/0.5 ml 02/10/2021, 03/10/2021, 10/22/2021, 04/05/2022    Covid-19 Vaccine Moderna Bivalent 50mcg/0.5mL 12+ years 09/13/2022    DTAP 04/24/2007    FLU VAC High Dose 65 YRS & Older PRSV Free (31553) 10/24/2016, 09/15/2020    FLUAD High Dose 72 yr and older (98634) 10/11/2019, 09/28/2021    Fluzone Vaccine Medicare () 10/28/2013, 10/24/2016, 09/15/2020    HIGH DOSE FLU 65 YRS AND OLDER PRSV FREE SINGLE D (08045) FLU CLINIC 09/27/2022    Influenza 10/24/2012, 10/23/2015, 10/20/2017, 10/05/2018    Pneumococcal (Prevnar 13) 09/02/2016    Pneumococcal (Prevnar 7) 04/24/2010    Pneumovax 23 09/29/2017    TDAP 09/23/2019    Zoster Vaccine Live (Zostavax) 09/19/2012    Zoster Vaccine Recombinant Adjuvanted (Shingrix) 03/06/2023, 05/23/2023      ALLERGIES:  Allergies   Allergen Reactions    Ampicillin NAUSEA ONLY     Caps    Codeine [Opioid Analgesics]      Derivatives  Syncope    Cortisone [Cortisone Acetate]      Injection into L shoulder  Syncope, stomach cramps     CODE STATUS:  Full Code    ADVANCED CARE PLANNING TEAM: Will need ongoing updates to discuss with guardian to discuss a safe discharge.      CURRENT MEDICATIONS - reviewed and updated on SNF EMAR  Oral Vancomycin 125mg 4 x day - completed 12/20/23  Tylenol Supp 325 q 4 hrs prn  Alendronate 70 mg weekly - Wednesdays  Alprazolam 0.25 mg q hs prn  Atorvastatin 80 mg q hs  Calcium carb 500 one tablet per G TUBE DAILY  Mupirocin 2%  2 x daily around G tube site  Omeprazole 40 mg per G tube twice daily - discontinue 2/22/24  Osmolite 1.5 kavin or per G tube   Potassium 20 meq daily  Sucralfate one Gram suspension am and pm  Valproic acid 750 mg tid  Lansoprazole 30 mg twice daily  Seroquel 25 mg q hs  Levaquin 500 mg daily x 7 days  Probiotic twice daily      SUBJECTIVE:  confused, repeats phrases over and over, disruptive in her room and at the nurses station. High fall risk. Occas SOB. Oxygen prn     PHYSICAL EXAM: BP  120/64. P 108. RR 18. POx 98%. T 97.4. Wgt 146.3#  GENERAL HEALTH:well developed, well nourished, confused, agitated. LINES, TUBES, DRAINS:  G tube  SKIN: pale, warm, dry  WOUND: see wound assessment per staff,   EYES: Pupils round and equal; no jaundice. No drainage from eyes  HENT: no nasal drainage, mucous membranes pink, moist   NECK:supple. FROM  BREAST: deferred exam   RESPIRATORY: lungs diminished. CARDIOVASCULAR:  RRR, rate 108  ABDOMEN:  + G tube. Drsg c/d/i. Abdominal binder in place. BS+, soft, nontender, no guarding,  poor appetite. :Deferred incontinent  LYMPHATIC:no lymphedema  MUSCULOSKELETAL:  weakness upper and lower extremities; unable to stand or ambulate. NEUROLOGIC:  s/p CVA, ICH, weakness. PSYCHIATRIC: agitated, restless, disruptive. OR x 1. MEDICAL DECISION MAKING:  Unable to make medical decisions. Guardianship appointed; Stephon Jones. Lab results:  12/26/23  WBC  5.7. HGB 10.9  Plts 119. Na 140. K 3.7. Cl 104. CO2 28. BUN 33. Creat 0.6.  GFR >60    Assessment / Plan:    Left lower lobe infiltrates  SOB - Oxygen prn  Levaquin 500 mg daily x 7 days  Probiotic twice daily  Monitor VS and pulse Ox    Agitation/behavioral disturbance  Increased Seroquel to 25 mg q hs  Psychiatrist consult pending 12/29/23  Psychologist weekly visits    C-Diff diarrhea  Oral Vanco 125mg 4 x day - ended 12/20/23  Monitor diarrhea and skin breakdown    G-Tube - prevent dehydration  tube feedings - Osmolite 80 ml/hr  Water flushes q 4 hrs and before and after meds   Monitor confusion   Dietician following    Hypokelamia  K+ 2 0 meq daily  Trend labs    S/P ICH s/p craniotomy  Valproic acid 75 mg tid    Dysphagia  Tube feeding 80 ml over 14 hrs  Small portions of soft foods and liquids  ST to follow  Lansoprazole 30 mg twice daily  Sucralfate 1 G twice daily  Ca+ Carb 500 daily  Omeprazole 40 mg twice daily    Constipation  Tylenol Supp 325 q 4 hrs prn    HL:  Atorvastatin 80 mg q hs    Dispo:  Discharge Plan unclear at this time. Arnulfo Mejía meeting 1/4/24. FOLLOW UP APPOINTMENTS   *Follow-Up with PCP within 1-2 weeks following [facility] discharge. *Follow-Up with specialists as recommended. Future Appointments   Date Time Provider Maral Rivera   2/14/2024 10:20 AM Marek Amaya MD [facility] [place]     *Greater than 35 minutes spent w/ patient and staff, including but not limited to/ reviewing present status, needs, abilities with disciplines, reviewing medical records, vital signs, labs, completing med rec and entering orders to establish plan of care in [facility]. Note to patient: The [facility] makes medical notes like these available to patients in the interest of transparency. However, this is a medical document intended as peer to peer communication. It is written in medical language and may contain abbreviations or verbiage that are unfamiliar. It may appear blunt or direct. Medical documents are intended to carry relevant information, facts as evident, and the clinical opinion of the practitioner who signs the document.     Jaclyn Forman, HYACINTH  12/28/23  1 pm   [facility] Post Acute Care Team

## 2024-01-13 ENCOUNTER — APPOINTMENT (OUTPATIENT)
Dept: GENERAL RADIOLOGY | Facility: HOSPITAL | Age: 79
End: 2024-01-13
Attending: EMERGENCY MEDICINE
Payer: MEDICARE

## 2024-01-13 ENCOUNTER — HOSPITAL ENCOUNTER (EMERGENCY)
Facility: HOSPITAL | Age: 79
Discharge: HOME | End: 2024-01-13
Attending: EMERGENCY MEDICINE
Payer: MEDICARE

## 2024-01-13 VITALS
RESPIRATION RATE: 18 BRPM | WEIGHT: 136.69 LBS | TEMPERATURE: 99 F | DIASTOLIC BLOOD PRESSURE: 65 MMHG | HEART RATE: 100 BPM | SYSTOLIC BLOOD PRESSURE: 124 MMHG | OXYGEN SATURATION: 98 % | BODY MASS INDEX: 28 KG/M2

## 2024-01-13 DIAGNOSIS — K94.20 COMPLICATION OF GASTROSTOMY TUBE (HCC): Primary | ICD-10-CM

## 2024-01-13 PROCEDURE — 74018 RADEX ABDOMEN 1 VIEW: CPT | Performed by: EMERGENCY MEDICINE

## 2024-01-13 PROCEDURE — 99283 EMERGENCY DEPT VISIT LOW MDM: CPT

## 2024-01-13 NOTE — ED PROVIDER NOTES
Patient Seen in: [facility] Emergency Department      History     Chief Complaint   Patient presents with    Cath Tube Problem     Stated Complaint: g tube hissinig    Subjective:   HPI    Patient here with concern for G-tube.  Apparently there is an abnormal sound coming from the G-tube today the patient has pulled the G-tube in the past with concern for dislodgment.    Patient herself complains of some unusual bowel movements lately, no melena no blood.      Per report provided me the patient does have some baseline confusion issues and I am not sure how reliable her history is but there is no report of any bloody bowel movements.  Presently she denies any pain or discomfort.      Objective:   Past Medical History:   Diagnosis Date    Anxiety     Breast CA (HCC) 2004    CANCER     lft breast lumpectomy pre cancerous cells stage 0    Diabetes mellitus (HCC)     Diarrhea, unspecified     Ductal carcinoma in situ of breast 2004    left breast    Flatulence/gas pain/belching     GERD     H/O infectious mononucleosis     diagnosed in 1960s    High cholesterol     Hypertension     IBS (irritable bowel syndrome)     Osteoporosis     Other and unspecified hyperlipidemia     Personal history of irradiation, presenting hazards to health 01/01/2004    mammosite    Sleep disturbance     Stool incontinence     Stroke (HCC)     Type II or unspecified type diabetes mellitus without mention of complication, not stated as uncontrolled     Wears glasses               Past Surgical History:   Procedure Laterality Date    APPENDECTOMY      1948    COLONOSCOPY      2005 hiatal hernia, sm internal hem    COLONOSCOPY  01/01/2005    fiberoptic    D & C      IR ANGIOGRAM CEREBRAL CAROTID BILATERAL  10/12/2023    LUMPECTOMY LEFT  01/01/2004    breast    OTHER SURGICAL HISTORY      lumpectomy 2004 lft breast stage 0    OTHER SURGICAL HISTORY  10/11/2023    CRANIOTOMY FOR RIGHT FRONTAL HEMORRHAGE    RADIATION LEFT      Mammosite     TONSILLECTOMY      7    UPPER GI ENDOSCOPY,EXAM      2005                Social History     Socioeconomic History    Marital status:    Tobacco Use    Smoking status: Former     Packs/day: 1.00     Years: 20.00     Additional pack years: 0.00     Total pack years: 20.00     Types: Cigarettes     Quit date: 3/1/2006     Years since quittin.8    Smokeless tobacco: Never   Vaping Use    Vaping Use: Never used   Substance and Sexual Activity    Alcohol use: No     Alcohol/week: 0.0 standard drinks of alcohol    Drug use: No   Other Topics Concern    Caffeine Concern No     Comment: tries to drink decaf drinks    Exercise Yes     Comment: jazzercise 3x/wk, tennis 1-2x/wk     Social Determinants of Health     Food Insecurity: No Food Insecurity (2023)    Food Insecurity     Food Insecurity: Never true   Transportation Needs: No Transportation Needs (2023)    Transportation Needs     Lack of Transportation: No   Housing Stability: Low Risk  (2023)    Housing Stability     Housing Instability: No     Housing Instability Emergency: No              Review of Systems    Positive for stated complaint: g tube hissinig  Other systems are as noted in HPI.  Constitutional and vital signs reviewed.      All other systems reviewed and negative except as noted above.    Physical Exam     ED Triage Vitals [24 1209]   /65   Pulse 102   Resp 16   Temp 99 °F (37.2 °C)   Temp src Temporal   SpO2 98 %   O2 Device        Current:/65   Pulse 102   Temp 99 °F (37.2 °C) (Temporal)   Resp 16   Wt 62 kg   SpO2 98%   BMI 27.61 kg/m²         Physical Exam    Physical Exam   Constitutional: Awake, alert, well appearing  Head: Normocephalic and atraumatic.   Eyes: Conjunctivae are normal. Pupils are equal, round, and reactive to light.   Neck: Normal range of motion. Neck supple.   Cardiovascular: Normal rate, regular rhythm  Pulmonary/Chest: Normal effort.  No accessory muscle use.  No  clubbing, no cyanosis.  Abdominal: Soft nondistended.  G-tube in place left upper quadrant.    Neurological: Awake alert oriented to person and place but not time or situation.  Skin: Skin is warm and dry.    ED Course   Labs Reviewed - No data to display          The G-tube was flushed and pulled back without any issues.  Out of abundance of precaution I did order confirmatory x-ray with contrast and the G-tube appears to be in place.      XR ABDOMEN (1 VIEW) (CPT=74018)    Result Date: 1/13/2024  CONCLUSION:  Negative exam.  No evidence to suggest G-tube leak.   LOCATION:  [facility]   Dictated by (CST): Natalio Quiroz DO on 1/13/2024 at 2:23 PM     Finalized by (CST): Natalio Quiroz DO on 1/13/2024 at 2:25 PM             MDM          Differential diagnoses considered: Dislodged G-tube, G-tube malfunction, leaking G-tube.    -All evidence suggest that the G-tube is functioning fine at this point.  The patient is not in any discomfort and there are no other complaints patient be discharged back to her living facility      *My independent interpretation of radiographs:  no dense of contrast extravasation on contrast x-ray.    *Discussion of ongoing management of this patient's care included: n/a  *Comorbidities contributing to the complexity of decision making:  true delirium, Deitch G-tube dependence, recent intracranial hemorrhage s/p craniotomy  *External charts reviewed: Patient was admitted in October 2023 and at discharge after she had intracranial hemorrhage he required craniotomy.  *Additional sources of history: Staff, chart, nursing home report                                         Medical Decision Making      Disposition and Plan     Clinical Impression:  1. Complication of gastrostomy tube (HCC)         Disposition:  Discharge  1/13/2024  2:29 pm    Follow-up:  Dilcia Avery MD  [address] [address] [phone]    Follow up            Medications Prescribed:  Current  Discharge Medication List

## 2024-01-13 NOTE — ED INITIAL ASSESSMENT (HPI)
Per Kaye VELAZQUEZ from [place], strange noise on aspiration prior to giving morning meds, RN tried to flush it and g-tube was leaking around the site. Per RN pt has tried to pull it out before.

## 2024-01-13 NOTE — ED QUICK NOTES
Pt had bowel movement - very sticky/liquidy and yellow.     Pt changed into new brief and linen. [place]-care complete.

## 2024-01-16 ENCOUNTER — SNF VISIT (OUTPATIENT)
Dept: INTERNAL MEDICINE CLINIC | Age: 79
End: 2024-01-16

## 2024-01-16 DIAGNOSIS — R29.898 SEVERE MUSCLE DECONDITIONING: ICD-10-CM

## 2024-01-16 DIAGNOSIS — I62.9 INTRACRANIAL HEMORRHAGE (HCC): ICD-10-CM

## 2024-01-16 DIAGNOSIS — Z43.1 ATTENTION TO G-TUBE (HCC): ICD-10-CM

## 2024-01-16 DIAGNOSIS — I63.9 RIGHT-SIDED CEREBROVASCULAR ACCIDENT (CVA) (HCC): ICD-10-CM

## 2024-01-16 DIAGNOSIS — Z98.890 HISTORY OF CRANIOTOMY: ICD-10-CM

## 2024-01-16 DIAGNOSIS — R45.1 RESTLESSNESS AND AGITATION: ICD-10-CM

## 2024-01-16 DIAGNOSIS — R19.7 DIARRHEA, UNSPECIFIED TYPE: Primary | ICD-10-CM

## 2024-01-16 PROCEDURE — 3074F SYST BP LT 130 MM HG: CPT | Performed by: NURSE PRACTITIONER

## 2024-01-16 PROCEDURE — 99309 SBSQ NF CARE MODERATE MDM 30: CPT | Performed by: NURSE PRACTITIONER

## 2024-01-16 PROCEDURE — 3079F DIAST BP 80-89 MM HG: CPT | Performed by: NURSE PRACTITIONER

## 2024-01-18 ENCOUNTER — SNF VISIT (OUTPATIENT)
Dept: INTERNAL MEDICINE CLINIC | Age: 79
End: 2024-01-18

## 2024-01-18 VITALS
OXYGEN SATURATION: 96 % | RESPIRATION RATE: 16 BRPM | TEMPERATURE: 98 F | BODY MASS INDEX: 28 KG/M2 | DIASTOLIC BLOOD PRESSURE: 89 MMHG | HEART RATE: 118 BPM | WEIGHT: 140.19 LBS | SYSTOLIC BLOOD PRESSURE: 123 MMHG

## 2024-01-18 VITALS
HEART RATE: 94 BPM | OXYGEN SATURATION: 95 % | WEIGHT: 140.19 LBS | DIASTOLIC BLOOD PRESSURE: 71 MMHG | TEMPERATURE: 98 F | SYSTOLIC BLOOD PRESSURE: 129 MMHG | RESPIRATION RATE: 18 BRPM | BODY MASS INDEX: 28 KG/M2

## 2024-01-18 DIAGNOSIS — I63.9 RIGHT-SIDED CEREBROVASCULAR ACCIDENT (CVA) (HCC): ICD-10-CM

## 2024-01-18 DIAGNOSIS — E66.9 DIABETES MELLITUS TYPE 2 IN OBESE  (HCC): ICD-10-CM

## 2024-01-18 DIAGNOSIS — I62.9 INTRACRANIAL HEMORRHAGE (HCC): ICD-10-CM

## 2024-01-18 DIAGNOSIS — F91.9 BEHAVIOR DISTURBANCE: ICD-10-CM

## 2024-01-18 DIAGNOSIS — Z98.890 HISTORY OF CRANIOTOMY: ICD-10-CM

## 2024-01-18 DIAGNOSIS — R45.1 RESTLESSNESS AND AGITATION: ICD-10-CM

## 2024-01-18 DIAGNOSIS — E11.69 DIABETES MELLITUS TYPE 2 IN OBESE  (HCC): ICD-10-CM

## 2024-01-18 DIAGNOSIS — Z43.1 ATTENTION TO G-TUBE (HCC): ICD-10-CM

## 2024-01-18 DIAGNOSIS — A04.71 RECURRENT CLOSTRIDIOIDES DIFFICILE DIARRHEA: Primary | ICD-10-CM

## 2024-01-18 PROCEDURE — 99310 SBSQ NF CARE HIGH MDM 45: CPT | Performed by: NURSE PRACTITIONER

## 2024-01-18 PROCEDURE — 1159F MED LIST DOCD IN RCRD: CPT | Performed by: NURSE PRACTITIONER

## 2024-01-18 PROCEDURE — 1160F RVW MEDS BY RX/DR IN RCRD: CPT | Performed by: NURSE PRACTITIONER

## 2024-01-18 PROCEDURE — 3074F SYST BP LT 130 MM HG: CPT | Performed by: NURSE PRACTITIONER

## 2024-01-18 PROCEDURE — 3078F DIAST BP <80 MM HG: CPT | Performed by: NURSE PRACTITIONER

## 2024-01-18 RX ORDER — MIRTAZAPINE 15 MG/1
15 TABLET, FILM COATED ORAL NIGHTLY
COMMUNITY

## 2024-01-19 NOTE — PROGRESS NOTES
Mayte Yates.  11/10/1945.  APRN Encounter 24. 330 pm    Met with resident at bedside.  Received call from RN last night 24 with results of stool + for C-Diff.  Orders entered for oral Vancomycin 125 mg 4 x daily for 10 days.   Pharmacy contacted this morning 24; oral is on back order.  Pharmacy will send asap 24. Patient placed in isolation for +C-Diff.    Psychiatrist here to see resident who spoke with ANNAMARIA, staff nurse and this APN regarding her disruptive behavior, agitation to other residents, inability to sleep, anxiety, and hyper talking with repetitive phrases.  Mayte Yates.   11/10/1945. APRN Encounter 24.  215pm    Last chest Xray reported early infiltrates left lower lung.    Ended Levaquin 500 mg daily yesterday 1/3/24.     Seroquel was increased to 25 mg am and pm by Dr. Mazariegos, psychiatrist.  Alprazolam 0.25 mg increased to every 8 hrs prn by this APN.  Mirtazapine 15 mg q hs.  Cymbalta 20 mg daily added per physiatrist.   Patient will be seen by psychiatrist on 24.     Mayte Yates  : 11/10/1945. 78 year old  female is admitted to [facility].       Last hospitalization: [facility]  23  Discharged to [facility]:             23    Chief complaint today:  diarrhea, agitation, confusion     HPI (per Dr. Norton), Mrs. Yates is an 78 yr old female w/ PMH of anxiety, BRCA, HTN, DM, GERD.  She came to the ED now due to hypokalemia.  She is in a SNF and was sent to the ED. Recently had PEG tube placed.  In the ED she was noted to be confused though improved after IVF given.  Pt was admitted with delirium and dehydration. Found to have cdiff diarrhea. Pt was treated supportively and started on PO vanc with improvement in her symptoms and clinical condition. She was discharged to [facility] [place] in good clinical condition with recommendation to f/u w/ PCP as an outpatient.     Past Medical History:   Diagnosis Date    Anxiety     Breast  CA (HCC) 2004    CANCER     lft breast lumpectomy pre cancerous cells stage 0    Diabetes mellitus (HCC)     Diarrhea, unspecified     Ductal carcinoma in situ of breast     left breast    Flatulence/gas pain/belching     GERD     H/O infectious mononucleosis     diagnosed in 1960s    High cholesterol     Hypertension     IBS (irritable bowel syndrome)     Osteoporosis     Other and unspecified hyperlipidemia     Personal history of irradiation, presenting hazards to health 2004    mammosite    Sleep disturbance     Stool incontinence     Stroke (HCC)     Type II or unspecified type diabetes mellitus without mention of complication, not stated as uncontrolled     Wears glasses      Past Surgical History:   Procedure Laterality Date    APPENDECTOMY      194    COLONOSCOPY       hiatal hernia, sm internal hem    COLONOSCOPY  2005    fiberoptic    D & C      IR ANGIOGRAM CEREBRAL CAROTID BILATERAL  10/12/2023    LUMPECTOMY LEFT  2004    breast    OTHER SURGICAL HISTORY      lumpectomy 2004 lft breast stage 0    OTHER SURGICAL HISTORY  10/11/2023    CRANIOTOMY FOR RIGHT FRONTAL HEMORRHAGE    RADIATION LEFT      Mammosite    TONSILLECTOMY          UPPER GI ENDOSCOPY,EXAM      2005     Family History   Problem Relation Age of Onset    Heart Disorder Father          of heart attack age 57    Alcohol and Other Disorders Associated Father     Arthritis Father     Other (Other) Father     Heart Disease Mother         CHF age 80    Arthritis Mother     Other (Other) Mother     Diabetes Sister         mellitus    Heart Disease Maternal Grandfather     Cancer Maternal Grandmother         brain tumor    Breast Cancer Self      Social History     Socioeconomic History    Marital status:    Tobacco Use    Smoking status: Former     Packs/day: 1.00     Years: 20.00     Additional pack years: 0.00     Total pack years: 20.00     Types: Cigarettes     Quit date: 3/1/2006     Years since  quittin.8    Smokeless tobacco: Never   Vaping Use    Vaping Use: Never used   Substance and Sexual Activity    Alcohol use: No     Alcohol/week: 0.0 standard drinks of alcohol    Drug use: No   Other Topics Concern    Caffeine Concern No     Comment: tries to drink decaf drinks    Exercise Yes     Comment: jazzercise 3x/wk, tennis 1-2x/wk     Social Determinants of Health     Food Insecurity: No Food Insecurity (2023)    Food Insecurity     Food Insecurity: Never true   Transportation Needs: No Transportation Needs (2023)    Transportation Needs     Lack of Transportation: No   Housing Stability: Low Risk  (2023)    Housing Stability     Housing Instability: No     Housing Instability Emergency: No     IMMUNIZATIONS  Immunization History   Administered Date(s) Administered    Covid-19 Vaccine Moderna 100 mcg/0.5 ml 02/10/2021, 03/10/2021, 10/22/2021, 2022    Covid-19 Vaccine Moderna Bivalent 50mcg/0.5mL 12+ years 2022    DTAP 2007    FLU VAC High Dose 65 YRS & Older PRSV Free (55710) 10/24/2016, 09/15/2020    FLUAD High Dose 65 yr and older (02118) 10/11/2019, 2021    Fluzone Vaccine Medicare () 10/28/2013, 10/24/2016, 09/15/2020    HIGH DOSE FLU 65 YRS AND OLDER PRSV FREE SINGLE D (76764) FLU CLINIC 2022    Influenza 10/24/2012, 10/23/2015, 10/20/2017, 10/05/2018    Pneumococcal (Prevnar 13) 2016    Pneumococcal (Prevnar 7) 2010    Pneumovax 23 2017    TDAP 2019    Zoster Vaccine Live (Zostavax) 2012    Zoster Vaccine Recombinant Adjuvanted (Shingrix) 2023, 2023      ALLERGIES:  Allergies   Allergen Reactions    Ampicillin NAUSEA ONLY     Caps    Codeine [Opioid Analgesics]      Derivatives  Syncope    Cortisone [Cortisone Acetate]      Injection into L shoulder  Syncope, stomach cramps     CODE STATUS: changed to DNR 24 per Guardian    ADVANCED CARE PLANNING TEAM: Guardian requesting ongoing updates to be involved  in patient's POC.      CURRENT MEDICATIONS - reviewed and updated on SNF EM[facility]  RESTARTED Oral Vancomycin 125mg 4 x day on 1/18/24 for + C-Diff  Tylenol Supp 325 q 4 hrs prn  Alendronate 70 mg weekly - Wednesdays  Alprazolam 0.25 mg q  8 hrs prn  Atorvastatin 80 mg q hs  Calcium carb 500 one tablet per G TUBE DAILY  Mupirocin 2%  2 x daily around G tube site prn  Omeprazole 40 mg per G tube twice daily - discontinue 2/22/24  Osmolite 1.5 kavin or per G tube   Potassium 20 meq daily  Sucralfate one Gram suspension am and pm  Valproic acid 750 mg tid  Lansoprazole 30 mg twice daily  Seroquel 25 mg am and pm   Levaquin 500 mg daily ended 1/3/24  Mirtazapine 15 mg q hs  Probiotic twice daily  Cymbalta 20 mg daily      SUBJECTIVE:  confused, repeats phrases and words in a repetitive manner; disruptive at the nurses station;  High fall risk.       PHYSICAL EXAM: /71. P 94.  RR 18.  POx 95%. T 97.8. . Wgt 140.2#.  GENERAL HEALTH:well developed, well nourished, confused, agitated.   LINES, TUBES, DRAINS:  G tube  SKIN: pale, warm, dry  WOUND: see wound assessments per staff  EYES: Pupils round and equal; no jaundice. No drainage from eyes  HENT: no nasal drainage, mucous membranes moist   NECK:supple. FROM  BREAST: deferred exam   RESPIRATORY: lungs clear.    CARDIOVASCULAR Rate 94  ABDOMEN:  + G tube. NEW + C-Diff. Drsg c/d/i.  Abdominal binder in place.  BS+, soft, no guarding,  poor appetite.   :Deferred ,incontinent  LYMPHATIC:no lymphedema  MUSCULOSKELETAL:  weakness upper and lower extremities; unable to stand or ambulate. Trace edema.   NEUROLOGIC:  s/p CVA, ICH, weakness, confused    PSYCHIATRIC: agitated, restless, disruptive.  OR x 1.    MEDICAL DECISION MAKING:  Unable to make medical decisions.  Guardianship appointed; Demetrice Quick.  DNR    Lab results:  1/9/24  WBC  11.4. HGB 11. Plts 193.   Na 142. K 3.7. Cl 104. CO2 31.   BUN 24. Creat 0.5. GFR >60    Assessment / Plan:    New Onset of Recurrent C-Diff  1/17/24.  Oral Vanco 125 mg 4 X daily x 10 days.  Monitor s/s of dehydration  Trend labs.    Recent LLL infiltrates resolved  Completed Levaquin 500 mg daily 1/3/24  Probiotic twice daily  Monitor VS and pulse Ox    Agitation/behavioral disturbance - seen by psychiatrist 1/18/24  Increased Seroquel to 25 am and pm per psyche   Alprazolam 0.25 increased to q 8 hrs prn  Cymbalta 20 mg daily per physiatrist  Psychiatrist following   Psychologist weekly visits    Previous C-Diff resolved  Oral Vanco 125mg 4 x day - ended 12/20/23  Monitor diarrhea and skin breakdown    G-Tube - prevent dehydration  tube feedings - Osmolite per dietician   Water flushes q 4 hrs and before and after meds   Encourage oral intake   Dietician following    Hypokelamia  K+ 2 0 meq daily  Trend labs    S/P ICH s/p craniotomy  Valproic acid 75 mg tid    Dysphagia  Tube feedings per dietician   Small portions of soft foods and liquids  ST following   Lansoprazole 30 mg twice daily  Sucralfate 1 G twice daily  Ca+ Carb 500 daily  Omeprazole 40 mg twice daily    Constipation  Dulcolax Supp prn    HL:  Atorvastatin 80 mg q hs    Dispo:  Long term care currently. Demetrice Jo, guardian following.     FOLLOW UP APPOINTMENTS   *Follow-Up with PCP within 1-2 weeks following [facility] discharge.   *Follow-Up with specialists as recommended.    Future Appointments   Date Time Provider Department Center   2/14/2024 10:20 AM Kari Weinberg MD [facility]     *Greater than 35 minutes spent w/ patient and staff, including but not limited to/ reviewing present status, needs, abilities with disciplines, reviewing medical records, vital signs, labs, completing med rec and entering orders to establish plan of care in [facility].  Note to patient: The 21st Century Cures Act makes medical notes like these available to patients in the interest of transparency. However, this is a medical document intended as peer to peer communication. It is written in medical language  and may contain abbreviations or verbiage that are unfamiliar. It may appear blunt or direct. Medical documents are intended to carry relevant information, facts as evident, and the clinical opinion of the practitioner who signs the document.    Venessa Saucedo (Daya), HYACINTH  1/18/24  330pm    [facility] Post Acute Care Team

## 2024-01-19 NOTE — PROGRESS NOTES
Mayte Yates.   11/10/1945. APRN Encounter 24.  215pm    Met with patient at bedside and at the nurses station.  Patient's behavior is disruptive to the other residents on the unit. She yells and talks loudly to herself and occasionally to others fragmented ideas and wants.  She is able to make her needs known when she is calm and not agitated or restless.    Last chest Xray reported early infiltrates left lower lung.    Ended Levaquin 500 mg daily yesterday 1/3/24.     Seroquel was increased to 25 mg am and pm by Dr. Mazariegos, psychiatrist.  Alprazolam 0.25 mg increased to every 8 hrs prn by this APN.  Mirtazapine 15 mg q hs.  Cymbalta 20 mg daily added per physiatrist.   Patient will be seen by psychiatrist on 24.     Mayte Yates  : 11/10/1945. 78 year old  female is admitted to [facility].       Last hospitalization: [facility]  23  Discharged to [facility]:             23    Chief complaint today:  agitation, confusion, loose stools     HPI (per Dr. Norton), Mrs. Yates is an 78 yr old female w/ PMH of anxiety, BRCA, HTN, DM, GERD.  She came to the ED now due to hypokalemia.  She is in a SNF and was sent to the ED. Recently had PEG tube placed.  In the ED she was noted to be confused though improved after IVF given.  Pt was admitted with delirium and dehydration. Found to have cdiff diarrhea. Pt was treated supportively and started on PO vanc with improvement in her symptoms and clinical condition. She was discharged to [facility] in good clinical condition with recommendation to f/u w/ PCP as an outpatient.     Past Medical History:   Diagnosis Date    Anxiety     Breast CA (HCC)     CANCER     lft breast lumpectomy pre cancerous cells stage 0    Diabetes mellitus (HCC)     Diarrhea, unspecified     Ductal carcinoma in situ of breast     left breast    Flatulence/gas pain/belching     GERD     H/O infectious mononucleosis     diagnosed in 1960s     High cholesterol     Hypertension     IBS (irritable bowel syndrome)     Osteoporosis     Other and unspecified hyperlipidemia     Personal history of irradiation, presenting hazards to health 2004    mammosite    Sleep disturbance     Stool incontinence     Stroke (HCC)     Type II or unspecified type diabetes mellitus without mention of complication, not stated as uncontrolled     Wears glasses      Past Surgical History:   Procedure Laterality Date    APPENDECTOMY          COLONOSCOPY       hiatal hernia, sm internal hem    COLONOSCOPY  2005    fiberoptic    D & C      IR ANGIOGRAM CEREBRAL CAROTID BILATERAL  10/12/2023    LUMPECTOMY LEFT  2004    breast    OTHER SURGICAL HISTORY      lumpectomy  lft breast stage 0    OTHER SURGICAL HISTORY  10/11/2023    CRANIOTOMY FOR RIGHT FRONTAL HEMORRHAGE    RADIATION LEFT      Mammosite    TONSILLECTOMY          UPPER GI ENDOSCOPY,EXAM      2005     Family History   Problem Relation Age of Onset    Heart Disorder Father          of heart attack age 57    Alcohol and Other Disorders Associated Father     Arthritis Father     Other (Other) Father     Heart Disease Mother         CHF age 80    Arthritis Mother     Other (Other) Mother     Diabetes Sister         mellitus    Heart Disease Maternal Grandfather     Cancer Maternal Grandmother         brain tumor    Breast Cancer Self      Social History     Socioeconomic History    Marital status:    Tobacco Use    Smoking status: Former     Packs/day: 1.00     Years: 20.00     Additional pack years: 0.00     Total pack years: 20.00     Types: Cigarettes     Quit date: 3/1/2006     Years since quittin.8    Smokeless tobacco: Never   Vaping Use    Vaping Use: Never used   Substance and Sexual Activity    Alcohol use: No     Alcohol/week: 0.0 standard drinks of alcohol    Drug use: No   Other Topics Concern    Caffeine Concern No     Comment: tries to drink decaf drinks     Exercise Yes     Comment: jazzercise 3x/wk, tennis 1-2x/wk     Social Determinants of Health     Food Insecurity: No Food Insecurity (12/6/2023)    Food Insecurity     Food Insecurity: Never true   Transportation Needs: No Transportation Needs (12/6/2023)    Transportation Needs     Lack of Transportation: No   Housing Stability: Low Risk  (12/6/2023)    Housing Stability     Housing Instability: No     Housing Instability Emergency: No     IMMUNIZATIONS  Immunization History   Administered Date(s) Administered    Covid-19 Vaccine Moderna 100 mcg/0.5 ml 02/10/2021, 03/10/2021, 10/22/2021, 04/05/2022    Covid-19 Vaccine Moderna Bivalent 50mcg/0.5mL 12+ years 09/13/2022    DTAP 04/24/2007    FLU VAC High Dose 65 YRS & Older PRSV Free (68622) 10/24/2016, 09/15/2020    FLUAD High Dose 65 yr and older (81929) 10/11/2019, 09/28/2021    Fluzone Vaccine Medicare () 10/28/2013, 10/24/2016, 09/15/2020    HIGH DOSE FLU 65 YRS AND OLDER PRSV FREE SINGLE D (64792) FLU CLINIC 09/27/2022    Influenza 10/24/2012, 10/23/2015, 10/20/2017, 10/05/2018    Pneumococcal (Prevnar 13) 09/02/2016    Pneumococcal (Prevnar 7) 04/24/2010    Pneumovax 23 09/29/2017    TDAP 09/23/2019    Zoster Vaccine Live (Zostavax) 09/19/2012    Zoster Vaccine Recombinant Adjuvanted (Shingrix) 03/06/2023, 05/23/2023      ALLERGIES:  Allergies   Allergen Reactions    Ampicillin NAUSEA ONLY     Caps    Codeine [Opioid Analgesics]      Derivatives  Syncope    Cortisone [Cortisone Acetate]      Injection into L shoulder  Syncope, stomach cramps     CODE STATUS: changed to DNR 1/4/24 per Guardian    ADVANCED CARE PLANNING TEAM: Guardian requesting ongoing updates to be involved in patient's POC.      CURRENT MEDICATIONS - reviewed and updated on SNF EMAR  Oral Vancomycin 125mg 4 x day - completed 12/20/23  Tylenol Supp 325 q 4 hrs prn  Alendronate 70 mg weekly - Wednesdays  Alprazolam 0.25 mg q 8 hrs prn  Atorvastatin 80 mg q hs  Calcium carb 500 one tablet  per G TUBE DAILY  Mupirocin 2%  2 x daily around G tube site prn  Omeprazole 40 mg per G tube twice daily - discontinue 2/22/24  Osmolite 1.5 kavin or per G tube   Potassium 20 meq daily  Sucralfate one Gram suspension am and pm  Valproic acid 750 mg tid  Lansoprazole 30 mg twice daily  Seroquel 25 mg am and pm   Levaquin 500 mg daily ended 1/3/24  Mirtazapine 15 mg q hs  Probiotic twice daily  Cymbalta 20 mg daily      SUBJECTIVE:  confused, repeats phrases and words in a repetitive manner; disruptive at the nurses station; frustrated.  High fall risk.   Loose Stools.     PHYSICAL EXAM: /89. P 118. RR 16. Pulse Ox 96% T 98. Wgt 140.2#.  GENERAL HEALTH:well developed, well nourished, confused, agitated.   LINES, TUBES, DRAINS:  G tube  SKIN: pale, warm, dry  WOUND: see wound assessments per staff,   EYES: Pupils round and equal; no jaundice. No drainage from eyes  HENT: no nasal drainage, mucous membranes moist   NECK:supple. FROM  BREAST: deferred exam   RESPIRATORY: lungs clear.    CARDIOVASCULAR:  RRR, rate 118  ABDOMEN:  + G tube. Drsg c/d/i.  Abdominal binder in place.  BS+, soft, no guarding,  poor appetite.  Loose stools  :Deferred ,incontinent  LYMPHATIC:no lymphedema  MUSCULOSKELETAL:  weakness upper and lower extremities; unable to stand or ambulate. Trace edema.   NEUROLOGIC:  s/p CVA, ICH, weakness, confused    PSYCHIATRIC: agitated, restless, disruptive.  OR x 1.    MEDICAL DECISION MAKING:  Unable to make medical decisions.  Guardianship appointed; Demetrice Quick.     Lab results:  1/9/24  WBC  11.4. HGB 11. Plts 193.   Na 142. K 3.7. Cl 104. CO2 31.   BUN 24. Creat 0.5. GFR >60    Assessment / Plan:    Diarrhea:  Obtain stool for C-Diff   Monitor s/s of dehydration  Trend labs    Left lower lobe infiltrates resolved  Completed Levaquin 500 mg daily 1/3/24  Probiotic twice daily    Agitation/behavioral disturbance  Increased Seroquel to 25 am and pm per psyche   Alprazolam 0.25 increased to q 8 hrs  prn  Cymbalta 20 mg daily per physiatrist  Psychiatrist following - next visit 1/18/24.   Psychologist weekly visits    Previous C-Diff resolved  Oral Vanco 125mg 4 x day - ended 12/20/23  Monitor diarrhea and skin breakdown    G-Tube - prevent dehydration  tube feedings - Osmolite per dietician   Water flushes q 4 hrs and before and after meds   Encourage oral intake   Dietician following    Hypokelamia  K+ 2 0 meq daily  Trend labs    S/P ICH s/p craniotomy  Valproic acid 75 mg tid    Dysphagia  Tube feedings per dietitcian   Small portions of soft foods and liquids  ST following   Lansoprazole 30 mg twice daily  Sucralfate 1 G twice daily  Ca+ Carb 500 daily  Omeprazole 40 mg twice daily    Constipation  Dulcolax Supp prn    HL:  Atorvastatin 80 mg q hs    Dispo:  Discharge Plan unclear at this time. Long term care currently. Demetrice Jo, guardian following.     FOLLOW UP APPOINTMENTS   *Follow-Up with PCP within 1-2 weeks following [facility] discharge.   *Follow-Up with specialists as recommended.    Future Appointments   Date Time Provider Department Center   2/14/2024 10:20 AM Kari Weinberg MD [facility]     *Greater than 35 minutes spent w/ patient and staff, including but not limited to/ reviewing present status, needs, abilities with disciplines, reviewing medical records, vital signs, labs, completing med rec and entering orders to establish plan of care in [facility].  Note to patient: The 21st Century Cures Act makes medical notes like these available to patients in the interest of transparency. However, this is a medical document intended as peer to peer communication. It is written in medical language and may contain abbreviations or verbiage that are unfamiliar. It may appear blunt or direct. Medical documents are intended to carry relevant information, facts as evident, and the clinical opinion of the practitioner who signs the document.    Venessa Saucedo (Daya), APRN  1/16/24  215pm    [facility] Post Acute  Care Team

## 2024-01-25 ENCOUNTER — SNF VISIT (OUTPATIENT)
Dept: INTERNAL MEDICINE CLINIC | Age: 79
End: 2024-01-25

## 2024-01-25 ENCOUNTER — APPOINTMENT (OUTPATIENT)
Dept: CT IMAGING | Facility: HOSPITAL | Age: 79
End: 2024-01-25
Attending: EMERGENCY MEDICINE
Payer: MEDICARE

## 2024-01-25 ENCOUNTER — HOSPITAL ENCOUNTER (INPATIENT)
Facility: HOSPITAL | Age: 79
LOS: 4 days | Discharge: NURSING, REHAB OR LONG-TERM CARE | End: 2024-01-30
Attending: EMERGENCY MEDICINE | Admitting: HOSPITALIST
Payer: MEDICARE

## 2024-01-25 ENCOUNTER — HOSPITAL ENCOUNTER (INPATIENT)
Facility: HOSPITAL | Age: 79
Discharge: NURSING, REHAB OR LONG-TERM CARE | End: 2024-01-25
Attending: EMERGENCY MEDICINE | Admitting: HOSPITALIST
Payer: MEDICARE

## 2024-01-25 ENCOUNTER — HOSPITAL ENCOUNTER (EMERGENCY)
Facility: HOSPITAL | Age: 79
Discharge: HOME | End: 2024-01-25
Attending: EMERGENCY MEDICINE
Payer: MEDICARE

## 2024-01-25 ENCOUNTER — APPOINTMENT (OUTPATIENT)
Dept: GENERAL RADIOLOGY | Facility: HOSPITAL | Age: 79
End: 2024-01-25
Attending: EMERGENCY MEDICINE
Payer: MEDICARE

## 2024-01-25 VITALS
RESPIRATION RATE: 20 BRPM | SYSTOLIC BLOOD PRESSURE: 121 MMHG | TEMPERATURE: 97 F | DIASTOLIC BLOOD PRESSURE: 73 MMHG | HEART RATE: 96 BPM | OXYGEN SATURATION: 96 %

## 2024-01-25 DIAGNOSIS — R47.1 DYSARTHRIA: ICD-10-CM

## 2024-01-25 DIAGNOSIS — I82.4Y2 ACUTE DEEP VEIN THROMBOSIS (DVT) OF PROXIMAL END OF LEFT LOWER EXTREMITY (HCC): ICD-10-CM

## 2024-01-25 DIAGNOSIS — E11.69 DIABETES MELLITUS TYPE 2 IN OBESE  (HCC): ICD-10-CM

## 2024-01-25 DIAGNOSIS — A04.71 RECURRENT CLOSTRIDIOIDES DIFFICILE DIARRHEA: ICD-10-CM

## 2024-01-25 DIAGNOSIS — I63.9 ACUTE CVA (CEREBROVASCULAR ACCIDENT) (HCC): Primary | ICD-10-CM

## 2024-01-25 DIAGNOSIS — I62.9 INTRACRANIAL HEMORRHAGE (HCC): ICD-10-CM

## 2024-01-25 DIAGNOSIS — R56.9 SEIZURES (HCC): ICD-10-CM

## 2024-01-25 DIAGNOSIS — E66.9 DIABETES MELLITUS TYPE 2 IN OBESE  (HCC): ICD-10-CM

## 2024-01-25 DIAGNOSIS — R47.02 DYSPHASIA: Primary | ICD-10-CM
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
## 2024-01-25 DIAGNOSIS — R45.1 RESTLESSNESS AND AGITATION: ICD-10-CM

## 2024-01-25 DIAGNOSIS — R19.7 DIARRHEA, UNSPECIFIED TYPE: ICD-10-CM

## 2024-01-25 DIAGNOSIS — I26.99 OTHER PULMONARY EMBOLISM WITHOUT ACUTE COR PULMONALE, UNSPECIFIED CHRONICITY (HCC): ICD-10-CM

## 2024-01-25 DIAGNOSIS — Z98.890 HISTORY OF CRANIOTOMY: ICD-10-CM

## 2024-01-25 PROBLEM — D64.9 ANEMIA: Status: ACTIVE | Noted: 2024-01-25

## 2024-01-25 PROBLEM — F03.B0 MODERATE DEMENTIA (HCC): Status: ACTIVE | Noted: 2024-01-25

## 2024-01-25 PROBLEM — R79.89 AZOTEMIA: Status: ACTIVE | Noted: 2024-01-25

## 2024-01-25 LAB
ALBUMIN SERPL-MCNC: 1.7 G/DL (ref 3.4–5)
ALBUMIN/GLOB SERPL: 0.4 {RATIO} (ref 1–2)
ALP LIVER SERPL-CCNC: 78 U/L
ANION GAP SERPL CALC-SCNC: 1 MMOL/L (ref 0–18)
APTT PPP: 25.9 SECONDS (ref 23.3–35.6)
AST SERPL-CCNC: 27 U/L (ref 15–37)
BASOPHILS # BLD AUTO: 0.06 X10(3) UL (ref 0–0.2)
BASOPHILS NFR BLD AUTO: 0.5 %
BILIRUB SERPL-MCNC: 0.2 MG/DL (ref 0.1–2)
BUN BLD-MCNC: 14 MG/DL (ref 9–23)
CALCIUM BLD-MCNC: 8.8 MG/DL (ref 8.5–10.1)
CHLORIDE SERPL-SCNC: 103 MMOL/L (ref 98–112)
CO2 SERPL-SCNC: 33 MMOL/L (ref 21–32)
CREAT BLD-MCNC: 0.67 MG/DL
EGFRCR SERPLBLD CKD-EPI 2021: 88 ML/MIN/1.73M2 (ref 60–?)
EOSINOPHIL # BLD AUTO: 0.3 X10(3) UL (ref 0–0.7)
EOSINOPHIL NFR BLD AUTO: 2.7 %
ERYTHROCYTE [DISTWIDTH] IN BLOOD BY AUTOMATED COUNT: 15.3 %
GLOBULIN PLAS-MCNC: 4.4 G/DL (ref 2.8–4.4)
GLUCOSE BLD-MCNC: 107 MG/DL (ref 70–99)
GLUCOSE BLD-MCNC: 114 MG/DL (ref 70–99)
HCT VFR BLD AUTO: 35 %
HGB BLD-MCNC: 11.7 G/DL
IMM GRANULOCYTES # BLD AUTO: 0.36 X10(3) UL (ref 0–1)
IMM GRANULOCYTES NFR BLD: 3.2 %
INR BLD: 1.01 (ref 0.8–1.2)
LYMPHOCYTES # BLD AUTO: 2.78 X10(3) UL (ref 1–4)
LYMPHOCYTES NFR BLD AUTO: 24.9 %
MCH RBC QN AUTO: 29.9 PG (ref 26–34)
MCHC RBC AUTO-ENTMCNC: 33.4 G/DL (ref 31–37)
MCV RBC AUTO: 89.5 FL
MONOCYTES # BLD AUTO: 1.19 X10(3) UL (ref 0.1–1)
MONOCYTES NFR BLD AUTO: 10.7 %
NEUTROPHILS # BLD AUTO: 6.48 X10 (3) UL (ref 1.5–7.7)
NEUTROPHILS # BLD AUTO: 6.48 X10(3) UL (ref 1.5–7.7)
NEUTROPHILS NFR BLD AUTO: 58 %
OSMOLALITY SERPL CALC.SUM OF ELEC: 285 MOSM/KG (ref 275–295)
PLATELET # BLD AUTO: 199 10(3)UL (ref 150–450)
POTASSIUM SERPL-SCNC: 4.1 MMOL/L (ref 3.5–5.1)
PROT SERPL-MCNC: 6.1 G/DL (ref 6.4–8.2)
PROTHROMBIN TIME: 13.3 SECONDS (ref 11.6–14.8)
RBC # BLD AUTO: 3.91 X10(6)UL
SODIUM SERPL-SCNC: 137 MMOL/L (ref 136–145)
TROPONIN I SERPL HS-MCNC: 14 NG/L
WBC # BLD AUTO: 11.2 X10(3) UL (ref 4–11)

## 2024-01-25 PROCEDURE — 1159F MED LIST DOCD IN RCRD: CPT | Performed by: NURSE PRACTITIONER

## 2024-01-25 PROCEDURE — 99310 SBSQ NF CARE HIGH MDM 45: CPT | Performed by: NURSE PRACTITIONER

## 2024-01-25 PROCEDURE — 70450 CT HEAD/BRAIN W/O DYE: CPT | Performed by: EMERGENCY MEDICINE

## 2024-01-25 PROCEDURE — 3078F DIAST BP <80 MM HG: CPT | Performed by: INTERNAL MEDICINE

## 2024-01-25 PROCEDURE — 0042T CT STROKE (DAWN) CTA BRAIN/CTA NECK+PERF(CPT=70496/70498/0042T): CPT | Performed by: EMERGENCY MEDICINE

## 2024-01-25 PROCEDURE — G0317 PROLNG NSG FAC E/M EA ADDL 15 MIN: HCPCS | Performed by: NURSE PRACTITIONER

## 2024-01-25 PROCEDURE — 3074F SYST BP LT 130 MM HG: CPT | Performed by: NURSE PRACTITIONER

## 2024-01-25 PROCEDURE — 99223 1ST HOSP IP/OBS HIGH 75: CPT | Performed by: INTERNAL MEDICINE

## 2024-01-25 PROCEDURE — 3074F SYST BP LT 130 MM HG: CPT | Performed by: INTERNAL MEDICINE

## 2024-01-25 PROCEDURE — 99284 EMERGENCY DEPT VISIT MOD MDM: CPT

## 2024-01-25 PROCEDURE — 70496 CT ANGIOGRAPHY HEAD: CPT | Performed by: EMERGENCY MEDICINE

## 2024-01-25 PROCEDURE — 71045 X-RAY EXAM CHEST 1 VIEW: CPT | Performed by: EMERGENCY MEDICINE

## 2024-01-25 PROCEDURE — 3078F DIAST BP <80 MM HG: CPT | Performed by: NURSE PRACTITIONER

## 2024-01-25 PROCEDURE — 1160F RVW MEDS BY RX/DR IN RCRD: CPT | Performed by: NURSE PRACTITIONER

## 2024-01-25 PROCEDURE — 72125 CT NECK SPINE W/O DYE: CPT | Performed by: EMERGENCY MEDICINE

## 2024-01-25 PROCEDURE — 70498 CT ANGIOGRAPHY NECK: CPT | Performed by: EMERGENCY MEDICINE

## 2024-01-25 RX ORDER — HEPARIN SODIUM 1000 [USP'U]/ML
80 INJECTION, SOLUTION INTRAVENOUS; SUBCUTANEOUS ONCE
Status: COMPLETED | OUTPATIENT
Start: 2024-01-25 | End: 2024-01-25

## 2024-01-25 RX ORDER — ACETAMINOPHEN 500 MG
1000 TABLET ORAL ONCE
Status: COMPLETED | OUTPATIENT
Start: 2024-01-25 | End: 2024-01-25

## 2024-01-25 RX ORDER — HEPARIN SODIUM AND DEXTROSE 10000; 5 [USP'U]/100ML; G/100ML
18 INJECTION INTRAVENOUS ONCE
Status: COMPLETED | OUTPATIENT
Start: 2024-01-25 | End: 2024-01-26

## 2024-01-25 RX ORDER — ASPIRIN 300 MG/1
300 SUPPOSITORY RECTAL ONCE
Status: COMPLETED | OUTPATIENT
Start: 2024-01-25 | End: 2024-01-25

## 2024-01-25 NOTE — ED INITIAL ASSESSMENT (HPI)
Arrives via EMS from nursing facility after found fallen, hit head on bed rail. No thinners. Dementia patient with frequent falls. Arrives in [place]

## 2024-01-25 NOTE — ED PROVIDER NOTES
Patient Seen in: [facility] Emergency Department      History     Chief Complaint   Patient presents with    Fall     Stated Complaint: fall, dementia patient. hit head on bed rail. A&Ox2 per norm. no thinners. Arri*    Subjective:   HPI    78-year-old female resident at [facility] with history of dementia, poor historian comes to ED for evaluation after fall.  Reportedly she fell off the bed.  Patient does acknowledge this.  She fell off the bed denies any LOC.  Was placed in a c-collar complain of some pain had but no extremity pain injury.  History of stroke with left-sided weakness which is unchanged.  No other complaints.    Objective:   Past Medical History:   Diagnosis Date    Anxiety     Breast CA (HCC) 2004    CANCER     lft breast lumpectomy pre cancerous cells stage 0    Diabetes mellitus (HCC)     Diarrhea, unspecified     Ductal carcinoma in situ of breast 2004    left breast    Flatulence/gas pain/belching     GERD     H/O infectious mononucleosis     diagnosed in 1960s    High cholesterol     Hypertension     IBS (irritable bowel syndrome)     Osteoporosis     Other and unspecified hyperlipidemia     Personal history of irradiation, presenting hazards to health 01/01/2004    mammosite    Sleep disturbance     Stool incontinence     Stroke (HCC)     Type II or unspecified type diabetes mellitus without mention of complication, not stated as uncontrolled     Wears glasses               Past Surgical History:   Procedure Laterality Date    APPENDECTOMY      1948    COLONOSCOPY      2005 hiatal hernia, sm internal hem    COLONOSCOPY  01/01/2005    fiberoptic    D & C      IR ANGIOGRAM CEREBRAL CAROTID BILATERAL  10/12/2023    LUMPECTOMY LEFT  01/01/2004    breast    OTHER SURGICAL HISTORY      lumpectomy 2004 lft breast stage 0    OTHER SURGICAL HISTORY  10/11/2023    CRANIOTOMY FOR RIGHT FRONTAL HEMORRHAGE    RADIATION LEFT      Mammosite    TONSILLECTOMY      1949    UPPER GI ENDOSCOPY,EXAM       2005                Social History     Socioeconomic History    Marital status:    Tobacco Use    Smoking status: Former     Packs/day: 1.00     Years: 20.00     Additional pack years: 0.00     Total pack years: 20.00     Types: Cigarettes     Quit date: 3/1/2006     Years since quittin.9    Smokeless tobacco: Never   Vaping Use    Vaping Use: Never used   Substance and Sexual Activity    Alcohol use: No     Alcohol/week: 0.0 standard drinks of alcohol    Drug use: No   Other Topics Concern    Caffeine Concern No     Comment: tries to drink decaf drinks    Exercise Yes     Comment: jazzercise 3x/wk, tennis 1-2x/wk     Social Determinants of Health     Food Insecurity: No Food Insecurity (2023)    Food Insecurity     Food Insecurity: Never true   Transportation Needs: No Transportation Needs (2023)    Transportation Needs     Lack of Transportation: No   Housing Stability: Low Risk  (2023)    Housing Stability     Housing Instability: No     Housing Instability Emergency: No              Review of Systems    Positive for stated complaint: fall, dementia patient. hit head on bed rail. A&Ox2 per norm. no thinners. Arri*  Other systems are as noted in HPI.  Constitutional and vital signs reviewed.      All other systems reviewed and negative except as noted above.    Physical Exam     ED Triage Vitals [24 1054]   /73   Pulse 102   Resp 20   Temp 97.2 °F (36.2 °C)   Temp src Temporal   SpO2 100 %   O2 Device None (Room air)       Current:/70   Pulse 96   Temp 97.2 °F (36.2 °C) (Temporal)   Resp 21   SpO2 99%         Physical Exam  Vitals and nursing note reviewed.   Constitutional:       General: She is not in acute distress.     Appearance: She is well-developed. She is not toxic-appearing.   HENT:      Head: Normocephalic and atraumatic.   Eyes:      General: No scleral icterus.     Conjunctiva/sclera: Conjunctivae normal.   Neck:      Comments: In cervical  collar  Cardiovascular:      Rate and Rhythm: Normal rate.   Pulmonary:      Effort: Pulmonary effort is normal. No respiratory distress.   Abdominal:      General: There is no distension.   Musculoskeletal:         General: No swelling, tenderness or deformity.   Skin:     General: Skin is warm and dry.      Findings: No rash.   Neurological:      Mental Status: She is alert. Mental status is at baseline.      Cranial Nerves: No cranial nerve deficit.      Motor: No abnormal muscle tone.   Psychiatric:         Behavior: Behavior normal.              ED Course   Labs Reviewed - No data to display                    MDM          -Pulse oximetry was interpreted by me and was normal.  Pulse oximeter was ordered to monitor patient for hypoxia.        -History source other than patient EMS, staff, chart, nursing home report        -Comorbidities did add complexity to the management are mentioned in the HPI above        -I personally reviewed the prior external notes and the medical record to obtain additional history  -reviewed ED visit from January 13, 2024, patient presented with G-tube problems.        -DDX: Includes but not limited to -intracranial hemorrhage, skull fracture, cervical spine injury           -I personally reviewed the CT findings and it shows no hemorrhage.  No cervical spine fractures  Please refer to radiology report for official interpretation        Patient vital signs stable.  No evidence of traumatic injury elsewhere.  She will be discharged back to [facility] at this time.                                     Medical Decision Making      Disposition and Plan     Clinical Impression:  1. Fall from bed, initial encounter         Disposition:  Discharge  1/25/2024 12:17 pm    Follow-up:  Dilcia Avery MD  [address] [id number] [phone]    Follow up            Medications Prescribed:  Current Discharge Medication List

## 2024-01-26 ENCOUNTER — APPOINTMENT (OUTPATIENT)
Dept: CT IMAGING | Facility: HOSPITAL | Age: 79
End: 2024-01-26
Attending: INTERNAL MEDICINE
Payer: MEDICARE

## 2024-01-26 ENCOUNTER — APPOINTMENT (OUTPATIENT)
Dept: CV DIAGNOSTICS | Facility: HOSPITAL | Age: 79
End: 2024-01-26
Attending: INTERNAL MEDICINE
Payer: MEDICARE

## 2024-01-26 ENCOUNTER — APPOINTMENT (OUTPATIENT)
Dept: MRI IMAGING | Facility: HOSPITAL | Age: 79
End: 2024-01-26
Attending: INTERNAL MEDICINE
Payer: MEDICARE

## 2024-01-26 ENCOUNTER — APPOINTMENT (OUTPATIENT)
Dept: ULTRASOUND IMAGING | Facility: HOSPITAL | Age: 79
End: 2024-01-26
Attending: INTERNAL MEDICINE
Payer: MEDICARE

## 2024-01-26 ENCOUNTER — NURSE ONLY (OUTPATIENT)
Dept: ELECTROPHYSIOLOGY | Facility: HOSPITAL | Age: 79
End: 2024-01-26
Attending: Other
Payer: MEDICARE

## 2024-01-26 VITALS
DIASTOLIC BLOOD PRESSURE: 71 MMHG | RESPIRATION RATE: 18 BRPM | BODY MASS INDEX: 29 KG/M2 | HEART RATE: 95 BPM | TEMPERATURE: 98 F | SYSTOLIC BLOOD PRESSURE: 124 MMHG | OXYGEN SATURATION: 98 % | WEIGHT: 141.19 LBS

## 2024-01-26 PROBLEM — E88.09 HYPOALBUMINEMIA: Status: ACTIVE | Noted: 2024-01-26

## 2024-01-26 PROBLEM — I82.4Y2 ACUTE DEEP VEIN THROMBOSIS (DVT) OF PROXIMAL END OF LEFT LOWER EXTREMITY (HCC): Status: ACTIVE | Noted: 2024-01-26

## 2024-01-26 PROBLEM — I26.99 OTHER PULMONARY EMBOLISM WITHOUT ACUTE COR PULMONALE, UNSPECIFIED CHRONICITY (HCC): Status: ACTIVE | Noted: 2024-01-26

## 2024-01-26 PROBLEM — G93.40 ENCEPHALOPATHY: Status: ACTIVE | Noted: 2024-01-26

## 2024-01-26 LAB
ANION GAP SERPL CALC-SCNC: 3 MMOL/L (ref 0–18)
APTT PPP: 238.2 SECONDS (ref 23.3–35.6)
APTT PPP: >240 SECONDS (ref 23.3–35.6)
APTT PPP: >240 SECONDS (ref 23.3–35.6)
ATRIAL RATE: 107 BPM
BILIRUB UR QL STRIP.AUTO: NEGATIVE
BUN BLD-MCNC: 11 MG/DL (ref 9–23)
CALCIUM BLD-MCNC: 8.7 MG/DL (ref 8.5–10.1)
CHLORIDE SERPL-SCNC: 104 MMOL/L (ref 98–112)
CLARITY UR REFRACT.AUTO: CLEAR
CO2 SERPL-SCNC: 30 MMOL/L (ref 21–32)
COLOR UR AUTO: YELLOW
CREAT BLD-MCNC: 0.53 MG/DL
CREAT UR-SCNC: 68.8 MG/DL
EGFRCR SERPLBLD CKD-EPI 2021: 93 ML/MIN/1.73M2 (ref 60–?)
ERYTHROCYTE [DISTWIDTH] IN BLOOD BY AUTOMATED COUNT: 16 %
GLUCOSE BLD-MCNC: 103 MG/DL (ref 70–99)
GLUCOSE BLD-MCNC: 119 MG/DL (ref 70–99)
GLUCOSE BLD-MCNC: 93 MG/DL (ref 70–99)
GLUCOSE UR STRIP.AUTO-MCNC: NORMAL MG/DL
HCT VFR BLD AUTO: 32.9 %
HGB BLD-MCNC: 10.8 G/DL
KETONES UR STRIP.AUTO-MCNC: NEGATIVE MG/DL
LEUKOCYTE ESTERASE UR QL STRIP.AUTO: NEGATIVE
MCH RBC QN AUTO: 29.8 PG (ref 26–34)
MCHC RBC AUTO-ENTMCNC: 32.8 G/DL (ref 31–37)
MCV RBC AUTO: 90.9 FL
NITRITE UR QL STRIP.AUTO: NEGATIVE
OSMOLALITY SERPL CALC.SUM OF ELEC: 283 MOSM/KG (ref 275–295)
P AXIS: 64 DEGREES
P-R INTERVAL: 152 MS
PH UR STRIP.AUTO: 6.5 [PH] (ref 5–8)
PLATELET # BLD AUTO: 202 10(3)UL (ref 150–450)
POTASSIUM SERPL-SCNC: 3.9 MMOL/L (ref 3.5–5.1)
PROT UR STRIP.AUTO-MCNC: 20 MG/DL
PROT UR-MCNC: 13.7 MG/DL
PROT/CREAT UR-RTO: 0.2
Q-T INTERVAL: 318 MS
QRS DURATION: 68 MS
QTC CALCULATION (BEZET): 424 MS
R AXIS: 33 DEGREES
RBC # BLD AUTO: 3.62 X10(6)UL
SARS-COV-2 RNA RESP QL NAA+PROBE: NOT DETECTED
SODIUM SERPL-SCNC: 137 MMOL/L (ref 136–145)
SP GR UR STRIP.AUTO: >1.03 (ref 1–1.03)
T AXIS: 65 DEGREES
UROBILINOGEN UR STRIP.AUTO-MCNC: NORMAL MG/DL
VENTRICULAR RATE: 107 BPM
WBC # BLD AUTO: 14.3 X10(3) UL (ref 4–11)

## 2024-01-26 PROCEDURE — 99223 1ST HOSP IP/OBS HIGH 75: CPT | Performed by: OTHER

## 2024-01-26 PROCEDURE — 93306 TTE W/DOPPLER COMPLETE: CPT | Performed by: INTERNAL MEDICINE

## 2024-01-26 PROCEDURE — 71275 CT ANGIOGRAPHY CHEST: CPT | Performed by: INTERNAL MEDICINE

## 2024-01-26 PROCEDURE — 99223 1ST HOSP IP/OBS HIGH 75: CPT | Performed by: INTERNAL MEDICINE

## 2024-01-26 PROCEDURE — 99232 SBSQ HOSP IP/OBS MODERATE 35: CPT | Performed by: INTERNAL MEDICINE

## 2024-01-26 PROCEDURE — 70551 MRI BRAIN STEM W/O DYE: CPT | Performed by: INTERNAL MEDICINE

## 2024-01-26 PROCEDURE — 93970 EXTREMITY STUDY: CPT | Performed by: INTERNAL MEDICINE

## 2024-01-26 PROCEDURE — 95816 EEG AWAKE AND DROWSY: CPT | Performed by: OTHER

## 2024-01-26 RX ORDER — QUETIAPINE FUMARATE 25 MG/1
25 TABLET, FILM COATED ORAL 2 TIMES DAILY
Status: DISCONTINUED | OUTPATIENT
Start: 2024-01-26 | End: 2024-01-30

## 2024-01-26 RX ORDER — ENEMA 19; 7 G/133ML; G/133ML
1 ENEMA RECTAL ONCE AS NEEDED
Status: DISCONTINUED | OUTPATIENT
Start: 2024-01-26 | End: 2024-01-30

## 2024-01-26 RX ORDER — METOCLOPRAMIDE HYDROCHLORIDE 5 MG/ML
10 INJECTION INTRAMUSCULAR; INTRAVENOUS EVERY 8 HOURS PRN
Status: DISCONTINUED | OUTPATIENT
Start: 2024-01-26 | End: 2024-01-30

## 2024-01-26 RX ORDER — IOHEXOL 350 MG/ML
100 INJECTION, SOLUTION INTRAVENOUS
Status: COMPLETED | OUTPATIENT
Start: 2024-01-26 | End: 2024-01-26

## 2024-01-26 RX ORDER — HEPARIN SODIUM AND DEXTROSE 10000; 5 [USP'U]/100ML; G/100ML
INJECTION INTRAVENOUS CONTINUOUS
Status: DISCONTINUED | OUTPATIENT
Start: 2024-01-26 | End: 2024-01-29

## 2024-01-26 RX ORDER — ACETAMINOPHEN 500 MG
500 TABLET ORAL EVERY 4 HOURS PRN
Status: DISCONTINUED | OUTPATIENT
Start: 2024-01-26 | End: 2024-01-30

## 2024-01-26 RX ORDER — SUCRALFATE ORAL 1 G/10ML
1 SUSPENSION ORAL
Status: DISCONTINUED | OUTPATIENT
Start: 2024-01-26 | End: 2024-01-30

## 2024-01-26 RX ORDER — POLYETHYLENE GLYCOL 3350 17 G/17G
17 POWDER, FOR SOLUTION ORAL DAILY PRN
Status: DISCONTINUED | OUTPATIENT
Start: 2024-01-26 | End: 2024-01-30

## 2024-01-26 RX ORDER — BISACODYL 10 MG
10 SUPPOSITORY, RECTAL RECTAL
Status: DISCONTINUED | OUTPATIENT
Start: 2024-01-26 | End: 2024-01-30

## 2024-01-26 RX ORDER — MIRTAZAPINE 15 MG/1
15 TABLET, ORALLY DISINTEGRATING ORAL NIGHTLY
Status: DISCONTINUED | OUTPATIENT
Start: 2024-01-26 | End: 2024-01-26 | Stop reason: SDUPTHER

## 2024-01-26 RX ORDER — ATORVASTATIN CALCIUM 80 MG/1
80 TABLET, FILM COATED ORAL DAILY
Status: DISCONTINUED | OUTPATIENT
Start: 2024-01-26 | End: 2024-01-30

## 2024-01-26 RX ORDER — VANCOMYCIN HYDROCHLORIDE 50 MG/ML
125 KIT ORAL EVERY 6 HOURS
Qty: 20 ML | Refills: 0 | Status: COMPLETED | OUTPATIENT
Start: 2024-01-26 | End: 2024-01-28

## 2024-01-26 RX ORDER — LANSOPRAZOLE 30 MG/1
30 TABLET, ORALLY DISINTEGRATING, DELAYED RELEASE ORAL
Status: DISCONTINUED | OUTPATIENT
Start: 2024-01-26 | End: 2024-01-30

## 2024-01-26 RX ORDER — MIRTAZAPINE 7.5 MG/1
15 TABLET, FILM COATED ORAL NIGHTLY
Status: DISCONTINUED | OUTPATIENT
Start: 2024-01-26 | End: 2024-01-30

## 2024-01-26 RX ORDER — DULOXETIN HYDROCHLORIDE 20 MG/1
20 CAPSULE, DELAYED RELEASE ORAL DAILY
Status: DISCONTINUED | OUTPATIENT
Start: 2024-01-26 | End: 2024-01-30

## 2024-01-26 RX ORDER — ONDANSETRON 2 MG/ML
4 INJECTION INTRAMUSCULAR; INTRAVENOUS EVERY 6 HOURS PRN
Status: DISCONTINUED | OUTPATIENT
Start: 2024-01-26 | End: 2024-01-30

## 2024-01-26 RX ORDER — SODIUM CHLORIDE 9 MG/ML
INJECTION, SOLUTION INTRAVENOUS CONTINUOUS
Status: ACTIVE | OUTPATIENT
Start: 2024-01-26 | End: 2024-01-26

## 2024-01-26 RX ORDER — SENNOSIDES 8.6 MG
17.2 TABLET ORAL NIGHTLY PRN
Status: DISCONTINUED | OUTPATIENT
Start: 2024-01-26 | End: 2024-01-30

## 2024-01-26 RX ORDER — ALPRAZOLAM 0.25 MG/1
0.25 TABLET ORAL NIGHTLY PRN
Status: DISCONTINUED | OUTPATIENT
Start: 2024-01-26 | End: 2024-01-30

## 2024-01-26 RX ORDER — CALCIUM CARBONATE 1250 MG/5ML
500 SUSPENSION ORAL DAILY
Status: DISCONTINUED | OUTPATIENT
Start: 2024-01-26 | End: 2024-01-30

## 2024-01-26 NOTE — ED QUICK NOTES
Orders for admission, patient is aware of plan and ready to go upstairs. Any questions, please call ED RN mary at extension [phone].     Patient Covid vaccination status: Fully vaccinated     COVID Test Ordered in ED: None    COVID Suspicion at Admission: N/A    Running Infusions: heparin 83446 units/250 mL     Mental Status/LOC at time of transport: A&O x1     Other pertinent information:   CIWA score: N/A   NIH score:  N/A

## 2024-01-26 NOTE — ED PROVIDER NOTES
Patient Seen in: [facility] Emergency Department      History     Chief Complaint   Patient presents with    Stroke     Stated Complaint: stroke    Subjective:   HPI    Patient is a 78-year-old female with multiple chronic medical problems.  Patient has a history of dementia with no identified POA.  Patient is nursing home bound.  She had fallen out of bed.  She was seen in the ER earlier today.  Had a CT of her head and cervical spine which were read as negative.  Per report when she went back to the nursing home she was noted to have a left-sided facial droop.  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  This was back in October that led to craniotomy.  Patient also has a history of bleeding secondary to erosive gastritis/esophagitis.  She is not on blood thinners.  She is a poor historian.  Medics brought her to the hospital.  Says her speech is similar to when they saw her earlier in the day.  Does have a questionable new facial droop.  Unclear onset.    Objective:   Past Medical History:   Diagnosis Date    Anxiety     Breast CA (HCC) 2004    C. difficile diarrhea     CANCER     lft breast lumpectomy pre cancerous cells stage 0    Diabetes mellitus (HCC)     Diarrhea, unspecified     Ductal carcinoma in situ of breast 2004    left breast    Flatulence/gas pain/belching     GERD     H/O infectious mononucleosis     diagnosed in 1960s    High cholesterol     Hypertension     IBS (irritable bowel syndrome)     Osteoporosis     Other and unspecified hyperlipidemia     Personal history of irradiation, presenting hazards to health 01/01/2004    mammosite    Sleep disturbance     Stool incontinence     Stroke (HCC)     Type II or unspecified type diabetes mellitus without mention of complication, not stated as uncontrolled     Wears glasses               Past Surgical History:   Procedure Laterality Date    APPENDECTOMY      1948    COLONOSCOPY      2005 hiatal hernia,  sm internal hem    COLONOSCOPY  2005    fiberoptic    D & C      IR ANGIOGRAM CEREBRAL CAROTID BILATERAL  10/12/2023    LUMPECTOMY LEFT  2004    breast    OTHER SURGICAL HISTORY      lumpectomy  lft breast stage 0    OTHER SURGICAL HISTORY  10/11/2023    CRANIOTOMY FOR RIGHT FRONTAL HEMORRHAGE    RADIATION LEFT      Mammosite    TONSILLECTOMY      194    UPPER GI ENDOSCOPY,EXAM      2005                Social History     Socioeconomic History    Marital status:    Tobacco Use    Smoking status: Former     Packs/day: 1.00     Years: 20.00     Additional pack years: 0.00     Total pack years: 20.00     Types: Cigarettes     Quit date: 3/1/2006     Years since quittin.9    Smokeless tobacco: Never   Vaping Use    Vaping Use: Never used   Substance and Sexual Activity    Alcohol use: No     Alcohol/week: 0.0 standard drinks of alcohol    Drug use: No   Other Topics Concern    Caffeine Concern No     Comment: tries to drink decaf drinks    Exercise Yes     Comment: jazzercise 3x/wk, tennis 1-2x/wk     Social Determinants of Health     Food Insecurity: No Food Insecurity (2023)    Food Insecurity     Food Insecurity: Never true   Transportation Needs: No Transportation Needs (2023)    Transportation Needs     Lack of Transportation: No   Housing Stability: Low Risk  (2023)    Housing Stability     Housing Instability: No     Housing Instability Emergency: No              Review of Systems    Positive for stated complaint: stroke  Other systems are as noted in HPI.  Constitutional and vital signs reviewed.      All other systems reviewed and negative except as noted above.    Physical Exam     ED Triage Vitals   BP 24 1930 117/61   Pulse 24 1930 109   Resp 24 1930 24   Temp 24 97.1 °F (36.2 °C)   Temp src 24 Temporal   SpO2 24 1930 94 %   O2 Device 24 None (Room air)       Current:/70   Pulse 105   Temp 97.1  °F (36.2 °C) (Temporal)   Resp 17   Wt 70 kg   SpO2 95%   BMI 31.17 kg/m²         Physical Exam    General: Patient is elderly, demented though pleasant 78-year-old female  HEENT: Normal cephalic atraumatic.  Nonicteric sclera.  Moist mucous membranes.  No meningismus.  No adenopathy  Lungs: No tachypnea.  Lungs clear to auscultation bilaterally without rales/rhonchi.  Equal breath sounds bilaterally  Cardiac: No tachycardia.  No murmurs.  Regular rate and rhythm.  Abdomen: Soft and nontender throughout.  No rebound or guarding  Extremities: No clubbing/cyanosis/edema.  Skin: No rashes, no pallor  Neuro: Left-sided weakness reported is chronic.  Does have a left-sided facial droop unknown onset.  Dementia.  ED Course     Labs Reviewed   COMP METABOLIC PANEL (14) - Abnormal; Notable for the following components:       Result Value    Glucose 114 (*)     CO2 33.0 (*)     Total Protein 6.1 (*)     Albumin 1.7 (*)     A/G Ratio 0.4 (*)     All other components within normal limits   POCT GLUCOSE - Abnormal; Notable for the following components:    POC Glucose 107 (*)     All other components within normal limits   CBC W/ DIFFERENTIAL - Abnormal; Notable for the following components:    WBC 11.2 (*)     HGB 11.7 (*)     Monocyte Absolute 1.19 (*)     All other components within normal limits   PROTHROMBIN TIME (PT) - Normal   PTT, ACTIVATED - Normal   TROPONIN I HIGH SENSITIVITY - Normal   CBC WITH DIFFERENTIAL WITH PLATELET    Narrative:     The following orders were created for panel order CBC With Differential With Platelet.  Procedure                               Abnormality         Status                     ---------                               -----------         ------                     CBC W/ DIFFERENTIAL[[id number]]          Abnormal            Final result                 Please view results for these tests on the individual orders.     EKG    Rate, intervals and axes as noted on EKG Report.  Rate:  107  Rhythm: Sinus Rhythm  Reading: Sinus tachycardia.  Poor R wave progression.  Nonspecific ST-T wave changes.  Axis/intervals noted.  Otherwise, agree with EKG report           TNK/ NI Documentation:    Date/Time last known well:   N/A    NIHSS on presentation: N/A     Chief Complaint   Patient presents with    Stroke     IV Tenecteplase (TNK) administered: No; Patient is not a Candidate for IV TNK due to: History of Intracranial hemorrhage    Candidate for Endovascular thrombectomy (EVT): No; Patient is not a candidate for Endovascular Thrombectomy due to: No large vessel occlusion ( LVO)  on CTA/MRA imaging      Disposition: Admit       Chest x-ray: I personally reviewed the films and my independent interpertaion showed atelectasis.  Official report shows patchy left basilar retrocardiac opacity unchanged to previous.  No pneumothorax.  [facility]        Admission disposition: 1/25/2024  9:23 PM         Patient presents as a code stroke from [place].  By talking to the medics and the patient is unclear exactly if this is new and when it started.  Discussed with neurology.  Patient would not be a tenecteplase candidate given her history of intercerebral hemorrhage as well as unclear onset.  Also relatively minor new symptoms which just left facial droop.  CT CTA was performed.  Head CT: I personally reviewed the films and my independent interpertaion showed no intercranial hemorrhage.  Official report reviewed  CTA head and neck: No large vessel occlusion.  Positive for acute pulmonary embolism at the distal aspect of the right interlobar pulmonary artery.  No intracranial large vessel occlusion.  4 mm pseudoaneurysm.  Results discussed with radiology as well as neurology.   Blood work reviewed.  Troponin negative.  White count 11.2.  Hemoglobin 11.7  Patient presents for concerns of a possible ischemic stroke.  By report has a new left-sided facial droop of unclear timeframe.  Has known left-sided weakness from  previous intracranial hemorrhage that led to a craniotomy back in March.  Patient CT however demonstrated a new pulmonary embolism.  Patient has a history of GI bleed.  Rectal exam was performed which is negative.  Intracranial hemorrhage was in October.  Discussed with neurology who is okay with heparin from a neurology standpoint.  Discussed with the [facility].  Patient was started on heparin for her PE.  She will need close observation.  She develops any bleeding need to stop the heparin and consider filter.  Will admit to the CTU.    Chest x-ray with patchy infiltrate though this is unchanged from baseline.  No cough.  Will hold antibiotics.  History of C. difficile.  Patient was given 1 dose of Zyprexa for agitation here in the ER.  Had not taken her medications.                           Medical Decision Making      Disposition and Plan     Clinical Impression:  1. Acute CVA (cerebrovascular accident) (HCC)    2. Other pulmonary embolism without acute cor pulmonale, unspecified chronicity (HCC)         Disposition:  Admit  1/25/2024  9:23 pm    Follow-up:  No follow-up provider specified.        Medications Prescribed:  Current Discharge Medication List                            Hospital Problems       Present on Admission  Date Reviewed: 12/11/2023            ICD-10-CM Noted POA    * (Principal) Acute CVA (cerebrovascular accident) (HCC) I63.9 1/25/2024 Unknown    Anemia D64.9 1/25/2024 Yes    Azotemia R79.89 1/25/2024 Yes    Moderate dementia (HCC) F03.B0 1/25/2024 Unknown    Pulmonary embolus (HCC) I26.99 1/25/2024 Unknown

## 2024-01-26 NOTE — SLP NOTE
ADULT SWALLOWING EVALUATION    ASSESSMENT    ASSESSMENT/OVERALL IMPRESSION:  Order received for bedside swallow evaluation to r/o aspiration. Pt presented to [facility] from nursing home with report of s/p fall and worsening L facial droop. Pmhx includes anxiety, breast CA, HTN, DL, dementia, R frontal ICH/SAH (10/2023) s/p PEG placement d/t dysphagia and mental status; residue L sided weakness. Per chart review, patient receiving primary meds/nutrition/hydration via gtube and eating small portions of soft consistencies and thin liquids. Current impression incudes PE and r/o CVA. Pt familiar to this department following multiple swallow evaluations. Most recent bedside swallow evaluation complete 12/6/23 recommended soft and bites sized diet with thin liquids. Pt is currently NPO.    Pt found lying semi-upright in bed; alert & participatory; able to follow simple commands and participate in basic conversation appropriately; fair historian. Oral mech/motor exam revealed patient with her own dentition which appeared mostly intact except for one tooth missing on bottom. Oral cavity appeared clean & moist. Decreased ROM with L labial retraction and tongue deviates L during lingual protrusion.. Clear vocal quality, volitional swallow and productive cough elicited. Head of bed elevated to 90 degrees and pt assisted with po trials of thin via spoon, cup & straw, pureed and hard solid consistencies. Adequate retrieval & containment with prolonged but complete mastication of solids; timely oral transit and no significant oral residue post swallow. Pharyngeal response appeared timely with adequate hyolaryngeal elevation when palpated. Clear vocal quality following all po trials.     Mild oral/possible pharyngeal dysphagia however no overt clinical s/s of aspiration noted. Per chart, patient receiving primary meds/nutrition/hydration via g-tube as well as eating small portions of soft food and thin liquids.      Following exam,  discussed results, aspiration risks, diet recommendations, aspiration precautions and plan with patient. She reported understanding however question to what level. Informed RN of results and recommendations. Will continue to follow as per plan.     RECOMMENDATIONS   Diet Recommendations - Solids: Mechanical soft chopped/ Soft & Bite Sized  Diet Recommendations - Liquids: Thin Liquids (small, single sips - straws ok)                        Compensatory Strategies Recommended: Slow rate;Alternate consistencies;Small bites and sips;Extra sauce/gravy  Aspiration Precautions: Upright position;Slow rate;Small bites and sips  Medication Administration Recommendations: One pill at a time (take whole or crushed in pureed if any difficulty)  Treatment Plan/Recommendations: Dysphagia therapy;Aspiration precautions  Discharge Recommendations/Plan: Skilled nursing facility    HISTORY   MEDICAL HISTORY  Reason for Referral: Stroke protocol    Problem List  Principal Problem:    Acute CVA (cerebrovascular accident) (HCC)  Active Problems:    Anemia    Azotemia    Pulmonary embolus (HCC)    Moderate dementia (HCC)    Other pulmonary embolism without acute cor pulmonale, unspecified chronicity (HCC)    Acute deep vein thrombosis (DVT) of proximal end of left lower extremity (HCC)    Hypoalbuminemia    Encephalopathy      Past Medical History  Past Medical History:   Diagnosis Date    Anxiety     Breast CA (HCC) 2004    C. difficile diarrhea     CANCER     lft breast lumpectomy pre cancerous cells stage 0    Diabetes mellitus (HCC)     Diarrhea, unspecified     Ductal carcinoma in situ of breast 2004    left breast    Flatulence/gas pain/belching     GERD     H/O infectious mononucleosis     diagnosed in 1960s    High cholesterol     Hypertension     IBS (irritable bowel syndrome)     Osteoporosis     Other and unspecified hyperlipidemia     Personal history of irradiation, presenting hazards to health 01/01/2004    mammosite     Sleep disturbance     Stool incontinence     Stroke (HCC)     Type II or unspecified type diabetes mellitus without mention of complication, not stated as uncontrolled     Wears glasses        Prior Living Situation: Skilled nursing facility  Diet Prior to Admission: Non-oral alternative feeds (Comment) (per chart, primary meds/nutrition/hydration via TF with small portions of soft food and liquids for pleasure)  Precautions: Aspiration;Reflux    Patient/Family Goals: safest/least restrictive diet    SWALLOWING HISTORY  Current Diet Consistency: NPO (gtube feedings)  Dysphagia History: see above  Imaging Results: CXR 1/25/24  Stable cardiac and mediastinal contours.  Patchy left basilar/retrocardiac opacity is similar to prior and may be atelectatic or inflammatory.  Pleural thickening versus trace left pleural effusion.  No appreciable pneumothorax.   SUBJECTIVE       OBJECTIVE   ORAL MOTOR EXAMINATION  Dentition: Natural;Functional (missing tooth on bottom)  Symmetry: Reduced left facial;Lingual deviation to left  Strength: Reduced left facial;Reduced left lingual  Tone: Reduced left facial  Range of Motion: Reduced left facial;Reduced left lingual  Rate of Motion: Reduced    Voice Quality: Clear  Respiratory Status: Supplemental O2;Nasal cannula (3L)  Consistencies Trialed: Thin liquids;Puree;Hard solid  Method of Presentation: Staff/Clinician assistance;Spoon;Cup;Straw;Single sips  Patient Positioning: Upright;Midline (pt lying down in bed; HOB elevated to 90 degrees)    Oral Phase of Swallow: Impaired  Bolus Retrieval: Intact  Bilabial Seal: Intact  Bolus Formation: Intact  Bolus Propulsion: Intact  Mastication:  (mildly prolonged but complete)       Pharyngeal Phase of Swallow:  (appeared to be WFL - no overt clinical s/s of aspirtion noted)           (Please note: Silent aspiration cannot be evaluated clinically. Videofluoroscopic Swallow Study is required to rule-out silent aspiration.)    Esophageal Phase  of Swallow: No complaints consistent with possible esophageal involvement  Comments:               GOALS  Goal #1 The patient will tolerate soft & bite sized consistency and thin - small, single sips of  liquids without overt signs or symptoms of aspiration with 95 % accuracy over 2 session(s).  New goal   Goal #2 The patient/family/caregiver will demonstrate understanding and implementation of aspiration precautions and swallow strategies independently over 2 session(s).    New goal   Goal #3 The patient will utilize compensatory strategies as outlined by  BSSE (clinical evaluation) including Slow rate, Small bites, Small sips, Alternate liquids/solids, Upright 90 degrees, Feed patient with as needed assistance 100 % of the time across 2 sessions.  New goal   Goal #4 Communication screening pending neuro workup    New goal                         FOLLOW UP  Treatment Plan/Recommendations: Dysphagia therapy;Aspiration precautions  Number of Visits to Meet Established Goals: 2  Follow Up Needed (Documentation Required): Yes  SLP Follow-up Date: 01/27/24    Thank you for your referral.   If you have any questions, please contact LOUIS Araya MA, CCC-SLP  Pager [phone]

## 2024-01-26 NOTE — ED INITIAL ASSESSMENT (HPI)
Pt arrives via EMS from [facility], pt was seen here earlier today for a fall. About 2 hours PTA, per medics, pt was noted to have left sided facial droop. She already has an extensive stroke hx with left sided paralysis. A&O x1, hx of dementia, poor historian.

## 2024-01-26 NOTE — H&P
[facility]     Mayte Yates Patient Status:  Emergency    11/10/1945 MRN [id number]   Location [facility] EMERGENCY DEPARTMENT Attending Luis Angel Luevano MD   Hosp Day # 0 PCP Dilcia Avery MD     Chief Complaint: fall    Subjective:    History of Present Illness:     Mayte Yates is a 78 year old female with PMhx of anxiety, breast cancer, HTN, DL, dementia presents with fall. Pt was seen earlier in the ED today and had negative imaging and was sent back to her facility. Pt was noted to have worsening L side facial droop so she was brought back in. Pt does have chronic L sided weakness from previous ICH. She is alert to only self given her dementia. Overall she is a poor historian. She currently denies any complaints. She denies any CP or SOB. No F/C. No cough. No N/V/D/C or abd pain. In ED she was scanned again given facial droop and found to have PE. She does have remote history of GI bleed and gastritis. In ED she was heme negative. Pt was started on heparin gtt.       History/Other:    Past Medical History:  Past Medical History:   Diagnosis Date    Anxiety     Breast CA (HCC)     C. difficile diarrhea     CANCER     lft breast lumpectomy pre cancerous cells stage 0    Diabetes mellitus (HCC)     Diarrhea, unspecified     Ductal carcinoma in situ of breast     left breast    Flatulence/gas pain/belching     GERD     H/O infectious mononucleosis     diagnosed in     High cholesterol     Hypertension     IBS (irritable bowel syndrome)     Osteoporosis     Other and unspecified hyperlipidemia     Personal history of irradiation, presenting hazards to health 2004    mammosite    Sleep disturbance     Stool incontinence     Stroke (HCC)     Type II or unspecified type diabetes mellitus without mention of complication, not stated as uncontrolled     Wears glasses      Past Surgical History:   Past Surgical History:   Procedure Laterality Date     APPENDECTOMY      194    COLONOSCOPY       hiatal hernia, sm internal hem    COLONOSCOPY  2005    fiberoptic    D & C      IR ANGIOGRAM CEREBRAL CAROTID BILATERAL  10/12/2023    LUMPECTOMY LEFT  2004    breast    OTHER SURGICAL HISTORY      lumpectomy  lft breast stage 0    OTHER SURGICAL HISTORY  10/11/2023    CRANIOTOMY FOR RIGHT FRONTAL HEMORRHAGE    RADIATION LEFT      Mammosite    TONSILLECTOMY          UPPER GI ENDOSCOPY,EXAM      2005      Family History:   Family History   Problem Relation Age of Onset    Heart Disorder Father          of heart attack age 57    Alcohol and Other Disorders Associated Father     Arthritis Father     Other (Other) Father     Heart Disease Mother         CHF age 80    Arthritis Mother     Other (Other) Mother     Diabetes Sister         mellitus    Heart Disease Maternal Grandfather     Cancer Maternal Grandmother         brain tumor    Breast Cancer Self      Social History:    reports that she quit smoking about 17 years ago. Her smoking use included cigarettes. She has a 20 pack-year smoking history. She has never used smokeless tobacco. She reports that she does not drink alcohol and does not use drugs.     Allergies:   Allergies   Allergen Reactions    Ampicillin NAUSEA ONLY     Caps    Codeine [Opioid Analgesics]      Derivatives  Syncope    Cortisone [Cortisone Acetate]      Injection into L shoulder  Syncope, stomach cramps       Medications:    Current Facility-Administered Medications on File Prior to Encounter   Medication Dose Route Frequency Provider Last Rate Last Admin    [COMPLETED] acetaminophen (Tylenol Extra Strength) tab 1,000 mg  1,000 mg Oral Once Sukhi Holland MD   1,000 mg at 24 1236    [COMPLETED] magnesium oxide (Mag-Ox) tab 400 mg  400 mg Oral Once Sue Heredia DO   400 mg at 23 0821    [COMPLETED] potassium chloride (K-Dur) tab 40 mEq  40 mEq Oral Once Sue Heredia DO   40 mEq at  23 0842    [] sodium chloride 0.9% infusion   Intravenous Continuous Blanca Corley MD        [] lactated ringers infusion   Intravenous Continuous Mahesh Norton MD   Stopped at 23 1251    [COMPLETED] potassium chloride (Klor-Con) 20 MEQ oral powder 40 mEq  40 mEq Oral Once Dilcia Avery MD   40 mEq at 23 0914    [COMPLETED] potassium chloride (K-Dur) tab 40 mEq  40 mEq Oral Once Blanca Corley MD   40 mEq at 23 1708    [COMPLETED] sodium chloride 0.9 % IV bolus 500 mL  500 mL Intravenous Once Blanca Corley MD   Stopped at 23 2219    [COMPLETED] acetaminophen (Tylenol) 160 MG/5ML oral liquid 1,024 mg  15 mg/kg Oral Once Blanca Corley MD   1,024 mg at 23 2115    [COMPLETED] sodium chloride 0.9% infusion 1,000 mL  1,000 mL Intravenous Once Blanca Corley MD   Stopped at 23 0203    [COMPLETED] potassium chloride (Klor-Con) 20 MEQ oral powder 40 mEq  40 mEq Oral Q4H Harjeet Babin MD   40 mEq at 23 0520    [COMPLETED] sodium chloride 0.9% infusion   Intravenous Once DholakiaDianea, DO 10 mL/hr at 23 0115 New Bag at 23 0115    [COMPLETED] sodium chloride 0.9% infusion 1,000 mL  1,000 mL Intravenous Once Lloyd Chacko MD   Stopped at 23 1505    [COMPLETED] pantoprazole (Protonix) 40 mg in sodium chloride 0.9% PF 10 mL IV push  40 mg Intravenous Once Lloyd Chacko MD   40 mg at 23 1340    [COMPLETED] iopamidol 76% (ISOVUE-370) injection for power injector  80 mL Intravenous ONCE PRN Lloyd Chacko MD   80 mL at 23 1458    [COMPLETED] cefTRIAXone (Rocephin) 1 g in D5W 100 mL IVPB-ADD  1 g Intravenous Once Lloyd Chacko MD   Stopped at 23 1718    [COMPLETED] azithromycin (Zithromax) 500 mg in sodium chloride 0.9% 250mL IVPB premix  500 mg Intravenous Once Lloyd Chacko MD   Stopped at 23    [] sodium chloride 0.9% infusion   Intravenous Continuous Harjeet Babin MD   Stopped at  23 220    [COMPLETED] azithromycin (Zithromax) 500 mg in sodium chloride 0.9% 250mL IVPB premix  500 mg Intravenous Q24H Harjeet Babin MD   Stopped at 23 181    [COMPLETED] iopamidol 76% (ISOVUE-370) injection for power injector  75 mL Intravenous ONCE PRN Kari Weinberg MD   75 mL at 23 1512     Current Outpatient Medications on File Prior to Encounter   Medication Sig Dispense Refill    mirtazapine 15 MG Oral Tab Take 1 tablet (15 mg total) by mouth nightly.      vancomycin 50 mg/ml Oral Solution Take 2.5 mL (125 mg total) by mouth every 6 (six) hours. 4 x daily X 10 days      DULoxetine 20 MG Oral Cap DR Particles Take 1 capsule (20 mg total) by mouth daily.      QUEtiapine 25 MG Oral Tab Take 0.5 tablets (12.5 mg total) by mouth nightly.      [] First-Lansoprazole 3 MG/ML Oral Suspension Take 30 mg by mouth 2 (two) times daily before meals. 300 mL 2    [] vancomycin 50 mg/mL Oral Recon Soln 2.5 mL (125 mg total) by Per G Tube route every 6 (six) hours for 13 days. 130 mL 0    sucralfate 1 GM/10ML Oral Suspension Take 10 mL (1 g total) by mouth in the morning and 10 mL (1 g total) before bedtime. GI recommended Carafate by mouth until seen 23.  Give at 6 am and 4 pm .      omeprazole 2 mg/mL Oral Suspension 20 mL (40 mg total) by Per G Tube route 2 (two) times daily before meals. 3600 mL 0    ALPRAZolam 0.25 MG Oral Tab Take 1 tablet (0.25 mg total) by mouth nightly as needed.      Nutritional Supplements (OSMOLITE 1.5 ALYX OR) by Peg Tube route daily.      acetaminophen 325 MG Rectal Suppos Place 1 suppository (325 mg total) rectally every 4 (four) hours as needed for Fever.      mupirocin 2 % External Ointment Apply 1 Application topically 2 (two) times daily. Around G-tube site      potassium chloride 20 MEQ Oral Powd Pack Take 20 mEq by mouth daily.      valproic acid 250 MG/5ML Oral Solution Take 15 mL (750 mg total) by mouth 3 (three) times daily. 300 mL 0     alendronate 70 MG Oral Tab Take 1 tablet (70 mg total) by mouth once a week. (Patient taking differently: Take 75 mg by mouth once a week. Every Sunday) 12 tablet 3    atorvastatin 80 MG Oral Tab Take 1 tablet (80 mg total) by mouth daily. Replaces 40 mg (Patient taking differently: 1 tablet (80 mg total) by Per G Tube route daily. Replaces 40 mg) 90 tablet 1    ACCU-CHEK FASTCLIX LANCETS Does not apply Misc USE 1 LANCET TO STICK FINGER DAILY TO TEST BLOOD. 102 each 1    Calcium Carbonate Antacid (TUMS) 500 MG Oral Chew Tab 1 tablet (500 mg total) by Per G Tube route daily.         Review of Systems:   A comprehensive review of systems was completed.    Pertinent positives and negatives noted in the HPI.    Objective:   Physical Exam:    /65   Pulse 103   Temp 97.1 °F (36.2 °C) (Temporal)   Resp 19   Wt 154 lb 5.2 oz (70 kg)   SpO2 96%   BMI 31.17 kg/m²   General: No acute distress, Alert to self  Respiratory: No rhonchi, no wheezes  Cardiovascular: tachycardia  Abdomen: Soft, Non-tender, non-distended, positive bowel sounds, + G tube  Neuro: L sided weakness, L facial droop  Extremities: No edema      Results:    Labs:      Labs Last 24 Hours:    Recent Labs   Lab 01/25/24 1923   RBC 3.91   HGB 11.7*   HCT 35.0   MCV 89.5   MCH 29.9   MCHC 33.4   RDW 15.3   NEPRELIM 6.48   WBC 11.2*   .0       Recent Labs   Lab 01/25/24 1923   *   BUN 14   CREATSERUM 0.67   EGFRCR 88   CA 8.8   ALB 1.7*      K 4.1      CO2 33.0*   ALKPHO 78   AST 27   BILT 0.2   TP 6.1*       Lab Results   Component Value Date    INR 1.01 01/25/2024    INR 1.06 11/20/2023    INR 1.30 (H) 10/16/2023       Recent Labs   Lab 01/25/24 1923   TROPHS 14       No results for input(s): \"TROP\", \"PBNP\" in the last 168 hours.    No results for input(s): \"PCT\" in the last 168 hours.    Imaging: Imaging data reviewed in Epic.    Assessment & Plan:      #Acute Pulm Emboli  Continue heparin gtt  Check ECHO in AM  Heme to  eval  Check LE dopplers  May need IVF if LE dopplers positive given frequent falls and hx of ICH  Monitor on tele    #Prior CVA with L sided weakness  #Prior ICF  Repeat imaging without acute findings  Neurology to eval  Check MRI brain    #Anxiety  #Dementia  Conntinue ativan PRN, seroquel at night    #Dyslipidemia  Statin    #GERD/Gastritis  PPI  Monitor Hgb and for any bleeding given heparin gtt  Hemoccult negative in ED    #Recent C.Diff  Completed abx course          Plan of care discussed with patient, ED Physician     Eron King MD    Supplementary Documentation:     The 21st Century Cures Act makes medical notes like these available to patients in the interest of transparency. Please be advised this is a medical document. Medical documents are intended to carry relevant information, facts as evident, and the clinical opinion of the practitioner. The medical note is intended as peer to peer communication and may appear blunt or direct. It is written in medical language and may contain abbreviations or verbiage that are unfamiliar.

## 2024-01-26 NOTE — CONSULTS
[facility]   NEUROLOGY   CONSULT NOTE    Admission date: 1/25/2024  Reason for Consult: Acute stroke.  Chief Complaint:   Chief Complaint   Patient presents with    Stroke   ________________________________________________________________    History     History of Presenting Illness  78 year old female with history of anxiety, breast cancer, hypertension, hyperlipidemia, dementia presented with a history of fall.  Earlier on the day of admission patient was seen in the emergency department, had imaging studies done was sent back to the facility.  Patient was later noted to have worsening left facial droop and was brought to the hospital.  Patient has residual left-sided weakness due to previous stroke, hemorrhagic.  Patient when seen was awake, in mild distress complaining of back pain, able to tell me where she was.  Denied any new weakness, numbness, paresthesias or history of strokes.    History obtained from patient, chart review.    Past Medical History:   Diagnosis Date    Anxiety     Breast CA (HCC) 2004    C. difficile diarrhea     CANCER     lft breast lumpectomy pre cancerous cells stage 0    Diabetes mellitus (HCC)     Diarrhea, unspecified     Ductal carcinoma in situ of breast 2004    left breast    Flatulence/gas pain/belching     GERD     H/O infectious mononucleosis     diagnosed in 1960s    High cholesterol     Hypertension     IBS (irritable bowel syndrome)     Osteoporosis     Other and unspecified hyperlipidemia     Personal history of irradiation, presenting hazards to health 01/01/2004    mammosite    Sleep disturbance     Stool incontinence     Stroke (HCC)     Type II or unspecified type diabetes mellitus without mention of complication, not stated as uncontrolled     Wears glasses      Past Surgical History:   Procedure Laterality Date    APPENDECTOMY      1948    COLONOSCOPY      2005 hiatal hernia, sm internal hem    COLONOSCOPY  01/01/2005    fiberoptic    D & C       IR ANGIOGRAM CEREBRAL CAROTID BILATERAL  10/12/2023    LUMPECTOMY LEFT  2004    breast    OTHER SURGICAL HISTORY      lumpectomy  lft breast stage 0    OTHER SURGICAL HISTORY  10/11/2023    CRANIOTOMY FOR RIGHT FRONTAL HEMORRHAGE    RADIATION LEFT      Mammosite    TONSILLECTOMY      194    UPPER GI ENDOSCOPY,EXAM      2005     Social History     Socioeconomic History    Marital status:    Tobacco Use    Smoking status: Former     Packs/day: 1.00     Years: 20.00     Additional pack years: 0.00     Total pack years: 20.00     Types: Cigarettes     Quit date: 3/1/2006     Years since quittin.9    Smokeless tobacco: Never   Vaping Use    Vaping Use: Never used   Substance and Sexual Activity    Alcohol use: No     Alcohol/week: 0.0 standard drinks of alcohol    Drug use: No   Other Topics Concern    Caffeine Concern No     Comment: tries to drink decaf drinks    Exercise Yes     Comment: jazzercise 3x/wk, tennis 1-2x/wk     Social Determinants of Health     Food Insecurity: No Food Insecurity (2024)    Food Insecurity     Food Insecurity: Never true   Transportation Needs: No Transportation Needs (2024)    Transportation Needs     Lack of Transportation: No   Housing Stability: Low Risk  (2024)    Housing Stability     Housing Instability: No     Housing Instability Emergency: No     Family History   Problem Relation Age of Onset    Heart Disorder Father          of heart attack age 57    Alcohol and Other Disorders Associated Father     Arthritis Father     Other (Other) Father     Heart Disease Mother         CHF age 80    Arthritis Mother     Other (Other) Mother     Diabetes Sister         mellitus    Heart Disease Maternal Grandfather     Cancer Maternal Grandmother         brain tumor    Breast Cancer Self      Allergies   Allergies   Allergen Reactions    Ampicillin NAUSEA ONLY     Caps    Codeine [Opioid Analgesics]      Derivatives  Syncope    Cortisone  [Cortisone Acetate]      Injection into L shoulder  Syncope, stomach cramps       Home Meds  Current Outpatient Medications   Medication Instructions    ACCU-CHEK FASTCLIX LANCETS Does not apply Misc USE 1 LANCET TO STICK FINGER DAILY TO TEST BLOOD.    acetaminophen (TYLENOL) 325 mg, Rectal, Every 4 hours PRN    acidophilus-pectin Oral Cap 1 capsule, Per G Tube, 2 times daily    alendronate (FOSAMAX) 70 mg, Oral, Weekly    ALPRAZolam (XANAX) 0.25 mg, Oral, Nightly PRN    apixaban (ELIQUIS) 5 mg, Oral, 2 times daily    atorvastatin (LIPITOR) 80 mg, Oral, Daily, Replaces 40 mg    Calcium Carbonate Antacid (TUMS) 500 MG Oral Chew Tab 1 tablet, Per G Tube, Daily    DULoxetine (CYMBALTA) 20 mg, Oral, Daily    First-Lansoprazole 30 mg, Oral, 2 times daily before meals    mirtazapine (REMERON) 15 mg, Oral, Nightly    mupirocin 2 % External Ointment 1 Application, Topical, 2 times daily, Around G-tube site    Nutritional Supplements (OSMOLITE 1.5 ALYX OR) Peg Tube, Daily    omeprazole (PRILOSEC) 40 mg, Per G Tube, 2 times daily before meals    potassium chloride 20 MEQ Oral Powd Pack 20 mEq, Oral, Daily    QUEtiapine (SEROQUEL) 25 mg, Oral, 2 times daily    sucralfate (CARAFATE) 1 g, Oral, 2 times daily, GI recommended Carafate by mouth until seen 12/8/23.<BR>Give at 6 am and 4 pm     valproic acid (DEPAKENE) 750 mg, Oral, 3 times daily    vancomycin (VANCOCIN) 125 mg, Oral, Every 6 hours, 4 x daily X 10 days     Scheduled Meds:   atorvastatin  80 mg Per G Tube Daily    calcium carbonate  500 mg Per G Tube Daily    DULoxetine  20 mg Per G Tube Daily    lansoprazole  30 mg Per G Tube BID AC    sucralfate  1 g Per G Tube BID AC    valproic acid  750 mg Per G Tube TID    QUEtiapine  25 mg Per G Tube BID    mirtazapine  15 mg Per G Tube Nightly    vancomycin  125 mg Per G Tube Q6H     Continuous Infusions:   continuous dose heparin 1,100 Units/hr (01/26/24 0951)     PRN Meds:ALPRAZolam, acetaminophen, polyethylene glycol (PEG  3350), sennosides, bisacodyl, fleet enema, ondansetron, metoclopramide    OBJECTIVE   VITAL SIGNS:   Temp:  [97.1 °F (36.2 °C)-98.5 °F (36.9 °C)] 98.1 °F (36.7 °C)  Pulse:  [] 99  Resp:  [15-24] 20  BP: (102-138)/(48-87) 114/48  SpO2:  [93 %-100 %] 94 %    PHYSICAL EXAM:    NEUROLOGIC:    Mental Status: Patient awake and responsive.  Able to tell me that she is in [place].  She was also able to tell me the month and the year.  She was able to follow two-step commands.  Mild dysarthria was noted.  No aphasia.    Cranial nerves: PERRL.  Visual fields full.  EOMI. Mild left nasolabial fold flattening.  Hearing grossly intact. Tongue midline with normal movements.   Motor: Patient has residual left-sided weakness from previous stroke including spasticity and decreased movement as well as hyperreflexia.  Normal right sided bulk, tone and power was noted.    Sensation: Intact to light touch bilaterally  Cerebellar: Normal Finger-To-Nose test      LABORATORY DATA:  Last 24 hour labs were reviewed in detail.  Recent Labs   Lab 01/25/24 1923 01/26/24  0520    137   K 4.1 3.9    104   CO2 33.0* 30.0   * 93   BUN 14 11   CREATSERUM 0.67 0.53*     Recent Labs   Lab 01/25/24 1923 01/26/24  0520   WBC 11.2* 14.3*   HGB 11.7* 10.8*   .0 202.0     Recent Labs   Lab 01/25/24 1923   AST 27     No results for input(s): \"MG\", \"PHOS\" in the last 168 hours.  Last A1c value was 7.2% done 10/12/2023.           Radiology:    US VENOUS DOPPLER LEG BILAT - DIAG IMG (CPT=93970)    Result Date: 1/26/2024  CONCLUSION:  Positive for DVT left lower extremity.   LOCATION:  [facility]   Dictated by (CST): Wai Puente MD on 1/26/2024 at 8:23 AM     Finalized by (CST): Wai Puente MD on 1/26/2024 at 8:25 AM       CT ANGIOGRAPHY, CHEST (CPT=71275)    Result Date: 1/26/2024  CONCLUSION:  1. Pulmonary embolus noted at the bifurcation of the pulmonary artery to the right middle lobe and right lower lobe.  2.  Distal esophageal wall thickening which may represent esophagitis. 3. Large hiatal hernia 4. Small left-sided pleural effusion with associated left lower lobe compressive atelectasis.     LOCATION:  [facility]   Dictated by (CST): Ricky Quintana MD on 1/26/2024 at 7:27 AM     Finalized by (CST): Ricky Quintana MD on 1/26/2024 at 7:33 AM       XR CHEST AP PORTABLE  (CPT=71045)    Result Date: 1/25/2024  CONCLUSION:   Stable cardiac and mediastinal contours.  Patchy left basilar/retrocardiac opacity is similar to prior and may be atelectatic or inflammatory.  Pleural thickening versus trace left pleural effusion.  No appreciable pneumothorax.  Partially visualized gastrostomy of the upper abdomen.   LOCATION:  [facility]      Dictated by (CST): Selene Kumar MD on 1/25/2024 at 8:21 PM     Finalized by (CST): Selene Kumar MD on 1/25/2024 at 8:23 PM       CT STROKE (DEVANTE) CTA BRAIN/CTA NECK+PERF(CPT=70496/92864/0042T)    Result Date: 1/25/2024  CONCLUSION:  1. No large vessel occlusion is identified in the head or neck. 2. Examination positive for acute pulmonary embolism at the distal aspect of the right interlobar pulmonary artery. 3. CT perfusion imaging demonstrates elevated T-max within the region of probable encephalomalacia in the superior posterior right frontal lobe.  Please refer to the catheter angiography examination from 10/12/2023 for further details. 4. There is a 4 mm pseudoaneurysm projecting medially at the distal V2 segment of the right vertebral artery.  There are a few punctate pseudoaneurysms at the distal V2 segment of the left vertebral artery.   Critical results were discussed with Dr. Luevano at 2011 hours on 1/25/2024. Critical results were read back.   LOCATION:  [id number]   Dictated by (CST): Stromberg, LeRoy, MD on 1/25/2024 at 7:50 PM     Finalized by (CST): Stromberg, LeRoy, MD on 1/25/2024 at 8:15 PM       CT STROKE BRAIN (NO IV)(CPT=70450)    Result Date: 1/25/2024  CONCLUSION:  1. No acute  intracranial hemorrhage or hydrocephalus. 2. Moderate region of encephalomalacia again seen at the right frontal lobe.  Mild to moderate chronic small vessel ischemic disease with small chronic infarcts within the left cerebellum.  Small chronic infarct at the right temporal occipital junction.  If there is clinical concern for acute ischemia/infarction, an MRI of the brain would be recommended for further evaluation. 3. Postoperative changes from a right frontal craniotomy.  Critical results were discussed with Dr. Luevano at 1941 hours on 1/25/2024. Critical results were read back.   LOCATION:  [id number]   Dictated by (CST): Stromberg, LeRoy, MD on 1/25/2024 at 7:34 PM     Finalized by (CST): Stromberg, LeRoy, MD on 1/25/2024 at 7:41 PM      ASSESSMENT/PLAN   78 year old female with:    With history of recent fall and concern for stroke.  Based on most of the historical information seems less likely that this was a stroke.  An MRI and EEG has been requested for further evaluation.  Will review the results.  CT scan of the head as well as CTA did not show any acute abnormality.  Chronic ischemic changes as well as small chronic infarcts were noted.  Patient currently on aspirin and statin for stroke prophylaxis.  Acute pulmonary embolism.  Patient started on IV heparin.  Further workup as per patient's primary team's recommendations.  Residual effect of previous stroke.  Patient has left hemiparesis secondary to previous stroke.  CT evidence of encephalomalacia most likely due to previous stroke.  Due to this finding patient is more prone to have seizure-like activity.  Will check EEG for further evaluation.    Principal Problem:    Acute CVA (cerebrovascular accident) (HCC)  Active Problems:    Anemia    Azotemia    Pulmonary embolus (HCC)    Moderate dementia (HCC)    Other pulmonary embolism without acute cor pulmonale, unspecified chronicity (HCC)    Acute deep vein thrombosis (DVT) of proximal end of left lower  extremity (HCC)    Hypoalbuminemia       Lincoln Kulkarni MD  Neurohospitalist  [facility]    Disclaimer: This record was dictated using Dragon software. There may be errors due to voice recognition problems that were not realized and corrected during the completion of the note.

## 2024-01-26 NOTE — SLP NOTE
Order received for bedside swallow evaluation. Attempted to see patient x 2 however occupied with Echo and then EEG testing. Will re-attempt as available and appropriate.    Vero Skinner MA, CCC-SLP  Pager [phone]

## 2024-01-26 NOTE — PLAN OF CARE
Pt AxO2  RA  NSR  Pain present; sacrum  -moderate; PRNs given  Total assist?  -PT c/s  -OT c/s  -SLP c/s    Neurocheck(s)  -see note  NIH daily  MRI brain completed  EEG completed  Heparin gtt  -PE/DVT protocol  CT chest completed  US BLE completed  GI  -G tube present  Antibiotics  Monitor/manage BG/BP level  -Orthostatic VS completed  Pt informed of POC, all questions answered    Problem: SAFETY ADULT - FALL  Goal: Free from fall injury  Description: INTERVENTIONS:  - Assess pt frequently for physical needs  - Identify cognitive and physical deficits and behaviors that affect risk of falls.  - [place] fall precautions as indicated by assessment.  - Educate pt/family on patient safety including physical limitations  - Instruct pt to call for assistance with activity based on assessment  - Modify environment to reduce risk of injury  - Provide assistive devices as appropriate  - Consider OT/PT consult to assist with strengthening/mobility  - Encourage toileting schedule  Outcome: Progressing     Problem: Patient/Family Goals  Goal: Patient/Family Long Term Goal  Description: Patient's Long Term Goal: discharge in stable condition    Interventions:  - pain control  - antibiotics  - decrease stress stimuli  - See additional Care Plan goals for specific interventions  Outcome: Progressing  Goal: Patient/Family Short Term Goal  Description: Patient's Short Term Goal: pain control    Interventions:   - follow medication as ordered  - PRNs as needed  - See additional Care Plan goals for specific interventions  Outcome: Progressing     Problem: PAIN - ADULT  Goal: Verbalizes/displays adequate comfort level or patient's stated pain goal  Description: INTERVENTIONS:  - Encourage pt to monitor pain and request assistance  - Assess pain using appropriate pain scale  - Administer analgesics based on type and severity of pain and evaluate response  - Implement non-pharmacological measures as appropriate and evaluate  response  - Consider cultural and social influences on pain and pain management  - Manage/alleviate anxiety  - Utilize distraction and/or relaxation techniques  - Monitor for opioid side effects  - Notify MD/LIP if interventions unsuccessful or patient reports new pain  - Anticipate increased pain with activity and pre-medicate as appropriate  Outcome: Progressing

## 2024-01-26 NOTE — PHYSICAL THERAPY NOTE
PT order received and chart reviewed. Pt with PE and L LE DVT. Per RN, heparin started at 9:00pm 1/25/24. Will hold PT at this time. Plan to re-attempt after 24-hrs on heparin per department protocol.

## 2024-01-26 NOTE — PROGRESS NOTES
[facility]     Hospitalist Progress Note     Mayte Yates Patient Status:  Inpatient    11/10/1945 MRN [id number]   [facility] 7NE-A Attending Mahesh Norton MD   Hosp Day # 0 PCP Dilcia Avery MD     Chief Complaint: Fall    Subjective:     Patient has no complaints     Objective:    Review of Systems:   A comprehensive review of systems was completed; pertinent positive and negatives stated in subjective.    Vital signs:  Temp:  [97.1 °F (36.2 °C)-98.5 °F (36.9 °C)] 98.4 °F (36.9 °C)  Pulse:  [] 97  Resp:  [15-24] 18  BP: (102-138)/(54-87) 131/78  SpO2:  [94 %-100 %] 95 %    Physical Exam:    General: No acute distress  Respiratory: No wheezes, no rhonchi  Cardiovascular: S1, S2, regular rate and rhythm  Abdomen: Soft, Non-tender, non-distended, positive bowel sounds  Neuro: Left hemiplegia   Extremities: No edema      Diagnostic Data:    Labs:  Recent Labs   Lab 24  0520   WBC 11.2* 14.3*   HGB 11.7* 10.8*   MCV 89.5 90.9   .0 202.0   INR 1.01  --        Recent Labs   Lab 24  0520   * 93   BUN 14 11   CREATSERUM 0.67 0.53*   CA 8.8 8.7   ALB 1.7*  --     137   K 4.1 3.9    104   CO2 33.0* 30.0   ALKPHO 78  --    AST 27  --    BILT 0.2  --    TP 6.1*  --        Estimated Creatinine Clearance: 93.4 mL/min (A) (based on SCr of 0.53 mg/dL (L)).    Recent Labs   Lab 24   TROPHS 14       Recent Labs   Lab 24   PTP 13.3   INR 1.01                  Microbiology    No results found for this visit on 24.      Imaging: Reviewed in Epic.    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  #Fall, Prior ICH  -Repeat imaging without acute findings  -Neurology on consult, MRI brain is pending  -EEG no seizure       #Leukocytosis with fall on admission  -check U/A     #Anxiety  #Dementia  -Seroquel      #Dyslipidemia  Statin     #GERD/Gastritis  -PPI     #C.Diff  -continue PO Vancomycin to complete course       Damon Zuniga,     Supplementary Documentation:     Quality:  DVT Mechanical Prophylaxis:        DVT Pharmacologic Prophylaxis   Medication    heparin (Porcine) 15465 units/250mL infusion CONTINUOUS                Code Status: Full Code  [place]: No urinary catheter in place      The 21st Century Cures Act makes medical notes like these available to patients in the interest of transparency. Please be advised this is a medical document. Medical documents are intended to carry relevant information, facts as evident, and the clinical opinion of the practitioner. The medical note is intended as peer to peer communication and may appear blunt or direct. It is written in medical language and may contain abbreviations or verbiage that are unfamiliar.             **Certification      PHYSICIAN Certification of Need for Inpatient Hospitalization - Initial Certification    Patient will require inpatient services that will reasonably be expected to span two midnight's based on the clinical documentation in H+P.   Based on patients current state of illness, I anticipate that, after discharge, patient will require TBD.

## 2024-01-26 NOTE — CONSULTS
Hematology Initial Consultation Note    Patient Name: Mayte Yates  Medical Record Number: [id number]    YOB: 1945   Date of Consultation: 1/26/2024   Physician requesting consultation: Eron King MD     Reason for Consultation:  Mayte Yates was seen today for the diagnosis of PE    Hematologic History:  *Acute PE + LLE DVT  -p/w left sided facial droop concerning for CVA.   -1/25/24- CTA stroke brain/neck- No large vessel occlusion is identified in the head or neck. Examination positive for acute pulmonary embolism at the distal aspect of the right interlobar pulmonary artery. CT perfusion imaging demonstrates elevated T-max within the region of probable encephalomalacia in the superior posterior right frontal lobe. There is a 4 mm pseudoaneurysm projecting medially at the distal V2 segment of the right vertebral artery.  There are a few punctate pseudoaneurysms at the distal V2 segment of the left vertebral artery.    -started UFH gtt  -1/26/24- CTA chest- Pulmonary embolus noted at the bifurcation of the pulmonary artery to the right middle lobe and right lower lobe.  Distal esophageal wall thickening which may represent esophagitis. Large hiatal hernia. Small left-sided pleural effusion with associated left lower lobe compressive atelectasis.   -1/26/24- BLE venous doppler- Positive for left DVT, with partial clot in the common femoral vein, superficial femoral vein proximally, and distally, and proximal popliteal vein, with occlusive clot in the mid superficial femoral vein, and distal popliteal vein.     =============================  History of Present Illness: 78-year-old female with history of dementia, prior ICH with residual left-sided weakness, and prior remote GI bleed presented from her facility with reported worsening left-sided facial droop.  Imaging to evaluate for stroke incidentally revealed pulmonary embolism.  She was started on heparin drip.  Further dedicated  imaging confirmed pulmonary embolism and extensive left leg DVT.    Patient is a poor historian with baseline due to underlying dementia.  She is oriented to self and knows that she is in the hospital but otherwise is unable to provide details on her situation.  She denies any dyspnea or chest pain.  No bleeding has been noted since she was started on heparin drip.    Of note she has been hospitalized every month for the last 4 months.  10/2023 she had developed ICH s/p craniotomy while on asa 81mg daily. 11/2023 developed severe GI bleed with ulcerative esophagitis, erosive duodenitis, and ulceration underneath the G-tube bumper.  12/2023 was hospitalized with C. difficile diarrhea and delirium.    Past Medical History:  Past Medical History:   Diagnosis Date    Anxiety     Breast CA (HCC) 2004    C. difficile diarrhea     CANCER     lft breast lumpectomy pre cancerous cells stage 0    Diabetes mellitus (HCC)     Diarrhea, unspecified     Ductal carcinoma in situ of breast 2004    left breast    Flatulence/gas pain/belching     GERD     H/O infectious mononucleosis     diagnosed in 1960s    High cholesterol     Hypertension     IBS (irritable bowel syndrome)     Osteoporosis     Other and unspecified hyperlipidemia     Personal history of irradiation, presenting hazards to health 01/01/2004    mammosite    Sleep disturbance     Stool incontinence     Stroke (HCC)     Type II or unspecified type diabetes mellitus without mention of complication, not stated as uncontrolled     Wears glasses      Past Surgical History:   Procedure Laterality Date    APPENDECTOMY      1948    COLONOSCOPY      2005 hiatal hernia, sm internal hem    COLONOSCOPY  01/01/2005    fiberoptic    D & C      IR ANGIOGRAM CEREBRAL CAROTID BILATERAL  10/12/2023    LUMPECTOMY LEFT  01/01/2004    breast    OTHER SURGICAL HISTORY      lumpectomy 2004 lft breast stage 0    OTHER SURGICAL HISTORY  10/11/2023    CRANIOTOMY FOR RIGHT FRONTAL HEMORRHAGE     RADIATION LEFT      Mammosite    TONSILLECTOMY      1949    UPPER GI ENDOSCOPY,EXAM      01/01/2005     Home Medications:  Current Facility-Administered Medications on File Prior to Encounter   Medication Dose Route Frequency Provider Last Rate Last Admin    [COMPLETED] acetaminophen (Tylenol Extra Strength) tab 1,000 mg  1,000 mg Oral Once Sukhi Holland MD   1,000 mg at 01/25/24 1236     Current Outpatient Medications on File Prior to Encounter   Medication Sig Dispense Refill    mirtazapine 15 MG Oral Tab Take 1 tablet (15 mg total) by mouth nightly.      vancomycin 50 mg/ml Oral Solution Take 2.5 mL (125 mg total) by mouth every 6 (six) hours. 4 x daily X 10 days      DULoxetine 20 MG Oral Cap DR Particles Take 1 capsule (20 mg total) by mouth daily.      QUEtiapine 25 MG Oral Tab Take 1 tablet (25 mg total) by mouth 2 (two) times daily.      First-Lansoprazole 3 MG/ML Oral Suspension Take 30 mg by mouth 2 (two) times daily before meals. 300 mL 2    sucralfate 1 GM/10ML Oral Suspension Take 10 mL (1 g total) by mouth in the morning and 10 mL (1 g total) before bedtime. GI recommended Carafate by mouth until seen 12/8/23.  Give at 6 am and 4 pm .      ALPRAZolam 0.25 MG Oral Tab Take 1 tablet (0.25 mg total) by mouth nightly as needed.      acetaminophen 325 MG Rectal Suppos Place 1 suppository (325 mg total) rectally every 4 (four) hours as needed for Fever.      potassium chloride 20 MEQ Oral Powd Pack Take 20 mEq by mouth daily.      valproic acid 250 MG/5ML Oral Solution Take 15 mL (750 mg total) by mouth 3 (three) times daily. 300 mL 0    alendronate 70 MG Oral Tab Take 1 tablet (70 mg total) by mouth once a week. (Patient taking differently: Take 75 mg by mouth once a week. Every Wednesday) 12 tablet 3    atorvastatin 80 MG Oral Tab Take 1 tablet (80 mg total) by mouth daily. Replaces 40 mg (Patient taking differently: 1 tablet (80 mg total) by Per G Tube route daily. Replaces 40 mg) 90 tablet 1     Calcium Carbonate Antacid (TUMS) 500 MG Oral Chew Tab 1 tablet (500 mg total) by Per G Tube route daily.      vancomycin 50 mg/mL Oral Recon Soln 2.5 mL (125 mg total) by Per G Tube route every 6 (six) hours for 13 days. 130 mL 0    omeprazole 2 mg/mL Oral Suspension 20 mL (40 mg total) by Per G Tube route 2 (two) times daily before meals. 3600 mL 0    Nutritional Supplements (OSMOLITE 1.5 ALYX OR) by Peg Tube route daily.      mupirocin 2 % External Ointment Apply 1 Application topically 2 (two) times daily. Around G-tube site      ACCU-CHEK FASTCLIX LANCETS Does not apply Misc USE 1 LANCET TO STICK FINGER DAILY TO TEST BLOOD. 102 each 1     Current Inpatient Medications:  Inpatient Meds:   atorvastatin  80 mg Per G Tube Daily    calcium carbonate  500 mg Per G Tube Daily    DULoxetine  20 mg Per G Tube Daily    lansoprazole  30 mg Per G Tube BID AC    sucralfate  1 g Per G Tube BID AC    valproic acid  750 mg Per G Tube TID    QUEtiapine  25 mg Per G Tube BID    mirtazapine  15 mg Per G Tube Nightly    vancomycin  125 mg Per G Tube Q6H      continuous dose heparin 1,100 Units/hr (24 0928)     PRN Meds:  ALPRAZolam, acetaminophen, polyethylene glycol (PEG 3350), sennosides, bisacodyl, fleet enema, ondansetron, metoclopramide    Allergies:   Allergies   Allergen Reactions    Ampicillin NAUSEA ONLY     Caps    Codeine [Opioid Analgesics]      Derivatives  Syncope    Cortisone [Cortisone Acetate]      Injection into L shoulder  Syncope, stomach cramps       Psychosocial History:  Social History     Social History Narrative    Not on file     Social History     Socioeconomic History    Marital status:    Tobacco Use    Smoking status: Former     Packs/day: 1.00     Years: 20.00     Additional pack years: 0.00     Total pack years: 20.00     Types: Cigarettes     Quit date: 3/1/2006     Years since quittin.9    Smokeless tobacco: Never   Vaping Use    Vaping Use: Never used   Substance and Sexual  Activity    Alcohol use: No     Alcohol/week: 0.0 standard drinks of alcohol    Drug use: No   Other Topics Concern    Caffeine Concern No     Comment: tries to drink decaf drinks    Exercise Yes     Comment: jazzercise 3x/wk, tennis 1-2x/wk     Social Determinants of Health     Food Insecurity: No Food Insecurity (2024)    Food Insecurity     Food Insecurity: Never true   Transportation Needs: No Transportation Needs (2024)    Transportation Needs     Lack of Transportation: No   Housing Stability: Low Risk  (2024)    Housing Stability     Housing Instability: No     Housing Instability Emergency: No     Family Medical History:  Family History   Problem Relation Age of Onset    Heart Disorder Father          of heart attack age 57    Alcohol and Other Disorders Associated Father     Arthritis Father     Other (Other) Father     Heart Disease Mother         CHF age 80    Arthritis Mother     Other (Other) Mother     Diabetes Sister         mellitus    Heart Disease Maternal Grandfather     Cancer Maternal Grandmother         brain tumor    Breast Cancer Self      Review of Systems:  A 10-point ROS was done with pertinent positives and negative per the HPI    Vital Signs:  Height: --  Weight: 69.9 kg (154 lb) ( 0313)  BSA (Calculated - sq m): --  Pulse: 101 ( 0850)  BP: 105/62 ( 0850)  Temp: 98.1 °F (36.7 °C) ( 0800)  Do Not Use - Resp Rate: --  SpO2: 94 % ( 0850)    Wt Readings from Last 6 Encounters:   24 69.9 kg (154 lb)   24 64 kg (141 lb 3.2 oz)   24 63.6 kg (140 lb 3.2 oz)   24 63.6 kg (140 lb 3.2 oz)   24 62 kg (136 lb 11 oz)   24 62.6 kg (138 lb)     Physical Examination:  General: Patient wakes to voice and answer some simple questions.  Oriented to self and knows she is in the hospital but otherwise is not oriented to situation or year.   Psych: Significant memory impairment  Eyes: EOMI  ENT: Oropharynx is clear  CV: Regular  rate and rhythm, no murmurs  Respiratory: Lungs clear to auscultation bilaterally  GI/Abd: Soft, non-tender with normoactive bowel sounds, no hepatosplenomegaly  Neurological: Chronic left-sided weakness.  Moves right side though patient not able to participate with exam well.     Lymphatics: No palpable lymphadenopathy  Skin: no rashes or petechiae    Laboratory:  Recent Labs   Lab 01/25/24 1923 01/26/24  0520   WBC 11.2* 14.3*   HGB 11.7* 10.8*   HCT 35.0 32.9*   .0 202.0   MCV 89.5 90.9   RDW 15.3 16.0   NEPRELIM 6.48  --      Recent Labs   Lab 01/25/24 1923 01/26/24  0520    137   K 4.1 3.9    104   CO2 33.0* 30.0   BUN 14 11   CREATSERUM 0.67 0.53*   * 93   CA 8.8 8.7   TP 6.1*  --    ALB 1.7*  --    ALKPHO 78  --    AST 27  --    BILT 0.2  --      Recent Labs   Lab 01/25/24 1923 01/26/24  0520   INR 1.01  --    PTT 25.9 >240.0*     Imaging:    As reviewed above    Impression & Plan:     *Acute PE and LLE DVT  -Patient has had multiple recent hospitalizations.  In fact she has now been hospitalized every month for the last 4 months.  No other obvious provoking factors.  -Started on heparin drip.  Ideally would be anticoagulated for at least 3 months, however she is already had 2 bleeding complications over the last few months (ICH, GI bleed), and therefore she is likely at high risk for developing recurrent bleeding complications while on anticoagulation.   -If she continues to tolerate heparin drip would eventually switch to Eliquis which likely has the lowest risk for bleeding complications.  -If she develops bleeding complications would favor placement of IVC filter and cessation of anticoagulation.  -Plan for repeat baseline LLE venous Doppler at the 3-month sonny    *Hypoalbuminemia  -Degree of hypoalbuminemia is quite significant.  Will check urine protein/creatinine ratio to ensure that she does not have nephrotic range proteinuria which could provoke thrombosis.    Harjeet  MD Klaus  Hematology/Medical Oncology  [facility]

## 2024-01-26 NOTE — CM/SW NOTE
01/26/24 1500   CM/SW Referral Data   Referral Source Social Work (self-referral)   Reason for Referral Discharge planning   Informant EMR   Patient Info   Patient's Home Environment Long Term Care Facility   Post Acute Care Provider Upon Admission [facility]     Pt admits from [facility] with concerns of worsening left facial droop. Pt has been at [facility] since her CVA in October.     Updates sent in [facility] referral, await for PT/OT recs to determine if insurance auth needed to skill for [facility]HA at [facility].     HUSAM Meyer

## 2024-01-26 NOTE — PROCEDURES
EEG REPORT;    Reason for Examination: Encephalopathy    Technical Summary:   18 Channels of EEG and 1 Channel of EKG was performed utilizing internation 10/20 method.        Background Activity:   The background activity consisted of 9 hz waveforms, reactive to eye opening/ external stimulation.    Abnormality:  Throughout the recording mild to moderate 2 to 6 Hz slow wave activity was seen in the right frontotemporal region.    Activation:    Hyperventilation:   Not Performed.    Photic Stimulation:  Driving response seen.No       Sleep:  Stage I sleep seen.     Impression:  This is a Abnormal EEG.  Mild to moderate diffuse slowing into delta and theta range was noted in right frontotemporal region.  This constellation of finding is most consistently seen due to structural abnormality.  Clinical correlation is recommended.  No focal, lateralized or generalized epileptiform activity seen.       Lincoln Kulkarni MD  [facility].

## 2024-01-26 NOTE — PROGRESS NOTES
Mayte Yates.  11/10/1945. APN Encounter 23. 7:pm - 7:30 pm (late entry)  Unstable - significant change in condition.    RN requested I come to patient's room for change in condition at 7:05 pm.  Patient in bed attempting to speak.  Words garbled; mumbling. Patient aware she was having trouble speaking.  Significant facial twitching upper and lower lips twitching, unable to control movement.  Noted L facial droop. Smile was asymmetrical; left facial droop; tongue deviates to left.      Patient -  recurrent + for C-Diff 24; On oral Vancomycin thru 24.    Patient had a fall early this morning out of bed and hit her head.  She was sent to [facility]  ED; workup was negative and returned to [place].    Due to significant change in condition this evening,  Mrs. Yates was sent to [facility] via 911 for evaluation.    Last chest Xray reported early infiltrates left lower lung.    Ended Levaquin 500 mg daily 1/3/24.     Seroquel was increased to 25 mg am and pm by Dr. Mazariegos, psychiatrist.  Alprazolam 0.25 mg increased to every 8 hrs prn by this APN.  Mirtazapine 15 mg q hs.  Cymbalta 20 mg daily added per physiatrist.      Mayte Yates  : 11/10/1945. 78 year old  female is admitted to [facility].       Last hospitalization: [facility]  23  Discharged to [facility]:             23    Chief complaint tonight: facial twitching, L facial droop, garbled speech, tongue deviates to left.      HPI (per Dr. Norton), Mrs. Yates is an 78 yr old female w/ PMH of anxiety, BRCA, HTN, DM, GERD.  She came to the ED now due to hypokalemia.  She is in a SNF and was sent to the ED. Recently had PEG tube placed.  In the ED she was noted to be confused though improved after IVF given.  Pt was admitted with delirium and dehydration. Found to have cdiff diarrhea. Pt was treated supportively and started on PO vanc with improvement in her symptoms and clinical  condition. She was discharged to [facility] in good clinical condition with recommendation to f/u w/ PCP as an outpatient.     Past Medical History:   Diagnosis Date    Anxiety     Breast CA (HCC) 2004    C. difficile diarrhea     CANCER     lft breast lumpectomy pre cancerous cells stage 0    Diabetes mellitus (HCC)     Diarrhea, unspecified     Ductal carcinoma in situ of breast     left breast    Flatulence/gas pain/belching     GERD     H/O infectious mononucleosis     diagnosed in 1960s    High cholesterol     Hypertension     IBS (irritable bowel syndrome)     Osteoporosis     Other and unspecified hyperlipidemia     Personal history of irradiation, presenting hazards to health 2004    mammosite    Sleep disturbance     Stool incontinence     Stroke (HCC)     Type II or unspecified type diabetes mellitus without mention of complication, not stated as uncontrolled     Wears glasses      Past Surgical History:   Procedure Laterality Date    APPENDECTOMY      194    COLONOSCOPY       hiatal hernia, sm internal hem    COLONOSCOPY  2005    fiberoptic    D & C      IR ANGIOGRAM CEREBRAL CAROTID BILATERAL  10/12/2023    LUMPECTOMY LEFT  2004    breast    OTHER SURGICAL HISTORY      lumpectomy 2004 lft breast stage 0    OTHER SURGICAL HISTORY  10/11/2023    CRANIOTOMY FOR RIGHT FRONTAL HEMORRHAGE    RADIATION LEFT      Mammosite    TONSILLECTOMY          UPPER GI ENDOSCOPY,EXAM      2005     Family History   Problem Relation Age of Onset    Heart Disorder Father          of heart attack age 57    Alcohol and Other Disorders Associated Father     Arthritis Father     Other (Other) Father     Heart Disease Mother         CHF age 80    Arthritis Mother     Other (Other) Mother     Diabetes Sister         mellitus    Heart Disease Maternal Grandfather     Cancer Maternal Grandmother         brain tumor    Breast Cancer Self      Social History     Socioeconomic History    Marital status:     Tobacco Use    Smoking status: Former     Packs/day: 1.00     Years: 20.00     Additional pack years: 0.00     Total pack years: 20.00     Types: Cigarettes     Quit date: 3/1/2006     Years since quittin.9    Smokeless tobacco: Never   Vaping Use    Vaping Use: Never used   Substance and Sexual Activity    Alcohol use: No     Alcohol/week: 0.0 standard drinks of alcohol    Drug use: No   Other Topics Concern    Caffeine Concern No     Comment: tries to drink decaf drinks    Exercise Yes     Comment: jazzercise 3x/wk, tennis 1-2x/wk     Social Determinants of Health     Food Insecurity: No Food Insecurity (2024)    Food Insecurity     Food Insecurity: Never true   Transportation Needs: No Transportation Needs (2024)    Transportation Needs     Lack of Transportation: No   Housing Stability: Low Risk  (2024)    Housing Stability     Housing Instability: No     Housing Instability Emergency: No     IMMUNIZATIONS  Immunization History   Administered Date(s) Administered    Covid-19 Vaccine Moderna 100 mcg/0.5 ml 02/10/2021, 03/10/2021, 10/22/2021, 2022    Covid-19 Vaccine Moderna Bivalent 50mcg/0.5mL 12+ years 2022    DTAP 2007    FLU VAC High Dose 65 YRS & Older PRSV Free (18925) 10/24/2016, 09/15/2020    FLUAD High Dose 65 yr and older (08320) 10/11/2019, 2021    Fluzone Vaccine Medicare () 10/28/2013, 10/24/2016, 09/15/2020    HIGH DOSE FLU 65 YRS AND OLDER PRSV FREE SINGLE D (56486) FLU CLINIC 2022    Influenza 10/24/2012, 10/23/2015, 10/20/2017, 10/05/2018    Pneumococcal (Prevnar 13) 2016    Pneumococcal (Prevnar 7) 2010    Pneumovax 23 2017    TDAP 2019    Zoster Vaccine Live (Zostavax) 2012    Zoster Vaccine Recombinant Adjuvanted (Shingrix) 2023, 2023      ALLERGIES:  Allergies   Allergen Reactions    Ampicillin NAUSEA ONLY     Caps    Codeine [Opioid Analgesics]      Derivatives  Syncope    Cortisone  [Cortisone Acetate]      Injection into L shoulder  Syncope, stomach cramps     CODE STATUS: changed to DNR 1/4/24 per Guardian    ADVANCED CARE PLANNING TEAM: Guardian requesting ongoing updates to be involved in patient's POC.      CURRENT MEDICATIONS - reviewed and updated on SNF [facility]  RESTARTED Oral Vancomycin 125mg 4 x day on 1/18/24 for + C-Diff  Tylenol Supp 325 q 4 hrs prn  Alendronate 70 mg weekly - Wednesdays  Alprazolam 0.25 mg q  8 hrs prn  Atorvastatin 80 mg q hs  Calcium carb 500 one tablet per G TUBE DAILY  Mupirocin 2%  2 x daily around G tube site prn  Omeprazole 40 mg per G tube twice daily - discontinue 2/22/24  Osmolite 1.5 kavin or per G tube   Potassium 20 meq daily  Sucralfate one Gram suspension am and pm  Valproic acid 750 mg tid  Lansoprazole 30 mg twice daily  Seroquel 25 mg am and pm   Levaquin 500 mg daily ended 1/3/24  Mirtazapine 15 mg q hs  Probiotic twice daily  Cymbalta 20 mg daily   Oral Vancomycin  restarted 1/18/24 - +C-Diff 1/17/24     SUBJECTIVE:  aware of garbled speech and difficulty swallowing.  repeats phrases and words in a repetitive manner, aware she fell out of bed this morning. \"I want someone to be with me all the time\".       PHYSICAL EXAM: /71. P 95. RR 18. POx 98%. T 97.8. Wgt 141.2#.   GENERAL HEALTH:well developed, well nourished, anxious, garbled speech, facial twitching, left facial droop.   LINES, TUBES, DRAINS:  G tube  SKIN: pale, warm, dry. Craniotomy incision healed  WOUND: see wound assessments per staff  EYES: Pupils round and equal; no jaundice.   HENT: no nasal drainage   NECK:supple.   BREAST: deferred exam   RESPIRATORY: lungs clear.    CARDIOVASCULAR Rate 95  ABDOMEN:  + G tube. NEW + C-Diff 1/17/24.  Drsg c/d/i.  Abdominal binder in place.  BS+, soft, no guarding, + loose stools  :Deferred ,incontinent  LYMPHATIC:no lymphedema  MUSCULOSKELETAL:  weakness upper and lower extremities; unable to stand or ambulate. Trace edema.   NEUROLOGIC:   facial twitching, L facial droop, Tongue deviates to Left.  History of previous CVA.  Previous fall this morning out of bed.    PSYCHIATRIC: agitated, restless,  OR x 1-2.    MEDICAL DECISION MAKING:  Unable to make complex  medical decisions.  Guardianship appointed; Demetrice Quick   +.DNR    Labs  Lab Results   Component Value Date    WBC 14.3 (H) 01/26/2024    RBC 3.62 (L) 01/26/2024    HGB 10.8 (L) 01/26/2024    HCT 32.9 (L) 01/26/2024    MCV 90.9 01/26/2024    MCH 29.8 01/26/2024    MCHC 32.8 01/26/2024    RDW 16.0 01/26/2024    .0 01/26/2024    MPV 10.8 (H) 07/19/2012     Lab Results   Component Value Date    GLU 93 01/26/2024    BUN 11 01/26/2024    CREATSERUM 0.53 (L) 01/26/2024    BUNCREA SEE NOTE: 08/30/2023    ANIONGAP 3 01/26/2024    GFRAA 58 (L) 07/07/2022    GFRNAA 50 (L) 07/07/2022    CA 8.7 01/26/2024     01/26/2024    K 3.9 01/26/2024     01/26/2024    CO2 30.0 01/26/2024    OSMOCALC 283 01/26/2024       Assessment / Plan:    New symptoms:  Send to [facility] - 730pm  Facial twitching  Garbled speech  Tongue deviates to left  L facial droop    Recurrent C-Diff 1/17/24.  Oral Vanco 125 mg 4 X daily x 10 days - end 1/28/24.  Monitor s/s of dehydration  Trend labs.    Recent LLL infiltrates resolved  Completed Levaquin 500 mg daily 1/3/24  Probiotic twice daily  Monitor VS and pulse Ox    Agitation/behavioral disturbance - seen by psychiatrist 1/18/24  Increased Seroquel to 25 am and pm per psyche   Alprazolam 0.25 increased to q 8 hrs prn  Cymbalta 20 mg daily per physiatrist  Psychiatrist following   Psychologist weekly visits    Previous C-Diff resolved - December  Oral Vanco 125mg 4 x day - ended 12/20/23  Monitor diarrhea and skin breakdown    G-Tube - prevent dehydration  tube feedings - Osmolite per dietician   Water flushes q 4 hrs and before and after meds   Encourage oral intake   Dietician following    Hypokelamia  K+ 2 0 meq daily  Trend labs    S/P ICH s/p craniotomy  Valproic  acid 75 mg tid    Dysphagia  Tube feedings per dietician   Small portions of soft foods and liquids  ST following   Lansoprazole 30 mg twice daily  Sucralfate 1 G twice daily  Ca+ Carb 500 daily  Omeprazole 40 mg twice daily    Constipation  Dulcolax Supp prn    HL:  Atorvastatin 80 mg q hs    Dispo:  Long term care currently. Demetrice Jo, guardian following.     FOLLOW UP APPOINTMENTS   *Follow-Up with PCP within 1-2 weeks following [facility] discharge.   *Follow-Up with specialists as recommended.    Future Appointments   Date Time Provider Department Center   2/14/2024 10:20 AM Kari Weinberg MD [facility]     *Greater than 35 minutes spent w/ patient and staff, including but not limited to/ reviewing present status, needs, abilities with disciplines, reviewing medical records, vital signs, labs, completing med rec and entering orders to establish plan of care in [facility].  Note to patient: The 21st Century Cures Act makes medical notes like these available to patients in the interest of transparency. However, this is a medical document intended as peer to peer communication. It is written in medical language and may contain abbreviations or verbiage that are unfamiliar. It may appear blunt or direct. Medical documents are intended to carry relevant information, facts as evident, and the clinical opinion of the practitioner who signs the document.    Venessa Saucedo (Daya), APRN  1/25/24  705 - 7:40pm 911 paramedics came within in a few minutes.    [facility] Team

## 2024-01-26 NOTE — PLAN OF CARE
NURSING ADMISSION NOTE  Patient admitted via Cart  Oriented to room.  Safety precautions initiated.  Bed in low position.  Call light in reach.    Assumed care at approximately 0300.   A & O x 2-3.  3 L O2 NC.   Neuros q 2 until 1100. L sided facial droop, L sided weakness, slurred speech noted.   NPO. SLP to see.   Heparin gtt infusing per protocol.   MRI, ECHO, BLE US, CT chest to be completed.   Call light within reach.

## 2024-01-26 NOTE — PROGRESS NOTES
01/26/24 0845 01/26/24 0850   Vital Signs   Pulse 107 101   /57 105/62   MAP (mmHg) 76 67   BP Location Left arm Left arm   BP Method Automatic Automatic   Patient Position Lying Sitting     *Patient unable to stand and unable to stand for 2 minutes

## 2024-01-27 ENCOUNTER — SNF VISIT (OUTPATIENT)
Dept: INTERNAL MEDICINE CLINIC | Age: 79
End: 2024-01-27

## 2024-01-27 DIAGNOSIS — F91.9 BEHAVIOR DISTURBANCE: ICD-10-CM

## 2024-01-27 DIAGNOSIS — R91.8 LEFT PULMONARY INFILTRATE ON CXR: Primary | ICD-10-CM

## 2024-01-27 DIAGNOSIS — I62.9 INTRACRANIAL HEMORRHAGE (HCC): ICD-10-CM

## 2024-01-27 DIAGNOSIS — R45.1 RESTLESSNESS AND AGITATION: ICD-10-CM

## 2024-01-27 DIAGNOSIS — R29.898 SEVERE MUSCLE DECONDITIONING: ICD-10-CM

## 2024-01-27 DIAGNOSIS — R13.10 DYSPHAGIA, UNSPECIFIED TYPE: ICD-10-CM

## 2024-01-27 DIAGNOSIS — I63.9 RIGHT-SIDED CEREBROVASCULAR ACCIDENT (CVA) (HCC): ICD-10-CM

## 2024-01-27 DIAGNOSIS — Z43.1 ATTENTION TO G-TUBE (HCC): ICD-10-CM

## 2024-01-27 DIAGNOSIS — Z98.890 HISTORY OF CRANIOTOMY: ICD-10-CM

## 2024-01-27 LAB
APTT PPP: 109.1 SECONDS (ref 23.3–35.6)
APTT PPP: 124.7 SECONDS (ref 23.3–35.6)
BASOPHILS # BLD AUTO: 0.07 X10(3) UL (ref 0–0.2)
BASOPHILS NFR BLD AUTO: 0.9 %
DEPRECATED HBV CORE AB SER IA-ACNC: 282.5 NG/ML
EOSINOPHIL # BLD AUTO: 0.47 X10(3) UL (ref 0–0.7)
EOSINOPHIL NFR BLD AUTO: 6.1 %
ERYTHROCYTE [DISTWIDTH] IN BLOOD BY AUTOMATED COUNT: 16.3 %
GLUCOSE BLD-MCNC: 101 MG/DL (ref 70–99)
GLUCOSE BLD-MCNC: 102 MG/DL (ref 70–99)
GLUCOSE BLD-MCNC: 127 MG/DL (ref 70–99)
HCT VFR BLD AUTO: 35.9 %
HGB BLD-MCNC: 11.5 G/DL
IMM GRANULOCYTES # BLD AUTO: 0.17 X10(3) UL (ref 0–1)
IMM GRANULOCYTES NFR BLD: 2.2 %
IRON SATN MFR SERPL: 26 %
IRON SERPL-MCNC: 77 UG/DL
LYMPHOCYTES # BLD AUTO: 1.95 X10(3) UL (ref 1–4)
LYMPHOCYTES NFR BLD AUTO: 25.2 %
MCH RBC QN AUTO: 29.6 PG (ref 26–34)
MCHC RBC AUTO-ENTMCNC: 32 G/DL (ref 31–37)
MCV RBC AUTO: 92.5 FL
MONOCYTES # BLD AUTO: 0.69 X10(3) UL (ref 0.1–1)
MONOCYTES NFR BLD AUTO: 8.9 %
NEUTROPHILS # BLD AUTO: 4.38 X10 (3) UL (ref 1.5–7.7)
NEUTROPHILS # BLD AUTO: 4.38 X10(3) UL (ref 1.5–7.7)
NEUTROPHILS NFR BLD AUTO: 56.7 %
PLATELET # BLD AUTO: 217 10(3)UL (ref 150–450)
PLATELET # BLD AUTO: 217 10(3)UL (ref 150–450)
RBC # BLD AUTO: 3.88 X10(6)UL
TIBC SERPL-MCNC: 291 UG/DL (ref 240–450)
TRANSFERRIN SERPL-MCNC: 195 MG/DL (ref 200–360)
WBC # BLD AUTO: 7.7 X10(3) UL (ref 4–11)

## 2024-01-27 PROCEDURE — 99232 SBSQ HOSP IP/OBS MODERATE 35: CPT | Performed by: INTERNAL MEDICINE

## 2024-01-27 PROCEDURE — 99233 SBSQ HOSP IP/OBS HIGH 50: CPT | Performed by: OTHER

## 2024-01-27 PROCEDURE — 99233 SBSQ HOSP IP/OBS HIGH 50: CPT | Performed by: INTERNAL MEDICINE

## 2024-01-27 NOTE — PHYSICAL THERAPY NOTE
PHYSICAL THERAPY EVALUATION - INPATIENT     Room Number: 7603/7603-A  Evaluation Date: 1/27/2024  Type of Evaluation: Initial  Physician Order: PT Eval and Treat    Presenting Problem: fall off bed, worsening L facial droop  Co-Morbidities : CVA with L sided weakness, Dementia, Anxiety, Breast Ca, G-tube  Reason for Therapy: Mobility Dysfunction and Discharge Planning    History related to current admission: Patient is a 78 year old female admitted on 1/25/2024 from  for fall, L facial droop worsening.  Pt diagnosed with acute CVA, (+)PE. Patient went to the ER after fall and was discharged back to facility, returned to ER with worsening L facial droop.  Patient was started on heparin 1/25/24 @ 9PM.     1/25 CT Cervical spine:  No evidence of acute fracture of the cervical spine.  There are overall mild multilevel degenerative changes present.If there is persistent clinical concern then consider MRI.     1/26 CT chest:   1. Pulmonary embolus noted at the bifurcation of the pulmonary artery to the right middle lobe and right lower lobe.    2. Distal esophageal wall thickening which may represent esophagitis.   3. Large hiatal hernia   4. Small left-sided pleural effusion with associated left lower lobe compressive atelectasis.       1/26 US Venous doppler:  Positive for DVT, with partial clot in the common femoral vein, superficial femoral vein proximally, and distally, and proximal popliteal vein, with occlusive clot in the mid superficial femoral vein, and distal popliteal vein.     1/25 CT stroke brain/neck:   1. No large vessel occlusion is identified in the head or neck.   2. Examination positive for acute pulmonary embolism at the distal aspect of the right interlobar pulmonary artery.   3. CT perfusion imaging demonstrates elevated T-max within the region of probable encephalomalacia in the superior posterior right frontal lobe.  Please refer to the catheter angiography examination from 10/12/2023 for  further details.   4. There is a 4 mm pseudoaneurysm projecting medially at the distal V2 segment of the right vertebral artery.  There are a few punctate pseudoaneurysms at the distal V2 segment of the left vertebral artery.     Recent Admission:   12/5-12/7/23 admitted with delirium and dehydration, dc to [facility]   11/20-11/24/23: s/p PEG,from [facility] > [facility]  10/11-10/25/23: IPH s/p R frontal craniotomy (from home and DC [facility])  ASSESSMENT   In this PT evaluation, the patient presents with the following impairments: activity tolerance, independence during functional mobility tasks during bed mobility/sitting, sitting balance and tolerance; patient was unable to participate in transfers or standing today.  These impairments and comorbidities manifest themselves as functional limitations in independent bed mobility, transfers, and gait.  The patient is below baseline and would benefit from skilled inpatient PT to address the above deficits to assist patient in returning to prior to level of function.   Functional outcome measures completed include AMPA.  The AM-PAC '6-Clicks' Inpatient Basic Mobility Short Form was completed and this patient is demonstrating a Approx Degree of Impairment: 68.66%  degree of impairment in mobility. Research supports that patients with this level of impairment may benefit from [facility].    DISCHARGE RECOMMENDATIONS  PT Discharge Recommendations: Sub-acute rehabilitation (PT to upgrade mobility)    PLAN  PT Treatment Plan: Bed mobility;Patient education;Range of motion;Strengthening;Transfer training  Rehab Potential : Fair  Frequency (Obs): 3-5x/week         CURRENT GOALS    Goal #1 Patient is able to demonstrate supine - sit EOB @ level: cga     Goal #2 Patient is able to demonstrate transfers Sit to/from Stand at assistance level: moderate assistance     Goal #3 Patient is able to tolerate static standing mod assist x 1 x 1 minute     Goal #4    Goal #5    Goal #6    Goal Comments: Goals  established on 1/27/2024    HOME SITUATION  Type of Home: House   Home Layout: Two level                Lives With: Alone  Drives: Yes          Prior Level of Beat[place]: Per 12/6 PT Evaluation-patient was completely independent, living alone and driving prior to 10/23.  On 12/6 PT Evaluation, patient exhibited posterior lean sitting unsupported at the EOB with fair- sitting.      SUBJECTIVE  \"I don't know where I am\"      OBJECTIVE  Precautions: Bed/chair alarm;Other (Comment) (G-tube)  Fall Risk: High fall risk    WEIGHT BEARING RESTRICTION  Weight Bearing Restriction: None                PAIN ASSESSMENT  Rating: Unable to rate  Location: L UE/LE  Management Techniques: Activity promotion;Other (Comment) (Rest)    COGNITION  Arousal/Alertness:  delayed responses to stimuli  Attention Span:  appears intact  Memory:  impaired working memory and decreased recall of recent events  Following Commands:  follows one step commands with increased time, follows one step commands with repetition, and follows one step commands consistently  Motor Planning: impaired    RANGE OF MOTION AND STRENGTH ASSESSMENT  Upper extremity ROM and strength are within functional limits R UE AROM, L UE AAROM to shoulder and elbow    Lower extremity ROM is within functional limits; R LE AROM, L LE AAROM    Lower extremity strength is within functional limits: R LE grossly 3+-4- in the hip and knee, L hip and knee 3-/5      BALANCE  Static Sitting: Fair  Dynamic Sitting: Poor +  Static Standing: Not tested  Dynamic Standing: Not tested    ADDITIONAL TESTS                                    ACTIVITY TOLERANCE                         O2 WALK       NEUROLOGICAL FINDINGS                        AM-PAC '6-Clicks' INPATIENT SHORT FORM - BASIC MOBILITY  How much difficulty does the patient currently have...  Patient Difficulty: Turning over in bed (including adjusting bedclothes, sheets and blankets)?: A Little   Patient Difficulty: Sitting down on  and standing up from a chair with arms (e.g., wheelchair, bedside commode, etc.): A Lot   Patient Difficulty: Moving from lying on back to sitting on the side of the bed?: A Little   How much help from another person does the patient currently need...   Help from Another: Moving to and from a bed to a chair (including a wheelchair)?: A Lot   Help from Another: Need to walk in hospital room?: Total   Help from Another: Climbing 3-5 steps with a railing?: Total       AM-PAC Score:  Raw Score: 12   Approx Degree of Impairment: 68.66%   Standardized Score (AM-PAC Scale): 35.33   CMS Modifier (G-Code): CL    FUNCTIONAL ABILITY STATUS  Gait Assessment   Functional Mobility/Gait Assessment  Gait Assistance: Not tested    Skilled Therapy Provided     Bed Mobility:  Rolling: min assist towards L with HOB elevated  Supine to sit: min assist   Sit to supine: min-mod assist to LE, min assist to trunk     Transfer Mobility:  Sit to stand: NT   Stand to sit: NT  Gait = NT    Therapist's Comments: RN approved session, patient was received in L sidely in bed with HOB slightly elevated, RN was present at the start of the session.  This PT assisted RN to scoot patient up towards HOB, patient required max assist x 2 using the draw sheet. Patient was able to remain sitting up unsupported exhibiting some L lateral trunk lean and patient was able to correct to midline with cues provided and remain in midline briefly, (-)posterior trunk lean and needs cues to keep R hand on the side, patient is very fearful of falling and needs to be reassured multiple times, Patient was max assist x 1 to scoot towards EOB.  Patient tolerated sitting unsupported x 2 mins then requested to return to bed. Patient was oriented to place and day of the week.     Exercise/Education Provided:  Discussed role of role of PT.  Patient performed AA/AROM R UE/LE and AAROM L LE/UE (patient only allowed a few reps of L UE AAROM reporting pain and discomfort); patient  was provided with encouragement during exs.      Patient End of Session: In bed;Needs met;Call light within reach;RN aware of session/findings;All patient questions and concerns addressed;Alarm set      Patient Evaluation Complexity Level:  History Moderate - 1 or 2 personal factors and/or co-morbidities   Examination of body systems Moderate - addressing a total of 3 or more elements   Clinical Presentation Moderate - Evolving   Clinical Decision Making Moderate - Evolving       PT Session Time: 30 minutes  Gait Training:  minutes  Therapeutic Activity: 15 minutes  Neuromuscular Re-education:  minutes  Therapeutic Exercise: 8 minutes

## 2024-01-27 NOTE — PROGRESS NOTES
History of Presenting Illness:  Patient seen for follow-up.  Awake, responsive, slightly dysarthric.  No new weakness, numbness, paresthesias.  No seizure-like activity or loss of consciousness.  Patient initially felt more drowsy however she is perking up.    Vitals:   Vitals:    01/27/24 1330   BP: 113/50   Pulse: 102   Resp: 18   Temp: 97.9 °F (36.6 °C)          Examination:    Awake , dysarthric, expressive an receptive language normal.   Pupils round and reactive to light, extra ocular movement normal, right facial weakness, hearing normal.  Motor strength and reflexes symmetrical.  Finger to Nose testing normal.     Investigations:    MRI BRAIN (CPT=70551)    Result Date: 1/26/2024  CONCLUSION:   Marked volume loss with adjacent craniotomy in the right frontal lobe is noted.  Sequelae of chronic hemorrhagic infarct is noted.  Restricted diffusion centrally is noted in this region.  This is likely related to chronic hemorrhagic products in this region.  If there is concern for infection a follow-up postcontrast MRI brain exam may be done.  This region restricted diffusion is unlikely to be related to acute infarct since it does not appear to involve any viable brain parenchyma.   LOCATION:  [id number]   Dictated by (CST): David Chopra MD on 1/26/2024 at 5:05 PM     Finalized by (CST): David Chopra MD on 1/26/2024 at 5:13 PM       US VENOUS DOPPLER LEG BILAT - DIAG IMG (CPT=93970)    Result Date: 1/26/2024  CONCLUSION:  Positive for DVT left lower extremity.   LOCATION:  [facility]   Dictated by (CST): Wai Puente MD on 1/26/2024 at 8:23 AM     Finalized by (CST): Wai Puente MD on 1/26/2024 at 8:25 AM       CT ANGIOGRAPHY, CHEST (CPT=71275)    Result Date: 1/26/2024  CONCLUSION:  1. Pulmonary embolus noted at the bifurcation of the pulmonary artery to the right middle lobe and right lower lobe.  2. Distal esophageal wall thickening which may represent esophagitis. 3. Large hiatal hernia 4. Small  left-sided pleural effusion with associated left lower lobe compressive atelectasis.     LOCATION:  [facility]   Dictated by (CST): Ricky Quintana MD on 1/26/2024 at 7:27 AM     Finalized by (CST): Ricky Quintana MD on 1/26/2024 at 7:33 AM       XR CHEST AP PORTABLE  (CPT=71045)    Result Date: 1/25/2024  CONCLUSION:   Stable cardiac and mediastinal contours.  Patchy left basilar/retrocardiac opacity is similar to prior and may be atelectatic or inflammatory.  Pleural thickening versus trace left pleural effusion.  No appreciable pneumothorax.  Partially visualized gastrostomy of the upper abdomen.   LOCATION:  [facility]      Dictated by (CST): Selene Kumar MD on 1/25/2024 at 8:21 PM     Finalized by (CST): Selene Kumar MD on 1/25/2024 at 8:23 PM       CT STROKE (DEVANTE) CTA BRAIN/CTA NECK+PERF(CPT=70496/71614/0042T)    Result Date: 1/25/2024  CONCLUSION:  1. No large vessel occlusion is identified in the head or neck. 2. Examination positive for acute pulmonary embolism at the distal aspect of the right interlobar pulmonary artery. 3. CT perfusion imaging demonstrates elevated T-max within the region of probable encephalomalacia in the superior posterior right frontal lobe.  Please refer to the catheter angiography examination from 10/12/2023 for further details. 4. There is a 4 mm pseudoaneurysm projecting medially at the distal V2 segment of the right vertebral artery.  There are a few punctate pseudoaneurysms at the distal V2 segment of the left vertebral artery.   Critical results were discussed with Dr. Luevano at 2011 hours on 1/25/2024. Critical results were read back.   LOCATION:  [id number]   Dictated by (CST): Stromberg, LeRoy, MD on 1/25/2024 at 7:50 PM     Finalized by (CST): Stromberg, LeRoy, MD on 1/25/2024 at 8:15 PM       CT STROKE BRAIN (NO IV)(CPT=70450)    Result Date: 1/25/2024  CONCLUSION:  1. No acute intracranial hemorrhage or hydrocephalus. 2. Moderate region of encephalomalacia again seen at the right  frontal lobe.  Mild to moderate chronic small vessel ischemic disease with small chronic infarcts within the left cerebellum.  Small chronic infarct at the right temporal occipital junction.  If there is clinical concern for acute ischemia/infarction, an MRI of the brain would be recommended for further evaluation. 3. Postoperative changes from a right frontal craniotomy.  Critical results were discussed with Dr. Luevano at 1941 hours on 1/25/2024. Critical results were read back.   LOCATION:  [id number]   Dictated by (CST): Stromberg, LeRoy, MD on 1/25/2024 at 7:34 PM     Finalized by (CST): Stromberg, LeRoy, MD on 1/25/2024 at 7:41 PM            Lab Results   Component Value Date    A1C 7.2 (H) 10/12/2023        Lab Results   Component Value Date    LDL 66 10/12/2023    HDL 45 10/12/2023    TRIG 97 10/12/2023        Recent Labs   Lab 01/25/24 1923 01/26/24  0520 01/27/24  0628   RBC 3.91 3.62* 3.88   HGB 11.7* 10.8* 11.5*   HCT 35.0 32.9* 35.9   MCV 89.5 90.9 92.5   MCH 29.9 29.8 29.6   MCHC 33.4 32.8 32.0   RDW 15.3 16.0 16.3   NEPRELIM 6.48  --  4.38   WBC 11.2* 14.3* 7.7   .0 202.0 217.0  217.0       Recent Labs   Lab 01/25/24 1923 01/26/24  0520   * 93   BUN 14 11   CREATSERUM 0.67 0.53*   EGFRCR 88 93   CA 8.8 8.7    137   K 4.1 3.9    104   CO2 33.0* 30.0       Impression/MDM.    Stroke.  No evidence of new stroke on the MRI.  Patient to continue stroke prophylaxis with aspirin and statin.  Encephalopathy.  Improving possible related to hypoxic/metabolic etiology.  Concerns of seizure.  No significant epileptiform activity noted on the EEG.  Patient is already on valproic acid 750 mg daily.  No further neuro intervention recommended at this time.

## 2024-01-27 NOTE — PLAN OF CARE
Assumed care at approximately 0300.   A & O x 3.  RA.   Neuros q 4, no new deficits. L sided facial droop, L sided weakness, slurred speech noted. See flowsheets.   Heparin gtt infusing per protocol.   G tube in place CDI.   Call light within reach.
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
## 2024-01-27 NOTE — PROGRESS NOTES
Hematology/Oncology Progress Note    Patient Name: Mayte Yates  Medical Record Number: [id number]    YOB: 1945     Reason for Consultation:  Mayte Yates was seen today for the diagnosis of PE    Interval events:      - agitated, yelling at staff  - she denies any pain to me  - hgb stable 11.5    Inpatient Meds:   atorvastatin  80 mg Per G Tube Daily    calcium carbonate  500 mg Per G Tube Daily    DULoxetine  20 mg Per G Tube Daily    lansoprazole  30 mg Per G Tube BID AC    sucralfate  1 g Per G Tube BID AC    valproic acid  750 mg Per G Tube TID    QUEtiapine  25 mg Per G Tube BID    mirtazapine  15 mg Per G Tube Nightly    vancomycin  125 mg Per G Tube Q6H      continuous dose heparin 700 Units/hr (01/27/24 0214)       PRN Meds:  ALPRAZolam, acetaminophen, polyethylene glycol (PEG 3350), sennosides, bisacodyl, fleet enema, ondansetron, metoclopramide    Allergies:   Allergies   Allergen Reactions    Ampicillin NAUSEA ONLY     Caps    Codeine [Opioid Analgesics]      Derivatives  Syncope    Cortisone [Cortisone Acetate]      Injection into L shoulder  Syncope, stomach cramps       Vital Signs:  Height: --  Weight: --  BSA (Calculated - sq m): --  Pulse: 98 (01/27 0800)  BP: 115/69 (01/27 0800)  Temp: 97.6 °F (36.4 °C) (01/27 0800)  Do Not Use - Resp Rate: --  SpO2: 96 % (01/27 0800)    Wt Readings from Last 6 Encounters:   01/26/24 69.9 kg (154 lb)   01/25/24 64 kg (141 lb 3.2 oz)   01/18/24 63.6 kg (140 lb 3.2 oz)   01/16/24 63.6 kg (140 lb 3.2 oz)   01/13/24 62 kg (136 lb 11 oz)   01/04/24 62.6 kg (138 lb)       Physical Examination:  General: Patient is alert and oriented to self  Psych: Agitated affect  Eyes: EOMI, PERRL  ENT: Oropharynx is clear, no adenopathy  CV:  no LE edema  Respiratory: Non-labored respirations  GI/Abd: Soft, non-tender   Neurological: A&Ox1. 4/5 LUE and LLE  Skin: no rashes or petechiae    Laboratory:  Recent Labs   Lab 01/25/24 1923 01/26/24  0520  01/27/24  0628   WBC 11.2* 14.3* 7.7   HGB 11.7* 10.8* 11.5*   HCT 35.0 32.9* 35.9   .0 202.0 217.0  217.0   MCV 89.5 90.9 92.5   RDW 15.3 16.0 16.3   NEPRELIM 6.48  --  4.38       Recent Labs   Lab 01/25/24 1923 01/26/24  0520    137   K 4.1 3.9    104   CO2 33.0* 30.0   BUN 14 11   CREATSERUM 0.67 0.53*   * 93   CA 8.8 8.7   TP 6.1*  --    ALB 1.7*  --    ALKPHO 78  --    AST 27  --    BILT 0.2  --        Recent Labs   Lab 01/25/24 1923 01/26/24  0520 01/26/24  1448 01/26/24  2242 01/27/24  0628   INR 1.01  --   --   --   --    PTT 25.9   < > >240.0* 238.2* 124.7*    < > = values in this interval not displayed.       Impression & Plan:     PE/DVT  - provoked by hospitalizations  - no bleeding issues currently on heparin gtt, although she has had prior ICH and GIB when she was not anticoagulated previously  - continue heparin gtt for now; if tolerating, can switch to apixaban  - if she bleeds, would need to stop AC and place IVC filter    Anemia  - check iron studies, vit b12, folic acid, replete if low    Jesús Horvath MD  Hematology/Medical Oncology  [facility]

## 2024-01-27 NOTE — PROGRESS NOTES
[facility]     Hospitalist Progress Note     Mayte Yates Patient Status:  Inpatient    11/10/1945 MRN [id number]   [facility] 7NE-A Attending Mahesh Norton MD   Hosp Day # 1 PCP Dilcia Avery MD     Chief Complaint: Fall    Subjective:     No complaints     Objective:    Review of Systems:   A comprehensive review of systems was completed; pertinent positive and negatives stated in subjective.    Vital signs:  Temp:  [97.6 °F (36.4 °C)-98.4 °F (36.9 °C)] 97.9 °F (36.6 °C)  Pulse:  [] 102  Resp:  [18-20] 18  BP: (101-120)/(48-69) 113/50  SpO2:  [94 %-98 %] 98 %    Physical Exam:    General: No acute distress  Respiratory: No wheezes, no rhonchi  Cardiovascular: S1, S2, regular rate and rhythm  Abdomen: Soft, Non-tender, non-distended, positive bowel sounds  Neuro: Left hemiplegia   Extremities: No edema      Diagnostic Data:    Labs:  Recent Labs   Lab 24  0520 24  0628   WBC 11.2* 14.3* 7.7   HGB 11.7* 10.8* 11.5*   MCV 89.5 90.9 92.5   .0 202.0 217.0  217.0   INR 1.01  --   --        Recent Labs   Lab 24  0520   * 93   BUN 14 11   CREATSERUM 0.67 0.53*   CA 8.8 8.7   ALB 1.7*  --     137   K 4.1 3.9    104   CO2 33.0* 30.0   ALKPHO 78  --    AST 27  --    BILT 0.2  --    TP 6.1*  --        Estimated Creatinine Clearance: 93.4 mL/min (A) (based on SCr of 0.53 mg/dL (L)).    Recent Labs   Lab 24   TROPHS 14       Recent Labs   Lab 24   PTP 13.3   INR 1.01                  Microbiology    No results found for this visit on 24.      Imaging: Reviewed in Epic.    Medications:    atorvastatin  80 mg Per G Tube Daily    calcium carbonate  500 mg Per G Tube Daily    DULoxetine  20 mg Per G Tube Daily    lansoprazole  30 mg Per G Tube BID AC    sucralfate  1 g Per G Tube BID AC    valproic acid  750 mg Per G Tube TID    QUEtiapine  25 mg Per G Tube BID    mirtazapine  15 mg Per G Tube  Nightly    vancomycin  125 mg Per G Tube Q6H       Assessment & Plan:      #Acute Right sided Pulmonary Embolus   #LLE DVT  -heparin gtt - > transition to DOAC per heme  -ECHO without any RV strain   -LE dopplers + for LLE DVT     #Cerebrovascular disease with residual L sided weakness  #Fall, Prior ICH  -Repeat imaging without acute findings  -Neurology on consult, MRI brain reviewed - defer any further workup to neurology,   -EEG no seizure       #Leukocytosis with fall on admission  -U/A unimpressive  -resolved     #Anxiety  #Dementia  -Seroquel      #Dyslipidemia  Statin     #GERD/Gastritis  -PPI     #C.Diff  -continue PO Vancomycin to complete course       Damon Zuniga DO    Supplementary Documentation:     Quality:  DVT Mechanical Prophylaxis:        DVT Pharmacologic Prophylaxis   Medication    heparin (Porcine) 69373 units/250mL infusion CONTINUOUS                Code Status: Full Code  Kong: No urinary catheter in place      The 21st Century Cures Act makes medical notes like these available to patients in the interest of transparency. Please be advised this is a medical document. Medical documents are intended to carry relevant information, facts as evident, and the clinical opinion of the practitioner. The medical note is intended as peer to peer communication and may appear blunt or direct. It is written in medical language and may contain abbreviations or verbiage that are unfamiliar.             **Certification      PHYSICIAN Certification of Need for Inpatient Hospitalization - Initial Certification    Patient will require inpatient services that will reasonably be expected to span two midnight's based on the clinical documentation in H+P.   Based on patients current state of illness, I anticipate that, after discharge, patient will require TBD.

## 2024-01-28 LAB
APTT PPP: 62.4 SECONDS (ref 23.3–35.6)
APTT PPP: 82 SECONDS (ref 23.3–35.6)
APTT PPP: 89.1 SECONDS (ref 23.3–35.6)
FOLATE SERPL-MCNC: 13.9 NG/ML (ref 8.7–?)
GLUCOSE BLD-MCNC: 111 MG/DL (ref 70–99)
GLUCOSE BLD-MCNC: 128 MG/DL (ref 70–99)
GLUCOSE BLD-MCNC: 132 MG/DL (ref 70–99)
GLUCOSE BLD-MCNC: 94 MG/DL (ref 70–99)
PLATELET # BLD AUTO: 207 10(3)UL (ref 150–450)
VIT B12 SERPL-MCNC: 1928 PG/ML (ref 193–986)

## 2024-01-28 PROCEDURE — 99232 SBSQ HOSP IP/OBS MODERATE 35: CPT | Performed by: INTERNAL MEDICINE

## 2024-01-28 PROCEDURE — 99233 SBSQ HOSP IP/OBS HIGH 50: CPT | Performed by: INTERNAL MEDICINE

## 2024-01-28 NOTE — PROGRESS NOTES
Hematology/Oncology Progress Note    Patient Name: Mayte Yates  Medical Record Number: [id number]    YOB: 1945     Reason for Consultation:  Mayte Yates was seen today for the diagnosis of PE    Interval events:      - sleeping  - aPTT now therapeutic at 82  - platelet count stable at 207    Inpatient Meds:   atorvastatin  80 mg Per G Tube Daily    calcium carbonate  500 mg Per G Tube Daily    DULoxetine  20 mg Per G Tube Daily    lansoprazole  30 mg Per G Tube BID AC    sucralfate  1 g Per G Tube BID AC    valproic acid  750 mg Per G Tube TID    QUEtiapine  25 mg Per G Tube BID    mirtazapine  15 mg Per G Tube Nightly      continuous dose heparin 500 Units/hr (01/27/24 1626)       PRN Meds:  ALPRAZolam, acetaminophen, polyethylene glycol (PEG 3350), sennosides, bisacodyl, fleet enema, ondansetron, metoclopramide    Allergies:   Allergies   Allergen Reactions    Ampicillin NAUSEA ONLY     Caps    Codeine [Opioid Analgesics]      Derivatives  Syncope    Cortisone [Cortisone Acetate]      Injection into L shoulder  Syncope, stomach cramps       Vital Signs:  Height: --  Weight: --  BSA (Calculated - sq m): --  Pulse: 94 (01/28 0800)  BP: 122/51 (01/28 0800)  Temp: 97.8 °F (36.6 °C) (01/28 0800)  Do Not Use - Resp Rate: --  SpO2: 94 % (01/28 0800)    Wt Readings from Last 6 Encounters:   01/26/24 69.9 kg (154 lb)   12/27/23 65 kg (143 lb 4.8 oz)   01/25/24 64 kg (141 lb 3.2 oz)   01/18/24 63.6 kg (140 lb 3.2 oz)   01/16/24 63.6 kg (140 lb 3.2 oz)   01/13/24 62 kg (136 lb 11 oz)       Physical Examination:  General: Patient is alert and oriented to self  Psych: Agitated affect  Eyes: EOMI, PERRL  ENT: Oropharynx is clear, no adenopathy  CV:  no LE edema  Respiratory: Non-labored respirations  GI/Abd: Soft, non-tender   Neurological: A&Ox1. 4/5 LUE and LLE  Skin: no rashes or petechiae    Laboratory:  Recent Labs   Lab 01/25/24  1923 01/26/24  0520 01/27/24  0628 01/28/24  0518   WBC 11.2*  14.3* 7.7  --    HGB 11.7* 10.8* 11.5*  --    HCT 35.0 32.9* 35.9  --    .0 202.0 217.0  217.0 207.0   MCV 89.5 90.9 92.5  --    RDW 15.3 16.0 16.3  --    NEPRELIM 6.48  --  4.38  --        Recent Labs   Lab 01/25/24 1923 01/26/24  0520    137   K 4.1 3.9    104   CO2 33.0* 30.0   BUN 14 11   CREATSERUM 0.67 0.53*   * 93   CA 8.8 8.7   TP 6.1*  --    ALB 1.7*  --    ALKPHO 78  --    AST 27  --    BILT 0.2  --        Recent Labs   Lab 01/25/24 1923 01/26/24  0520 01/27/24  0628 01/27/24  1437 01/27/24  2326   INR 1.01  --   --   --   --    PTT 25.9   < > 124.7* 109.1* 82.0*    < > = values in this interval not displayed.       Impression & Plan:     PE/DVT  - provoked by hospitalizations  - no bleeding issues currently on heparin gtt, although she has had prior ICH and GIB when she was not anticoagulated previously  - continue heparin gtt for now; if tolerating, can switch to apixaban  - if she bleeds, would need to stop AC and place IVC filter    Anemia  - iron studies, vit B12 and folic acid levels normal    Dr Enrique will follow from tomorrow    Jesús Horvath MD  Hematology/Medical Oncology  [facility]
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
## 2024-01-28 NOTE — PROGRESS NOTES
[facility]     Hospitalist Progress Note     Mayte Yates Patient Status:  Inpatient    11/10/1945 MRN [id number]   [facility] 7NE-A Attending Mahesh Norton MD   Hosp Day # 2 PCP Dilcia Avery MD     Chief Complaint: Fall    Subjective:     Does not have any complaints     Objective:    Review of Systems:   A comprehensive review of systems was completed; pertinent positive and negatives stated in subjective.    Vital signs:  Temp:  [97.8 °F (36.6 °C)-98.5 °F (36.9 °C)] 97.8 °F (36.6 °C)  Pulse:  [] 94  Resp:  [18-19] 19  BP: (113-126)/(50-65) 122/51  SpO2:  [94 %-98 %] 94 %    Physical Exam:    General: No acute distress  Respiratory: No wheezes, no rhonchi  Cardiovascular: S1, S2, regular rate and rhythm  Abdomen: Soft, Non-tender, non-distended, positive bowel sounds  Neuro: Left hemiplegia   Extremities: No edema      Diagnostic Data:    Labs:  Recent Labs   Lab 24  0520 24  0628 24  0518   WBC 11.2* 14.3* 7.7  --    HGB 11.7* 10.8* 11.5*  --    MCV 89.5 90.9 92.5  --    .0 202.0 217.0  217.0 207.0   INR 1.01  --   --   --        Recent Labs   Lab 24  0520   * 93   BUN 14 11   CREATSERUM 0.67 0.53*   CA 8.8 8.7   ALB 1.7*  --     137   K 4.1 3.9    104   CO2 33.0* 30.0   ALKPHO 78  --    AST 27  --    BILT 0.2  --    TP 6.1*  --        Estimated Creatinine Clearance: 93.4 mL/min (A) (based on SCr of 0.53 mg/dL (L)).    Recent Labs   Lab 24   TROPHS 14       Recent Labs   Lab 24   PTP 13.3   INR 1.01                  Microbiology    No results found for this visit on 24.      Imaging: Reviewed in Epic.    Medications:    atorvastatin  80 mg Per G Tube Daily    calcium carbonate  500 mg Per G Tube Daily    DULoxetine  20 mg Per G Tube Daily    lansoprazole  30 mg Per G Tube BID AC    sucralfate  1 g Per G Tube BID AC    valproic acid  750 mg Per G Tube TID    QUEtiapine   25 mg Per G Tube BID    mirtazapine  15 mg Per G Tube Nightly       Assessment & Plan:      #Acute Right sided Pulmonary Embolus   #LLE DVT  -heparin gtt - > transition to DOAC per heme  -ECHO without any RV strain   -LE dopplers + for LLE DVT     #Cerebrovascular disease with residual L sided weakness  #Fall, Prior ICH  -Repeat imaging without acute findings  -Neurology on consult, MRI brain reviewed - no further neurologic workup planned at this time   -EEG no seizure       #Leukocytosis with fall on admission  -U/A unimpressive  -resolved     #Anxiety  #Dementia  -Seroquel      #Dyslipidemia  Statin     #GERD/Gastritis  -PPI     #C.Diff  -Completed PO Vancomycin       Damon Zuniga, DO    Supplementary Documentation:     Quality:  DVT Mechanical Prophylaxis:        DVT Pharmacologic Prophylaxis   Medication    heparin (Porcine) 21367 units/250mL infusion CONTINUOUS                Code Status: Full Code  Kong: No urinary catheter in place      The 21st Century Cures Act makes medical notes like these available to patients in the interest of transparency. Please be advised this is a medical document. Medical documents are intended to carry relevant information, facts as evident, and the clinical opinion of the practitioner. The medical note is intended as peer to peer communication and may appear blunt or direct. It is written in medical language and may contain abbreviations or verbiage that are unfamiliar.             **Certification      PHYSICIAN Certification of Need for Inpatient Hospitalization - Initial Certification    Patient will require inpatient services that will reasonably be expected to span two midnight's based on the clinical documentation in H+P.   Based on patients current state of illness, I anticipate that, after discharge, patient will require TBD.

## 2024-01-28 NOTE — PLAN OF CARE
Assumed care at approximately 0300.   A & O x 3.  RA.   Neuros q shift, no new deficits. L sided facial droop, L sided weakness, slurred speech noted. See flowsheets.   Heparin gtt infusing per protocol.   G tube in place CDI.   PRN tylenol given with relief for back/ neck pain.   Call light within reach.   Patient updated on plan of care.

## 2024-01-29 LAB
ANION GAP SERPL CALC-SCNC: 4 MMOL/L (ref 0–18)
APTT PPP: 106.5 SECONDS (ref 23.3–35.6)
BUN BLD-MCNC: 9 MG/DL (ref 9–23)
CALCIUM BLD-MCNC: 8.1 MG/DL (ref 8.5–10.1)
CHLORIDE SERPL-SCNC: 103 MMOL/L (ref 98–112)
CO2 SERPL-SCNC: 33 MMOL/L (ref 21–32)
CREAT BLD-MCNC: 0.53 MG/DL
EGFRCR SERPLBLD CKD-EPI 2021: 93 ML/MIN/1.73M2 (ref 60–?)
ERYTHROCYTE [DISTWIDTH] IN BLOOD BY AUTOMATED COUNT: 16.5 %
GLUCOSE BLD-MCNC: 100 MG/DL (ref 70–99)
GLUCOSE BLD-MCNC: 103 MG/DL (ref 70–99)
GLUCOSE BLD-MCNC: 108 MG/DL (ref 70–99)
GLUCOSE BLD-MCNC: 113 MG/DL (ref 70–99)
GLUCOSE BLD-MCNC: 123 MG/DL (ref 70–99)
HCT VFR BLD AUTO: 32.2 %
HGB BLD-MCNC: 10.3 G/DL
MCH RBC QN AUTO: 29.7 PG (ref 26–34)
MCHC RBC AUTO-ENTMCNC: 32 G/DL (ref 31–37)
MCV RBC AUTO: 92.8 FL
OSMOLALITY SERPL CALC.SUM OF ELEC: 289 MOSM/KG (ref 275–295)
PLATELET # BLD AUTO: 202 10(3)UL (ref 150–450)
POTASSIUM SERPL-SCNC: 3.4 MMOL/L (ref 3.5–5.1)
POTASSIUM SERPL-SCNC: 3.9 MMOL/L (ref 3.5–5.1)
RBC # BLD AUTO: 3.47 X10(6)UL
SODIUM SERPL-SCNC: 140 MMOL/L (ref 136–145)
WBC # BLD AUTO: 6.8 X10(3) UL (ref 4–11)

## 2024-01-29 PROCEDURE — 99232 SBSQ HOSP IP/OBS MODERATE 35: CPT | Performed by: INTERNAL MEDICINE

## 2024-01-29 RX ORDER — POTASSIUM CHLORIDE 20 MEQ/1
40 TABLET, EXTENDED RELEASE ORAL EVERY 4 HOURS
Status: COMPLETED | OUTPATIENT
Start: 2024-01-29 | End: 2024-01-29

## 2024-01-29 RX ORDER — ALPRAZOLAM 0.25 MG/1
0.25 TABLET ORAL NIGHTLY PRN
Qty: 3 TABLET | Refills: 0 | Status: SHIPPED | OUTPATIENT
Start: 2024-01-29

## 2024-01-29 NOTE — CM/SW NOTE
01/29/24 1622   Discharge disposition   Expected discharge disposition [facility]   [facility] Provider [facility]   Discharge transportation [facility]     Transport delay, SW set up ambulance transport for 11AM tomorrow. MARCUS and Anjel made aware.    HUSAM Meyer

## 2024-01-29 NOTE — OCCUPATIONAL THERAPY NOTE
OT orders received and pt chart reviewed. Per chart review, pt lives at [facility]. Pt baseline requires assist with ADLs, incontinent of bowel and bladder, assist for feeding and total assist for transfers. Pt is at reported baseline and presents with no skilled acute IP OT needs at this time. OT will sign off.
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
## 2024-01-29 NOTE — PLAN OF CARE
Assumed care at 0700  Patient alert, oriented x2-3  Worked with PT  Potassium replaced per protocol  Heparin drip stopped. Eliquis started  Minimal PO intake  Pills taken in applesauce  TF started this evening    Dc tomorrow to [facility] @ 11am

## 2024-01-29 NOTE — PHYSICAL THERAPY NOTE
PHYSICAL THERAPY TREATMENT NOTE - INPATIENT    Room Number: 7603/7603-A     Session: 1          Presenting Problem: fall off bed, worsening L facial droop  Co-Morbidities : CVA with L sided weakness, Dementia, Anxiety, Breast Ca, G-tube      History related to current admission: Patient is a 78 year old female admitted on 1/25/2024 from  for fall, L facial droop worsening.  Pt diagnosed with acute CVA, (+)PE. Patient went to the ER after fall and was discharged back to facility, returned to ER with worsening L facial droop.  Patient was started on heparin 1/25/24 @ 9PM.     1/25 CT Cervical spine:  No evidence of acute fracture of the cervical spine.  There are overall mild multilevel degenerative changes present.If there is persistent clinical concern then consider MRI.     1/26 CT chest:   1. Pulmonary embolus noted at the bifurcation of the pulmonary artery to the right middle lobe and right lower lobe.    2. Distal esophageal wall thickening which may represent esophagitis.   3. Large hiatal hernia   4. Small left-sided pleural effusion with associated left lower lobe compressive atelectasis.       1/26 US Venous doppler:  Positive for DVT, with partial clot in the common femoral vein, superficial femoral vein proximally, and distally, and proximal popliteal vein, with occlusive clot in the mid superficial femoral vein, and distal popliteal vein.     1/25 CT stroke brain/neck:   1. No large vessel occlusion is identified in the head or neck.   2. Examination positive for acute pulmonary embolism at the distal aspect of the right interlobar pulmonary artery.   3. CT perfusion imaging demonstrates elevated T-max within the region of probable encephalomalacia in the superior posterior right frontal lobe.  Please refer to the catheter angiography examination from 10/12/2023 for further details.   4. There is a 4 mm pseudoaneurysm projecting medially at the distal V2 segment of the right vertebral artery.  There  are a few punctate pseudoaneurysms at the distal V2 segment of the left vertebral artery.     Recent Admission:   12/5-12/7/23 admitted with delirium and dehydration, dc to [place] [facility]   11/20-11/24/23: s/p PEG,from [facility] > [facility]  10/11-10/25/23: IPH s/p R frontal craniotomy (from home and DC [facility])      ASSESSMENT     The patient seen for PT treatment session this AM. Pt alert and oriented to self, not to place. Orientation tends to fluctuate and at times pt was confused and unable to recall correct living situation.  Presented with pain only during assistant to L LE. Patient shows balance deficits in sitting and standing, needs assist to recorrect posture position and trunk control. Lateral lean and lob to L in sitting and standing. The patient continues to be below baseline for strength,endurance and balance resulting in decreased functional independence. Will benefit from ongoing skilled PT.         DISCHARGE RECOMMENDATIONS  PT Discharge Recommendations: Sub-acute rehabilitation (PT to upgrade mobility)     PLAN  PT Treatment Plan: Bed mobility;Patient education;Range of motion;Strengthening;Transfer training  Rehab Potential : Fair  Frequency (Obs): 3-5x/week    CURRENT GOALS     Goal #1 Patient is able to demonstrate supine - sit EOB @ level: cga      Goal #2 Patient is able to demonstrate transfers Sit to/from Stand at assistance level: moderate assistance      Goal #3 Patient is able to tolerate static standing mod assist x 1 x 1 minute      Goal #4     Goal #5     Goal #6     Goal Comments: Goals established on 1/27/2024 1/29/2024 all goals ongoing.      SUBJECTIVE      Upon arrival, pt oriented to self. She stated \" I live with my parents in their home. But now I have my own home so I will live there.\"  Pt was reoriented to situation,  For place, pt states \" I am in [facility].\" Pt was reoriented.     \"No one has worked with me on this before.\" Referring to wt shift activity towards R side.   Pain  reported in LLE during assist, pain gone when hand placement removed.     OBJECTIVE  Precautions: Bed/chair alarm;Other (Comment) (G-tube)    WEIGHT BEARING RESTRICTION  Weight Bearing Restriction: None                PAIN ASSESSMENT   Rating: Unable to rate  Location: L LE to touch  Management Techniques: Activity promotion    BALANCE                                                                                                                       Static Sitting: Poor +  Dynamic Sitting: Poor +           Static Standing: Poor  Dynamic Standing: Not tested    ACTIVITY TOLERANCE      fair    AM-PAC '6-Clicks' INPATIENT SHORT FORM - BASIC MOBILITY  How much difficulty does the patient currently have...  Patient Difficulty: Turning over in bed (including adjusting bedclothes, sheets and blankets)?: A Little   Patient Difficulty: Sitting down on and standing up from a chair with arms (e.g., wheelchair, bedside commode, etc.): A Lot   Patient Difficulty: Moving from lying on back to sitting on the side of the bed?: A Lot   How much help from another person does the patient currently need...   Help from Another: Moving to and from a bed to a chair (including a wheelchair)?: Total   Help from Another: Need to walk in hospital room?: Total   Help from Another: Climbing 3-5 steps with a railing?: Total       AM-PAC Score:  Raw Score: 10   Approx Degree of Impairment: 76.75%   Standardized Score (AM-PAC Scale): 32.29   CMS Modifier (G-Code): CL    FUNCTIONAL ABILITY STATUS  Gait Assessment   Functional Mobility/Gait Assessment  Gait Assistance: Not tested    Skilled Therapy Provided    Bed Mobility:  Rolling: min   Supine<>Sit: mod assist, cues for technique to rotate trunk and hand support on the bed rail. Patient needed assist for trunk control and scooting.  Sit<>Supine: max assist.      Transfer Mobility:  Sit<>Stand: max assist, hand support on the back handle of the chair ( Locked). Pt maintained standing for 20 sec,  repeated three times. Narrow STEPHANIE, L knee flexed and dec wb, left UE weakness. Lob towards L side.    Stand<>Sit: max.    Gait: NT    Therapist's Comments: patient worked on sitting balance training, standing balance training, and bed mobility technique training.   Pt was left in the room, RN was notified.     THERAPEUTIC EXERCISES  Lower Extremity Alternating marching  Heel slides  LAQ     Upper Extremity ROM ex     Position Sitting and Supine     Repetitions   10   Sets   1     Patient End of Session: In bed;Needs met;Call light within reach;RN aware of session/findings;All patient questions and concerns addressed;Alarm set    PT Session Time: 30 minutes  Gait Trainin minutes  Therapeutic Activity: 5 minutes  Therapeutic Exercise: 10 minutes   Neuromuscular Re-education: 10 minutes

## 2024-01-29 NOTE — PLAN OF CARE
Assumed care at 0700. Contact isolation for C-diff (recent infection, although not having diarrhea now)  NO PTT collected this AM; patient on Heparin drip.   PTT ordered. PTT subtherapeutic. Dose increased by 100units. Next PTT scheduled for 1800. Please adjust accordingly.    Incontinent with brief and Purewick.   NSR/Stach on tele.  A/O x3. Denies pain; however, moans. Tylenol 500mg given. Patient OK to take meds crushed with applesauce or pudding.

## 2024-01-29 NOTE — SLP NOTE
SPEECH DAILY NOTE - INPATIENT    ASSESSMENT & PLAN   ASSESSMENT  Pt seen for dysphagia tx to assess tolerance with recommended diet, ensure appropriate utilization of aspiration precautions and provide pt/family education. Pt found lying semi-upright in bed; alert & participatory; partially eaten noon tray present in room. Head of bed positioned upright to 90 degrees and pt midline. Assisted with po trials of thin and pureed consistencies with good tolerance and no overt clinical s/s of aspiration noted. Pt politely refused and soft solids. Reviewed and reiterated importance of aspiration precautions with good understanding reported. Will d/c from services at this time.     Diet Recommendations - Solids: Mechanical soft chopped/ Soft & Bite Sized  Diet Recommendations - Liquids: Thin Liquids (small, single sips - straws ok)    Compensatory Strategies Recommended: Slow rate;Alternate consistencies;Small bites and sips;Extra sauce/gravy  Aspiration Precautions: Upright position;Slow rate;Small bites and sips  Medication Administration Recommendations: One pill at a time (take whole or crushed in pureed if any difficulty)    Patient Experiencing Pain: No                Discharge Recommendations  Discharge Recommendations/Plan: Skilled nursing facility    Treatment Plan  Treatment Plan/Recommendations: Dysphagia therapy;Aspiration precautions          GOALS  Goal #1 The patient will tolerate soft & bite sized consistency and thin - small, single sips of  liquids without overt signs or symptoms of aspiration with 95 % accuracy over 2 session(s). Met   Goal #2 The patient/family/caregiver will demonstrate understanding and implementation of aspiration precautions and swallow strategies independently over 2 session(s).    Met   Goal #3 The patient will utilize compensatory strategies as outlined by  BSSE (clinical evaluation) including Slow rate, Small bites, Small sips, Alternate liquids/solids, Upright 90 degrees, Feed  patient with as needed assistance 100 % of the time across 2 sessions. Met   Goal #4 Communication screening pending neuro workup    N/a           FOLLOW UP  Follow Up Needed (Documentation Required): No  SLP Follow-up Date: 01/27/24  Number of Visits to Meet Established Goals: 2    Session: 1    If you have any questions, please contact Vero MOELLER, LOUIS Skinner MA, CCC-SLP  Pager [phone]

## 2024-01-29 NOTE — PLAN OF CARE
Assumed care of patient alert and oriented x2-3. Left sided neuro deficits, facial droop, left arm flaccid and left leg with minimal movement. SR on tele.Room air. PEG tube site flushed, CDI. Repositioned. Heparin gtt infusing. Bed alarm on for safety. Call light within reach, frequent rounding complete.

## 2024-01-29 NOTE — PROGRESS NOTES
Hematology Progress Note    Patient Name: Mayte Yates  Medical Record Number: [id number]    YOB: 1945     Reason for Consultation:  Mayte Yates was seen today for the diagnosis of PE    Hematologic History:  *Acute PE + LLE DVT  -p/w left sided facial droop concerning for CVA.   -1/25/24- CTA stroke brain/neck- No large vessel occlusion is identified in the head or neck. Examination positive for acute pulmonary embolism at the distal aspect of the right interlobar pulmonary artery. CT perfusion imaging demonstrates elevated T-max within the region of probable encephalomalacia in the superior posterior right frontal lobe. There is a 4 mm pseudoaneurysm projecting medially at the distal V2 segment of the right vertebral artery.  There are a few punctate pseudoaneurysms at the distal V2 segment of the left vertebral artery.    -started UFH gtt  -1/26/24- CTA chest- Pulmonary embolus noted at the bifurcation of the pulmonary artery to the right middle lobe and right lower lobe.  Distal esophageal wall thickening which may represent esophagitis. Large hiatal hernia. Small left-sided pleural effusion with associated left lower lobe compressive atelectasis.   -1/26/24- BLE venous doppler- Positive for left DVT, with partial clot in the common femoral vein, superficial femoral vein proximally, and distally, and proximal popliteal vein, with occlusive clot in the mid superficial femoral vein, and distal popliteal vein.   -1/29/24- switched UFH gtt to eliquis 5mg BID    =============================  Interval events: Patient has been tolerating heparin drip without any bleeding.  She denies any increased dyspnea or chest pain.      Current Inpatient Medications:  Inpatient Meds:   potassium chloride  40 mEq Oral Q4H    apixaban  5 mg Oral BID    atorvastatin  80 mg Per G Tube Daily    calcium carbonate  500 mg Per G Tube Daily    DULoxetine  20 mg Per G Tube Daily    lansoprazole  30 mg Per G Tube  BID AC    sucralfate  1 g Per G Tube BID AC    valproic acid  750 mg Per G Tube TID    QUEtiapine  25 mg Per G Tube BID    mirtazapine  15 mg Per G Tube Nightly     PRN Meds:  ALPRAZolam, acetaminophen, polyethylene glycol (PEG 3350), sennosides, bisacodyl, fleet enema, ondansetron, metoclopramide    Allergies:   Allergies   Allergen Reactions    Ampicillin NAUSEA ONLY     Caps    Codeine [Opioid Analgesics]      Derivatives  Syncope    Cortisone [Cortisone Acetate]      Injection into L shoulder  Syncope, stomach cramps     Vital Signs:  Height: --  Weight: --  BSA (Calculated - sq m): --  Pulse: 88 (01/29 0800)  BP: 114/47 (01/29 0800)  Temp: 97.6 °F (36.4 °C) (01/29 0800)  Do Not Use - Resp Rate: --  SpO2: 96 % (01/29 0800)    Wt Readings from Last 6 Encounters:   01/26/24 69.9 kg (154 lb)   12/27/23 65 kg (143 lb 4.8 oz)   01/25/24 64 kg (141 lb 3.2 oz)   01/18/24 63.6 kg (140 lb 3.2 oz)   01/16/24 63.6 kg (140 lb 3.2 oz)   01/13/24 62 kg (136 lb 11 oz)     Physical Examination:  General: Patient alert, + memory impairment   CV: Regular rate and rhythm, no murmurs  Respiratory: Lungs clear to auscultation bilaterally  GI/Abd: Soft, non-tender  Neurological: Chronic left-sided weakness.   Skin: no rashes or petechiae    Laboratory:  Recent Labs   Lab 01/25/24  1923 01/26/24  0520 01/27/24  0628 01/28/24  0518 01/29/24  0640   WBC 11.2* 14.3* 7.7  --  6.8   HGB 11.7* 10.8* 11.5*  --  10.3*   HCT 35.0 32.9* 35.9  --  32.2*   .0 202.0 217.0  217.0 207.0 202.0   MCV 89.5 90.9 92.5  --  92.8   RDW 15.3 16.0 16.3  --  16.5   NEPRELIM 6.48  --  4.38  --   --      Recent Labs   Lab 01/25/24  1923 01/26/24  0520 01/29/24  0640    137 140   K 4.1 3.9 3.4*    104 103   CO2 33.0* 30.0 33.0*   BUN 14 11 9   CREATSERUM 0.67 0.53* 0.53*   * 93 103*   CA 8.8 8.7 8.1*   TP 6.1*  --   --    ALB 1.7*  --   --    ALKPHO 78  --   --    AST 27  --   --    BILT 0.2  --   --      Recent Labs   Lab  01/25/24  1923 01/26/24  0520 01/27/24  1437 01/27/24  2326 01/28/24  0925 01/28/24  1855 01/29/24  0640   INR 1.01  --   --   --   --   --   --    PTT 25.9   < > 109.1* 82.0* 62.4* 89.1* 106.5*    < > = values in this interval not displayed.     Imaging:    As reviewed above    Impression & Plan:     *Acute PE and LLE DVT  -Likely provoked by multiple recent hospitalizations.    -Ideally would be anticoagulated for at least 3 months, however she is already had 2 bleeding complications over the last few months (ICH, GI bleed), and therefore she is at high risk for developing recurrent bleeding complications while on anticoagulation.   -Tolerating heparin drip over the last few days.  Will transition to Eliquis 5mg BID (skip 1 week loading dose due to concern for high bleed risk)  Eliquis likely has the lowest risk for bleeding complications.  -If she develops bleeding complications in the future would favor placement of IVC filter and cessation of anticoagulation.  -Plan for repeat baseline LLE venous Doppler at the 3-month sonny  -Recommend avoiding concurrent antiplatelet use when on anticoagulant due to higher bleeding risk.    Okay to discharge from hematology standpoint.    Harjeet Enrique MD  Hematology/Medical Oncology  [facility]

## 2024-01-29 NOTE — PROGRESS NOTES
[facility]     Hospitalist Progress Note     Mayte Yates Patient Status:  Inpatient    11/10/1945 MRN [id number]   Formerly [facility] 7NE-A Attending Mahesh Norton MD   Hosp Day # 3 PCP Dilcia Avery MD     Chief Complaint: Fall    Subjective:     No pain, no complaints     Objective:    Review of Systems:   A comprehensive review of systems was completed; pertinent positive and negatives stated in subjective.    Vital signs:  Temp:  [97.6 °F (36.4 °C)-98.3 °F (36.8 °C)] 98.3 °F (36.8 °C)  Pulse:  [83-97] 97  Resp:  [16-19] 16  BP: (113-125)/(46-60) 113/47  SpO2:  [94 %-97 %] 97 %    Physical Exam:    General: No acute distress  Respiratory: No wheezes, no rhonchi  Cardiovascular: S1, S2, regular rate and rhythm  Abdomen: Soft, Non-tender, non-distended, positive bowel sounds  Neuro: Left hemiplegia   Extremities: No edema      Diagnostic Data:    Labs:  Recent Labs   Lab 24  0628 24  0518 24  0640   WBC 11.2* 14.3* 7.7  --  6.8   HGB 11.7* 10.8* 11.5*  --  10.3*   MCV 89.5 90.9 92.5  --  92.8   .0 202.0 217.0  217.0 207.0 202.0   INR 1.01  --   --   --   --        Recent Labs   Lab 2420 24  0640   * 93 103*   BUN 14 11 9   CREATSERUM 0.67 0.53* 0.53*   CA 8.8 8.7 8.1*   ALB 1.7*  --   --     137 140   K 4.1 3.9 3.4*    104 103   CO2 33.0* 30.0 33.0*   ALKPHO 78  --   --    AST 27  --   --    BILT 0.2  --   --    TP 6.1*  --   --        Estimated Creatinine Clearance: 93.4 mL/min (A) (based on SCr of 0.53 mg/dL (L)).    Recent Labs   Lab 24   TROPHS 14       Recent Labs   Lab 24   PTP 13.3   INR 1.01                  Microbiology    No results found for this visit on 24.      Imaging: Reviewed in Epic.    Medications:    potassium chloride  40 mEq Oral Q4H    apixaban  5 mg Oral BID    atorvastatin  80 mg Per G Tube Daily    calcium carbonate  500 mg Per G Tube  Daily    DULoxetine  20 mg Per G Tube Daily    lansoprazole  30 mg Per G Tube BID AC    sucralfate  1 g Per G Tube BID AC    valproic acid  750 mg Per G Tube TID    QUEtiapine  25 mg Per G Tube BID    mirtazapine  15 mg Per G Tube Nightly       Assessment & Plan:      #Acute Right sided Pulmonary Embolus   #LLE DVT  -heparin gtt - > Eliquis  -ECHO without any RV strain   -LE dopplers + for LLE DVT     #Cerebrovascular disease with residual L sided weakness  #Fall, Prior ICH  -Repeat imaging without acute findings  -Neurology on consult, MRI brain reviewed - no further neurologic workup planned at this time   -EEG no seizure       #Leukocytosis with fall on admission  -U/A unimpressive  -resolved     #Anxiety  #Dementia  -Seroquel      #Dyslipidemia  Statin     #GERD/Gastritis  -PPI     #C.Diff  -Completed PO Vancomycin     POC:  Transitioned to Eliquis this morning sans loading dose due to bleeding risk Discharge today    Damon Zuniga DO    Supplementary Documentation:     Quality:  DVT Mechanical Prophylaxis:        DVT Pharmacologic Prophylaxis   Medication    apixaban (Eliquis) tab 5 mg                Code Status: Full Code  [place]: No urinary catheter in place      The 21st Century Cures Act makes medical notes like these available to patients in the interest of transparency. Please be advised this is a medical document. Medical documents are intended to carry relevant information, facts as evident, and the clinical opinion of the practitioner. The medical note is intended as peer to peer communication and may appear blunt or direct. It is written in medical language and may contain abbreviations or verbiage that are unfamiliar.             **Certification      PHYSICIAN Certification of Need for Inpatient Hospitalization - Initial Certification    Patient will require inpatient services that will reasonably be expected to span two midnight's based on the clinical documentation in H+P.   Based on patients  current state of illness, I anticipate that, after discharge, patient will require TBD.

## 2024-01-30 VITALS
BODY MASS INDEX: 31 KG/M2 | WEIGHT: 154 LBS | DIASTOLIC BLOOD PRESSURE: 47 MMHG | RESPIRATION RATE: 18 BRPM | OXYGEN SATURATION: 94 % | HEART RATE: 88 BPM | TEMPERATURE: 98 F | SYSTOLIC BLOOD PRESSURE: 112 MMHG

## 2024-01-30 LAB
ERYTHROCYTE [DISTWIDTH] IN BLOOD BY AUTOMATED COUNT: 17 %
GLUCOSE BLD-MCNC: 168 MG/DL (ref 70–99)
HCT VFR BLD AUTO: 33 %
HGB BLD-MCNC: 10.6 G/DL
MCH RBC QN AUTO: 30.5 PG (ref 26–34)
MCHC RBC AUTO-ENTMCNC: 32.1 G/DL (ref 31–37)
MCV RBC AUTO: 95.1 FL
PLATELET # BLD AUTO: 182 10(3)UL (ref 150–450)
RBC # BLD AUTO: 3.47 X10(6)UL
WBC # BLD AUTO: 6.4 X10(3) UL (ref 4–11)

## 2024-01-30 PROCEDURE — 99232 SBSQ HOSP IP/OBS MODERATE 35: CPT | Performed by: INTERNAL MEDICINE

## 2024-01-30 PROCEDURE — 99239 HOSP IP/OBS DSCHRG MGMT >30: CPT | Performed by: INTERNAL MEDICINE

## 2024-01-30 NOTE — CM/SW NOTE
01/29/24 1622   Discharge disposition   Expected discharge disposition [facility]   Post Acute Care Provider [facility]   Discharge transportation [facility]

## 2024-01-30 NOTE — PROGRESS NOTES
Hematology Progress Note    Patient Name: Mayte Yates  Medical Record Number: [id number]    YOB: 1945     Reason for Consultation:  Mayte Yates was seen today for the diagnosis of PE    Hematologic History:  *Acute PE + LLE DVT  -p/w left sided facial droop concerning for CVA.   -1/25/24- CTA stroke brain/neck- No large vessel occlusion is identified in the head or neck. Examination positive for acute pulmonary embolism at the distal aspect of the right interlobar pulmonary artery. CT perfusion imaging demonstrates elevated T-max within the region of probable encephalomalacia in the superior posterior right frontal lobe. There is a 4 mm pseudoaneurysm projecting medially at the distal V2 segment of the right vertebral artery.  There are a few punctate pseudoaneurysms at the distal V2 segment of the left vertebral artery.    -started UFH gtt  -1/26/24- CTA chest- Pulmonary embolus noted at the bifurcation of the pulmonary artery to the right middle lobe and right lower lobe.  Distal esophageal wall thickening which may represent esophagitis. Large hiatal hernia. Small left-sided pleural effusion with associated left lower lobe compressive atelectasis.   -1/26/24- BLE venous doppler- Positive for left DVT, with partial clot in the common femoral vein, superficial femoral vein proximally, and distally, and proximal popliteal vein, with occlusive clot in the mid superficial femoral vein, and distal popliteal vein.   -1/29/24- switched UFH gtt to eliquis 5mg BID    =============================  Interval events: Patient denies any complaints.  Currently on Eliquis without any bleeding observed.   She denies any increased dyspnea or chest pain.      Current Inpatient Medications:  Inpatient Meds:   apixaban  5 mg Oral BID    atorvastatin  80 mg Per G Tube Daily    calcium carbonate  500 mg Per G Tube Daily    DULoxetine  20 mg Per G Tube Daily    lansoprazole  30 mg Per G Tube BID AC     sucralfate  1 g Per G Tube BID AC    valproic acid  750 mg Per G Tube TID    QUEtiapine  25 mg Per G Tube BID    mirtazapine  15 mg Per G Tube Nightly     PRN Meds:  ALPRAZolam, acetaminophen, polyethylene glycol (PEG 3350), sennosides, bisacodyl, fleet enema, ondansetron, metoclopramide    Allergies:   Allergies   Allergen Reactions    Ampicillin NAUSEA ONLY     Caps    Codeine [Opioid Analgesics]      Derivatives  Syncope    Cortisone [Cortisone Acetate]      Injection into L shoulder  Syncope, stomach cramps     Vital Signs:  Height: --  Weight: --  BSA (Calculated - sq m): --  Pulse: 93 (01/30 0515)  BP: 151/61 (01/30 0515)  Temp: 97.8 °F (36.6 °C) (01/30 0515)  Do Not Use - Resp Rate: --  SpO2: 97 % (01/30 0515)    Wt Readings from Last 6 Encounters:   01/26/24 69.9 kg (154 lb)   12/27/23 65 kg (143 lb 4.8 oz)   01/25/24 64 kg (141 lb 3.2 oz)   01/18/24 63.6 kg (140 lb 3.2 oz)   01/16/24 63.6 kg (140 lb 3.2 oz)   01/13/24 62 kg (136 lb 11 oz)     Physical Examination:  General: Patient alert, + memory impairment   CV: Regular rate and rhythm, no murmurs  Respiratory: Lungs clear to auscultation bilaterally  GI/Abd: Soft, non-tender  Neurological: Chronic left-sided weakness.   Skin: no rashes or petechiae    Laboratory:  Recent Labs   Lab 01/25/24  1923 01/26/24  0520 01/27/24  0628 01/28/24  0518 01/29/24  0640 01/30/24  0617   WBC 11.2*   < > 7.7  --  6.8 6.4   HGB 11.7*   < > 11.5*  --  10.3* 10.6*   HCT 35.0   < > 35.9  --  32.2* 33.0*   .0   < > 217.0  217.0 207.0 202.0 182.0   MCV 89.5   < > 92.5  --  92.8 95.1   RDW 15.3   < > 16.3  --  16.5 17.0   NEPRELIM 6.48  --  4.38  --   --   --     < > = values in this interval not displayed.     Recent Labs   Lab 01/25/24  1923 01/26/24  0520 01/29/24  0640 01/29/24  1543    137 140  --    K 4.1 3.9 3.4* 3.9    104 103  --    CO2 33.0* 30.0 33.0*  --    BUN 14 11 9  --    CREATSERUM 0.67 0.53* 0.53*  --    * 93 103*  --    CA 8.8 8.7  8.1*  --    TP 6.1*  --   --   --    ALB 1.7*  --   --   --    ALKPHO 78  --   --   --    AST 27  --   --   --    BILT 0.2  --   --   --      Recent Labs   Lab 01/25/24  1923 01/26/24  0520 01/27/24  1437 01/27/24  2326 01/28/24  0925 01/28/24  1855 01/29/24  0640   INR 1.01  --   --   --   --   --   --    PTT 25.9   < > 109.1* 82.0* 62.4* 89.1* 106.5*    < > = values in this interval not displayed.     Imaging:    As reviewed above    Impression & Plan:     *Acute PE and LLE DVT  -Likely provoked by multiple recent hospitalizations.    -Ideally would be anticoagulated for at least 3 months, however she is already had 2 bleeding complications over the last few months (ICH, GI bleed), and therefore she is at high risk for developing recurrent bleeding complications while on anticoagulation.   -Tolerating anticoagulation during this hospitalization thus far.  Continue Eliquis 5mg BID (skipped 1 week loading dose due to concern for high bleed risk). Eliquis likely has the lowest risk for bleeding complications.  -If she develops bleeding complications in the future would favor placement of IVC filter and cessation of anticoagulation.  -Plan for repeat baseline LLE venous Doppler at the 3-month sonny  -Recommend avoiding concurrent antiplatelet use when on anticoagulant due to higher bleeding risk.    Okay to discharge from hematology standpoint.      Harjeet Enrique MD  Hematology/Medical Oncology  [facility]

## 2024-01-30 NOTE — PAYOR COMM NOTE
ASKING FOR RECONSIDERATION OF INPT  1/26-1/29  ADMISSION REVIEW     Payor: [employer]  Subscriber #:  [id number]  Authorization Number: [id number]    Admit date: 1/26/24  Admit time:  2:36 AM       REVIEW DOCUMENTATION:     ED Provider Notes        ED Provider Notes signed by Luis Angel Luevano MD at 1/25/2024 11:51 PM       Author: Luis Angel Luevano MD Service: -- Author Type: Physician    Filed: 1/25/2024 11:51 PM Date of Service: 1/25/2024 10:51 PM Status: Addendum    : Luis Angel Luevano MD (Physician)    Related Notes: Original Note by Luis Angel Luevano MD (Physician) filed at 1/25/2024 10:57 PM           Patient Seen in: [facility] Emergency Department      History     Chief Complaint   Patient presents with    Stroke     Stated Complaint: stroke    Subjective:   HPI    Patient is a 78-year-old female with multiple chronic medical problems.  Patient has a history of dementia with no identified POA.  Patient is nursing home bound.  She had fallen out of bed.  She was seen in the ER earlier today.  Had a CT of her head and cervical spine which were read as negative.  Per report when she went back to the nursing home she was noted to have a left-sided facial droop.  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  This was back in October that led to craniotomy.  Patient also has a history of bleeding secondary to erosive gastritis/esophagitis.  She is not on blood thinners.  She is a poor historian.  Medics brought her to the hospital.  Says her speech is similar to when they saw her earlier in the day.  Does have a questionable new facial droop.  Unclear onset.    Objective:   Past Medical History:   Diagnosis Date    Anxiety     Breast CA (HCC) 2004    C. difficile diarrhea     CANCER     lft breast lumpectomy pre cancerous cells stage 0    Diabetes mellitus (HCC)     Diarrhea, unspecified     Ductal carcinoma in situ of breast 2004    left breast     Flatulence/gas pain/belching     GERD     H/O infectious mononucleosis     diagnosed in 1960s    High cholesterol     Hypertension     IBS (irritable bowel syndrome)     Osteoporosis     Other and unspecified hyperlipidemia     Personal history of irradiation, presenting hazards to health 2004    mammosite    Sleep disturbance     Stool incontinence     Stroke (HCC)     Type II or unspecified type diabetes mellitus without mention of complication, not stated as uncontrolled     Wears glasses               Past Surgical History:   Procedure Laterality Date    APPENDECTOMY          COLONOSCOPY       hiatal hernia, sm internal hem    COLONOSCOPY  2005    fiberoptic    D & C      IR ANGIOGRAM CEREBRAL CAROTID BILATERAL  10/12/2023    LUMPECTOMY LEFT  2004    breast    OTHER SURGICAL HISTORY      lumpectomy  lft breast stage 0    OTHER SURGICAL HISTORY  10/11/2023    CRANIOTOMY FOR RIGHT FRONTAL HEMORRHAGE    RADIATION LEFT      Mammosite    TONSILLECTOMY          UPPER GI ENDOSCOPY,EXAM      2005                Social History     Socioeconomic History    Marital status:    Tobacco Use    Smoking status: Former     Packs/day: 1.00     Years: 20.00     Additional pack years: 0.00     Total pack years: 20.00     Types: Cigarettes     Quit date: 3/1/2006     Years since quittin.9    Smokeless tobacco: Never   Vaping Use    Vaping Use: Never used   Substance and Sexual Activity    Alcohol use: No     Alcohol/week: 0.0 standard drinks of alcohol    Drug use: No   Other Topics Concern    Caffeine Concern No     Comment: tries to drink decaf drinks    Exercise Yes     Comment: jazzercise 3x/wk, tennis 1-2x/wk     Social Determinants of Health     Food Insecurity: No Food Insecurity (2023)    Food Insecurity     Food Insecurity: Never true   Transportation Needs: No Transportation Needs (2023)    Transportation Needs     Lack of Transportation: No   Housing  Stability: Low Risk  (12/6/2023)    Housing Stability     Housing Instability: No     Housing Instability Emergency: No              Review of Systems    Positive for stated complaint: stroke  Other systems are as noted in HPI.  Constitutional and vital signs reviewed.      All other systems reviewed and negative except as noted above.    Physical Exam     ED Triage Vitals   BP 01/25/24 1930 117/61   Pulse 01/25/24 1930 109   Resp 01/25/24 1930 24   Temp 01/25/24 1953 97.1 °F (36.2 °C)   Temp src 01/25/24 1953 Temporal   SpO2 01/25/24 1930 94 %   O2 Device 01/25/24 1918 None (Room air)       Current:/70   Pulse 105   Temp 97.1 °F (36.2 °C) (Temporal)   Resp 17   Wt 70 kg   SpO2 95%   BMI 31.17 kg/m²         Physical Exam    General: Patient is elderly, demented though pleasant 78-year-old female  HEENT: Normal cephalic atraumatic.  Nonicteric sclera.  Moist mucous membranes.  No meningismus.  No adenopathy  Lungs: No tachypnea.  Lungs clear to auscultation bilaterally without rales/rhonchi.  Equal breath sounds bilaterally  Cardiac: No tachycardia.  No murmurs.  Regular rate and rhythm.  Abdomen: Soft and nontender throughout.  No rebound or guarding  Extremities: No clubbing/cyanosis/edema.  Skin: No rashes, no pallor  Neuro: Left-sided weakness reported is chronic.  Does have a left-sided facial droop unknown onset.  Dementia.  ED Course     Labs Reviewed   COMP METABOLIC PANEL (14) - Abnormal; Notable for the following components:       Result Value    Glucose 114 (*)     CO2 33.0 (*)     Total Protein 6.1 (*)     Albumin 1.7 (*)     A/G Ratio 0.4 (*)     All other components within normal limits   POCT GLUCOSE - Abnormal; Notable for the following components:    POC Glucose 107 (*)     All other components within normal limits   CBC W/ DIFFERENTIAL - Abnormal; Notable for the following components:    WBC 11.2 (*)     HGB 11.7 (*)     Monocyte Absolute 1.19 (*)     All other components within normal  limits   PROTHROMBIN TIME (PT) - Normal   PTT, ACTIVATED - Normal   TROPONIN I HIGH SENSITIVITY - Normal   CBC WITH DIFFERENTIAL WITH PLATELET    Narrative:     The following orders were created for panel order CBC With Differential With Platelet.  Procedure                               Abnormality         Status                     ---------                               -----------         ------                     CBC W/ DIFFERENTIAL[[id number]]          Abnormal            Final result                 Please view results for these tests on the individual orders.     EKG    Rate, intervals and axes as noted on EKG Report.  Rate: 107  Rhythm: Sinus Rhythm  Reading: Sinus tachycardia.  Poor R wave progression.  Nonspecific ST-T wave changes.  Axis/intervals noted.  Otherwise, agree with EKG report           TNK/ NI Documentation:    Date/Time last known well:   N/A    NIHSS on presentation: N/A     Chief Complaint   Patient presents with    Stroke     IV Tenecteplase (TNK) administered: No; Patient is not a Candidate for IV TNK due to: History of Intracranial hemorrhage    Candidate for Endovascular thrombectomy (EVT): No; Patient is not a candidate for Endovascular Thrombectomy due to: No large vessel occlusion ( LVO)  on CTA/MRA imaging      Disposition: Admit      Chest x-ray: I personally reviewed the films and my independent interpertaion showed atelectasis.  Official report shows patchy left basilar retrocardiac opacity unchanged to previous.  No pneumothorax.  [facility]        Admission disposition: 1/25/2024  9:23 PM         Patient presents as a code stroke from [place].  By talking to the medics and the patient is unclear exactly if this is new and when it started.  Discussed with neurology.  Patient would not be a tenecteplase candidate given her history of intercerebral hemorrhage as well as unclear onset.  Also relatively minor new symptoms which just left facial droop.  CT CTA was performed.  Head CT: I  personally reviewed the films and my independent interpertaion showed no intercranial hemorrhage.  Official report reviewed  CTA head and neck: No large vessel occlusion.  Positive for acute pulmonary embolism at the distal aspect of the right interlobar pulmonary artery.  No intracranial large vessel occlusion.  4 mm pseudoaneurysm.  Results discussed with radiology as well as neurology.   Blood work reviewed.  Troponin negative.  White count 11.2.  Hemoglobin 11.7  Patient presents for concerns of a possible ischemic stroke.  By report has a new left-sided facial droop of unclear timeframe.  Has known left-sided weakness from previous intracranial hemorrhage that led to a craniotomy back in March.  Patient CT however demonstrated a new pulmonary embolism.  Patient has a history of GI bleed.  Rectal exam was performed which is negative.  Intracranial hemorrhage was in October.  Discussed with neurology who is okay with heparin from a neurology standpoint.  Discussed with the [facility].  Patient was started on heparin for her PE.  She will need close observation.  She develops any bleeding need to stop the heparin and consider filter.  Will admit to the CTU.    Chest x-ray with patchy infiltrate though this is unchanged from baseline.  No cough.  Will hold antibiotics.  History of C. difficile.  Patient was given 1 dose of Zyprexa for agitation here in the ER.  Had not taken her medications.                           Medical Decision Making      Disposition and Plan     Clinical Impression:  1. Acute CVA (cerebrovascular accident) (HCC)    2. Other pulmonary embolism without acute cor pulmonale, unspecified chronicity (HCC)         Disposition:  Admit  1/25/2024  9:23 pm    Follow-up:  No follow-up provider specified.        Medications Prescribed:  Current Discharge Medication List                            Hospital Problems       Present on Admission  Date Reviewed: 12/11/2023            ICD-10-CM Noted  POA    * (Principal) Acute CVA (cerebrovascular accident) (HCC) I63.9 2024 Unknown    Anemia D64.9 2024 Yes    Azotemia R79.89 2024 Yes    Moderate dementia (HCC) F03.B0 2024 Unknown    Pulmonary embolus (HCC) I26.99 2024 Unknown                     Signed by Luis Angel Luevano MD on 2024 11:51 PM            H&P - H&P Note        H&P signed by Eron King MD at 2024  9:36 PM       Author: Eron King MD Service: -- Author Type: Physician    Filed: 2024  9:36 PM Date of Service: 2024  9:21 PM Status: Signed    : Eron King MD (Physician)           [facility]     Mayte Yates Patient Status:  Emergency    11/10/1945 MRN [id number]   Location [facility] EMERGENCY DEPARTMENT Attending Luis Angel Luevano MD   Hosp Day # 0 PCP Dilcia Avery MD     Chief Complaint: fall    Subjective:    History of Present Illness:     Mayte Yates is a 78 year old female with PMhx of anxiety, breast cancer, HTN, DL, dementia presents with fall. Pt was seen earlier in the ED today and had negative imaging and was sent back to her facility. Pt was noted to have worsening L side facial droop so she was brought back in. Pt does have chronic L sided weakness from previous ICH. She is alert to only self given her dementia. Overall she is a poor historian. She currently denies any complaints. She denies any CP or SOB. No F/C. No cough. No N/V/D/C or abd pain. In ED she was scanned again given facial droop and found to have PE. She does have remote history of GI bleed and gastritis. In ED she was heme negative. Pt was started on heparin gtt.       History/Other:    Past Medical History:  Past Medical History:   Diagnosis Date    Anxiety     Breast CA (HCC)     C. difficile diarrhea     CANCER     lft breast lumpectomy pre cancerous cells stage 0    Diabetes mellitus (HCC)     Diarrhea, unspecified     Ductal carcinoma in situ of  breast 2004    left breast    Flatulence/gas pain/belching     GERD     H/O infectious mononucleosis     diagnosed in 1960s    High cholesterol     Hypertension     IBS (irritable bowel syndrome)     Osteoporosis     Other and unspecified hyperlipidemia     Personal history of irradiation, presenting hazards to health 2004    mammosite    Sleep disturbance     Stool incontinence     Stroke (HCC)     Type II or unspecified type diabetes mellitus without mention of complication, not stated as uncontrolled     Wears glasses      Past Surgical History:   Past Surgical History:   Procedure Laterality Date    APPENDECTOMY      194    COLONOSCOPY       hiatal hernia, sm internal hem    COLONOSCOPY  2005    fiberoptic    D & C      IR ANGIOGRAM CEREBRAL CAROTID BILATERAL  10/12/2023    LUMPECTOMY LEFT  2004    breast    OTHER SURGICAL HISTORY      lumpectomy  lft breast stage 0    OTHER SURGICAL HISTORY  10/11/2023    CRANIOTOMY FOR RIGHT FRONTAL HEMORRHAGE    RADIATION LEFT      Mammosite    TONSILLECTOMY          UPPER GI ENDOSCOPY,EXAM      2005      Family History:   Family History   Problem Relation Age of Onset    Heart Disorder Father          of heart attack age 57    Alcohol and Other Disorders Associated Father     Arthritis Father     Other (Other) Father     Heart Disease Mother         CHF age 80    Arthritis Mother     Other (Other) Mother     Diabetes Sister         mellitus    Heart Disease Maternal Grandfather     Cancer Maternal Grandmother         brain tumor    Breast Cancer Self      Social History:    reports that she quit smoking about 17 years ago. Her smoking use included cigarettes. She has a 20 pack-year smoking history. She has never used smokeless tobacco. She reports that she does not drink alcohol and does not use drugs.     Allergies:   Allergies   Allergen Reactions    Ampicillin NAUSEA ONLY     Caps    Codeine [Opioid Analgesics]      Derivatives   Syncope    Cortisone [Cortisone Acetate]      Injection into L shoulder  Syncope, stomach cramps       Medications:    Current Facility-Administered Medications on File Prior to Encounter   Medication Dose Route Frequency Provider Last Rate Last Admin    [COMPLETED] acetaminophen (Tylenol Extra Strength) tab 1,000 mg  1,000 mg Oral Once Sukhi Holland MD   1,000 mg at 24 1236    [COMPLETED] magnesium oxide (Mag-Ox) tab 400 mg  400 mg Oral Once Sue Heredia DO   400 mg at 23 0821    [COMPLETED] potassium chloride (K-Dur) tab 40 mEq  40 mEq Oral Once Sue Heredia DO   40 mEq at 23 0842    [] sodium chloride 0.9% infusion   Intravenous Continuous Blanca Corley MD        [] lactated ringers infusion   Intravenous Continuous Mahesh Norton MD   Stopped at 23 1251    [COMPLETED] potassium chloride (Klor-Con) 20 MEQ oral powder 40 mEq  40 mEq Oral Once Dilcia Avery MD   40 mEq at 23 0914    [COMPLETED] potassium chloride (K-Dur) tab 40 mEq  40 mEq Oral Once Blanca Corley MD   40 mEq at 23 1708    [COMPLETED] sodium chloride 0.9 % IV bolus 500 mL  500 mL Intravenous Once Blanca Corley MD   Stopped at 23 2219    [COMPLETED] acetaminophen (Tylenol) 160 MG/5ML oral liquid 1,024 mg  15 mg/kg Oral Once Blanca Corley MD   1,024 mg at 23 2115    [COMPLETED] sodium chloride 0.9% infusion 1,000 mL  1,000 mL Intravenous Once Blanca Corley MD   Stopped at 23 0203    [COMPLETED] potassium chloride (Klor-Con) 20 MEQ oral powder 40 mEq  40 mEq Oral Q4H Harjeet Babin MD   40 mEq at 23 0520    [COMPLETED] sodium chloride 0.9% infusion   Intravenous Once Ap Ramos DO 10 mL/hr at 23 0115 New Bag at 23 0115    [COMPLETED] sodium chloride 0.9% infusion 1,000 mL  1,000 mL Intravenous Once Lloyd Chacko MD   Stopped at 23 1505    [COMPLETED] pantoprazole (Protonix) 40 mg in sodium chloride 0.9% PF 10 mL IV push  40  mg Intravenous Once Lloyd Chacko MD   40 mg at 23 1340    [COMPLETED] iopamidol 76% (ISOVUE-370) injection for power injector  80 mL Intravenous ONCE PRN Lloyd Chacko MD   80 mL at 23 1458    [COMPLETED] cefTRIAXone (Rocephin) 1 g in D5W 100 mL IVPB-ADD  1 g Intravenous Once Lloyd Chacko MD   Stopped at 23 1718    [COMPLETED] azithromycin (Zithromax) 500 mg in sodium chloride 0.9% 250mL IVPB premix  500 mg Intravenous Once Lloyd Chacko MD   Stopped at 23 1823    [] sodium chloride 0.9% infusion   Intravenous Continuous Harjeet Babin MD   Stopped at 23 2200    [COMPLETED] azithromycin (Zithromax) 500 mg in sodium chloride 0.9% 250mL IVPB premix  500 mg Intravenous Q24H Harjeet Babin MD   Stopped at 23 1810    [COMPLETED] iopamidol 76% (ISOVUE-370) injection for power injector  75 mL Intravenous ONCE PRN Kari Weinberg MD   75 mL at 23 1512     Current Outpatient Medications on File Prior to Encounter   Medication Sig Dispense Refill    mirtazapine 15 MG Oral Tab Take 1 tablet (15 mg total) by mouth nightly.      vancomycin 50 mg/ml Oral Solution Take 2.5 mL (125 mg total) by mouth every 6 (six) hours. 4 x daily X 10 days      DULoxetine 20 MG Oral Cap DR Particles Take 1 capsule (20 mg total) by mouth daily.      QUEtiapine 25 MG Oral Tab Take 0.5 tablets (12.5 mg total) by mouth nightly.      [] First-Lansoprazole 3 MG/ML Oral Suspension Take 30 mg by mouth 2 (two) times daily before meals. 300 mL 2    [] vancomycin 50 mg/mL Oral Recon Soln 2.5 mL (125 mg total) by Per G Tube route every 6 (six) hours for 13 days. 130 mL 0    sucralfate 1 GM/10ML Oral Suspension Take 10 mL (1 g total) by mouth in the morning and 10 mL (1 g total) before bedtime. GI recommended Carafate by mouth until seen 23.  Give at 6 am and 4 pm .      omeprazole 2 mg/mL Oral Suspension 20 mL (40 mg total) by Per G Tube route 2 (two) times daily before  meals. 3600 mL 0    ALPRAZolam 0.25 MG Oral Tab Take 1 tablet (0.25 mg total) by mouth nightly as needed.      Nutritional Supplements (OSMOLITE 1.5 ALYX OR) by Peg Tube route daily.      acetaminophen 325 MG Rectal Suppos Place 1 suppository (325 mg total) rectally every 4 (four) hours as needed for Fever.      mupirocin 2 % External Ointment Apply 1 Application topically 2 (two) times daily. Around G-tube site      potassium chloride 20 MEQ Oral Powd Pack Take 20 mEq by mouth daily.      valproic acid 250 MG/5ML Oral Solution Take 15 mL (750 mg total) by mouth 3 (three) times daily. 300 mL 0    alendronate 70 MG Oral Tab Take 1 tablet (70 mg total) by mouth once a week. (Patient taking differently: Take 75 mg by mouth once a week. Every Sunday) 12 tablet 3    atorvastatin 80 MG Oral Tab Take 1 tablet (80 mg total) by mouth daily. Replaces 40 mg (Patient taking differently: 1 tablet (80 mg total) by Per G Tube route daily. Replaces 40 mg) 90 tablet 1    ACCU-CHEK FASTCLIX LANCETS Does not apply Misc USE 1 LANCET TO STICK FINGER DAILY TO TEST BLOOD. 102 each 1    Calcium Carbonate Antacid (TUMS) 500 MG Oral Chew Tab 1 tablet (500 mg total) by Per G Tube route daily.         Review of Systems:   A comprehensive review of systems was completed.    Pertinent positives and negatives noted in the HPI.    Objective:   Physical Exam:    /65   Pulse 103   Temp 97.1 °F (36.2 °C) (Temporal)   Resp 19   Wt 154 lb 5.2 oz (70 kg)   SpO2 96%   BMI 31.17 kg/m²   General: No acute distress, Alert to self  Respiratory: No rhonchi, no wheezes  Cardiovascular: tachycardia  Abdomen: Soft, Non-tender, non-distended, positive bowel sounds, + G tube  Neuro: L sided weakness, L facial droop  Extremities: No edema      Results:    Labs:      Labs Last 24 Hours:    Recent Labs   Lab 01/25/24 1923   RBC 3.91   HGB 11.7*   HCT 35.0   MCV 89.5   MCH 29.9   MCHC 33.4   RDW 15.3   NEPRELIM 6.48   WBC 11.2*   .0       Recent  Labs   Lab 01/25/24 1923   *   BUN 14   CREATSERUM 0.67   EGFRCR 88   CA 8.8   ALB 1.7*      K 4.1      CO2 33.0*   ALKPHO 78   AST 27   BILT 0.2   TP 6.1*       Lab Results   Component Value Date    INR 1.01 01/25/2024    INR 1.06 11/20/2023    INR 1.30 (H) 10/16/2023       Recent Labs   Lab 01/25/24 1923   TROPHS 14       No results for input(s): \"TROP\", \"PBNP\" in the last 168 hours.    No results for input(s): \"PCT\" in the last 168 hours.    Imaging: Imaging data reviewed in Epic.    Assessment & Plan:      #Acute Pulm Emboli  Continue heparin gtt  Check ECHO in AM  Heme to eval  Check LE dopplers  May need IVF if LE dopplers positive given frequent falls and hx of ICH  Monitor on tele    #Prior CVA with L sided weakness  #Prior ICF  Repeat imaging without acute findings  Neurology to eval  Check MRI brain    #Anxiety  #Dementia  Conntinue ativan PRN, seroquel at night    #Dyslipidemia  Statin    #GERD/Gastritis  PPI  Monitor Hgb and for any bleeding given heparin gtt  Hemoccult negative in ED    #Recent C.Diff  Completed abx course          Plan of care discussed with patient, ED Physician     Eron King MD      Electronically signed by Eron King MD on 1/25/2024  9:36 PM         1/26HEMA/ONC CONSULT NOTE      Reason for Consultation:  Mayte Yates was seen today for the diagnosis of PE     Hematologic History:  *Acute PE + LLE DVT  -p/w left sided facial droop concerning for CVA.   -1/25/24- CTA stroke brain/neck- No large vessel occlusion is identified in the head or neck. Examination positive for acute pulmonary embolism at the distal aspect of the right interlobar pulmonary artery. CT perfusion imaging demonstrates elevated T-max within the region of probable encephalomalacia in the superior posterior right frontal lobe. There is a 4 mm pseudoaneurysm projecting medially at the distal V2 segment of the right vertebral artery.  There are a few punctate pseudoaneurysms  at the distal V2 segment of the left vertebral artery.                 -started UFH gtt  -1/26/24- CTA chest- Pulmonary embolus noted at the bifurcation of the pulmonary artery to the right middle lobe and right lower lobe.  Distal esophageal wall thickening which may represent esophagitis. Large hiatal hernia. Small left-sided pleural effusion with associated left lower lobe compressive atelectasis.   -1/26/24- BLE venous doppler- Positive for left DVT, with partial clot in the common femoral vein, superficial femoral vein proximally, and distally, and proximal popliteal vein, with occlusive clot in the mid superficial femoral vein, and distal popliteal vein.      =============================  History of Present Illness: 78-year-old female with history of dementia, prior ICH with residual left-sided weakness, and prior remote GI bleed presented from her facility with reported worsening left-sided facial droop.  Imaging to evaluate for stroke incidentally revealed pulmonary embolism.  She was started on heparin drip.  Further dedicated imaging confirmed pulmonary embolism and extensive left leg DVT.     Patient is a poor historian with baseline due to underlying dementia.  She is oriented to self and knows that she is in the hospital but otherwise is unable to provide details on her situation.  She denies any dyspnea or chest pain.  No bleeding has been noted since she was started on heparin drip.     Of note she has been hospitalized every month for the last 4 months.  10/2023 she had developed ICH s/p craniotomy while on asa 81mg daily. 11/2023 developed severe GI bleed with ulcerative esophagitis, erosive duodenitis, and ulceration underneath the G-tube bumper.  12/2023 was hospitalized with C. difficile diarrhea and delirium.       Vital Signs:  Height: --  Weight: 69.9 kg (154 lb) (01/26 0313)  BSA (Calculated - sq m): --  Pulse: 101 (01/26 0850)  BP: 105/62 (01/26 0850)  Temp: 98.1 °F (36.7 °C) (01/26  0800)  Do Not Use - Resp Rate: --  SpO2: 94 % (01/26 0850)      Physical Examination:  General: Patient wakes to voice and answer some simple questions.  Oriented to self and knows she is in the hospital but otherwise is not oriented to situation or year.   Psych: Significant memory impairment  Eyes: EOMI  ENT: Oropharynx is clear  CV: Regular rate and rhythm, no murmurs  Respiratory: Lungs clear to auscultation bilaterally  GI/Abd: Soft, non-tender with normoactive bowel sounds, no hepatosplenomegaly  Neurological: Chronic left-sided weakness.  Moves right side though patient not able to participate with exam well.     Lymphatics: No palpable lymphadenopathy  Skin: no rashes or petechiae     Laboratory:       Recent Labs   Lab 01/25/24 1923 01/26/24  0520   WBC 11.2* 14.3*   HGB 11.7* 10.8*   HCT 35.0 32.9*   .0 202.0   MCV 89.5 90.9   RDW 15.3 16.0   NEPRELIM 6.48  --            Recent Labs   Lab 01/25/24 1923 01/26/24  0520    137   K 4.1 3.9    104   CO2 33.0* 30.0   BUN 14 11   CREATSERUM 0.67 0.53*   * 93   CA 8.8 8.7   TP 6.1*  --    ALB 1.7*  --    ALKPHO 78  --    AST 27  --    BILT 0.2  --        Impression & Plan:      *Acute PE and LLE DVT  -Patient has had multiple recent hospitalizations.  In fact she has now been hospitalized every month for the last 4 months.  No other obvious provoking factors.  -Started on heparin drip.  Ideally would be anticoagulated for at least 3 months, however she is already had 2 bleeding complications over the last few months (ICH, GI bleed), and therefore she is likely at high risk for developing recurrent bleeding complications while on anticoagulation.   -If she continues to tolerate heparin drip would eventually switch to Eliquis which likely has the lowest risk for bleeding complications.  -If she develops bleeding complications would favor placement of IVC filter and cessation of anticoagulation.  -Plan for repeat baseline LLE venous  Doppler at the 3-month sonny     *Hypoalbuminemia  -Degree of hypoalbuminemia is quite significant.  Will check urine protein/creatinine ratio to ensure that she does not have nephrotic range proteinuria which could provoke thrombosis.    1/26 HOSPITALIST NOTE    Vital signs:  Temp:  [97.1 °F (36.2 °C)-98.5 °F (36.9 °C)] 98.4 °F (36.9 °C)  Pulse:  [] 97  Resp:  [15-24] 18  BP: (102-138)/(54-87) 131/78  SpO2:  [94 %-100 %] 95 %       Current Inpatient Medications:  Inpatient Meds:   atorvastatin  80 mg Per G Tube Daily    calcium carbonate  500 mg Per G Tube Daily    DULoxetine  20 mg Per G Tube Daily    lansoprazole  30 mg Per G Tube BID AC    sucralfate  1 g Per G Tube BID AC    valproic acid  750 mg Per G Tube TID    QUEtiapine  25 mg Per G Tube BID    mirtazapine  15 mg Per G Tube Nightly    vancomycin  125 mg Per G Tube Q6H       continuous dose heparin 1,100 Units/hr (01/26/24 7477)            Assessment & Plan:   #Acute Right sided Pulmonary Embolus   -heparin gtt  -ECHO   -Hematology on consult  -LE dopplers  -Monitor on tele     #Cerebrovascular disease with residual L sided weakness  #Fall, Prior ICH  -Repeat imaging without acute findings  -Neurology on consult, MRI brain is pending  -EEG no seizure       #Leukocytosis with fall on admission  -check U/A     #Anxiety  #Dementia  -Seroquel      #Dyslipidemia  Statin     #GERD/Gastritis  -PPI     #C.Diff  -continue PO Vancomycin to complete course      1/26 NEUROLOGY CONSULT NOTE    History of Presenting Illness  78 year old female with history of anxiety, breast cancer, hypertension, hyperlipidemia, dementia presented with a history of fall.  Earlier on the day of admission patient was seen in the emergency department, had imaging studies done was sent back to the facility.  Patient was later noted to have worsening left facial droop and was brought to the hospital.  Patient has residual left-sided weakness due to previous stroke, hemorrhagic.  Patient  when seen was awake, in mild distress complaining of back pain, able to tell me where she was.  Denied any new weakness, numbness, paresthesias or history of strokes.     History obtained from patient, chart review.       NEUROLOGIC:    Mental Status: Patient awake and responsive.  Able to tell me that she is in [place].  She was also able to tell me the month and the year.  She was able to follow two-step commands.  Mild dysarthria was noted.  No aphasia.    Cranial nerves: PERRL.  Visual fields full.  EOMI. Mild left nasolabial fold flattening.  Hearing grossly intact. Tongue midline with normal movements.   Motor: Patient has residual left-sided weakness from previous stroke including spasticity and decreased movement as well as hyperreflexia.  Normal right sided bulk, tone and power was noted.    Sensation: Intact to light touch bilaterally  Cerebellar: Normal Finger-To-Nose test        ASSESSMENT/PLAN   78 year old female with:     With history of recent fall and concern for stroke.  Based on most of the historical information seems less likely that this was a stroke.  An MRI and EEG has been requested for further evaluation.  Will review the results.  CT scan of the head as well as CTA did not show any acute abnormality.  Chronic ischemic changes as well as small chronic infarcts were noted.  Patient currently on aspirin and statin for stroke prophylaxis.  Acute pulmonary embolism.  Patient started on IV heparin.  Further workup as per patient's primary team's recommendations.  Residual effect of previous stroke.  Patient has left hemiparesis secondary to previous stroke.  CT evidence of encephalomalacia most likely due to previous stroke.  Due to this finding patient is more prone to have seizure-like activity.  Will check EEG for further evaluation.     Principal Problem:    Acute CVA (cerebrovascular accident) (HCC)  Active Problems:    Anemia    Azotemia    Pulmonary embolus (HCC)    Moderate dementia  (HCC)    Other pulmonary embolism without acute cor pulmonale, unspecified chronicity (HCC)    Acute deep vein thrombosis (DVT) of proximal end of left lower extremity (HCC)    Hypoalbuminemia      1/27 HEMA/ONC NOTE    Interval events:       - agitated, yelling at staff  - she denies any pain to me  - hgb stable 11.5     Inpatient Meds:   atorvastatin  80 mg Per G Tube Daily    calcium carbonate  500 mg Per G Tube Daily    DULoxetine  20 mg Per G Tube Daily    lansoprazole  30 mg Per G Tube BID AC    sucralfate  1 g Per G Tube BID AC    valproic acid  750 mg Per G Tube TID    QUEtiapine  25 mg Per G Tube BID    mirtazapine  15 mg Per G Tube Nightly    vancomycin  125 mg Per G Tube Q6H       continuous dose heparin 700 Units/hr (01/27/24 0214)     Pulse: 98 (01/27 0800)  BP: 115/69 (01/27 0800)  Temp: 97.6 °F (36.4 °C) (01/27 0800)  Do Not Use - Resp Rate: --  SpO2: 96 % (01/27 0800)    Recent Labs   Lab 01/25/24 1923 01/26/24  0520 01/27/24  0628   WBC 11.2* 14.3* 7.7   HGB 11.7* 10.8* 11.5*   HCT 35.0 32.9* 35.9   .0 202.0 217.0  217.0   MCV 89.5 90.9 92.5   RDW 15.3 16.0 16.3   NEPRELIM 6.48  --  4.38                Recent Labs   Lab 01/25/24 1923 01/26/24  0520 01/26/24  1448 01/26/24  2242 01/27/24  0628   INR 1.01  --   --   --   --    PTT 25.9   < > >240.0* 238.2* 124.7*       Impression & Plan:      PE/DVT  - provoked by hospitalizations  - no bleeding issues currently on heparin gtt, although she has had prior ICH and GIB when she was not anticoagulated previously  - continue heparin gtt for now; if tolerating, can switch to apixaban  - if she bleeds, would need to stop AC and place IVC filter     Anemia  - check iron studies, vit b12, folic acid, replete if low    1/27 HOSPITALIST NOTE    Vital signs:  Temp:  [97.6 °F (36.4 °C)-98.4 °F (36.9 °C)] 97.9 °F (36.6 °C)  Pulse:  [] 102  Resp:  [18-20] 18  BP: (101-120)/(48-69) 113/50  SpO2:  [94 %-98 %] 98 %     Assessment & Plan:   #Acute Right  sided Pulmonary Embolus   #LLE DVT  -heparin gtt - > transition to DOAC per heme  -ECHO without any RV strain   -LE dopplers + for LLE DVT     #Cerebrovascular disease with residual L sided weakness  #Fall, Prior ICH  -Repeat imaging without acute findings  -Neurology on consult, MRI brain reviewed - defer any further workup to neurology,   -EEG no seizure       #Leukocytosis with fall on admission  -U/A unimpressive  -resolved     #Anxiety  #Dementia  -Seroquel      #Dyslipidemia  Statin     #GERD/Gastritis  -PPI     #C.Diff  -continue PO Vancomycin to complete course     1/27 NEUROLOGY NOTE  History of Presenting Illness:  Patient seen for follow-up.  Awake, responsive, slightly dysarthric.  No new weakness, numbness, paresthesias.  No seizure-like activity or loss of consciousness.  Patient initially felt more drowsy however she is perking up.     Vitals:       Vitals:     01/27/24 1330   BP: 113/50   Pulse: 102   Resp: 18   Temp: 97.9 °F (36.6 °C)            Examination:     Awake , dysarthric, expressive an receptive language normal.   Pupils round and reactive to light, extra ocular movement normal, right facial weakness, hearing normal.  Motor strength and reflexes symmetrical.  Finger to Nose testing normal.      Investigations:     MRI BRAIN (CPT=70551)     Result Date: 1/26/2024  CONCLUSION:   Marked volume loss with adjacent craniotomy in the right frontal lobe is noted.  Sequelae of chronic hemorrhagic infarct is noted.  Restricted diffusion centrally is noted in this region.  This is likely related to chronic hemorrhagic products in this region.  If there is concern for infection a follow-up postcontrast MRI brain exam may be done.  This region restricted diffusion is unlikely to be related to acute infarct since it does not appear to involve any viable brain parenchyma.        Impression/MDM.     Stroke.  No evidence of new stroke on the MRI.  Patient to continue stroke prophylaxis with aspirin and  statin.  Encephalopathy.  Improving possible related to hypoxic/metabolic etiology.  Concerns of seizure.  No significant epileptiform activity noted on the EEG.  Patient is already on valproic acid 750 mg daily.  No further neuro intervention recommended at this time.       1/28 HEMA/ONC NOTE    Interval events:       - sleeping  - aPTT now therapeutic at 82  - platelet count stable at 207     Inpatient Meds:   atorvastatin  80 mg Per G Tube Daily    calcium carbonate  500 mg Per G Tube Daily    DULoxetine  20 mg Per G Tube Daily    lansoprazole  30 mg Per G Tube BID AC    sucralfate  1 g Per G Tube BID AC    valproic acid  750 mg Per G Tube TID    QUEtiapine  25 mg Per G Tube BID    mirtazapine  15 mg Per G Tube Nightly       continuous dose heparin 500 Units/hr (01/27/24 1626)     Pulse: 94 (01/28 0800)  BP: 122/51 (01/28 0800)  Temp: 97.8 °F (36.6 °C) (01/28 0800)  Do Not Use - Resp Rate: --  SpO2: 94 % (01/28 0800)    Recent Labs   Lab 01/25/24  1923 01/26/24  0520 01/27/24  0628 01/28/24  0518   WBC 11.2* 14.3* 7.7  --    HGB 11.7* 10.8* 11.5*  --    HCT 35.0 32.9* 35.9  --    .0 202.0 217.0  217.0 207.0   MCV 89.5 90.9 92.5  --    RDW 15.3 16.0 16.3  --    NEPRELIM 6.48  --  4.38  --           Impression & Plan:      PE/DVT  - provoked by hospitalizations  - no bleeding issues currently on heparin gtt, although she has had prior ICH and GIB when she was not anticoagulated previously  - continue heparin gtt for now; if tolerating, can switch to apixaban  - if she bleeds, would need to stop AC and place IVC filter     Anemia  - iron studies, vit B12 and folic acid levels normal    1/28 HOSPITALIST NOTE    Assessment & Plan:   #Acute Right sided Pulmonary Embolus   #LLE DVT  -heparin gtt - > transition to DOAC per heme  -ECHO without any RV strain   -LE dopplers + for LLE DVT     #Cerebrovascular disease with residual L sided weakness  #Fall, Prior ICH  -Repeat imaging without acute findings  -Neurology  on consult, MRI brain reviewed - no further neurologic workup planned at this time   -EEG no seizure       #Leukocytosis with fall on admission  -U/A unimpressive  -resolved     #Anxiety  #Dementia  -Seroquel      #Dyslipidemia  Statin     #GERD/Gastritis  -PPI     #C.Diff  -Completed PO Vancomycin      1/29 HEMA/ONC NOTE  Hematologic History:  *Acute PE + LLE DVT  -p/w left sided facial droop concerning for CVA.   -1/25/24- CTA stroke brain/neck- No large vessel occlusion is identified in the head or neck. Examination positive for acute pulmonary embolism at the distal aspect of the right interlobar pulmonary artery. CT perfusion imaging demonstrates elevated T-max within the region of probable encephalomalacia in the superior posterior right frontal lobe. There is a 4 mm pseudoaneurysm projecting medially at the distal V2 segment of the right vertebral artery.  There are a few punctate pseudoaneurysms at the distal V2 segment of the left vertebral artery.                 -started UFH gtt  -1/26/24- CTA chest- Pulmonary embolus noted at the bifurcation of the pulmonary artery to the right middle lobe and right lower lobe.  Distal esophageal wall thickening which may represent esophagitis. Large hiatal hernia. Small left-sided pleural effusion with associated left lower lobe compressive atelectasis.   -1/26/24- BLE venous doppler- Positive for left DVT, with partial clot in the common femoral vein, superficial femoral vein proximally, and distally, and proximal popliteal vein, with occlusive clot in the mid superficial femoral vein, and distal popliteal vein.   -1/29/24- switched UFH gtt to eliquis 5mg BID     =============================  Interval events: Patient has been tolerating heparin drip without any bleeding.  She denies any increased dyspnea or chest pain.       Current Inpatient Medications:  Inpatient Meds:   potassium chloride  40 mEq Oral Q4H    apixaban  5 mg Oral BID    atorvastatin  80 mg Per G  Tube Daily    calcium carbonate  500 mg Per G Tube Daily    DULoxetine  20 mg Per G Tube Daily    lansoprazole  30 mg Per G Tube BID AC    sucralfate  1 g Per G Tube BID AC    valproic acid  750 mg Per G Tube TID    QUEtiapine  25 mg Per G Tube BID    mirtazapine  15 mg Per G Tube Nightly     Pulse: 88 (01/29 0800)  BP: 114/47 (01/29 0800)  Temp: 97.6 °F (36.4 °C) (01/29 0800)  Do Not Use - Resp Rate: --  SpO2: 96 % (01/29 0800)    Recent Labs   Lab 01/25/24 1923 01/26/24 0520 01/27/24  0628 01/28/24  0518 01/29/24  0640   WBC 11.2* 14.3* 7.7  --  6.8   HGB 11.7* 10.8* 11.5*  --  10.3*   HCT 35.0 32.9* 35.9  --  32.2*   .0 202.0 217.0  217.0 207.0 202.0   MCV 89.5 90.9 92.5  --  92.8   RDW 15.3 16.0 16.3  --  16.5   NEPRELIM 6.48  --  4.38  --   --              Recent Labs   Lab 01/25/24 1923 01/26/24 0520 01/29/24  0640    137 140   K 4.1 3.9 3.4*    104 103   CO2 33.0* 30.0 33.0*   BUN 14 11 9   CREATSERUM 0.67 0.53* 0.53*   * 93 103*   CA 8.8 8.7 8.1*   TP 6.1*  --   --    ALB 1.7*  --   --    ALKPHO 78  --   --    AST 27  --   --    BILT 0.2  --   --                 Recent Labs   Lab 01/25/24 1923 01/26/24 0520 01/27/24  1437 01/27/24  2326 01/28/24  0925 01/28/24  1855 01/29/24  0640   INR 1.01  --   --   --   --   --   --    PTT 25.9   < > 109.1* 82.0* 62.4* 89.1* 106.5*       Impression & Plan:      *Acute PE and LLE DVT  -Likely provoked by multiple recent hospitalizations.    -Ideally would be anticoagulated for at least 3 months, however she is already had 2 bleeding complications over the last few months (ICH, GI bleed), and therefore she is at high risk for developing recurrent bleeding complications while on anticoagulation.   -Tolerating heparin drip over the last few days.  Will transition to Eliquis 5mg BID (skip 1 week loading dose due to concern for high bleed risk)  Eliquis likely has the lowest risk for bleeding complications.  -If she develops bleeding  complications in the future would favor placement of IVC filter and cessation of anticoagulation.  -Plan for repeat baseline LLE venous Doppler at the 3-month sonny  -Recommend avoiding concurrent antiplatelet use when on anticoagulant due to higher bleeding risk.    1/29 HOSPITALIST NOTE       Assessment & Plan:   #Acute Right sided Pulmonary Embolus   #LLE DVT  -heparin gtt - > Eliquis  -ECHO without any RV strain   -LE dopplers + for LLE DVT     #Cerebrovascular disease with residual L sided weakness  #Fall, Prior ICH  -Repeat imaging without acute findings  -Neurology on consult, MRI brain reviewed - no further neurologic workup planned at this time   -EEG no seizure       #Leukocytosis with fall on admission  -U/A unimpressive  -resolved     #Anxiety  #Dementia  -Seroquel      #Dyslipidemia  Statin     #GERD/Gastritis  -PPI     #C.Diff  -Completed PO Vancomycin

## 2024-01-30 NOTE — PAYOR COMM NOTE
Discharge Notification    Patient Name: MARISOL SULLIVAN  Payor: [employer]  Subscriber #: [id number]  Authorization Number: [id number]  Admit Date/Time: 1/25/2024 7:18 PM  Discharge Date/Time: 1/30/2024 11:31 AM

## 2024-01-30 NOTE — CM/SW NOTE
01/29/24 1622   Discharge disposition   Expected discharge disposition [facility]   [facility]   Discharge transportation [facility]     Pt to dc at 11AM via ambulance to [facility]. PCS completed. Guardian made aware.     RN to call [facility] at [phone] for report.     HUSAM Meyer

## 2024-01-30 NOTE — DISCHARGE SUMMARY
[facility]     Mayte Yates Patient Status:  Inpatient    11/10/1945 MRN [id number]   Location [facility] 7NE-A Attending No att. providers found   Hosp Day # 4 PCP Dilcia Avery MD     Date of Admission: 2024  Date of Discharge:  2024     Discharge Disposition: SNF [facility] ([facility])    Discharge Diagnosis:  #Acute Right sided Pulmonary Embolus   #LLE DVT  -heparin gtt - > Eliquis  -ECHO without any RV strain   -LE dopplers + for LLE DVT     #Cerebrovascular disease with residual L sided weakness  #Fall, Prior ICH  -Repeat imaging without acute findings  -Neurology on consult, MRI brain reviewed - no further neurologic workup planned at this time   -EEG no seizure       #Leukocytosis with fall on admission  -U/A unimpressive  -resolved     #Anxiety  #Dementia  -Seroquel      #Dyslipidemia  Statin     #GERD/Gastritis  -PPI     #C.Diff  -Completed PO Vancomycin     History of Present Illness:      Mayte Yates is a 78 year old female with PMhx of anxiety, breast cancer, HTN, DL, dementia presents with fall. Pt was seen earlier in the ED today and had negative imaging and was sent back to her facility. Pt was noted to have worsening L side facial droop so she was brought back in. Pt does have chronic L sided weakness from previous ICH. She is alert to only self given her dementia. Overall she is a poor historian. She currently denies any complaints. She denies any CP or SOB. No F/C. No cough. No N/V/D/C or abd pain. In ED she was scanned again given facial droop and found to have PE. She does have remote history of GI bleed and gastritis. In ED she was heme negative. Pt was started on heparin gtt.     Brief Synopsis: Patient was admitted, anticoagulated with heparin drip and transition to Eliquis, discharged in stable condition    Lace+ Score: 81  59-90 High Risk  29-58 Medium Risk  0-28   Low Risk       TCM Follow-Up Recommendation:  LACE > 58: High Risk of  readmission after discharge from the hospital.      Procedures during hospitalization:   N/a    Incidental or significant findings and recommendations (brief descriptions):  See above    Lab/Test results pending at Discharge:   N/a    Consultants:  Hematology, Neurology     Discharge Medication List:     Discharge Medications        START taking these medications        Instructions Prescription details   apixaban 5 MG Tabs  Commonly known as: Eliquis      Take 1 tablet (5 mg total) by mouth 2 (two) times daily.   Quantity: 60 tablet  Refills: 0            CHANGE how you take these medications        Instructions Prescription details   ALPRAZolam 0.25 MG Tabs  Commonly known as: Xanax  What changed: when to take this      Take 1 tablet (0.25 mg total) by mouth nightly as needed.   Quantity: 3 tablet  Refills: 0            CONTINUE taking these medications        Instructions Prescription details   Accu-Chek FastClix Lancets [facility]      USE 1 LANCET TO STICK FINGER DAILY TO TEST BLOOD.   Quantity: 102 each  Refills: 1     acetaminophen 325 MG Supp  Commonly known as: Tylenol      Place 1 suppository (325 mg total) rectally every 4 (four) hours as needed for Fever.   Refills: 0     acidophilus-pectin Caps  Commonly known as: Probiotic      1 capsule by Per G Tube route 2 (two) times daily.   Refills: 0     alendronate 70 MG Tabs  Commonly known as: Fosamax      Take 1 tablet (70 mg total) by mouth once a week.   Quantity: 12 tablet  Refills: 3     atorvastatin 80 MG Tabs  Commonly known as: Lipitor      Take 1 tablet (80 mg total) by mouth daily. Replaces 40 mg   Quantity: 90 tablet  Refills: 1     calcium carbonate 500 MG Chew  Commonly known as: Tums      1 tablet (500 mg total) by Per G Tube route daily.   Refills: 0     DULoxetine 20 MG Cpep  Commonly known as: Cymbalta      Take 1 capsule (20 mg total) by mouth daily.   Refills: 0     mirtazapine 15 MG Tabs  Commonly known as: Remeron      Take 1 tablet (15 mg  total) by mouth nightly.   Refills: 0     mupirocin 2 % Oint  Commonly known as: Bactroban      Apply 1 Application topically 2 (two) times daily. Around G-tube site   Refills: 0     omeprazole 2 mg/mL Susp  Commonly known as: PriLOSEC      20 mL (40 mg total) by Per G Tube route 2 (two) times daily before meals.   Stop taking on: February 22, 2024  Quantity: 3600 mL  Refills: 0     OSMOLITE 1.5 ALYX OR      by Peg Tube route daily.   Refills: 0     potassium chloride 20 MEQ Pack  Commonly known as: Klor-Con      Take 20 mEq by mouth daily.   Refills: 0     QUEtiapine 25 MG Tabs  Commonly known as: SEROquel      Take 1 tablet (25 mg total) by mouth 2 (two) times daily.   Refills: 0     sucralfate 1 GM/10ML Susp  Commonly known as: Carafate      Take 10 mL (1 g total) by mouth in the morning and 10 mL (1 g total) before bedtime. GI recommended Carafate by mouth until seen 12/8/23.  Give at 6 am and 4 pm .   Refills: 0     valproic acid 250 MG/5ML Soln  Commonly known as: Depakene      Take 15 mL (750 mg total) by mouth 3 (three) times daily.   Quantity: 300 mL  Refills: 0            STOP taking these medications      First-Lansoprazole 3 MG/ML Susp        vancomycin 50 mg/ml Soln  Commonly known as: VANCOCIN                  Where to Get Your Medications        These medications were sent to [id number] - [place], [place] - [address] [phone], [phone]  [address] [address]      Phone: [phone]   apixaban 5 MG Tabs       Please  your prescriptions at the location directed by your doctor or nurse    Bring a paper prescription for each of these medications  ALPRAZolam 0.25 MG Tabs         [place] reviewed[place] yes    Follow-up appointment:   Harjeet Enrique MD  [address]  [address]  [facility] [address] [phone]    Call      Dilcia Avery MD  [address]  [address]  [facility] [address] [phone]    Schedule an appointment as soon as possible  for a visit in 1 week(s)      Appointments for Next 30 Days 2024 - 2024        Date Arrival Time Visit Type Length Department Provider     2024 10:20 AM  FOLLOW UP VISIT [2828] 20 min [facility] Kari Weinberg MD    Patient Instructions:         Location Instructions:     Masks are optional for all patients and visitors, unless otherwise indicated.                      Vital signs:  Temp:  [97.2 °F (36.2 °C)-98.1 °F (36.7 °C)] 98.1 °F (36.7 °C)  Pulse:  [] 88  Resp:  [16-18] 18  BP: (110-151)/(47-61) 112/47  SpO2:  [93 %-98 %] 94 %    Physical Exam:    General: No acute distress   Lungs: clear to auscultation  Cardiovascular: S1, S2  Abdomen: Soft      -----------------------------------------------------------------------------------------------  PATIENT DISCHARGE INSTRUCTIONS: See electronic chart    Damon Zuniga DO    Total time spent on discharge plannin minutes     The  [employer] Cures Act makes medical notes like these available to patients in the interest of transparency. Please be advised this is a medical document. Medical documents are intended to carry relevant information, facts as evident, and the clinical opinion of the practitioner. The medical note is intended as peer to peer communication and may appear blunt or direct. It is written in medical language and may contain abbreviations or verbiage that are unfamiliar.

## 2024-01-30 NOTE — PLAN OF CARE
Assumed care at approximately 0300.   A & O x 3.  RA.   G tube in place CDI.   TF infusing,   Denies pain.  Plan to dc at 1100 back to [facility] [place].   Call light within reach.   Patient updated on plan of care

## 2024-01-30 NOTE — PLAN OF CARE
Assumed care at 0700  Patient alert, oriented x2-3  No acute neurological changes  Cyclic TF off this am  + BM this am  Meds given through PEG tube    Discharged by ambulance to [facility]. Report called to RN    NURSING DISCHARGE NOTE    Discharged Rehab facility via Ambulance.  Accompanied by Support staff  Belongings Taken by patient/family.

## 2024-01-31 ENCOUNTER — TELEPHONE (OUTPATIENT)
Dept: HEMATOLOGY/ONCOLOGY | Facility: HOSPITAL | Age: 79
End: 2024-01-31

## 2024-01-31 ENCOUNTER — PATIENT OUTREACH (OUTPATIENT)
Dept: CASE MANAGEMENT | Age: 79
End: 2024-01-31

## 2024-01-31 DIAGNOSIS — D64.9 NORMOCYTIC ANEMIA: Primary | ICD-10-CM

## 2024-01-31 DIAGNOSIS — I82.4Y2 ACUTE DEEP VEIN THROMBOSIS (DVT) OF PROXIMAL END OF LEFT LOWER EXTREMITY (HCC): ICD-10-CM

## 2024-02-01 ENCOUNTER — SNF VISIT (OUTPATIENT)
Dept: INTERNAL MEDICINE CLINIC | Age: 79
End: 2024-02-01

## 2024-02-01 DIAGNOSIS — Z43.1 ATTENTION TO G-TUBE (HCC): ICD-10-CM

## 2024-02-01 DIAGNOSIS — A04.72 C. DIFFICILE DIARRHEA: ICD-10-CM

## 2024-02-01 DIAGNOSIS — R19.7 DIARRHEA, UNSPECIFIED TYPE: ICD-10-CM

## 2024-02-01 DIAGNOSIS — Z98.890 HISTORY OF CRANIOTOMY: ICD-10-CM

## 2024-02-01 DIAGNOSIS — D72.829 LEUKOCYTOSIS, UNSPECIFIED TYPE: ICD-10-CM

## 2024-02-01 DIAGNOSIS — I82.402 DEEP VEIN THROMBOSIS (DVT) OF LEFT LOWER EXTREMITY, UNSPECIFIED CHRONICITY, UNSPECIFIED VEIN (HCC): ICD-10-CM

## 2024-02-01 DIAGNOSIS — K21.9 GASTROESOPHAGEAL REFLUX DISEASE, UNSPECIFIED WHETHER ESOPHAGITIS PRESENT: ICD-10-CM

## 2024-02-01 DIAGNOSIS — I26.99 PULMONARY EMBOLISM ON RIGHT (HCC): Primary | ICD-10-CM

## 2024-02-01 DIAGNOSIS — F91.9 BEHAVIOR DISTURBANCE: ICD-10-CM

## 2024-02-01 DIAGNOSIS — R45.1 RESTLESSNESS AND AGITATION: ICD-10-CM

## 2024-02-01 DIAGNOSIS — I63.9 RIGHT-SIDED CEREBROVASCULAR ACCIDENT (CVA) (HCC): ICD-10-CM

## 2024-02-01 DIAGNOSIS — I62.9 INTRACRANIAL HEMORRHAGE (HCC): ICD-10-CM

## 2024-02-01 PROCEDURE — 1123F ACP DISCUSS/DSCN MKR DOCD: CPT | Performed by: NURSE PRACTITIONER

## 2024-02-01 PROCEDURE — 99310 SBSQ NF CARE HIGH MDM 45: CPT | Performed by: NURSE PRACTITIONER

## 2024-02-02 ENCOUNTER — TELEPHONE (OUTPATIENT)
Dept: NEUROLOGY | Facility: CLINIC | Age: 79
End: 2024-02-02

## 2024-02-02 NOTE — TELEPHONE ENCOUNTER
Anjel called requesting that patient has a phone visit for her appt on 2/14. Pt is unable to get around on her own, and her guardian is not available to bring her into this appt.    Ph# to call for phone visit: [phone]

## 2024-02-04 VITALS
BODY MASS INDEX: 27 KG/M2 | DIASTOLIC BLOOD PRESSURE: 73 MMHG | OXYGEN SATURATION: 97 % | WEIGHT: 132.38 LBS | SYSTOLIC BLOOD PRESSURE: 129 MMHG | HEART RATE: 67 BPM | RESPIRATION RATE: 18 BRPM | TEMPERATURE: 97 F

## 2024-02-04 NOTE — PROGRESS NOTES
Mayte Yates.  [birth year]. APN Encounter 24 12 pm.    Patient returned from [facility] on 21.    Mayte Junior Yates  : 11/10/1945. 78 year old  female is admitted to [facility] for rehabilitation and strengthening.      [facility] Re-Admission: 24  Returned to [place]:                24    Chief complaint:  fall at NH hitting her head.  history of ICH w/craniotomy    HPI 78 yr old female with PMhx of anxiety, breast cancer, HTN, DL, dementia presented to the ED after a fall at NH.  Pt was seen earlier in the ED today and had negative imaging and was sent back to [facility] Pt was noted to have worsening L side facial droop so was sent back to the ED.  Pt does have chronic L sided weakness from previous ICH. She is alert to only self given her dementia. Overall, she is a poor historian. She denied any complaints. She denied any CP or SOB. No F/C. No cough. No N/V/D/C or abd pain. In ED, she was scanned again given facial droop and found to have PE. She does have remote history of GI bleed and gastritis. In the ED, she was heme negative. Pt was started on heparin gtt.      Pt was admitted, anticoagulated with heparin drip and transition to Eliquis;  discharged back to NH in stable condition.    Today 24. Met w/pt at bedside and at the nurses station. Contacted psychiatrist regarding patient being agitated, crying out and repeating phrases over and over. Dr. Mazariegos increased Seroquel 25 mg to 3 x daily.  Guardian Demetrice notified.    Past Medical History:   Diagnosis Date    Anxiety     Breast CA (HCC)     C. difficile diarrhea     CANCER     lft breast lumpectomy pre cancerous cells stage 0    Diabetes mellitus (HCC)     Diarrhea, unspecified     Ductal carcinoma in situ of breast     left breast    Flatulence/gas pain/belching     GERD     H/O infectious mononucleosis     diagnosed in     High cholesterol     Hypertension     IBS (irritable bowel syndrome)      Osteoporosis     Other and unspecified hyperlipidemia     Personal history of irradiation, presenting hazards to health 2004    mammosite    Sleep disturbance     Stool incontinence     Stroke (HCC)     Type II or unspecified type diabetes mellitus without mention of complication, not stated as uncontrolled     Wears glasses      Past Surgical History:   Procedure Laterality Date    APPENDECTOMY      1948    COLONOSCOPY       hiatal hernia, sm internal hem    COLONOSCOPY  2005    fiberoptic    D & C      IR ANGIOGRAM CEREBRAL CAROTID BILATERAL  10/12/2023    LUMPECTOMY LEFT  2004    breast    OTHER SURGICAL HISTORY      lumpectomy  lft breast stage 0    OTHER SURGICAL HISTORY  10/11/2023    CRANIOTOMY FOR RIGHT FRONTAL HEMORRHAGE    RADIATION LEFT      Mammosite    TONSILLECTOMY          UPPER GI ENDOSCOPY,EXAM      2005     Family History   Problem Relation Age of Onset    Heart Disorder Father          of heart attack age 57    Alcohol and Other Disorders Associated Father     Arthritis Father     Other (Other) Father     Heart Disease Mother         CHF age 80    Arthritis Mother     Other (Other) Mother     Diabetes Sister         mellitus    Heart Disease Maternal Grandfather     Cancer Maternal Grandmother         brain tumor    Breast Cancer Self      Social History     Socioeconomic History    Marital status:    Tobacco Use    Smoking status: Former     Packs/day: 1.00     Years: 20.00     Additional pack years: 0.00     Total pack years: 20.00     Types: Cigarettes     Quit date: 3/1/2006     Years since quittin.9    Smokeless tobacco: Never   Vaping Use    Vaping Use: Never used   Substance and Sexual Activity    Alcohol use: No     Alcohol/week: 0.0 standard drinks of alcohol    Drug use: No   Other Topics Concern    Caffeine Concern No     Comment: tries to drink decaf drinks    Exercise Yes     Comment: jazzercise 3x/wk, tennis 1-2x/wk     Social  Determinants of Health     Food Insecurity: No Food Insecurity (1/26/2024)    Food Insecurity     Food Insecurity: Never true   Transportation Needs: No Transportation Needs (1/26/2024)    Transportation Needs     Lack of Transportation: No   Housing Stability: Low Risk  (1/26/2024)    Housing Stability     Housing Instability: No     Housing Instability Emergency: No     IMMUNIZATIONS  Immunization History   Administered Date(s) Administered    Covid-19 Vaccine Moderna 100 mcg/0.5 ml 02/10/2021, 03/10/2021, 10/22/2021, 04/05/2022    Covid-19 Vaccine Moderna Bivalent 50mcg/0.5mL 12+ years 09/13/2022    DTAP 04/24/2007    FLU VAC High Dose 65 YRS & Older PRSV Free (81793) 10/24/2016, 09/15/2020    FLUAD High Dose 65 yr and older (33482) 10/11/2019, 09/28/2021    Fluzone Vaccine Medicare () 10/28/2013, 10/24/2016, 09/15/2020    HIGH DOSE FLU 65 YRS AND OLDER PRSV FREE SINGLE D (22566) FLU CLINIC 09/27/2022    Influenza 10/24/2012, 10/23/2015, 10/20/2017, 10/05/2018    Pneumococcal (Prevnar 13) 09/02/2016    Pneumococcal (Prevnar 7) 04/24/2010    Pneumovax 23 09/29/2017    TDAP 09/23/2019    Zoster Vaccine Live (Zostavax) 09/19/2012    Zoster Vaccine Recombinant Adjuvanted (Shingrix) 03/06/2023, 05/23/2023      ALLERGIES:  Allergies   Allergen Reactions    Ampicillin NAUSEA ONLY     Caps    Codeine [Opioid Analgesics]      Derivatives  Syncope    Cortisone [Cortisone Acetate]      Injection into L shoulder  Syncope, stomach cramps     CODE STATUS:  DNR    ADVANCED CARE PLANNING TEAM: Will need updates given to Guardian Demetrice on a regular basis and when any change in condition.     CURRENT MEDICATIONS - reviewed and updated on SNF [facility]  Tylenol 325 one supp q 4 hrs prn fever  Acidophilus priobiotic - 1 cap per Gtube  Alendronate 70 mg 1 x week  Atorvastatin 80 mg q hs  TUMS one tab per G tube daily  Duloxetine 20 mg daily  Mirtazapine 15 mg q hs  Mupirocin 2% ointment prn g-tube drainage  Omeprazole 20 via g tube  twice daily  OSmolite 1.5 kavin per g tube daily  K+ 20 meq daily  Seroquel 25 mg tid  Sucralfate 1 G q am via g tube  Valproic acid 750 tid  Restart Vanco 125 4 x daily thru 1/28/24  Banatrol Plus 2 x day     SUBJECTIVE: difficult to communicate with. Does deny pain, nausea, vomiting.  \"I need to be changed\"    PHYSICAL EXAM: 129/73. P 67. RR 18.POx 97%. T 97.3. wgt 132.4#  GENERAL HEALTH:well developed, well nourished w/noted agitation, yelling loudly at the nurses station.  LINES, TUBES, DRAINS:  g tube  SKIN: pale, warm, dry  WOUND: see skin/wound assessments per staff,   EYES: Pupils round and equal. sclera anicteric, conjunctiva normal; no drainage from eyes  HENT: normal nose, no nasal drainage, mucous membranes pink, moist   NECK:supple  BREAST: deferred exam   RESPIRATORY:clear  CARDIOVASCULAR:  RRR, rate 67  ABDOMEN: + g-tube; abdominal binder in place.  BS+, nondistended; no guarding, no rebound tenderness. + diarrhea incontinent  : incontinent  LYMPHATIC:no lymphedema  MUSCULOSKELETAL: chair and bed confined; unable to stand or walk.  EXTREMITIES/VASCULAR: no edema  NEUROLOGIC: intermittently will follow commands and instructions;  on and off communicates with staff.  PSYCHIATRIC: agitated, anxious , sleep disturbance, and mood disorder   Seroquel 25 increased to tid by psychiatry.    MEDICAL DECISION MAKING. Guardian Demetrice Jo makes medical decision for patient.    Lab Results   Component Value Date    WBC 6.4 01/30/2024    RBC 3.47 (L) 01/30/2024    HGB 10.6 (L) 01/30/2024    HCT 33.0 (L) 01/30/2024    MCV 95.1 01/30/2024    MCH 30.5 01/30/2024    MCHC 32.1 01/30/2024    RDW 17.0 01/30/2024    .0 01/30/2024    MPV 10.8 (H) 07/19/2012     Lab Results   Component Value Date     (H) 01/29/2024    BUN 9 01/29/2024    CREATSERUM 0.53 (L) 01/29/2024    BUNCREA SEE NOTE: 08/30/2023    ANIONGAP 4 01/29/2024    GFRAA 58 (L) 07/07/2022    GFRNAA 50 (L) 07/07/2022    CA 8.1 (L) 01/29/2024      01/29/2024    K 3.9 01/29/2024     01/29/2024    CO2 33.0 (H) 01/29/2024    OSMOCALC 289 01/29/2024     HGB A1C 6.9    Assessment /Plan    Acute Right sided PE and LLE DVT  Eliquis 5 mg 2 x daily  ECHO reported without any RV strain  Dopplers was + for LLE DVT    CVA w/ residual left sided weakness  History of Fall  Prior ICH s/p Craniotomy  Valproic acid 750 tid  Neurology on consult  No seizure activity noted on EEG    Leukocytosis w/fall on admission  Negative for UTI    Anxiety/Dementia/Agitation  Seroquel increased to 25 mg tid  Alprazolam 0.25 q hr prn    Dyslipidemia - atorvastatin 80 mg q hs per g-tube    Chronic diarrhea/+ c-diff  Continue Vanco 125 mg 4 x daily thru 1/28/24.  Banatrol Plus twice daily  Probiotic twice daily via g-tube  Turn and change brief q 3-4 hrs  Zinc to perineum and sacrum with each change    GERD/gastritis   Omeprazole 29 via Gtube 2 x daily  Sucralfate 1 G by mouth daily     Insomnia - Remeron 15 mg q hs    Pain  Tylenol supp prn  Cymbalta 20 daily    Disposition:  Long term care at [facility]    FOLLOW UP APPOINTMENTS   *Follow-Up with specialists as recommended.    Future Appointments   Date Time Provider Department Center   2/14/2024 10:20 AM Kari Weinberg MD [facility]     *Greater than 45 minutes spent w/ patient and staff, including but not limited to/ reviewing present status, needs, abilities with disciplines, reviewing medical records, vital signs, labs, completing med rec and entering orders to establish plan of care in [facility].  Note to patient: The 21st Century Cures Act makes medical notes like these available to patients in the interest of transparency. However, this is a medical document intended as peer to peer communication. It is written in medical language and may contain abbreviations or verbiage that are unfamiliar. It may appear blunt or direct. Medical documents are intended to carry relevant information, facts as evident, and the clinical  opinion of the practitioner who signs the document.    Venessa (HYACINTH Renteria  02/01/24  12 pm  [facility] Post Acute Care Team

## 2024-02-05 NOTE — TELEPHONE ENCOUNTER
I prefer in person visit as her condition is too complicated for phone visit. Please feel free to see any available providers .

## 2024-02-05 NOTE — TELEPHONE ENCOUNTER
Psr called Anjel to relay in person preferred. Anjel will c/b tomorrow to r/s or will keep appt as is.

## 2024-02-09 ENCOUNTER — SNF VISIT (OUTPATIENT)
Dept: INTERNAL MEDICINE CLINIC | Age: 79
End: 2024-02-09

## 2024-02-09 DIAGNOSIS — I62.9 INTRACRANIAL HEMORRHAGE (HCC): ICD-10-CM

## 2024-02-09 DIAGNOSIS — R13.10 DYSPHAGIA, UNSPECIFIED TYPE: ICD-10-CM

## 2024-02-09 DIAGNOSIS — R45.1 RESTLESSNESS AND AGITATION: ICD-10-CM

## 2024-02-09 DIAGNOSIS — Z98.890 HISTORY OF CRANIOTOMY: ICD-10-CM

## 2024-02-09 DIAGNOSIS — A04.71 RECURRENT CLOSTRIDIUM DIFFICILE DIARRHEA: Primary | ICD-10-CM

## 2024-02-09 DIAGNOSIS — Z43.1 ATTENTION TO G-TUBE (HCC): ICD-10-CM

## 2024-02-09 DIAGNOSIS — A04.72 C. DIFFICILE DIARRHEA: ICD-10-CM

## 2024-02-09 PROCEDURE — 99309 SBSQ NF CARE MODERATE MDM 30: CPT | Performed by: NURSE PRACTITIONER

## 2024-02-12 ENCOUNTER — TELEPHONE (OUTPATIENT)
Dept: HEMATOLOGY/ONCOLOGY | Facility: HOSPITAL | Age: 79
End: 2024-02-12

## 2024-02-12 NOTE — TELEPHONE ENCOUNTER
Spoke with Dorys. She states patient will never be able to come to office, she is bed bound and it will cost $500 to get her here. Explained to dorys BARBER recommendations \" Plan is only for 3 months of anticoagulation and then stop.  If she has any bleeding complications would stop earlier.  If she cannot come in to see me within the next few weeks she should at least get a CBC and CMP at her facility.  If willing to come and see me at the 3-month sonny then she should, if not, then she should have a doctor who works at the facility she is at manage going forward.\" Dorys v/u. Fax number for facility: [phone] Radha Gan RN. Pt is at [facility] in [place].

## 2024-02-13 ENCOUNTER — SNF VISIT (OUTPATIENT)
Dept: INTERNAL MEDICINE CLINIC | Age: 79
End: 2024-02-13

## 2024-02-13 DIAGNOSIS — G51.4 FACIAL TWITCHING: Primary | ICD-10-CM

## 2024-02-13 DIAGNOSIS — E87.5 HYPERKALEMIA: ICD-10-CM

## 2024-02-13 DIAGNOSIS — R13.10 DYSPHAGIA, UNSPECIFIED TYPE: ICD-10-CM

## 2024-02-13 DIAGNOSIS — Z98.890 HISTORY OF CRANIOTOMY: ICD-10-CM

## 2024-02-13 DIAGNOSIS — E87.6 HYPOKALEMIA: ICD-10-CM

## 2024-02-13 DIAGNOSIS — F91.9 BEHAVIOR DISTURBANCE: ICD-10-CM

## 2024-02-13 DIAGNOSIS — A04.71 RECURRENT CLOSTRIDIOIDES DIFFICILE DIARRHEA: ICD-10-CM

## 2024-02-13 DIAGNOSIS — I62.9 INTRACRANIAL HEMORRHAGE (HCC): ICD-10-CM

## 2024-02-13 DIAGNOSIS — I63.9 RIGHT-SIDED CEREBROVASCULAR ACCIDENT (CVA) (HCC): ICD-10-CM

## 2024-02-13 DIAGNOSIS — I26.99 PULMONARY EMBOLISM ON RIGHT (HCC): ICD-10-CM

## 2024-02-13 DIAGNOSIS — E87.6 HYPOKALEMIA: Primary | ICD-10-CM

## 2024-02-13 DIAGNOSIS — I82.402 DEEP VEIN THROMBOSIS (DVT) OF LEFT LOWER EXTREMITY, UNSPECIFIED CHRONICITY, UNSPECIFIED VEIN (HCC): ICD-10-CM

## 2024-02-13 DIAGNOSIS — Z43.1 ATTENTION TO G-TUBE (HCC): ICD-10-CM

## 2024-02-13 PROCEDURE — 99309 SBSQ NF CARE MODERATE MDM 30: CPT | Performed by: NURSE PRACTITIONER

## 2024-02-14 VITALS
DIASTOLIC BLOOD PRESSURE: 78 MMHG | SYSTOLIC BLOOD PRESSURE: 134 MMHG | WEIGHT: 133.63 LBS | TEMPERATURE: 97 F | RESPIRATION RATE: 15 BRPM | HEART RATE: 78 BPM | BODY MASS INDEX: 27 KG/M2

## 2024-02-14 VITALS
RESPIRATION RATE: 18 BRPM | BODY MASS INDEX: 26 KG/M2 | OXYGEN SATURATION: 97 % | HEART RATE: 75 BPM | TEMPERATURE: 98 F | WEIGHT: 131 LBS | DIASTOLIC BLOOD PRESSURE: 74 MMHG | SYSTOLIC BLOOD PRESSURE: 110 MMHG

## 2024-02-14 VITALS
SYSTOLIC BLOOD PRESSURE: 132 MMHG | BODY MASS INDEX: 27 KG/M2 | TEMPERATURE: 98 F | OXYGEN SATURATION: 96 % | DIASTOLIC BLOOD PRESSURE: 75 MMHG | RESPIRATION RATE: 14 BRPM | HEART RATE: 78 BPM | WEIGHT: 131.31 LBS

## 2024-02-14 RX ORDER — POTASSIUM CHLORIDE 1.5 G/1.58G
40 POWDER, FOR SOLUTION ORAL
COMMUNITY

## 2024-02-14 RX ORDER — FIDAXOMICIN 200 MG/1
200 TABLET, FILM COATED ORAL 2 TIMES DAILY
COMMUNITY

## 2024-02-15 ENCOUNTER — SNF VISIT (OUTPATIENT)
Dept: INTERNAL MEDICINE CLINIC | Age: 79
End: 2024-02-15

## 2024-02-15 DIAGNOSIS — R45.1 RESTLESSNESS AND AGITATION: ICD-10-CM

## 2024-02-15 DIAGNOSIS — R13.10 DYSPHAGIA, UNSPECIFIED TYPE: ICD-10-CM

## 2024-02-15 DIAGNOSIS — F91.9 BEHAVIOR DISTURBANCE: ICD-10-CM

## 2024-02-15 DIAGNOSIS — G51.4 FACIAL TWITCHING: Primary | ICD-10-CM

## 2024-02-15 DIAGNOSIS — Z98.890 HISTORY OF CRANIOTOMY: ICD-10-CM

## 2024-02-15 DIAGNOSIS — R29.898 SEVERE MUSCLE DECONDITIONING: ICD-10-CM

## 2024-02-15 DIAGNOSIS — I62.9 INTRACRANIAL HEMORRHAGE (HCC): ICD-10-CM

## 2024-02-15 DIAGNOSIS — A04.72 C. DIFFICILE DIARRHEA: ICD-10-CM

## 2024-02-15 PROCEDURE — 99309 SBSQ NF CARE MODERATE MDM 30: CPT | Performed by: NURSE PRACTITIONER

## 2024-02-15 NOTE — PROGRESS NOTES
Mayte Yates.  [birth year] APN Encounter . Date of Encounter 24.    Nurse reported critical lab K+ level of 2.5.  Orders given to give stat dose of 40meq  K+, repeat after dinner 40 meq K and at 6am 24 another 40 meq.  Repeat BMP 24.    Patient returned from [facility] on 21.    Mayte Yates  : 11/10/1945. 78 year old  female is admitted to [facility] for rehabilitation and strengthening.      [facility] Re-Admission: 24  Returned to [place]:                24    Chief complaint:  K+ 2.5. weakness, poor appetite; +Gtube    HPI 78 yr old female with PMhx of anxiety, breast cancer, HTN, DL, dementia presented to the ED after a fall at NH.  Pt was seen earlier in the ED today and had negative imaging and was sent back to [facility] Pt was noted to have worsening L side facial droop so was sent back to the ED.  Pt does have chronic L sided weakness from previous ICH. She is alert to only self given her dementia. Overall, she is a poor historian. She denied any complaints. She denied any CP or SOB. No F/C. No cough. No N/V/D/C or abd pain. In ED, she was scanned again given facial droop and found to have PE. She does have remote history of GI bleed and gastritis. In the ED, she was heme negative. Pt was started on heparin gtt.      Pt was admitted, anticoagulated with heparin drip and transitioned to Eliquis;  discharged back to NH in stable condition.    Past Medical History:   Diagnosis Date    Anxiety     Breast CA (HCC)     C. difficile diarrhea     CANCER     lft breast lumpectomy pre cancerous cells stage 0    Diabetes mellitus (HCC)     Diarrhea, unspecified     Ductal carcinoma in situ of breast     left breast    Flatulence/gas pain/belching     GERD     H/O infectious mononucleosis     diagnosed in     High cholesterol     Hypertension     IBS (irritable bowel syndrome)     Osteoporosis     Other and unspecified hyperlipidemia      Personal history of irradiation, presenting hazards to health 2004    mammosite    Sleep disturbance     Stool incontinence     Stroke (HCC)     Type II or unspecified type diabetes mellitus without mention of complication, not stated as uncontrolled     Wears glasses      Past Surgical History:   Procedure Laterality Date    APPENDECTOMY      194    COLONOSCOPY       hiatal hernia, sm internal hem    COLONOSCOPY  2005    fiberoptic    D & C      IR ANGIOGRAM CEREBRAL CAROTID BILATERAL  10/12/2023    LUMPECTOMY LEFT  2004    breast    OTHER SURGICAL HISTORY      lumpectomy  lft breast stage 0    OTHER SURGICAL HISTORY  10/11/2023    CRANIOTOMY FOR RIGHT FRONTAL HEMORRHAGE    RADIATION LEFT      Mammosite    TONSILLECTOMY          UPPER GI ENDOSCOPY,EXAM      2005     Family History   Problem Relation Age of Onset    Heart Disorder Father          of heart attack age 57    Alcohol and Other Disorders Associated Father     Arthritis Father     Other (Other) Father     Heart Disease Mother         CHF age 80    Arthritis Mother     Other (Other) Mother     Diabetes Sister         mellitus    Heart Disease Maternal Grandfather     Cancer Maternal Grandmother         brain tumor    Breast Cancer Self      Social History     Socioeconomic History    Marital status:    Tobacco Use    Smoking status: Former     Packs/day: 1.00     Years: 20.00     Additional pack years: 0.00     Total pack years: 20.00     Types: Cigarettes     Quit date: 3/1/2006     Years since quittin.9    Smokeless tobacco: Never   Vaping Use    Vaping Use: Never used   Substance and Sexual Activity    Alcohol use: No     Alcohol/week: 0.0 standard drinks of alcohol    Drug use: No   Other Topics Concern    Caffeine Concern No     Comment: tries to drink decaf drinks    Exercise Yes     Comment: jazzercise 3x/wk, tennis 1-2x/wk     Social Determinants of Health     Food Insecurity: No Food Insecurity  (1/26/2024)    Food Insecurity     Food Insecurity: Never true   Transportation Needs: No Transportation Needs (1/26/2024)    Transportation Needs     Lack of Transportation: No   Housing Stability: Low Risk  (1/26/2024)    Housing Stability     Housing Instability: No     Housing Instability Emergency: No     IMMUNIZATIONS  Immunization History   Administered Date(s) Administered    Covid-19 Vaccine Moderna 100 mcg/0.5 ml 02/10/2021, 03/10/2021, 10/22/2021, 04/05/2022    Covid-19 Vaccine Moderna Bivalent 50mcg/0.5mL 12+ years 09/13/2022    DTAP 04/24/2007    FLU VAC High Dose 65 YRS & Older PRSV Free (22864) 10/24/2016, 09/15/2020    FLUAD High Dose 65 yr and older (92134) 10/11/2019, 09/28/2021    Fluzone Vaccine Medicare () 10/28/2013, 10/24/2016, 09/15/2020    HIGH DOSE FLU 65 YRS AND OLDER PRSV FREE SINGLE D (41878) FLU CLINIC 09/27/2022    Influenza 10/24/2012, 10/23/2015, 10/20/2017, 10/05/2018    Pneumococcal (Prevnar 13) 09/02/2016    Pneumococcal (Prevnar 7) 04/24/2010    Pneumovax 23 09/29/2017    TDAP 09/23/2019    Zoster Vaccine Live (Zostavax) 09/19/2012    Zoster Vaccine Recombinant Adjuvanted (Shingrix) 03/06/2023, 05/23/2023      ALLERGIES:  Allergies   Allergen Reactions    Ampicillin NAUSEA ONLY     Caps    Codeine [Opioid Analgesics]      Derivatives  Syncope    Cortisone [Cortisone Acetate]      Injection into L shoulder  Syncope, stomach cramps     CODE STATUS:  DNR    ADVANCED CARE PLANNING TEAM: Will need updates given to Guardian Demetrice on a regular basis and when any change in condition.     CURRENT MEDICATIONS - reviewed and updated on SNF EMAR  Tylenol 325 one supp q 4 hrs prn fever  Acidophilus priobiotic - 1 cap per Gtube  Alendronate 70 mg 1 x week  Atorvastatin 80 mg q hs  TUMS one tab per G tube daily  Duloxetine 20 mg daily  Mirtazapine 15 mg q hs  Mupirocin 2% ointment prn g-tube drainage  Omeprazole 20 via g tube twice daily  OSmolite 1.5 kavin per g tube daily  K+ 20 meq  daily  Seroquel 25 mg tid  Sucralfate 1 G q am via g tube  Valproic acid 750 tid  Restart Vanco 125 4 x daily thru 1/28/24   Banatrol Plus 2 x day  K+ 40meq STAT, at dinner and 6 am next morning.     SUBJECTIVE: difficult to communicate with. C/O left arm pain, Denies nausea, vomiting.     PHYSICAL EXAM: 110/74. P 75. RR 18.POx 97%. T 97.5. wgt 131#  GENERAL HEALTH: well developed, well nourished in no acute distress.   LINES, TUBES, DRAINS:  +g tube  SKIN: pale, warm, dry  WOUND: see skin/wound assessments per staff,   EYES: Pupils round and equal. No jaundice. conjunctiva normal; no drainage from eyes  HENT: no nasal drainage, mucous membranes pink, moist   NECK:supple  BREAST: deferred exam   RESPIRATORY:clear  CARDIOVASCULAR:  RRR, rate 75  ABDOMEN: + g-tube; abdominal binder in place.  BS+, nondistended; no guarding, no guarding.  + diarrhea.  incontinent  : incontinent  LYMPHATIC:no lymphedema  MUSCULOSKELETAL: chair and bed confined; unable to stand or walk.  EXTREMITIES/VASCULAR: no edema. L sided weakness. L arm pain.  NEUROLOGIC: intermittently will follow commands and instructions;  on and off communicates with staff.  PSYCHIATRIC: agitated, anxious , sleep disturbance, and mood disorder   Seroquel 25 increased to tid by psychiatrist.    MEDICAL DECISION MAKING. Guardian Demetrice Jo makes medical decision for patient.    Lab Results: 2/6/24  Wbc 4.4. Hgb 11. Plts 164.  Na 141. K 2.5. . Cl 99. Co2 34. BUN 20. Creat 0.5. alb 2.9. gfr 60  HGB A1C 6.9    Assessment /Plan    Hypokalemia - 2.5  40 meq Kcl  X 3 Doses over 1 day.  Repeat cmp   Trend labs    Acute R sided PE and LLE DVT  Eliquis 5 mg 2 x daily  ECHO reported without any RV strain  Dopplers was + for LLE DVT    CVA w/ residual left sided weakness  History of Fall  Prior ICH s/p Craniotomy  Valproic acid 750 tid  Neurology on consult  No seizure activity noted on EEG    Leukocytosis w/fall on admission  Negative for  UTI    Anxiety/Dementia/Agitation  Seroquel increased to 25 mg tid  Remeron 15 mg q hs  Cymbalta 20 daily    Dyslipidemia - atorvastatin 80 mg q hs per g-tube    Chronic diarrhea/+ c-diff  Completed Vanco 125 mg 4 x daily   Banatrol Plus twice daily  Probiotic twice daily via g-tube  Turn and change brief q 3-4 hrs  Zinc to perineum and sacrum with each change    GERD/gastritis   Omeprazole 29 via Gtube 2 x daily  Sucralfate 1 G by mouth daily  G-tube. + poor oral appetite     Insomnia - Remeron 15 mg q hs    Pain  Tylenol supp prn  Cymbalta 20 daily    Disposition:  Long term care at [facility]. Updates to guardian Demetrice on a regular basis.    FOLLOW UP APPOINTMENTS   *Follow-Up with specialists as recommended.    Future Appointments   Date Time Provider Department Center   2/14/2024 10:20 AM Kari Weinberg MD [facility]     *Greater than 35 minutes spent w/ patient and staff, including but not limited to/ reviewing present status, needs, abilities with disciplines, reviewing medical records, vital signs, labs, completing med rec and entering orders to establish plan of care in [facility].  Note to patient: The 21st Century Cures Act makes medical notes like these available to patients in the interest of transparency. However, this is a medical document intended as peer to peer communication. It is written in medical language and may contain abbreviations or verbiage that are unfamiliar. It may appear blunt or direct. Medical documents are intended to carry relevant information, facts as evident, and the clinical opinion of the practitioner who signs the document.    HYACINTH Silva (Daya)  02/6/24  1:15 pm  [facility] Post Acute Care Team

## 2024-02-15 NOTE — PROGRESS NOTES
Mayte Yates.   [birth year] APN Encounter 24-Thurs.  1:45 pm    Met patient at the nurses station. Staff report patient still is having diarrhea.   Dificid ordered thru pharmacy d/t failed oral vancomycin.  Patient started Dificid and will continue for 10 days.  Patient is alert, orientated to self; confusion and agitated at times.    Mayte Yates  : 11/10/1945. 78 year old  female is admitted to [facility] for rehabilitation and strengthening.       [facility] Re-Admission: 24  Returned to [place]:               24     Chief complaint today:  recurrent diarrhea. weakness, poor appetite; +Gtube     HPI 78 yr old female with PMhx of anxiety, breast cancer, HTN, DL, dementia presented to the ED after a fall at NH.  Pt was seen earlier in the ED today and had negative imaging and was sent back to [facility] Pt was noted to have worsening L side facial droop so was sent back to the ED.  Pt does have chronic L sided weakness from previous ICH. She is alert to only self given her dementia. Overall, she is a poor historian. She denied any complaints. She denied any CP or SOB. No F/C. No cough. No N/V/D/C or abd pain. In ED, she was scanned again given facial droop and found to have PE. She does have remote history of GI bleed and gastritis. In the ED, she was heme negative. Pt was started on heparin gtt.      Pt was admitted, anticoagulated with heparin drip and transitioned to Eliquis;  discharged back to NH in stable condition.          Past Medical History:   Diagnosis Date    Anxiety      Breast CA (HCC)     C. difficile diarrhea      CANCER       lft breast lumpectomy pre cancerous cells stage 0    Diabetes mellitus (HCC)      Diarrhea, unspecified      Ductal carcinoma in situ of breast      left breast    Flatulence/gas pain/belching      GERD      H/O infectious mononucleosis       diagnosed in     High cholesterol      Hypertension      IBS (irritable bowel  syndrome)      Osteoporosis      Other and unspecified hyperlipidemia      Personal history of irradiation, presenting hazards to health 2004     mammosite    Sleep disturbance      Stool incontinence      Stroke (HCC)      Type II or unspecified type diabetes mellitus without mention of complication, not stated as uncontrolled      Wears glasses              Past Surgical History:   Procedure Laterality Date    APPENDECTOMY         194    COLONOSCOPY          hiatal hernia, sm internal hem    COLONOSCOPY   2005     fiberoptic    D & C        IR ANGIOGRAM CEREBRAL CAROTID BILATERAL   10/12/2023    LUMPECTOMY LEFT   2004     breast    OTHER SURGICAL HISTORY         lumpectomy  lft breast stage 0    OTHER SURGICAL HISTORY   10/11/2023     CRANIOTOMY FOR RIGHT FRONTAL HEMORRHAGE    RADIATION LEFT         Mammosite    TONSILLECTOMY             UPPER GI ENDOSCOPY,EXAM         2005            Family History   Problem Relation Age of Onset    Heart Disorder Father            of heart attack age 57    Alcohol and Other Disorders Associated Father      Arthritis Father      Other (Other) Father      Heart Disease Mother           CHF age 80    Arthritis Mother      Other (Other) Mother      Diabetes Sister           mellitus    Heart Disease Maternal Grandfather      Cancer Maternal Grandmother           brain tumor    Breast Cancer Self        Social History            Socioeconomic History    Marital status:    Tobacco Use    Smoking status: Former       Packs/day: 1.00       Years: 20.00       Additional pack years: 0.00       Total pack years: 20.00       Types: Cigarettes       Quit date: 3/1/2006       Years since quittin.9    Smokeless tobacco: Never   Vaping Use    Vaping Use: Never used   Substance and Sexual Activity    Alcohol use: No       Alcohol/week: 0.0 standard drinks of alcohol    Drug use: No   Other Topics Concern    Caffeine Concern No       Comment:  tries to drink decaf drinks    Exercise Yes       Comment: jazzercise 3x/wk, tennis 1-2x/wk      Social Determinants of Health           Food Insecurity: No Food Insecurity (1/26/2024)     Food Insecurity      Food Insecurity: Never true   Transportation Needs: No Transportation Needs (1/26/2024)     Transportation Needs      Lack of Transportation: No   Housing Stability: Low Risk  (1/26/2024)     Housing Stability      Housing Instability: No      Housing Instability Emergency: No      IMMUNIZATIONS       Immunization History   Administered Date(s) Administered    Covid-19 Vaccine Moderna 100 mcg/0.5 ml 02/10/2021, 03/10/2021, 10/22/2021, 04/05/2022    Covid-19 Vaccine Moderna Bivalent 50mcg/0.5mL 12+ years 09/13/2022    DTAP 04/24/2007    FLU VAC High Dose 65 YRS & Older PRSV Free (19385) 10/24/2016, 09/15/2020    FLUAD High Dose 65 yr and older (69855) 10/11/2019, 09/28/2021    Fluzone Vaccine Medicare () 10/28/2013, 10/24/2016, 09/15/2020    HIGH DOSE FLU 65 YRS AND OLDER PRSV FREE SINGLE D (53746) FLU CLINIC 09/27/2022    Influenza 10/24/2012, 10/23/2015, 10/20/2017, 10/05/2018    Pneumococcal (Prevnar 13) 09/02/2016    Pneumococcal (Prevnar 7) 04/24/2010    Pneumovax 23 09/29/2017    TDAP 09/23/2019    Zoster Vaccine Live (Zostavax) 09/19/2012    Zoster Vaccine Recombinant Adjuvanted (Shingrix) 03/06/2023, 05/23/2023      ALLERGIES:        Allergies   Allergen Reactions    Ampicillin NAUSEA ONLY       Caps    Codeine [Opioid Analgesics]         Derivatives  Syncope    Cortisone [Cortisone Acetate]         Injection into L shoulder  Syncope, stomach cramps      CODE STATUS:  DNR     ADVANCED CARE PLANNING TEAM: Will need updates to be given to Guardian Demetrice on a regular basis and when any change in condition.      CURRENT MEDICATIONS - reviewed and updated on SNF EM[facility]  Tylenol 325 one supp q 4 hrs prn fever  Acidophilus priobiotic - 1 cap per Gtube  Alendronate 70 mg 1 x week  Atorvastatin 80 mg q hs  TUMS  one tab per G tube daily  Duloxetine 20 mg daily  Mirtazapine 15 mg q hs  Mupirocin 2% ointment prn g-tube drainage  Omeprazole 20 via g tube twice daily  OSmolite 1.5 kavin per g tube daily  K+ 20 meq daily  Seroquel 25 mg tid  Sucralfate 1 G q am via g tube  Valproic acid 750 tid  Restart Vanco 125 4 x daily completed  Dificid 200 twice daily x 10 days for Cdiff   Banatrol Plus 2 x day  K+ 40meq STAT, at dinner and 6 am next morning.                  SUBJECTIVE: difficult to communicate with. + diarrhea. C/O left arm pain, Denies nausea, vomiting.      PHYSICAL EXAM: 132/75. P 758 RR 14.POx 96%. T 97.54 wgt 131.3#  GENERAL HEALTH: well developed, well nourished  w/ recurrent diarrhea.    LINES, TUBES, DRAINS:  +g tube with daily feedings  SKIN: pale, warm, dry  WOUND: see skin/wound assessments per staff   EYES: Pupils round and equal. No jaundice. conjunctiva normal; no drainage from eyes  HENT: no nasal drainage, mucous membranes pink, moist   NECK:supple  BREAST: deferred exam   RESPIRATORY: clear  CARDIOVASCULAR:  RRR, rate 78  ABDOMEN: + g-tube. abdominal binder in place to protect Gtube. BS+, nondistended; + diarrhea,  incontinent  : incontinent  LYMPHATIC:no lymphedema  MUSCULOSKELETAL: chair and bed confined; unable to stand or ambulate  EXTREMITIES/VASCULAR: no edema. L sided weakness. L arm pain.  NEUROLOGIC: intermittently will follow commands and instructions;  on and off able to communicate with staff.  PSYCHIATRIC: agitated, anxious , sleep disturbance, and mood disorder   Seroquel 25 increased to tid by psychiatrist.     MEDICAL DECISION MAKING. Guardian Demetrice Jo makes medical decision for patient.     Lab Results: 2/6/24  Wbc 4.4. Hgb 11. Plts 164.  Na 141. K 2.5. . Cl 99. Co2 34. BUN 20. Creat 0.5. alb 2.9. gfr 60  Trend labs  HGB A1C 6.9     Assessment /Plan     Hypokalemia -   40 meq Kcl  X 3 Doses over 1 day.  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   Completed Vanco 125 mg 4 x daily  Start Dificid 200 mg twice daily x 10 days   Banatrol Plus twice daily  Probiotic twice daily via g-tube  Turn and change brief q 3-4 hrs  Zinc to perineum and sacrum with each change     GERD/gastritis   Omeprazole 29 via Gtube 2 x daily  Sucralfate 1 G by mouth daily  G-tube. + poor oral appetite     Insomnia - Remeron 15 mg q hs     Pain  Tylenol supp prn  Cymbalta 20 daily     Disposition:  Long term care at [facility]. Updates to guardian Demetrice on a regular basis.     FOLLOW UP APPOINTMENTS   *Follow-Up with specialists as recommended.            Future Appointments   Date Time Provider Department Center   2/14/2024 10:20 AM Kari Weinberg MD [facility]      *Greater than 35 minutes spent w/ patient and staff, including but not limited to/ reviewing present status, needs, abilities with disciplines, reviewing medical records, vital signs, labs, completing med rec and entering orders to establish plan of care in [facility].  Note to patient: The 21st Century Cures Act makes medical notes like these available to patients in the interest of transparency. However, this is a medical document intended as peer to peer communication. It is written in medical language and may contain abbreviations or verbiage that are unfamiliar. It may appear blunt or direct. Medical documents are intended to carry relevant information, facts as evident, and the clinical opinion of the practitioner who signs the document.    Venessa  (Melissa) Sheryl APRN  2/8/24  145 pm  Formerly [facility] Post Acute Care Team

## 2024-02-15 NOTE — PROGRESS NOTES
Mayte Yates.   [birth year] APN Encounter 24-Thurs.  1:45 pm    ADON asked this APN to evaluate patient.  Mrs. Yates seen with facial twitching.  Able to speak with some difficulty.  Alert to her name, and .  Neuro checks intact.  Able to repeat words without slurred speech.  Patient received all her meds. Staff report difficulty chewing and swallowing cottage cheese.    New Orders:  STAT labs including valproic acid  CBC, CMP  Downgrade diet to puree; dietician aware  Speech evaluation    Mayte Yates  : 11/10/1945. 78 year old  female is admitted to [facility] for rehabilitation and strengthening.       [facility] Re-Admission: 24  Returned to [place]:               24     Chief complaint today: facial twitching,dysphagia, weakness, left arm pain.      HPI 78 yr old female with PMhx of anxiety, breast cancer, HTN, DL, dementia presented to the ED after a fall at NH.  Pt was seen earlier in the ED today and had negative imaging and was sent back to [facility] Pt was noted to have worsening L side facial droop so was sent back to the ED.  Pt does have chronic L sided weakness from previous ICH. She is alert to only self given her dementia. Overall, she is a poor historian. She denied any complaints. She denied any CP or SOB. No F/C. No cough. No N/V/D/C or abd pain. In ED, she was scanned again given facial droop and found to have PE. She does have remote history of GI bleed and gastritis. In the ED, she was heme negative. Pt was started on heparin gtt.      Pt was admitted, anticoagulated with heparin drip and transitioned to Eliquis;  discharged back to NH in stable condition.          Past Medical History:   Diagnosis Date    Anxiety      Breast CA (HCC)     C. difficile diarrhea      CANCER       lft breast lumpectomy pre cancerous cells stage 0    Diabetes mellitus (HCC)      Diarrhea, unspecified      Ductal carcinoma in situ of breast      left breast     Flatulence/gas pain/belching      GERD      H/O infectious mononucleosis       diagnosed in 1960s    High cholesterol      Hypertension      IBS (irritable bowel syndrome)      Osteoporosis      Other and unspecified hyperlipidemia      Personal history of irradiation, presenting hazards to health 2004     mammosite    Sleep disturbance      Stool incontinence      Stroke (HCC)      Type II or unspecified type diabetes mellitus without mention of complication, not stated as uncontrolled      Wears glasses              Past Surgical History:   Procedure Laterality Date    APPENDECTOMY         194    COLONOSCOPY          hiatal hernia, sm internal hem    COLONOSCOPY   2005     fiberoptic    D & C        IR ANGIOGRAM CEREBRAL CAROTID BILATERAL   10/12/2023    LUMPECTOMY LEFT   2004     breast    OTHER SURGICAL HISTORY         lumpectomy  lft breast stage 0    OTHER SURGICAL HISTORY   10/11/2023     CRANIOTOMY FOR RIGHT FRONTAL HEMORRHAGE    RADIATION LEFT         Mammosite    TONSILLECTOMY             UPPER GI ENDOSCOPY,EXAM         2005            Family History   Problem Relation Age of Onset    Heart Disorder Father            of heart attack age 57    Alcohol and Other Disorders Associated Father      Arthritis Father      Other (Other) Father      Heart Disease Mother           CHF age 80    Arthritis Mother      Other (Other) Mother      Diabetes Sister           mellitus    Heart Disease Maternal Grandfather      Cancer Maternal Grandmother           brain tumor    Breast Cancer Self        Social History            Socioeconomic History    Marital status:    Tobacco Use    Smoking status: Former       Packs/day: 1.00       Years: 20.00       Additional pack years: 0.00       Total pack years: 20.00       Types: Cigarettes       Quit date: 3/1/2006       Years since quittin.9    Smokeless tobacco: Never   Vaping Use    Vaping Use: Never used   Substance and  Sexual Activity    Alcohol use: No       Alcohol/week: 0.0 standard drinks of alcohol    Drug use: No   Other Topics Concern    Caffeine Concern No       Comment: tries to drink decaf drinks    Exercise Yes       Comment: jazzercise 3x/wk, tennis 1-2x/wk      Social Determinants of Health           Food Insecurity: No Food Insecurity (1/26/2024)     Food Insecurity      Food Insecurity: Never true   Transportation Needs: No Transportation Needs (1/26/2024)     Transportation Needs      Lack of Transportation: No   Housing Stability: Low Risk  (1/26/2024)     Housing Stability      Housing Instability: No      Housing Instability Emergency: No      IMMUNIZATIONS       Immunization History   Administered Date(s) Administered    Covid-19 Vaccine Moderna 100 mcg/0.5 ml 02/10/2021, 03/10/2021, 10/22/2021, 04/05/2022    Covid-19 Vaccine Moderna Bivalent 50mcg/0.5mL 12+ years 09/13/2022    DTAP 04/24/2007    FLU VAC High Dose 65 YRS & Older PRSV Free (41367) 10/24/2016, 09/15/2020    FLUAD High Dose 65 yr and older (19288) 10/11/2019, 09/28/2021    Fluzone Vaccine Medicare () 10/28/2013, 10/24/2016, 09/15/2020    HIGH DOSE FLU 65 YRS AND OLDER PRSV FREE SINGLE D (12830) FLU CLINIC 09/27/2022    Influenza 10/24/2012, 10/23/2015, 10/20/2017, 10/05/2018    Pneumococcal (Prevnar 13) 09/02/2016    Pneumococcal (Prevnar 7) 04/24/2010    Pneumovax 23 09/29/2017    TDAP 09/23/2019    Zoster Vaccine Live (Zostavax) 09/19/2012    Zoster Vaccine Recombinant Adjuvanted (Shingrix) 03/06/2023, 05/23/2023      ALLERGIES:        Allergies   Allergen Reactions    Ampicillin NAUSEA ONLY       Caps    Codeine [Opioid Analgesics]         Derivatives  Syncope    Cortisone [Cortisone Acetate]         Injection into L shoulder  Syncope, stomach cramps      CODE STATUS:  DNR     ADVANCED CARE PLANNING TEAM: Will need updates for Guardian Demetrice on a regular basis and with any change in condition.      CURRENT MEDICATIONS - reviewed and updated  on SNF EMAR  Tylenol 325 one supp q 4 hrs prn fever  Acidophilus priobiotic - 1 cap per Gtube  Alendronate 70 mg 1 x week  Atorvastatin 80 mg q hs  TUMS one tab per G tube daily  Duloxetine 20 mg daily  Mirtazapine 15 mg q hs  Mupirocin 2% ointment prn g-tube drainage  Omeprazole 20 via g tube twice daily  OSmolite 1.5 kavin per g tube daily  K+ 20 meq daily  Seroquel 25 mg tid  Sucralfate 1 G q am via g tube  Valproic acid 750 tid  Restart Vanco 125 4 x daily completed  Dificid 200 twice daily x 10 days for Cdiff - end 2/16/24   Banatrol Plus 2 x day  K+ 40meq STAT, at dinner and 6 am next morning completed.                  SUBJECTIVE: difficult to communicate with.  C/O left arm pain, Denies pain, n/v/.       PHYSICAL EXAM: 134/78 P 78 RR 15 POx 96%. T 97.1 wgt 133.6#  GENERAL HEALTH: well developed, well nourished no acute distress.    LINES, TUBES, DRAINS:  +g tube with daily feedings  SKIN: pale, warm, dry  WOUND: see skin/wound assessments per staff   EYES: Pupils round and equal. No jaundice. conjunctiva normal; no drainage from eyes  HENT: + facial twitching left > R. + dysphagia. no nasal drainage, mucous membranes pink, moist   NECK:supple  BREAST: deferred exam   RESPIRATORY: lungs clear  CARDIOVASCULAR:  RRR, rate 78  ABDOMEN: + g-tube. abdominal binder in place to protect Gtube. BS+, nondistended; + diarrhea,  incontinent  : incontinent  LYMPHATIC:no lymphedema  MUSCULOSKELETAL: chair and bed confined; unable to stand or ambulate  EXTREMITIES/VASCULAR: no edema. L sided weakness. L arm pain.  NEUROLOGIC: intermittently will follow commands and instructions;  on and off able to communicate with staff. Neuro checks intact.  PSYCHIATRIC: agitated, anxiou , sleep disturbance, and mood disorder   Seroquel 25 increased to tid by psychiatrist.     MEDICAL DECISION MAKING. Guardian Demetrice Jo makes medical decision for patient.     Lab Results: 2/13/24  Wbc 4.4. Hgb 11. Plts 164.  Na 136. K 6.3 from  2.5.  Cl  100. Co2 33. BUN 21. Creat 0.7. alb 2.9. gfr 60  HGB A1C 6.9. Valproic acid 67.2.     Assessment /Plan    Facial Twitching  Labs WNL  Valproic acid 67.2 - WNL  Puree diet  Speech eval  Monitor neuro checks  Trend labs     Hypokalemia /Hyperkalemia-   Hold K+ x 3 days  Trend labs    Recurrent C Diff - failed oral vanco.   Completed Vanco 125 mg 4 x daily  Start Dificid 200 mg twice daily x 10 days   Banatrol Plus twice daily  Probiotic twice daily via g-tube  Turn and change brief q 3-4 hrs  Zinc to perineum and sacrum with each change     Acute R sided PE and LLE DVT  Eliquis 5 mg 2 x daily  ECHO reported without any RV strain  Dopplers was + for LLE DVT     CVA w/ residual left sided weakness  History of Fall  Prior ICH s/p Craniotomy  Valproic acid 750 tid  Neurology on consult  No seizure activity noted on EEG     Leukocytosis w/fall on admission  Negative for UTI     Anxiety/Dementia/Agitation  Seroquel increased to 25 mg tid  Remeron 15 mg q hs  Cymbalta 20 daily     Dyslipidemia - atorvastatin 80 mg q hs per g-tube      GERD/gastritis   Omeprazole 29 via Gtube 2 x daily  Sucralfate 1 G by mouth daily  G-tube. + poor oral appetite     Insomnia - Remeron 15 mg q hs     Pain  Tylenol supp prn  Cymbalta 20 daily     Disposition:  Long term care at [facility]. Updates to guardian Demetrice on a regular basis.     FOLLOW UP APPOINTMENTS   *Follow-Up with specialists as recommended.            Future Appointments   Date Time Provider Department Center   2/14/2024 10:20 AM Kari Weinberg MD [facility]      *Greater than 35 minutes spent w/ patient and staff, including but not limited to/ reviewing present status, needs, abilities with disciplines, reviewing medical records, vital signs, labs, completing med rec and entering orders to establish plan of care in [facility].  Note to patient: The 21st Century Cures Act makes medical notes like these available to patients in the interest of transparency. However, this  is a medical document intended as peer to peer communication. It is written in medical language and may contain abbreviations or verbiage that are unfamiliar. It may appear blunt or direct. Medical documents are intended to carry relevant information, facts as evident, and the clinical opinion of the practitioner who signs the document.    Venessa CLAROS (Daya)  2/13/24  10:45 am   Formerly [facility] Post Acute Care Team

## 2024-02-17 VITALS
DIASTOLIC BLOOD PRESSURE: 74 MMHG | HEART RATE: 126 BPM | WEIGHT: 133.63 LBS | SYSTOLIC BLOOD PRESSURE: 137 MMHG | TEMPERATURE: 98 F | OXYGEN SATURATION: 98 % | BODY MASS INDEX: 27 KG/M2

## 2024-02-17 NOTE — PROGRESS NOTES
Mayte Yates.  1041. APN Encounter 2/15/24 245 pm    Met with pt at bedside with noted mild facial twitching; less than the last 2 days.  Is awake, alert, confused at times.  Pt still with water stools; ends Dificid tomorrow for C-Diff.  Behavior unchanged with report that she has intermittent times of agitation with yelling, screaming and tearful.  Will discuss medications with Dr. Mazariegos, psychiatrist and Dr. Gaffney. May consider increasing Cymbalto; currently on 20 mg daily.    Recent left sided facial twitching; stat labs WNL.  Valproic acid 67.2.    Changed to puree diet; speech and dietician following.     Mayte Yates  : 11/10/1945. 78 year old female is admitted to [facility] for rehabilitation and strengthening.       [facility] Re-Admission: 24  Returned to [place]:               24     Chief complaint today: decrease in facial twitching,dysphagia, weakness, left arm pain, agitation.      HPI 78 yr old female with PMhx of anxiety, breast cancer, HTN, DL, dementia presented to the ED after a fall at NH.  Pt was seen earlier in the ED today and had negative imaging and was sent back to [facility] Pt was noted to have worsening L side facial droop so was sent back to the ED.  Pt does have chronic L sided weakness from previous ICH. She is alert to only self given her dementia. Overall, she is a poor historian. She denied any complaints. She denied any CP or SOB. No F/C. No cough. No N/V/D/C or abd pain. In ED, she was scanned again given facial droop and found to have PE. She does have remote history of GI bleed and gastritis. In the ED, she was heme negative. Pt was started on heparin gtt.      Pt was admitted, anticoagulated with heparin drip and transitioned to Eliquis;  discharged back to NH in stable condition.          Past Medical History:   Diagnosis Date    Anxiety      Breast CA (HCC)     C. difficile diarrhea      CANCER       lft breast lumpectomy pre  cancerous cells stage 0    Diabetes mellitus (HCC)      Diarrhea, unspecified      Ductal carcinoma in situ of breast      left breast    Flatulence/gas pain/belching      GERD      H/O infectious mononucleosis       diagnosed in 1960s    High cholesterol      Hypertension      IBS (irritable bowel syndrome)      Osteoporosis      Other and unspecified hyperlipidemia      Personal history of irradiation, presenting hazards to health 2004     mammosite    Sleep disturbance      Stool incontinence      Stroke (HCC)      Type II or unspecified type diabetes mellitus without mention of complication, not stated as uncontrolled      Wears glasses              Past Surgical History:   Procedure Laterality Date    APPENDECTOMY         194    COLONOSCOPY          hiatal hernia, sm internal hem    COLONOSCOPY   2005     fiberoptic    D & C        IR ANGIOGRAM CEREBRAL CAROTID BILATERAL   10/12/2023    LUMPECTOMY LEFT   2004     breast    OTHER SURGICAL HISTORY         lumpectomy  lft breast stage 0    OTHER SURGICAL HISTORY   10/11/2023     CRANIOTOMY FOR RIGHT FRONTAL HEMORRHAGE    RADIATION LEFT         Mammosite    TONSILLECTOMY             UPPER GI ENDOSCOPY,EXAM         2005            Family History   Problem Relation Age of Onset    Heart Disorder Father            of heart attack age 57    Alcohol and Other Disorders Associated Father      Arthritis Father      Other (Other) Father      Heart Disease Mother           CHF age 80    Arthritis Mother      Other (Other) Mother      Diabetes Sister           mellitus    Heart Disease Maternal Grandfather      Cancer Maternal Grandmother           brain tumor    Breast Cancer Self        Social History            Socioeconomic History    Marital status:    Tobacco Use    Smoking status: Former       Packs/day: 1.00       Years: 20.00       Additional pack years: 0.00       Total pack years: 20.00       Types: Cigarettes        Quit date: 3/1/2006       Years since quittin.9    Smokeless tobacco: Never   Vaping Use    Vaping Use: Never used   Substance and Sexual Activity    Alcohol use: No       Alcohol/week: 0.0 standard drinks of alcohol    Drug use: No   Other Topics Concern    Caffeine Concern No       Comment: tries to drink decaf drinks    Exercise Yes       Comment: jazzercise 3x/wk, tennis 1-2x/wk      Social Determinants of Health           Food Insecurity: No Food Insecurity (2024)     Food Insecurity      Food Insecurity: Never true   Transportation Needs: No Transportation Needs (2024)     Transportation Needs      Lack of Transportation: No   Housing Stability: Low Risk  (2024)     Housing Stability      Housing Instability: No      Housing Instability Emergency: No      IMMUNIZATIONS       Immunization History   Administered Date(s) Administered    Covid-19 Vaccine Moderna 100 mcg/0.5 ml 02/10/2021, 03/10/2021, 10/22/2021, 2022    Covid-19 Vaccine Moderna Bivalent 50mcg/0.5mL 12+ years 2022    DTAP 2007    FLU VAC High Dose 65 YRS & Older PRSV Free (69632) 10/24/2016, 09/15/2020    FLUAD High Dose 65 yr and older (39703) 10/11/2019, 2021    Fluzone Vaccine Medicare () 10/28/2013, 10/24/2016, 09/15/2020    HIGH DOSE FLU 65 YRS AND OLDER PRSV FREE SINGLE D (16005) FLU CLINIC 2022    Influenza 10/24/2012, 10/23/2015, 10/20/2017, 10/05/2018    Pneumococcal (Prevnar 13) 2016    Pneumococcal (Prevnar 7) 2010    Pneumovax 23 2017    TDAP 2019    Zoster Vaccine Live (Zostavax) 2012    Zoster Vaccine Recombinant Adjuvanted (Shingrix) 2023, 2023      ALLERGIES:        Allergies   Allergen Reactions    Ampicillin NAUSEA ONLY       Caps    Codeine [Opioid Analgesics]         Derivatives  Syncope    Cortisone [Cortisone Acetate]         Injection into L shoulder  Syncope, stomach cramps      CODE STATUS:  DNR     ADVANCED CARE PLANNING  TEAM: SW and staff to provide updates for Guardian Demetrice on a regular basis and with any change in condition.      CURRENT MEDICATIONS - reviewed and updated on SNF EMAR  Tylenol 325 one supp q 4 hrs prn fever  Acidophilus priobiotic - 1 cap per Gtube  Alendronate 70 mg 1 x week  Atorvastatin 80 mg q hs  TUMS one tab per G tube daily  Duloxetine 20 mg daily  Mirtazapine 15 mg q hs  Mupirocin 2% ointment prn g-tube drainage  Omeprazole 20 via g tube twice daily  OSmolite 1.5 kavin per g tube daily  K+ 20 meq daily  Seroquel 25 mg tid  Sucralfate 1 G q am via g tube  Valproic acid 750 tid  Restart Vanco 125 4 x daily completed  Dificid 200 twice daily x 10 days for Cdiff - ends 2/16/24   Banatrol Plus 2 x day                  SUBJECTIVE: difficult to communicate with.  C/O left arm pain, Denies chest pain, n/v/.    + diarrhea r/t C-Diff.     PHYSICAL EXAM: /74.  P 126. T 98.1. 98%. wgt 133.6#  GENERAL HEALTH: well developed, well nourished, + agitation, + L arm pain.    LINES, TUBES, DRAINS:  +g tube with daily feedings  SKIN: pale, warm, dry  WOUND: see skin/wound assessments per staff   EYES: Pupils round and equal. No jaundice. conjunctiva normal; no drainage from eyes  HENT: + facial twitching left, improving.  + dysphagia. no nasal drainage, mucous membranes pink, moist   NECK:supple  BREAST: deferred exam   RESPIRATORY: lungs clear  CARDIOVASCULAR:  RRR, tackycardia - rate 126  ABDOMEN: + g-tube. abdominal binder in place to protect Gtube. BS+, nondistended; + diarrhea,  incontinent or stool.  : incontinent  LYMPHATIC:no lymphedema  MUSCULOSKELETAL: chair and bed confined; unable to stand or ambulate  EXTREMITIES/VASCULAR: no edema. L sided weakness. L arm pain, + neuropathy.  NEUROLOGIC: intermittently will follow commands and instructions;  on and off able to communicate with staff. Neuro checks intact.  PSYCHIATRIC: agitated, anxious , sleep disturbance, and mood disorder   Seroquel 25 increased to tid by  psychiatrist.     MEDICAL DECISION MAKING. Mayda Jo makes medical decision for patient.     Lab Results: 2/13/24  Wbc 4.4. Hgb 11. Plts 164.  Na 136. K 6.3 from  2.5.  Repeat K 4.8.  Cl 100. Co2 33. BUN 21. Creat 0.7. alb 2.9. gfr 60  HGB A1C 6.9.   Valproic acid 67.2.     Assessment /Plan    Facial Twitching improved  Labs WNL  Valproic acid 67.2 - WNL  Puree diet  Speech to follow  Dietician following - puree diet  Monitor neuro checks  Trend labs     Hypokalemia /Hyperkalemia-   Repeat K+ 4.8 from 6.3.   Trend labs    Recurrent C Diff - failed oral vanco.   Completed Vanco 125 mg 4 x daily  Start Dificid 200 mg twice daily x 10 days, end tomorrow 2/16/24.   Banatrol Plus twice daily  Probiotic twice daily via g-tube  Turn and change brief q 3-4 hrs  Zinc to perineum and sacrum with each change  Monitor diarrhea.     Acute R sided PE and LLE DVT  Eliquis 5 mg 2 x daily  ECHO reported without any RV strain  Dopplers was + for LLE DVT     CVA w/ residual left sided weakness with peripheral neuropathy.  History of Fall  Prior ICH s/p Craniotomy  Valproic acid 750 tid - level WNL  Neurology on consult  No seizure activity noted on EEG     Leukocytosis w/fall on admission  Negative for UTI     Anxiety/Dementia/Agitation  Seroquel increased to 25 mg tid  Remeron 15 mg q hs  Cymbalta 20 daily     Dyslipidemia - atorvastatin 80 mg q hs per g-tube      GERD/gastritis   Omeprazole 29 via Gtube 2 x daily  Sucralfate 1 G by mouth daily  G-tube. + poor oral appetite     Insomnia - Remeron 15 mg q hs     Pain  Tylenol supp prn  Cymbalta 20 daily     Disposition:  Long term care at [facility]. Updates to mayda Suresh on a regular basis.     FOLLOW UP APPOINTMENTS   *Follow-Up with specialists as recommended.            Future Appointments   Date Time Provider Department Center   2/14/2024 10:20 AM Kari Weinberg MD [facility]      *Greater than 35 minutes spent w/ patient and staff, including but not  limited to/ reviewing present status, needs, abilities with disciplines, reviewing medical records, vital signs, labs, completing med rec and entering orders to establish plan of care in [facility].  Note to patient: The 21st Century Cures Act makes medical notes like these available to patients in the interest of transparency. However, this is a medical document intended as peer to peer communication. It is written in medical language and may contain abbreviations or verbiage that are unfamiliar. It may appear blunt or direct. Medical documents are intended to carry relevant information, facts as evident, and the clinical opinion of the practitioner who signs the document.    Venessa Saucedo (Daya) APRN  2/15/24  2:45 pm   [facility] Post Acute Care Team

## 2024-02-22 ENCOUNTER — SNF VISIT (OUTPATIENT)
Dept: INTERNAL MEDICINE CLINIC | Age: 79
End: 2024-02-22

## 2024-02-22 DIAGNOSIS — F91.9 BEHAVIOR DISTURBANCE: ICD-10-CM

## 2024-02-22 DIAGNOSIS — R13.10 DYSPHAGIA, UNSPECIFIED TYPE: ICD-10-CM

## 2024-02-22 DIAGNOSIS — E87.6 HYPOKALEMIA: ICD-10-CM

## 2024-02-22 DIAGNOSIS — Z86.19 HISTORY OF INFECTION OF INTESTINE DUE TO CLOSTRIDIUM DIFFICILE: ICD-10-CM

## 2024-02-22 DIAGNOSIS — D50.8 OTHER IRON DEFICIENCY ANEMIA: ICD-10-CM

## 2024-02-22 DIAGNOSIS — I62.9 INTRACRANIAL HEMORRHAGE (HCC): ICD-10-CM

## 2024-02-22 DIAGNOSIS — Z98.890 HISTORY OF CRANIOTOMY: ICD-10-CM

## 2024-02-22 DIAGNOSIS — I63.9 RIGHT-SIDED CEREBROVASCULAR ACCIDENT (CVA) (HCC): ICD-10-CM

## 2024-02-22 DIAGNOSIS — R79.81 LOW O2 SATURATION: Primary | ICD-10-CM

## 2024-02-22 DIAGNOSIS — D72.829 LEUKOCYTOSIS, UNSPECIFIED TYPE: ICD-10-CM

## 2024-02-22 DIAGNOSIS — R19.7 DIARRHEA, UNSPECIFIED TYPE: ICD-10-CM

## 2024-02-22 DIAGNOSIS — R09.02 OXYGEN DECREASE: ICD-10-CM

## 2024-02-22 DIAGNOSIS — Z43.1 ATTENTION TO G-TUBE (HCC): ICD-10-CM

## 2024-02-22 PROCEDURE — 99497 ADVNCD CARE PLAN 30 MIN: CPT | Performed by: NURSE PRACTITIONER

## 2024-02-22 PROCEDURE — 99310 SBSQ NF CARE HIGH MDM 45: CPT | Performed by: NURSE PRACTITIONER

## 2024-02-23 VITALS
HEART RATE: 98 BPM | RESPIRATION RATE: 15 BRPM | WEIGHT: 136.38 LBS | SYSTOLIC BLOOD PRESSURE: 106 MMHG | TEMPERATURE: 97 F | BODY MASS INDEX: 28 KG/M2 | OXYGEN SATURATION: 96 % | DIASTOLIC BLOOD PRESSURE: 54 MMHG

## 2024-02-24 NOTE — PROGRESS NOTES
Mayte Yates,  [birth year] APN Encounter  24. 11:30 am.    Met with patient and staff at bedside. Patient is awake, alert, agitated and periods of yelling. Low pulse Ox high 85-89%  requiring oxygen at 2-3 liters. Poor oral intake for breakfast and lunch; approximately 20-25% - total feed by staff.  Patient have watery stools; staff unable to collect stool for C-Diff d/t watery consistency.     Abnormal labs reviewed:  Chest X-ray negative.  WBC 10.34. HGB 9.6. K 3.8. Alb 2.7.  Valproic Acid low  29.5 from 48 (ref range )  Ordered repeat  CBC, BMP and Valproic Acid 24.  Continue to try to collect stool for C-Diff; completed oral vanco.  Completed oral  Deficid 200 mg twice daily x 10 days on 24.    Mayte Yates  : 11/10/1945. 78 year old female is admitted to [facility] for rehabilitation and strengthening.       [facility] Re-Admission: 24  Returned to [place]:               24     Chief complaint today: low pulse Ox requiring oxygen. left arm pain, agitation and poor oral intake.      HPI 78 yr old female with PMhx of anxiety, breast cancer, HTN, DL, dementia presented to the ED after a fall at NH.  Pt was seen earlier in the ED today and had negative imaging and was sent back to [facility] Pt was noted to have worsening L side facial droop so was sent back to the ED.  Pt does have chronic L sided weakness from previous ICH. She is alert to only self given her dementia. Overall, she is a poor historian. She denied any complaints. She denied any CP or SOB. No F/C. No cough. No N/V/D/C or abd pain. In ED, she was scanned again given facial droop and found to have PE. She does have remote history of GI bleed and gastritis. In the ED, she was heme negative. Pt was started on heparin gtt.      Pt was admitted, anticoagulated with heparin drip and transitioned to Eliquis;  discharged back to NH in stable condition.        Past Medical History:   Diagnosis Date     Anxiety      Breast CA (HCC) 2004    C. difficile diarrhea      CANCER       lft breast lumpectomy pre cancerous cells stage 0    Diabetes mellitus (HCC)      Diarrhea, unspecified      Ductal carcinoma in situ of breast      left breast    Flatulence/gas pain/belching      GERD      H/O infectious mononucleosis       diagnosed in 1960s    High cholesterol      Hypertension      IBS (irritable bowel syndrome)      Osteoporosis      Other and unspecified hyperlipidemia      Personal history of irradiation, presenting hazards to health 2004     mammosite    Sleep disturbance      Stool incontinence      Stroke (HCC)      Type II or unspecified type diabetes mellitus without mention of complication, not stated as uncontrolled      Wears glasses              Past Surgical History:   Procedure Laterality Date    APPENDECTOMY         194    COLONOSCOPY          hiatal hernia, sm internal hem    COLONOSCOPY   2005     fiberoptic    D & C        IR ANGIOGRAM CEREBRAL CAROTID BILATERAL   10/12/2023    LUMPECTOMY LEFT   2004     breast    OTHER SURGICAL HISTORY         lumpectomy 2004 lft breast stage 0    OTHER SURGICAL HISTORY   10/11/2023     CRANIOTOMY FOR RIGHT FRONTAL HEMORRHAGE    RADIATION LEFT         Mammosite    TONSILLECTOMY             UPPER GI ENDOSCOPY,EXAM         2005            Family History   Problem Relation Age of Onset    Heart Disorder Father            of heart attack age 57    Alcohol and Other Disorders Associated Father      Arthritis Father      Other (Other) Father      Heart Disease Mother           CHF age 80    Arthritis Mother      Other (Other) Mother      Diabetes Sister           mellitus    Heart Disease Maternal Grandfather      Cancer Maternal Grandmother           brain tumor    Breast Cancer Self        Social History            Socioeconomic History    Marital status:    Tobacco Use    Smoking status: Former       Packs/day: 1.00        Years: 20.00       Additional pack years: 0.00       Total pack years: 20.00       Types: Cigarettes       Quit date: 3/1/2006       Years since quittin.9    Smokeless tobacco: Never   Vaping Use    Vaping Use: Never used   Substance and Sexual Activity    Alcohol use: No       Alcohol/week: 0.0 standard drinks of alcohol    Drug use: No   Other Topics Concern    Caffeine Concern No       Comment: tries to drink decaf drinks    Exercise Yes       Comment: jazzercise 3x/wk, tennis 1-2x/wk      Social Determinants of Health           Food Insecurity: No Food Insecurity (2024)     Food Insecurity      Food Insecurity: Never true   Transportation Needs: No Transportation Needs (2024)     Transportation Needs      Lack of Transportation: No   Housing Stability: Low Risk  (2024)     Housing Stability      Housing Instability: No      Housing Instability Emergency: No           Immunization History   Administered Date(s) Administered    Covid-19 Vaccine Moderna 100 mcg/0.5 ml 02/10/2021, 03/10/2021, 10/22/2021, 2022    Covid-19 Vaccine Moderna Bivalent 50mcg/0.5mL 12+ years 2022    DTAP 2007    FLU VAC High Dose 65 YRS & Older PRSV Free (49207) 10/24/2016, 09/15/2020    FLUAD High Dose 65 yr and older (16406) 10/11/2019, 2021    Fluzone Vaccine Medicare () 10/28/2013, 10/24/2016, 09/15/2020    HIGH DOSE FLU 65 YRS AND OLDER PRSV FREE SINGLE D (93447) FLU CLINIC 2022    Influenza 10/24/2012, 10/23/2015, 10/20/2017, 10/05/2018    Pneumococcal (Prevnar 13) 2016    Pneumococcal (Prevnar 7) 2010    Pneumovax 23 2017    TDAP 2019    Zoster Vaccine Live (Zostavax) 2012    Zoster Vaccine Recombinant Adjuvanted (Shingrix) 2023, 2023            Allergies   Allergen Reactions    Ampicillin NAUSEA ONLY       Caps    Codeine [Opioid Analgesics]         Derivatives  Syncope    Cortisone [Cortisone Acetate]         Injection into L  shoulder  Syncope, stomach cramps      CODE STATUS:  DNR     ADVANCED CARE PLANNING TEAM: SW and staff to provide updates for Guardian Demetrice on a regular basis and with any change in condition.      CURRENT MEDICATIONS - reviewed and updated on SNF EMAR  Tylenol 325 one supp q 4 hrs prn fever  Acidophilus priobiotic - 1 cap per Gtube  Alendronate 70 mg 1 x week  Atorvastatin 80 mg q hs  TUMS one tab per G tube daily  Duloxetine 30 mg daily  Mirtazapine 15 mg q hs  Mupirocin 2% ointment prn g-tube drainage  Omeprazole 20 via g tube twice daily  OSmolite 1.5 kavin per g tube daily  K+ 20 meq daily  Seroquel 25 mg tid  Sucralfate 1 G q am via g tube  Valproic acid 750 tid  Restart Vanco 125 4 x daily completed  Dificid 200 twice daily x 10 days for Cdiff - ended 2/17/24   Banatrol Plus 2 x day                  SUBJECTIVE: difficult to communicate with.  C/O left arm pain, Denies chest pain, n/v/. + recurrent watery stools. Mild SOB        PHYSICAL EXAM: /54.  P98. T 97.1. 88%. wgt 136.4#  GENERAL HEALTH: well developed, well nourished, + agitation, + L arm pain; mild SOB, watery stools..    LINES, TUBES, DRAINS:  +g tube with daily feedings  SKIN: pale, warm, dry  WOUND: see skin/wound assessments per staff   EYES: Pupils round and equal. No jaundice. conjunctiva normal; no drainage from eyes  HENT: mild occasional facial twitching left.  + dysphagia. no nasal drainage, mucous membranes moist   NECK:supple  BREAST: deferred exam   RESPIRATORY: lungs clear  CARDIOVASCULAR:  RRR, 98  ABDOMEN: + g-tube. abdominal binder in place to protect Gtube. BS+, nondistended; + watery stools; incontinent of stool.  : incontinent  LYMPHATIC:no lymphedema  MUSCULOSKELETAL: chair and bed confined; unable to stand or ambulate  EXTREMITIES: no edema. L sided weakness. L arm pain, + neuropathy.  NEUROLOGIC: intermittently will follow simple commands and instructions;  occasionally will communicate with staff. Neuro checks  intact.  PSYCHIATRIC: agitated, anxious , sleep disturbance, and mood disorder   Seroquel 25 increased to tid by psychiatrist. Prn Xanax for anxiety.     MEDICAL DECISION MAKING. Guardian Demetrice Jo makes medical decision for patient    Later this afternoon 30 minute conversation.   This APN contacted guardian Demetrice Jo to provide clinical update on patient including medications, vital signs, lab results, behavior, tube feedings and appetite.  Guardian understands patient is not doing well but is hopeful she will respond to our care and treatment in a positive way.  She is aware of palliative care and hospice services and possible order to \"do not rehospitalize.\"  Demetrice would like patient to continue same care and treatment and requesting regular updates and if and when patient has a change in condition.   Informed ADON of conversation..     Lab Results: 2/22/24  Wbc 10.3 from 4.4. Hgb 9.6 from 11.  Plts 221.  Na 140. K 3.8.  Cl 104. Co2 18. BUN 22. Creat 0.5. alb 2.7. gfr 60  HGB A1C 6.9.   Valproic acid 29.5 from 48.        Assessment /Plan    Pulse Ox 80's  Oxygen 2 2-3 L prn  VS q shift and prn  HGB 9.6  Trend labs    Facial Twitching improved  Labs - see above  Valproic acid 29.5 from 48. Repeat tomorrow  Speech following & updated guardian   Dietician following - puree diet; total feed.  Monitor neuro checks  Trend labs     Hypokalemia /Hyperkalemia-   K+ 3.8     Trend labs    Recurrent C Diff - failed oral vanco.   Watery stools; recurrent.  Completed Vanco 125 mg 4 x daily  Completed Dificid 200 mg twice daily x 10 days   Banatrol Plus twice daily  Probiotic twice daily via g-tube  Turn and change brief q 3-4 hrs  Zinc to perineum and sacrum with each change  Obtain stool for C-diff     LLE DVT  Eliquis 5 mg 2 x daily  ECHO reported without any RV strain  Doppler was + for LLE DVT     CVA w/ residual left sided weakness with peripheral neuropathy.  History of Fall  Prior ICH s/p Craniotomy  Valproic acid 750  tid - level WNL  Neurology  f/u  No seizure activity noted on EEG     Leukocytosis w/fall on admission  UA pending     Anxiety/Dementia/Agitation  Seroquel increased to 25 mg tid  Remeron 15 mg q hs  Cymbalta 30mg daily     Dyslipidemia - atorvastatin 80 mg q hs per g-tube      GERD/gastritis   Omeprazole 29 via Gtube 2 x daily  Sucralfate 1 G by mouth daily  G-tube. + oral  pureed diet.      Insomnia - Remeron 15 mg q hs     Pain  Tylenol supp prn  Cymbalta increased to 30 mg daily     Disposition:  Long term care at [facility]. Updates to guardian Demetrice on a regular basis.     FOLLOW UP APPOINTMENTS   *Follow-Up with specialists as recommended.     *Greater than 35 minutes spent w/ patient and staff, including but not limited to/ reviewing present status, needs, abilities with disciplines, reviewing medical records, vital signs, labs, completing med rec and entering orders to establish plan of care in [facility].  Note to patient: The 21st Century Cures Act makes medical notes like these available to patients in the interest of transparency. However, this is a medical document intended as peer to peer communication. It is written in medical language and may contain abbreviations or verbiage that are unfamiliar. It may appear blunt or direct. Medical documents are intended to carry relevant information, facts as evident, and the clinical opinion of the practitioner who signs the document.    Venessa CLAROS (Daya)  2/22/24  11:30 am    [facility] Post Acute Care Team

## 2024-02-27 ENCOUNTER — SNF VISIT (OUTPATIENT)
Dept: INTERNAL MEDICINE CLINIC | Age: 79
End: 2024-02-27

## 2024-02-27 DIAGNOSIS — F91.9 BEHAVIOR DISTURBANCE: ICD-10-CM

## 2024-02-27 DIAGNOSIS — A04.71 RECURRENT CLOSTRIDIUM DIFFICILE DIARRHEA: Primary | ICD-10-CM

## 2024-02-27 DIAGNOSIS — I62.9 INTRACRANIAL HEMORRHAGE (HCC): ICD-10-CM

## 2024-02-27 DIAGNOSIS — R09.02 OXYGEN DECREASE: ICD-10-CM

## 2024-02-27 DIAGNOSIS — I63.9 RIGHT-SIDED CEREBROVASCULAR ACCIDENT (CVA) (HCC): ICD-10-CM

## 2024-02-27 DIAGNOSIS — Z98.890 HISTORY OF CRANIOTOMY: ICD-10-CM

## 2024-02-27 DIAGNOSIS — R41.0 CONFUSION: ICD-10-CM

## 2024-02-27 DIAGNOSIS — R13.10 DYSPHAGIA, UNSPECIFIED TYPE: ICD-10-CM

## 2024-02-27 DIAGNOSIS — D72.829 LEUKOCYTOSIS, UNSPECIFIED TYPE: ICD-10-CM

## 2024-02-27 DIAGNOSIS — N39.0 RECURRENT UTI (URINARY TRACT INFECTION): ICD-10-CM

## 2024-02-27 PROCEDURE — 99310 SBSQ NF CARE HIGH MDM 45: CPT | Performed by: NURSE PRACTITIONER

## 2024-02-27 PROCEDURE — 99497 ADVNCD CARE PLAN 30 MIN: CPT | Performed by: NURSE PRACTITIONER

## 2024-02-27 PROCEDURE — G0317 PROLNG NSG FAC E/M EA ADDL 15 MIN: HCPCS | Performed by: NURSE PRACTITIONER

## 2024-02-28 VITALS
TEMPERATURE: 97 F | DIASTOLIC BLOOD PRESSURE: 62 MMHG | HEART RATE: 102 BPM | BODY MASS INDEX: 27 KG/M2 | WEIGHT: 131.19 LBS | SYSTOLIC BLOOD PRESSURE: 131 MMHG | OXYGEN SATURATION: 96 %

## 2024-02-28 RX ORDER — LEVOFLOXACIN 250 MG/1
250 TABLET, FILM COATED ORAL DAILY
COMMUNITY
Start: 2024-02-28

## 2024-02-28 RX ORDER — ALPRAZOLAM 0.25 MG/1
0.25 TABLET ORAL EVERY 8 HOURS
COMMUNITY

## 2024-02-28 RX ORDER — FIDAXOMICIN 200 MG/1
200 TABLET, FILM COATED ORAL 2 TIMES DAILY
COMMUNITY
Start: 2024-02-28

## 2024-02-29 ENCOUNTER — SNF VISIT (OUTPATIENT)
Dept: INTERNAL MEDICINE CLINIC | Age: 79
End: 2024-02-29

## 2024-02-29 DIAGNOSIS — R13.10 DYSPHAGIA, UNSPECIFIED TYPE: ICD-10-CM

## 2024-02-29 DIAGNOSIS — Z99.81 SUPPLEMENTAL OXYGEN DEPENDENT: ICD-10-CM

## 2024-02-29 DIAGNOSIS — R06.2 EXPIRATORY WHEEZING: ICD-10-CM

## 2024-02-29 DIAGNOSIS — F91.9 BEHAVIOR DISTURBANCE: ICD-10-CM

## 2024-02-29 DIAGNOSIS — R41.0 CONFUSION: ICD-10-CM

## 2024-02-29 DIAGNOSIS — R45.1 RESTLESSNESS AND AGITATION: ICD-10-CM

## 2024-02-29 DIAGNOSIS — D72.829 LEUKOCYTOSIS, UNSPECIFIED TYPE: ICD-10-CM

## 2024-02-29 DIAGNOSIS — Z43.1 ATTENTION TO G-TUBE (HCC): ICD-10-CM

## 2024-02-29 DIAGNOSIS — N39.0 RECURRENT UTI (URINARY TRACT INFECTION): ICD-10-CM

## 2024-02-29 DIAGNOSIS — A04.71 RECURRENT CLOSTRIDIUM DIFFICILE DIARRHEA: Primary | ICD-10-CM

## 2024-02-29 DIAGNOSIS — Z98.890 HISTORY OF CRANIOTOMY: ICD-10-CM

## 2024-02-29 PROCEDURE — 99309 SBSQ NF CARE MODERATE MDM 30: CPT | Performed by: NURSE PRACTITIONER

## 2024-02-29 NOTE — PROGRESS NOTES
Mayte Yates.   [birth year] APN Encounter 24  10:30 to 11:30  - Interdisciplinary Team Meeting with Guardian present; Edmetrice.  Encounter 5-5:30 pm    IDT Mtg: This morning at 10:30 am; interdisciplinary team meeting was held with guardian Demetrice HAN.  All representatives present at this IDT.  Multiple topics discussed including environmental changes for comfort. Discussed bringing In resident's memorabilia, favorite items and pictures for calming environment.  Discussed medication adjustments, clinical signs and symptoms of C-DIff and a UTI; cultures pending.  Discussed tube feedings with dietician present. Discuss swallow eval with speech therapist present. Discussed foods she likes, dislikes and can have vs. foods that are safe for patient to eat.  Discussed total feeding by CNA or RN's to give food slowly. Speech therapist explained she has more drooling and spitting out her puree foods.  Total nutrition is via tube feedings.  ST not recommended regular consistency. Puree and nectar thick remains.  Guardian is looking to get approval from the court for a 1:1 sitter.  Broda chair or Gerichair recommended for comfort.  Demetrice also agreed to schedule the Alprazolam 0.25 mg q 8 hrs.    2nd Encounter: 5pm  Met with patient  approximately 5pm.  Patient had another watery, foul smelling stool.  Stool culture came back + for C-Diff. Reordered Dificid bid x 10 days.  Urine Culture + for UTI;  Ordered Levaquin 250 mg daily x 10 days.    Abnormal labs reviewed:  Chest X-ray negative.  Labs :  WBC 16.3 from 10. HGB 11 from 9.6. Plts 328.  Na 136. K 4.3. Cl 100. Co2 28. BUN 15. Creat 0.8. gfr >60   Valproic Acid level WNL from 29 the day before.    Mayte Yates  : 11/10/1945. 78 year old female is admitted to [facility] [place] for rehabilitation and strengthening.       [facility] Re-Admission: 24  Returned to [place]:               24     Chief complaint today: refusing to eat, diarrhea, agitation, left  arm pain. SOB when in bed.    HPI 78 yr old female with PMhx of anxiety, breast cancer, HTN, DL, dementia presented to the ED after a fall at NH.  Pt was seen earlier in the ED today and had negative imaging and was sent back to [facility] Pt was noted to have worsening L side facial droop so was sent back to the ED.  Pt does have chronic L sided weakness from previous ICH. She is alert to only self given her dementia. Overall, she is a poor historian. She denied any complaints. She denied any CP or SOB. No F/C. No cough. No N/V/D/C or abd pain. In ED, she was scanned again given facial droop and found to have PE. She does have remote history of GI bleed and gastritis. In the ED, she was heme negative. Pt was started on heparin gtt.      Pt was admitted, anticoagulated with heparin drip and transitioned to Eliquis;  discharged back to NH in stable condition.        Past Medical History:   Diagnosis Date    Anxiety      Breast CA (HCC) 2004    C. difficile diarrhea      CANCER       lft breast lumpectomy pre cancerous cells stage 0    Diabetes mellitus (HCC)      Diarrhea, unspecified      Ductal carcinoma in situ of breast 2004     left breast    Flatulence/gas pain/belching      GERD      H/O infectious mononucleosis       diagnosed in 1960s    High cholesterol      Hypertension      IBS (irritable bowel syndrome)      Osteoporosis      Other and unspecified hyperlipidemia      Personal history of irradiation, presenting hazards to health 01/01/2004     mammosite    Sleep disturbance      Stool incontinence      Stroke (HCC)      Type II or unspecified type diabetes mellitus without mention of complication, not stated as uncontrolled      Wears glasses      *  C-DIFF X 3        Past Surgical History:   Procedure Laterality Date    APPENDECTOMY         1948    COLONOSCOPY         2005 hiatal hernia, sm internal hem    COLONOSCOPY   01/01/2005     fiberoptic    D & C        IR ANGIOGRAM CEREBRAL CAROTID BILATERAL    10/12/2023    LUMPECTOMY LEFT   2004     breast    OTHER SURGICAL HISTORY         lumpectomy  lft breast stage 0    OTHER SURGICAL HISTORY   10/11/2023     CRANIOTOMY FOR RIGHT FRONTAL HEMORRHAGE    RADIATION LEFT         Mammosite    TONSILLECTOMY         194    UPPER GI ENDOSCOPY,EXAM         2005            Family History   Problem Relation Age of Onset    Heart Disorder Father            of heart attack age 57    Alcohol and Other Disorders Associated Father      Arthritis Father      Other (Other) Father      Heart Disease Mother           CHF age 80    Arthritis Mother      Other (Other) Mother      Diabetes Sister           mellitus    Heart Disease Maternal Grandfather      Cancer Maternal Grandmother           brain tumor    Breast Cancer Self        Social History            Socioeconomic History    Marital status:    Tobacco Use    Smoking status: Former       Packs/day: 1.00       Years: 20.00       Additional pack years: 0.00       Total pack years: 20.00       Types: Cigarettes       Quit date: 3/1/2006       Years since quittin.9i    Smokeless tobacco: Never   Vaping Use    Vaping Use: Never used   Substance and Sexual Activity    Alcohol use: No       Alcohol/week: 0.0 standard drinks of alcohol    Drug use: No   Other Topics Concern    Caffeine Concern No       Comment: tries to drink decaf drinks    Exercise Yes       Comment: jazzercise 3x/wk, tennis 1-2x/wk      Social Determinants of Health           Food Insecurity: No Food Insecurity (2024)     Food Insecurity      Food Insecurity: Never true   Transportation Needs: No Transportation Needs (2024)     Transportation Needs      Lack of Transportation: No   Housing Stability: Low Risk  (2024)     Housing Stability      Housing Instability: No      Housing Instability Emergency: No           Immunization History   Administered Date(s) Administered    Covid-19 Vaccine Moderna 100 mcg/0.5 ml  02/10/2021, 03/10/2021, 10/22/2021, 04/05/2022    Covid-19 Vaccine Moderna Bivalent 50mcg/0.5mL 12+ years 09/13/2022    DTAP 04/24/2007    FLU VAC High Dose 65 YRS & Older PRSV Free (63675) 10/24/2016, 09/15/2020    FLUAD High Dose 65 yr and older (52146) 10/11/2019, 09/28/2021    Fluzone Vaccine Medicare () 10/28/2013, 10/24/2016, 09/15/2020    HIGH DOSE FLU 65 YRS AND OLDER PRSV FREE SINGLE D (45620) FLU CLINIC 09/27/2022    Influenza 10/24/2012, 10/23/2015, 10/20/2017, 10/05/2018    Pneumococcal (Prevnar 13) 09/02/2016    Pneumococcal (Prevnar 7) 04/24/2010    Pneumovax 23 09/29/2017    TDAP 09/23/2019    Zoster Vaccine Live (Zostavax) 09/19/2012    Zoster Vaccine Recombinant Adjuvanted (Shingrix) 03/06/2023, 05/23/2023            Allergies   Allergen Reactions    Ampicillin NAUSEA ONLY       Caps    Codeine [Opioid Analgesics]         Derivatives  Syncope    Cortisone [Cortisone Acetate]         Injection into L shoulder  Syncope, stomach cramps      CODE STATUS:  DNR     ADVANCED CARE PLANNING TEAM: SW and staff to provide updates for Guardian Demetrice on a regular basis and with any change in condition.      CURRENT MEDICATIONS - reviewed and updated on SNF EMAR  Tylenol 325 one supp q 4 hrs prn fever  Acidophilus priobiotic - 1 cap per Gtube 2 x day  Alendronate 70 mg 1 x week  Atorvastatin 80 mg q hs  TUMS one tab per G tube daily  Duloxetine 30 mg daily  Mirtazapine 15 mg q hs  Mupirocin 2% ointment prn g-tube drainage  Omeprazole 20 via g tube twice daily  Osmolite 1.5 kavin per g tube daily  K+ 20 meq daily  Seroquel 25 mg tid  Sucralfate 1 G q am via g tube  Valproic acid 750 tid  Restart Vanco 125 4 x daily completed  2/28/24 Restart Dificid 200 2 x daily x 10 days for Cdiff - EOT  3/8/24   2/28/24 Start Levaquin 250mg daily x 10 days. EOT 3/8/24.  Banatrol Plus 2 x day                  SUBJECTIVE: Answers short questions. difficult to redirect when anxious or fixated on one thing. C/O left arm pain,  Denies chest pain, n/v/. + recurrent watery stools. Mild SOB when in bed.        PHYSICAL EXAM: /62. P102. RR 18. POx 96%. T 97.3. 131.2# from 136.4#  GENERAL HEALTH: well developed, well nourished, with loose stools, odorous urine, agitation, + L arm pain; mild SOB when in bed.     LINES, TUBES, DRAINS:  +g tube with daily feedings  SKIN: pale, warm, dry  WOUND: see skin/wound assessments per staff   EYES: Pupils round and equal. No jaundice. conjunctiva normal; no drainage from eyes  HENT: mild occasional facial twitching left.  + dysphagia.   no nasal drainage, mucous membranes moist   NECK:supple  BREAST: deferred exam   RESPIRATORY: lungs clear  CARDIOVASCULAR:  RRR, rate 102  ABDOMEN: + g-tube. Shortened abdominal binder for comfort to protect Gtube. BS+, nondistended; + watery stools; incontinent.  : incontinent  LYMPHATIC:no lymphedema  MUSCULOSKELETAL: chair and bed confined; unable to stand or ambulate  EXTREMITIES: no edema. L sided weakness. L arm pain, + neuropathy.  NEUROLOGIC: intermittently will follow simple commands and short instructions;  occasionally will communicate with staff. Neuro checks intact.  PSYCHIATRIC: agitated, anxious , sleep disturbance, and mood disorder more so with new diagnoses of UTI and C-Diff.    Seroquel 25 increased to tid by psychiatrist. Today scheduled xanax q 8 hrs.       MEDICAL DECISION MAKING. Guardian Demetrice Jo makes medical decisions for patient   Lab Results:  See above.     Assessment /Plan    Recurrent C-Diff x 3  Failed oral vanco, completed 10 day course of Dificid.  Restart Dificid 200 mg twice daily x 10 days EOT 3/8/24  Monitor loose stools  Isolation x 10 days.  Probiotic 2 x day    Recurrent UTI  Levaquin 250 q 24 hrs x 10 days EOT 3/8/24.  Increase fluids via G Tube and orally  Probiotic 2 x day    Pulse Ox 80's while in bed  Oxygen 2 2-3 L prn  VS q shift and prn    Facial Twitching improved  Labs - see above  Valproic acid WNL from 29.5    Speech following & updated guardian   Dietician following - puree diet; total feed.  Monitor neuro checks  Trend labs     Hypokalemia /Hyperkalemia-   K+ 4.3     Trend labs     LLE DVT  Eliquis 5 mg 2 x daily  ECHO reported without any RV strain  Doppler was + for LLE DVT     CVA w/residual L sided weakness, pain & peripheral neuropathy.  History of Falls  Prior ICH s/p Craniotomy  Valproic acid 750 tid - level WNL  Neurology  f/u  No seizure activity noted on EEG     Anxiety/Dementia/Agitation  Seroquel increased to 25 mg tid  Remeron 15 mg q hs  Cymbalta 30mg daily  Alprazolam 0.25 q 8 hrs    Dyslipidemia - atorvastatin 80 mg q hs per g-tube      GERD/gastritis   Omeprazole 29 via Gtube 2 x daily  Sucralfate 1 G by mouth daily  G-tube. + oral  pureed diet.      Insomnia - Remeron 15 mg q hs     Pain  Tylenol supp prn  Cymbalta increased to 30 mg daily     Disposition:  Long term care at [facility]. Updates to guardian Demetrice on a regular basis.     FOLLOW UP APPOINTMENTS   *Follow-Up with specialists as recommended.     *Greater than 35 minutes spent w/ patient and staff, including but not limited to/ reviewing present status, needs, abilities with disciplines, reviewing medical records, vital signs, labs, completing med rec and entering orders to establish plan of care in [facility].  Note to patient: The 21st Century Cures Act makes medical notes like these available to patients in the interest of transparency. However, this is a medical document intended as peer to peer communication. It is written in medical language and may contain abbreviations or verbiage that are unfamiliar. It may appear blunt or direct. Medical documents are intended to carry relevant information, facts as evident, and the clinical opinion of the practitioner who signs the document.    Venessa (Tony Saucedo APRN  2/27/24  1030 - 11:30 am   5pm to 5:30 pm   [facility] Post Acute Care Team

## 2024-03-03 VITALS
DIASTOLIC BLOOD PRESSURE: 74 MMHG | BODY MASS INDEX: 27 KG/M2 | OXYGEN SATURATION: 95 % | TEMPERATURE: 98 F | HEART RATE: 100 BPM | SYSTOLIC BLOOD PRESSURE: 126 MMHG | WEIGHT: 131.19 LBS | RESPIRATION RATE: 16 BRPM

## 2024-03-03 NOTE — PROGRESS NOTES
Mayte Yates.  [birth year]. APN Encounter 24. 10:30 am         Assessed patient at bedside and at the nurses station.  Mornings and late afternoons, patient has agitation with periods of yelling out. Patient today has received her scheduled Seroquel and will receive her scheduled Alprazolam.         Lungs today with expiratory wheezing.  Duonebs ordered q 6 for congestion, wheezing and RAMÍREZ.     Abnormal labs reviewed:  Chest X-ray negative.  Labs :  WBC 16.3 from 10. HGB 11 from 9.6. Plts 328.  Na 136. K 4.3. Cl 100. Co2 28. BUN 15. Creat 0.8. gfr >60   Valproic Acid level today WNL from 29 the day before.    Mayte Milan  : 11/10/1945. 78 year old female is admitted to [facility] [place] for rehabilitation and strengthening.       [facility] Re-Admission: 24  Returned to [place]:               24     Chief complaint today: refusing to eat, loose stools, SOB. agitation,     HPI 78 yr old female with PMhx of anxiety, breast cancer, HTN, DL, dementia presented to the ED after a fall at NH.  Pt was seen earlier in the ED today and had negative imaging and was sent back to [facility] Pt was noted to have worsening L side facial droop so was sent back to the ED.  Pt does have chronic L sided weakness from previous ICH. She is alert to only self given her dementia. Overall, she is a poor historian. She denied any complaints. She denied any CP or SOB. No F/C. No cough. No N/V/D/C or abd pain. In ED, she was scanned again given facial droop and found to have PE. She does have remote history of GI bleed and gastritis. In the ED, she was heme negative. Pt was started on heparin gtt.      Pt was admitted, anticoagulated with heparin drip and transitioned to Eliquis;  discharged back to NH in stable condition.        Past Medical History:   Diagnosis Date    Anxiety      Breast CA (HCC)     C. difficile diarrhea      CANCER       lft breast lumpectomy pre cancerous cells stage 0    Diabetes  mellitus (HCC)      Diarrhea, unspecified      Ductal carcinoma in situ of breast      left breast    Flatulence/gas pain/belching      GERD      H/O infectious mononucleosis       diagnosed in 1960s    High cholesterol      Hypertension      IBS (irritable bowel syndrome)      Osteoporosis      Other and unspecified hyperlipidemia      Personal history of irradiation, presenting hazards to health 2004     mammosite    Sleep disturbance      Stool incontinence      Stroke (HCC)      Type II or unspecified type diabetes mellitus without mention of complication, not stated as uncontrolled      Wears glasses      *  C-DIFF X 3        Past Surgical History:   Procedure Laterality Date    APPENDECTOMY         194    COLONOSCOPY          hiatal hernia, sm internal hem    COLONOSCOPY   2005     fiberoptic    D & C        IR ANGIOGRAM CEREBRAL CAROTID BILATERAL   10/12/2023    LUMPECTOMY LEFT   2004     breast    OTHER SURGICAL HISTORY         lumpectomy  lft breast stage 0    OTHER SURGICAL HISTORY   10/11/2023     CRANIOTOMY FOR RIGHT FRONTAL HEMORRHAGE    RADIATION LEFT         Mammosite    TONSILLECTOMY             UPPER GI ENDOSCOPY,EXAM         2005            Family History   Problem Relation Age of Onset    Heart Disorder Father            of heart attack age 57    Alcohol and Other Disorders Associated Father      Arthritis Father      Other (Other) Father      Heart Disease Mother           CHF age 80    Arthritis Mother      Other (Other) Mother      Diabetes Sister           mellitus    Heart Disease Maternal Grandfather      Cancer Maternal Grandmother           brain tumor    Breast Cancer Self        Social History            Socioeconomic History    Marital status:    Tobacco Use    Smoking status: Former       Packs/day: 1.00       Years: 20.00       Additional pack years: 0.00       Total pack years: 20.00       Types: Cigarettes       Quit date:  3/1/2006       Years since quittin.9i    Smokeless tobacco: Never   Vaping Use    Vaping Use: Never used   Substance and Sexual Activity    Alcohol use: No       Alcohol/week: 0.0 standard drinks of alcohol    Drug use: No   Other Topics Concern    Caffeine Concern No       Comment: tries to drink decaf drinks    Exercise Yes       Comment: jazzercise 3x/wk, tennis 1-2x/wk      Social Determinants of Health           Food Insecurity: No Food Insecurity (2024)     Food Insecurity      Food Insecurity: Never true   Transportation Needs: No Transportation Needs (2024)     Transportation Needs      Lack of Transportation: No   Housing Stability: Low Risk  (2024)     Housing Stability      Housing Instability: No      Housing Instability Emergency: No           Immunization History   Administered Date(s) Administered    Covid-19 Vaccine Moderna 100 mcg/0.5 ml 02/10/2021, 03/10/2021, 10/22/2021, 2022    Covid-19 Vaccine Moderna Bivalent 50mcg/0.5mL 12+ years 2022    DTAP 2007    FLU VAC High Dose 65 YRS & Older PRSV Free (96406) 10/24/2016, 09/15/2020    FLUAD High Dose 65 yr and older (65453) 10/11/2019, 2021    Fluzone Vaccine Medicare () 10/28/2013, 10/24/2016, 09/15/2020    HIGH DOSE FLU 65 YRS AND OLDER PRSV FREE SINGLE D (25439) FLU CLINIC 2022    Influenza 10/24/2012, 10/23/2015, 10/20/2017, 10/05/2018    Pneumococcal (Prevnar 13) 2016    Pneumococcal (Prevnar 7) 2010    Pneumovax 23 2017    TDAP 2019    Zoster Vaccine Live (Zostavax) 2012    Zoster Vaccine Recombinant Adjuvanted (Shingrix) 2023, 2023            Allergies   Allergen Reactions    Ampicillin NAUSEA ONLY       Caps    Codeine [Opioid Analgesics]         Derivatives  Syncope    Cortisone [Cortisone Acetate]         Injection into L shoulder  Syncope, stomach cramps      CODE STATUS:  DNR     ADVANCED CARE PLANNING TEAM: SW and staff to provide updates for  Guardian Demetrice on a regular basis and with any change in condition.      CURRENT MEDICATIONS - reviewed and updated on SNF EMAR  Tylenol 325 one supp q 4 hrs prn fever  Acidophilus priobiotic - 1 cap per Gtube 2 x day  Alendronate 70 mg 1 x week  Atorvastatin 80 mg q hs  TUMS one tab per G tube daily  Duloxetine 30 mg daily  Mirtazapine 15 mg q hs  Mupirocin 2% ointment prn g-tube drainage  Omeprazole 20 via g tube twice daily  Osmolite 1.5 kavin per g tube daily  K+ 20 meq daily  Seroquel 25 mg tid  Sucralfate 1 G q am via g tube  Valproic acid 750 tid  Restart Vanco 125 4 x daily completed  2/28/24 Restart Dificid 200 2 x daily x 10 days for Cdiff - EOT  3/8/24   2/28/24 Start Levaquin 250mg daily x 10 days. EOT 3/8/24.  Banatrol Plus 2 x day  Duonebs q 6 hrs prn                  SUBJECTIVE: Answers short questions. difficult to redirect when anxious or fixated on one thought. + diarrhea, SOB; O2 dependent.         PHYSICAL EXAM: /74. P100. RR 16. POx 95%. T 98.1 131.2# from 136.4#  GENERAL HEALTH: well developed, well nourished,  + loose stools, odorous urine, agitation, SOB.     LINES, TUBES, DRAINS:  +g tube with daily feedings  SKIN: pale, warm, dry  WOUND: see skin/wound assessments per staff   EYES: Pupils round and equal. No jaundice. conjunctiva normal  HENT: occasional facial twitching left.  + dysphagia unable to tolerate oral  feedings. mucus membranes moist   NECK:supple  BREAST: deferred exam   RESPIRATORY: lungs clear  CARDIOVASCULAR:  bilateral expiratory wheeze. Rate 100  ABDOMEN: + g-tube. Shortened abdominal binder for comfort to protect Gtube. BS+, nondistended; + watery stools; incontinent.  : incontinent  LYMPHATIC:no lymphedema  MUSCULOSKELETAL: chair and bed confined; unable to stand or ambulate  EXTREMITIES: no edema. L sided weakness. L arm pain, + neuropathy.  NEUROLOGIC: intermittently will follow simple commands and simple  instructions;  occasionally will communicate with staff.  Neuro checks intact.  PSYCHIATRIC: agitated, anxious , sleep disturbance, and mood disorder more so with new diagnoses of recurrent UTI and C-Diff.    Seroquel 25 increased to tid by psychiatrist. Today scheduled xanax q 8 hrs.       MEDICAL DECISION MAKING. Guardielinor Demetrice Sandro makes medical decisions for patient      Assessment /Plan    Recurrent C-Diff x 3  Failed oral vanco, completed 10 day course of Dificid.  Restart Dificid 200 mg twice daily x 10 days EOT 3/8/24  Monitor loose stools  Isolation x 10 days.  Probiotic 2 x day    Recurrent UTI  Levaquin 250 q 24 hrs x 10 days EOT 3/8/24.  Increase fluids via G Tube  Probiotic 2 x day    Pulse Ox 80's to low 90's.  Intermittent wheezing.  Oxygen 2 2-3 L prn  VS q shift and prn  Duonebs q 6 hrs    Facial Twitching improved  Labs - see above  Valproic acid today WNL from 29.5   Speech following & updated guardian   Dietician following - puree diet; total feed.  Monitor neuro checks  Trend labs     Hypokalemia /Hyperkalemia-   K+ 4.3     Trend labs     LLE DVT  Eliquis 5 mg 2 x daily  ECHO reported without any RV strain  Doppler was + for LLE DVT     CVA w/residual L sided weakness, pain & peripheral neuropathy.  History of Falls  Prior ICH s/p Craniotomy  Valproic acid 750 tid - level WNL  Neurology  f/u  No seizure activity noted on EEG  Left arm support     Anxiety/Dementia/Agitation  Seroquel increased to 25 mg tid  Remeron 15 mg q hs  Cymbalta 30mg daily  Alprazolam 0.25 q 8 hrs    Dyslipidemia - atorvastatin 80 mg q hs per g-tube      GERD/gastritis   Omeprazole 29 via Gtube 2 x daily  Sucralfate 1 G by mouth daily  G-tube feedings      Insomnia - Remeron 15 mg q hs     Pain  Tylenol supp prn  Cymbalta increased to 30 mg daily     Disposition:  Long term care at [facility]. Updates to mila Suresh on a regular basis.     FOLLOW UP APPOINTMENTS   *Follow-Up with specialists as recommended.     *Greater than 35 minutes spent w/ patient and staff, including  but not limited to/ reviewing present status, needs, abilities with disciplines, reviewing medical records, vital signs, labs, completing med rec and entering orders to establish plan of care in [facility].  Note to patient: The 21st Century Cures Act makes medical notes like these available to patients in the interest of transparency. However, this is a medical document intended as peer to peer communication. It is written in medical language and may contain abbreviations or verbiage that are unfamiliar. It may appear blunt or direct. Medical documents are intended to carry relevant information, facts as evident, and the clinical opinion of the practitioner who signs the document.    Venessa Saucedo (Daya) APRN  2/29/24 10:30 am  [facility] Post Acute Care Team

## 2024-03-12 ENCOUNTER — SNF VISIT (OUTPATIENT)
Dept: INTERNAL MEDICINE CLINIC | Age: 79
End: 2024-03-12

## 2024-03-12 DIAGNOSIS — I62.9 INTRACRANIAL HEMORRHAGE (HCC): ICD-10-CM

## 2024-03-12 DIAGNOSIS — Z99.81 SUPPLEMENTAL OXYGEN DEPENDENT: ICD-10-CM

## 2024-03-12 DIAGNOSIS — R06.02 SHORTNESS OF BREATH: ICD-10-CM

## 2024-03-12 DIAGNOSIS — R19.7 DIARRHEA, UNSPECIFIED TYPE: ICD-10-CM

## 2024-03-12 DIAGNOSIS — R06.2 EXPIRATORY WHEEZING: Primary | ICD-10-CM

## 2024-03-12 DIAGNOSIS — I63.9 RIGHT-SIDED CEREBROVASCULAR ACCIDENT (CVA) (HCC): ICD-10-CM

## 2024-03-12 DIAGNOSIS — R13.10 DYSPHAGIA, UNSPECIFIED TYPE: ICD-10-CM

## 2024-03-12 DIAGNOSIS — Z43.1 ATTENTION TO G-TUBE (HCC): ICD-10-CM

## 2024-03-12 DIAGNOSIS — Z98.890 HISTORY OF CRANIOTOMY: ICD-10-CM

## 2024-03-12 DIAGNOSIS — R45.1 RESTLESSNESS AND AGITATION: ICD-10-CM

## 2024-03-12 PROCEDURE — 99310 SBSQ NF CARE HIGH MDM 45: CPT | Performed by: NURSE PRACTITIONER

## 2024-03-12 PROCEDURE — 99497 ADVNCD CARE PLAN 30 MIN: CPT | Performed by: NURSE PRACTITIONER

## 2024-03-14 ENCOUNTER — SNF VISIT (OUTPATIENT)
Dept: INTERNAL MEDICINE CLINIC | Age: 79
End: 2024-03-14

## 2024-03-14 VITALS
DIASTOLIC BLOOD PRESSURE: 75 MMHG | RESPIRATION RATE: 18 BRPM | HEART RATE: 75 BPM | SYSTOLIC BLOOD PRESSURE: 123 MMHG | BODY MASS INDEX: 30 KG/M2 | WEIGHT: 146.63 LBS | OXYGEN SATURATION: 94 % | TEMPERATURE: 98 F

## 2024-03-14 VITALS
DIASTOLIC BLOOD PRESSURE: 74 MMHG | BODY MASS INDEX: 30 KG/M2 | SYSTOLIC BLOOD PRESSURE: 132 MMHG | RESPIRATION RATE: 28 BRPM | HEART RATE: 112 BPM | OXYGEN SATURATION: 96 % | WEIGHT: 146.63 LBS | TEMPERATURE: 98 F

## 2024-03-14 DIAGNOSIS — I63.9 RIGHT-SIDED CEREBROVASCULAR ACCIDENT (CVA) (HCC): ICD-10-CM

## 2024-03-14 DIAGNOSIS — Z98.890 HISTORY OF CRANIOTOMY: ICD-10-CM

## 2024-03-14 DIAGNOSIS — R06.2 BILATERAL WHEEZING: Primary | ICD-10-CM

## 2024-03-14 DIAGNOSIS — R45.1 RESTLESSNESS AND AGITATION: ICD-10-CM

## 2024-03-14 DIAGNOSIS — I62.9 INTRACRANIAL HEMORRHAGE (HCC): ICD-10-CM

## 2024-03-14 DIAGNOSIS — R06.02 SHORTNESS OF BREATH: ICD-10-CM

## 2024-03-14 DIAGNOSIS — R19.7 DIARRHEA, UNSPECIFIED TYPE: ICD-10-CM

## 2024-03-14 DIAGNOSIS — Z99.81 SUPPLEMENTAL OXYGEN DEPENDENT: ICD-10-CM

## 2024-03-14 DIAGNOSIS — R06.82 TACHYPNEA: ICD-10-CM

## 2024-03-14 DIAGNOSIS — Z43.1 ATTENTION TO G-TUBE (HCC): ICD-10-CM

## 2024-03-14 PROCEDURE — 99310 SBSQ NF CARE HIGH MDM 45: CPT | Performed by: NURSE PRACTITIONER

## 2024-03-14 RX ORDER — HYDROMORPHONE HYDROCHLORIDE 1 MG/ML
0.5 SOLUTION ORAL
COMMUNITY

## 2024-03-14 RX ORDER — SCOLOPAMINE TRANSDERMAL SYSTEM 1 MG/1
1 PATCH, EXTENDED RELEASE TRANSDERMAL
COMMUNITY

## 2024-03-15 NOTE — PROGRESS NOTES
Mayte Yates.  11/10/1945. APN Encounter 3/14/24.  11 am.    Met with patient at bedside.    Audible wheezing heard.  Respirations 28.  Dilaudid 0.5 mg given per staff.  Order changed from q 4 hours to q 3 hrs prn.  Lorazepam 0.25 mg given per staff.  Order changed from q 4 hrs to q 3 hrs mr.  Scopolamine patch on.    Met with psychiatrist, Dr. Mazariegos, to discuss medications.  Discontinued Seroquel.  Discontinued Mirtazapine.    Will discuss other meds with Dr. Gaffney.    Medication update:   Dilaudid 0.5 mg q 3 hrs prn dyspnea/respiratory distress/pain.  Scopolamine patch 1.5 mg - to be changed q 3 days  Lorazepam 0.25 mg q 3 hours (2mg/ml)  Continue to turn q 2 hours.  Oxygen - keep Sats >93%.  Continue tube feeds per dietician.    Discontinued:  Fosamax,TUMS - chewables, Alprazolam, Atorvastatin.   Demetrice in agreement with above medications and changes.  No further labs.         Last labs reviewed:  Chest X-ray negative.  Labs :  WBC 16.3 from 10. HGB 11 from 9.6. Plts 328.  Na 136. K 4.3. Cl 100. Co2 28. BUN 15. Creat 0.8. gfr >60   Valproic Acid level today WNL from 29 the day before.    Mayte Yates  : 11/10/1945. 78 year old female is admitted to [facility] for rehabilitation; tansitioned to long term care.       [facility] Re-Admission: 24  Returned to [place]:               24     Chief complaint today: per staff:  remains NPO, +Diarrhea, dyspnea, oxygen dependent, occasional agitation,     HPI 78 yr old female with PMhx of anxiety, breast cancer, HTN, DL, dementia presented to the ED after a fall at NH.  Pt was seen earlier in the ED today and had negative imaging and was sent back to [facility] Pt was noted to have worsening L side facial droop so was sent back to the ED.  Pt does have chronic L sided weakness from previous ICH. She is alert to only self given her dementia. Overall, she is a poor historian. She denied any complaints. She denied any CP or SOB. No F/C. No  cough. No N/V/D/C or abd pain. In ED, she was scanned again given facial droop and found to have PE. She does have remote history of GI bleed and gastritis. In the ED, she was heme negative. Pt was started on heparin gtt.      Pt was admitted, anticoagulated with heparin drip and transitioned to Eliquis;  discharged back to NH in stable condition.        Past Medical History:   Diagnosis Date    Anxiety      Breast CA (HCC)     C. difficile diarrhea      CANCER       lft breast lumpectomy pre cancerous cells stage 0    Diabetes mellitus (HCC)      Diarrhea, unspecified      Ductal carcinoma in situ of breast      left breast    Flatulence/gas pain/belching      GERD      H/O infectious mononucleosis       diagnosed in 1960s    High cholesterol      Hypertension      IBS (irritable bowel syndrome)      Osteoporosis      Other and unspecified hyperlipidemia      Personal history of irradiation, presenting hazards to health 2004     mammosite    Sleep disturbance      Stool incontinence      Stroke (HCC)      Type II or unspecified type diabetes mellitus without mention of complication, not stated as uncontrolled      Wears glasses      *  C-DIFF X 3        Past Surgical History:   Procedure Laterality Date    APPENDECTOMY         194    COLONOSCOPY          hiatal hernia, sm internal hem    COLONOSCOPY   2005     fiberoptic    D & C        IR ANGIOGRAM CEREBRAL CAROTID BILATERAL   10/12/2023    LUMPECTOMY LEFT   2004     breast    OTHER SURGICAL HISTORY         lumpectomy  lft breast stage 0    OTHER SURGICAL HISTORY   10/11/2023     CRANIOTOMY FOR RIGHT FRONTAL HEMORRHAGE    RADIATION LEFT         Mammosite    TONSILLECTOMY             UPPER GI ENDOSCOPY,EXAM         2005            Family History   Problem Relation Age of Onset    Heart Disorder Father            of heart attack age 57    Alcohol and Other Disorders Associated Father      Arthritis Father      Other  (Other) Father      Heart Disease Mother           CHF age 80    Arthritis Mother      Other (Other) Mother      Diabetes Sister           mellitus    Heart Disease Maternal Grandfather      Cancer Maternal Grandmother           brain tumor    Breast Cancer Self        Social History            Socioeconomic History    Marital status:    Tobacco Use    Smoking status: Former       Packs/day: 1.00       Years: 20.00       Additional pack years: 0.00       Total pack years: 20.00       Types: Cigarettes       Quit date: 3/1/2006       Years since quittin.9i    Smokeless tobacco: Never   Vaping Use    Vaping Use: Never used   Substance and Sexual Activity    Alcohol use: No       Alcohol/week: 0.0 standard drinks of alcohol    Drug use: No   Other Topics Concern    Caffeine Concern No       Comment: tries to drink decaf drinks    Exercise Yes       Comment: jazzercise 3x/wk, tennis 1-2x/wk      Social Determinants of Health           Food Insecurity: No Food Insecurity (2024)     Food Insecurity      Food Insecurity: Never true   Transportation Needs: No Transportation Needs (2024)     Transportation Needs      Lack of Transportation: No   Housing Stability: Low Risk  (2024)     Housing Stability      Housing Instability: No      Housing Instability Emergency: No           Immunization History   Administered Date(s) Administered    Covid-19 Vaccine Moderna 100 mcg/0.5 ml 02/10/2021, 03/10/2021, 10/22/2021, 2022    Covid-19 Vaccine Moderna Bivalent 50mcg/0.5mL 12+ years 2022    DTAP 2007    FLU VAC High Dose 65 YRS & Older PRSV Free (92819) 10/24/2016, 09/15/2020    FLUAD High Dose 65 yr and older (59407) 10/11/2019, 2021    Fluzone Vaccine Medicare () 10/28/2013, 10/24/2016, 09/15/2020    HIGH DOSE FLU 65 YRS AND OLDER PRSV FREE SINGLE D (27593) FLU CLINIC 2022    Influenza 10/24/2012, 10/23/2015, 10/20/2017, 10/05/2018    Pneumococcal (Prevnar 13)  09/02/2016    Pneumococcal (Prevnar 7) 04/24/2010    Pneumovax 23 09/29/2017    TDAP 09/23/2019    Zoster Vaccine Live (Zostavax) 09/19/2012    Zoster Vaccine Recombinant Adjuvanted (Shingrix) 03/06/2023, 05/23/2023            Allergies   Allergen Reactions    Ampicillin NAUSEA ONLY       Caps    Codeine [Opioid Analgesics]         Derivatives  Syncope    Cortisone [Cortisone Acetate]         Injection into L shoulder  Syncope, stomach cramps      CODE STATUS:  DNR.  Do not Hospitalize.     ADVANCED CARE PLANNING TEAM: SW and staff to continue updates for Guardian Demetrice on a regular basis and with any change in condition.      CURRENT MEDICATIONS - reviewed and updated on [facility]F [facility]  Tylenol 325 one supp q 4 hrs prn fever  Acidophilus priobiotic - 1 cap per Gtube 2 x day  Duloxetine 30 mg daily  Mupirocin 2% ointment prn g-tube drainage  Omeprazole 20 via g tube twice daily  Osmolite 1.5 kavin per g tube daily per dietician  K+ 20 meq daily  Sucralfate 1 G q am via g tube  Valproic acid 750 tid  2/28/24 Restart Dificid 200 2 x daily x 10 days for Cdiff - EOT  3/8/24   2/28/24 Start Levaquin 250mg daily x 10 days. EOT 3/8/24.  Banatrol Plus 2 x day  Duonebs q 6 hrs prn  Restart oral Vanco 3/14/24 125 mg qid for increased watery foul smelling stools.                    SUBJECTIVE: unable to answer.   Staff report periods of agitation and calling out, loose stools, audible wheezing, respirations 28.         PHYSICAL EXAM: /74. P 112. RR 28. POx 96%. Wgt 146.6#. T 97.5.   GENERAL HEALTH: doing poorly,  not eating or tolerating fluids, frail, watery stools.    LINES, TUBES, DRAINS:  +g tube with daily feedings  SKIN: pale, warm, dry  WOUND: see skin/wound assessments per staff   EYES: eyes opened; pupils round and equal.    HENT:   + dysphagia. NPO.   BREAST: deferred exam   RESPIRATORY: lungs w/ bilateral audible wheezing;  rate 28; O2 4 L n/c.  Duonebs q 6 hrs & prn.  CARDIOVASCULAR:  RRR, . Rate  112.  ABDOMEN: +  g-tube. abdominal binder to protect Gtube. BS+, nondistended; + watery stools; incontinent.  : deferred exam; incontinent  LYMPHATIC:no lymphedema  MUSCULOSKELETAL: bed confined  EXTREMITIES: no edema. L sided weakness.   neuropathy.  NEUROLOGIC: occasionally will answer yes/no or call out.   PSYCHIATRIC: met with psychiatrist to discuss medications.  Occasional agitation; sleep disturbance.         MEDICAL DECISION MAKING. Guardian Demetrice Jo makes medical decisions for patient .     Assessment /Plan    Failure to Thrive - hospice worthy  Tube feedings per dietician  Medications adjusted  -  see above  NPO  Mouth care and turn q 2 hrs    Recurrent C-Diff x 3  Recurrent watery stools   Restart Vanco 125 mg qid x 14 days; start 3/14/24.  Failed oral vanco recently;  completed 10 day course of Dificid.  Restarted Dificid 200 mg twice daily x 10 days Ended 3/8/24  Isolation.  Probiotic 2 x day    Previous  UTI  Levaquin 250 q 24 hrs x 10 days Ended 3/8/24.  Probiotic 2 x day    Mild facial twitching   Last Labs - see above  No further labs per guardian.  Valproic acid today WNL from 29.5   Dietician following      Hypokalemia /Hyperkalemia-   K+ 4.3       Previous LLE DVT  Eliquis 5 mg 2 x daily  ECHO reported without any RV strain  Doppler was + for LLE DVT - has been on eliquis long term.     Previous CVA w/residual L sided weakness, pain & peripheral neuropathy.  History of Falls  Prior ICH s/p Craniotomy  Valproic acid 750 tid - level WNL  No seizure activity noted on EEG  Left arm support     Anxiety/Dementia/Agitation  Lorazepam 0.2 mg q 3 hrs prn   Cymbalta 30mg daily      GERD/gastritis   Omeprazole 29 via Gtube 2 x daily  Sucralfate 1 G by mouth daily  G-tube feedings      Insomnia - discontinued Remeron 15 mg q hs     Pain  Tylenol supp prn  Cymbalta 30 mg daily     Disposition:  Long term care at [facility].  Hospice worthy.  F/U with Guardian.     *Greater than 35 minutes spent w/ patient and staff,  including but not limited to/ reviewing present status, needs, abilities with disciplines, reviewing medical records, vital signs, labs, completing med rec and entering orders to establish plan of care in [facility].  Note to patient: The 21st Century Cures Act makes medical notes like these available to patients in the interest of transparency. However, this is a medical document intended as peer to peer communication. It is written in medical language and may contain abbreviations or verbiage that are unfamiliar. It may appear blunt or direct. Medical documents are intended to carry relevant information, facts as evident, and the clinical opinion of the practitioner who signs the document.    Venessa (Tony Saucedo APRRUTHANN  3/14/24 11 am - 11 30 am  [facility] Post Acute Care Team

## 2024-03-15 NOTE — PROGRESS NOTES
Mayte Yates   [birth year] APN Encounter 3/12/24.  1045am    Additional meeting with Guardian Demetrice Jo at bedside. 11am - 1130am    Met with patient at bedside.  Patient's guardian, Demetrice, at bedside.  Demetrice brought in patient's belongings, pictures, prayers, blanket, wall hangings and soft music.  Discussed patient's decline in function, nutrition and cognition.  Patient is not tolerating any oral foods.    Lungs with bilateral wheezing and shallow, rapid breathing.  O2 at 4 liters per n/c with episodes of coughing.  Patient's guardian understands and witnessed Mayte's decline.  She would like comfort medications added and would like her to be calm.  Do not hospitalize orders in place; patient is a DNR.  Patient is hospice worthy guardian agrees.    Medications added:  Dilaudid 0.5 mg q 3 hrs prn dyspnea/respiratory distress/pain.  Scopolamine patch 1.5 mg - to be changed q 3 days  Lorazepam 0.25 mg q 3 hours (2mg/ml)  Continue to turn q 2 hours.  Oxygen - keep Sats >93%.  Continue tube feeds per dietician.    Discontinue:  Fosamax,TUMS - chewables, Alprazolam, Atorvastatin.   Demetrice in agreement with above medications and changes.  No further labs.         Last labs reviewed:  Chest X-ray negative.  Labs :  WBC 16.3 from 10. HGB 11 from 9.6. Plts 328.  Na 136. K 4.3. Cl 100. Co2 28. BUN 15. Creat 0.8. gfr >60   Valproic Acid level today WNL from 29 the day before.    Mayte Yates  : 11/10/1945. 78 year old female is admitted to [facility] for rehabilitation and strengthening; transitioned to long term care.       [facility] Re-Admission: 24  Returned to [place]:               24     Chief complaint today: not eating or drinking. Diarrhea, dyspnea, oxygen dependent, periods of agitation,     HPI 78 yr old female with PMhx of anxiety, breast cancer, HTN, DL, dementia presented to the ED after a fall at NH.  Pt was seen earlier in the ED today and had negative imaging and was sent  back to [facility] Pt was noted to have worsening L side facial droop so was sent back to the ED.  Pt does have chronic L sided weakness from previous ICH. She is alert to only self given her dementia. Overall, she is a poor historian. She denied any complaints. She denied any CP or SOB. No F/C. No cough. No N/V/D/C or abd pain. In ED, she was scanned again given facial droop and found to have PE. She does have remote history of GI bleed and gastritis. In the ED, she was heme negative. Pt was started on heparin gtt.      Pt was admitted, anticoagulated with heparin drip and transitioned to Eliquis;  discharged back to NH in stable condition.        Past Medical History:   Diagnosis Date    Anxiety      Breast CA (HCC) 2004    C. difficile diarrhea      CANCER       lft breast lumpectomy pre cancerous cells stage 0    Diabetes mellitus (HCC)      Diarrhea, unspecified      Ductal carcinoma in situ of breast 2004     left breast    Flatulence/gas pain/belching      GERD      H/O infectious mononucleosis       diagnosed in 1960s    High cholesterol      Hypertension      IBS (irritable bowel syndrome)      Osteoporosis      Other and unspecified hyperlipidemia      Personal history of irradiation, presenting hazards to health 01/01/2004     mammosite    Sleep disturbance      Stool incontinence      Stroke (HCC)      Type II or unspecified type diabetes mellitus without mention of complication, not stated as uncontrolled      Wears glasses      *  C-DIFF X 3        Past Surgical History:   Procedure Laterality Date    APPENDECTOMY         1948    COLONOSCOPY         2005 hiatal hernia, sm internal hem    COLONOSCOPY   01/01/2005     fiberoptic    D & C        IR ANGIOGRAM CEREBRAL CAROTID BILATERAL   10/12/2023    LUMPECTOMY LEFT   01/01/2004     breast    OTHER SURGICAL HISTORY         lumpectomy 2004 lft breast stage 0    OTHER SURGICAL HISTORY   10/11/2023     CRANIOTOMY FOR RIGHT FRONTAL HEMORRHAGE    RADIATION  LEFT         Mammosite    TONSILLECTOMY             UPPER GI ENDOSCOPY,EXAM         2005            Family History   Problem Relation Age of Onset    Heart Disorder Father            of heart attack age 57    Alcohol and Other Disorders Associated Father      Arthritis Father      Other (Other) Father      Heart Disease Mother           CHF age 80    Arthritis Mother      Other (Other) Mother      Diabetes Sister           mellitus    Heart Disease Maternal Grandfather      Cancer Maternal Grandmother           brain tumor    Breast Cancer Self        Social History            Socioeconomic History    Marital status:    Tobacco Use    Smoking status: Former       Packs/day: 1.00       Years: 20.00       Additional pack years: 0.00       Total pack years: 20.00       Types: Cigarettes       Quit date: 3/1/2006       Years since quittin.9i    Smokeless tobacco: Never   Vaping Use    Vaping Use: Never used   Substance and Sexual Activity    Alcohol use: No       Alcohol/week: 0.0 standard drinks of alcohol    Drug use: No   Other Topics Concern    Caffeine Concern No       Comment: tries to drink decaf drinks    Exercise Yes       Comment: jazzercise 3x/wk, tennis 1-2x/wk      Social Determinants of Health           Food Insecurity: No Food Insecurity (2024)     Food Insecurity      Food Insecurity: Never true   Transportation Needs: No Transportation Needs (2024)     Transportation Needs      Lack of Transportation: No   Housing Stability: Low Risk  (2024)     Housing Stability      Housing Instability: No      Housing Instability Emergency: No           Immunization History   Administered Date(s) Administered    Covid-19 Vaccine Moderna 100 mcg/0.5 ml 02/10/2021, 03/10/2021, 10/22/2021, 2022    Covid-19 Vaccine Moderna Bivalent 50mcg/0.5mL 12+ years 2022    DTAP 2007    FLU VAC High Dose 65 YRS & Older PRSV Free (77119) 10/24/2016, 09/15/2020    FLUAD High  Dose 65 yr and older (65219) 10/11/2019, 09/28/2021    Fluzone Vaccine Medicare () 10/28/2013, 10/24/2016, 09/15/2020    HIGH DOSE FLU 65 YRS AND OLDER PRSV FREE SINGLE D (87766) FLU CLINIC 09/27/2022    Influenza 10/24/2012, 10/23/2015, 10/20/2017, 10/05/2018    Pneumococcal (Prevnar 13) 09/02/2016    Pneumococcal (Prevnar 7) 04/24/2010    Pneumovax 23 09/29/2017    TDAP 09/23/2019    Zoster Vaccine Live (Zostavax) 09/19/2012    Zoster Vaccine Recombinant Adjuvanted (Shingrix) 03/06/2023, 05/23/2023            Allergies   Allergen Reactions    Ampicillin NAUSEA ONLY       Caps    Codeine [Opioid Analgesics]         Derivatives  Syncope    Cortisone [Cortisone Acetate]         Injection into L shoulder  Syncope, stomach cramps      CODE STATUS:  DNR.  Do not Hospitalize.     ADVANCED CARE PLANNING TEAM: SW and staff to continue updates for Guardian Demetrice on a regular basis and with any change in condition.      CURRENT MEDICATIONS - reviewed and updated on SNF EMAR  Tylenol 325 one supp q 4 hrs prn fever  Acidophilus priobiotic - 1 cap per Gtube 2 x day  Atorvastatin 80 mg q hs  Duloxetine 30 mg daily  Mupirocin 2% ointment prn g-tube drainage  Omeprazole 20 via g tube twice daily  Osmolite 1.5 kavin per g tube daily per dietician  K+ 20 meq daily  Sucralfate 1 G q am via g tube  Valproic acid 750 tid  2/28/24 Restart Dificid 200 2 x daily x 10 days for Cdiff - EOT  3/8/24   2/28/24 Start Levaquin 250mg daily x 10 days. EOT 3/8/24.  Banatrol Plus 2 x day  Duonebs q 6 hrs prn                  SUBJECTIVE: unable to answer after medication was given.  Answered  with short one word answers.  Staff report periods of agitation and calling out, loose stools, audible wheezing with SOB and rapid respirations.         PHYSICAL EXAM: /75. P 75. RR 18. POx 94%. Wgt 146.6#. T 98.2.   GENERAL HEALTH: doing poorly,  not eating or tolerating fluids, frail, loose stools.    LINES, TUBES, DRAINS:  +g tube with daily  feedings  SKIN: pale, warm, dry  WOUND: see skin/wound assessments per staff   EYES: closed during encounter.   HENT: occasional facial twitching left.  + dysphagia unable to tolerate oral  feedings.  NPO. NECK:supple  BREAST: deferred exam   RESPIRATORY: lungs w/ bilateral audible wheezing; O2 4 L n/c.  Duonebs q 6 hrs & prn.  CARDIOVASCULAR:  RRR, . Rate 75  ABDOMEN: + g-tube. Shortened abdominal binder for comfort to protect Gtube. BS+, nondistended; + watery stools; incontinent.  : incontinent  LYMPHATIC:no lymphedema  MUSCULOSKELETAL: bed confined  EXTREMITIES: no edema. L sided weakness. L arm pain, + neuropathy.  NEUROLOGIC: occasional will answer simple questions. Eyes closed throughout encounter.  PSYCHIATRIC: periods of agitation; sleep disturbance. Will start new meds regimen today for comfort.        MEDICAL DECISION MAKING. Guardian Demetrice Jo makes medical decisions for patient      Assessment /Plan    Failure to Thrive - hospice worthy  Tube feedings per dietician  Medications adjusted  -  see above  NPO  Mouth care and turn q 2 hrs    Recurrent C-Diff x 3  Failed oral vanco, completed 10 day course of Dificid.  Restart Dificid 200 mg twice daily x 10 days EOT 3/8/24  Monitor loose stools  Isolation x 10 days.  Probiotic 2 x day    Recurrent UTI  Levaquin 250 q 24 hrs x 10 days Ended 3/8/24.  Increase fluids via G Tube  Probiotic 2 x day    Mild facial twitching   Labs - see above  No further labs  Valproic acid today WNL from 29.5   Speech therapy    Dietician following      Hypokalemia /Hyperkalemia-   K+ 4.3       LLE DVT  Eliquis 5 mg 2 x daily  ECHO reported without any RV strain  Doppler was + for LLE DVT - has been on eliquis long term.     CVA w/residual L sided weakness, pain & peripheral neuropathy.  History of Falls  Prior ICH s/p Craniotomy  Valproic acid 750 tid - level WNL  Neurology  f/u  No seizure activity noted on EEG  Left arm support     Anxiety/Dementia/Agitation  Lorazepam 0.2  mg q 3 hrs prn   Cymbalta 30mg daily      GERD/gastritis   Omeprazole 29 via Gtube 2 x daily  Sucralfate 1 G by mouth daily  G-tube feedings      Insomnia - Remeron 15 mg q hs     Pain  Tylenol supp prn  Cymbalta increased to 30 mg     Disposition:  Long term care at [facility].  Hospice worthy.  F/U with Guardian.     *Greater than 35 minutes spent w/ patient and staff, including but not limited to/ reviewing present status, needs, abilities with disciplines, reviewing medical records, vital signs, labs, completing med rec and entering orders to establish plan of care in [facility].  Note to patient: The 21st Century Cures Act makes medical notes like these available to patients in the interest of transparency. However, this is a medical document intended as peer to peer communication. It is written in medical language and may contain abbreviations or verbiage that are unfamiliar. It may appear blunt or direct. Medical documents are intended to carry relevant information, facts as evident, and the clinical opinion of the practitioner who signs the document.    Venessa CLAROS (Daya)  3/12/24 10:45 am  Meeting with Guardian:  11 am - 11:30 am.  [facility] Post Acute Care Team

## 2024-03-19 ENCOUNTER — SNF VISIT (OUTPATIENT)
Dept: INTERNAL MEDICINE CLINIC | Age: 79
End: 2024-03-19

## 2024-03-19 VITALS
OXYGEN SATURATION: 90 % | WEIGHT: 146.63 LBS | TEMPERATURE: 98 F | BODY MASS INDEX: 30 KG/M2 | DIASTOLIC BLOOD PRESSURE: 63 MMHG | SYSTOLIC BLOOD PRESSURE: 101 MMHG | HEART RATE: 108 BPM | RESPIRATION RATE: 28 BRPM

## 2024-03-19 DIAGNOSIS — Z98.890 HISTORY OF CRANIOTOMY: ICD-10-CM

## 2024-03-19 DIAGNOSIS — R06.2 BILATERAL WHEEZING: ICD-10-CM

## 2024-03-19 DIAGNOSIS — R00.0 TACHYCARDIA: ICD-10-CM

## 2024-03-19 DIAGNOSIS — A04.72 C. DIFFICILE DIARRHEA: ICD-10-CM

## 2024-03-19 DIAGNOSIS — R62.7 FAILURE TO THRIVE IN ADULT: ICD-10-CM

## 2024-03-19 DIAGNOSIS — R19.7 DIARRHEA, UNSPECIFIED TYPE: ICD-10-CM

## 2024-03-19 DIAGNOSIS — Z99.81 SUPPLEMENTAL OXYGEN DEPENDENT: ICD-10-CM

## 2024-03-19 DIAGNOSIS — I62.9 INTRACRANIAL HEMORRHAGE (HCC): ICD-10-CM

## 2024-03-19 DIAGNOSIS — R06.82 TACHYPNEA: ICD-10-CM

## 2024-03-19 DIAGNOSIS — R13.10 DYSPHAGIA, UNSPECIFIED TYPE: ICD-10-CM

## 2024-03-19 DIAGNOSIS — R06.03 RESPIRATORY DISTRESS: Primary | ICD-10-CM

## 2024-03-19 PROCEDURE — 99310 SBSQ NF CARE HIGH MDM 45: CPT | Performed by: NURSE PRACTITIONER

## 2024-03-19 PROCEDURE — G0317 PROLNG NSG FAC E/M EA ADDL 15 MIN: HCPCS | Performed by: NURSE PRACTITIONER

## 2024-03-19 RX ORDER — LORAZEPAM 2 MG/ML
1 CONCENTRATE ORAL
COMMUNITY

## 2024-03-19 NOTE — PROGRESS NOTES
Mayte Yates.  11/10/1945. APN Encounter 3/19/24. 11 am - 12 pm..    ADON and staff nurse requested this APN evaluate Mrs. Yates.  Patient in respiratory distress, audible wheezing, rapid heart rate.  Patient with noted upper extremity edema and facial edema.  Patient is restless and crying out for her mother \"mommy, mommy, mommy\".    Call placed to Guardian Demetrice. Notified Guardian of update on medical condition.  Guardian understands and agrees with our plan of care.  Guardian reported there is no way to expedite an emergency Hospice court order.    Orders Updated:  Dilaudid scheduled 1 mg every 2 hours.  Lorazepam 1 mg scheduled every 2 hours.  Decrease tube feedings due to intolerance, bloating, and edema.  Tube feeding of Osmolite 50 ml/hr over 20 hours; water flushes 100 ml q shift.  Continue Scopolamine patch  Dietician aware.  Dr. Gaffney notified.   Closely monitor patient every 2 hours for comfort.     Medication update:  medication increases.  Dilaudid 1 mg. Q 2 hrs scheduled. (respiratory distress/pain)  Scopolamine patch 1.5 mg - to be changed q 3 days  Lorazepam 1 mg scheduled 2 hrs  (2mg/ml)  Continue to turn q 2 hours.  Oxygen - keep Sats >93%.  Discontinued:  Fosamax,TUMS - chewables, Alprazolam, Atorvastatin.   Guardian has been in agreement with above medications and changes.  No further labs.         Last labs reviewed:  Chest X-ray negative.  Labs :  WBC 16.3 from 10. HGB 11 from 9.6. Plts 328.  Na 136. K 4.3. Cl 100. Co2 28. BUN 15. Creat 0.8. gfr >60   Valproic Acid level today WNL from 29 the day before.    Mayte Yates  : 11/10/1945. 78 year old female is admitted to [facility] for rehabilitation; tansitioned to long term care.       [facility] Re-Admission: 24  Returned to [place]:               24     Chief complaint today: per staff:  respiratory distress, tachypnea, tachycardia, NPO, +Diarrhea, oxygen dependent, agitation,     HPI 78 yr old female with  PMhx of anxiety, breast cancer, HTN, DL, dementia presented to the ED after a fall at NH.  Pt was seen earlier in the ED today and had negative imaging and was sent back to [facility] Pt was noted to have worsening L side facial droop so was sent back to the ED.  Pt does have chronic L sided weakness from previous ICH. She is alert to only self given her dementia. Overall, she is a poor historian. She denied any complaints. She denied any CP or SOB. No F/C. No cough. No N/V/D/C or abd pain. In ED, she was scanned again given facial droop and found to have PE. She does have remote history of GI bleed and gastritis. In the ED, she was heme negative. Pt was started on heparin gtt.      Pt was admitted, anticoagulated with heparin drip and transitioned to Eliquis;  discharged back to NH in stable condition.        Past Medical History:   Diagnosis Date    Anxiety      Breast CA (HCC) 2004    C. difficile diarrhea      CANCER       lft breast lumpectomy pre cancerous cells stage 0    Diabetes mellitus (HCC)      Diarrhea, unspecified      Ductal carcinoma in situ of breast 2004     left breast    Flatulence/gas pain/belching      GERD      H/O infectious mononucleosis       diagnosed in 1960s    High cholesterol      Hypertension      IBS (irritable bowel syndrome)      Osteoporosis      Other and unspecified hyperlipidemia      Personal history of irradiation, presenting hazards to health 01/01/2004     mammosite    Sleep disturbance      Stool incontinence      Stroke (HCC)      Type II or unspecified type diabetes mellitus without mention of complication, not stated as uncontrolled      Wears glasses      *  C-DIFF X 3        Past Surgical History:   Procedure Laterality Date    APPENDECTOMY         1948    COLONOSCOPY         2005 hiatal hernia, sm internal hem    COLONOSCOPY   01/01/2005     fiberoptic    D & C        IR ANGIOGRAM CEREBRAL CAROTID BILATERAL   10/12/2023    LUMPECTOMY LEFT   01/01/2004     breast     OTHER SURGICAL HISTORY         lumpectomy  lft breast stage 0    OTHER SURGICAL HISTORY   10/11/2023     CRANIOTOMY FOR RIGHT FRONTAL HEMORRHAGE    RADIATION LEFT         Mammosite    TONSILLECTOMY         194    UPPER GI ENDOSCOPY,EXAM         2005            Family History   Problem Relation Age of Onset    Heart Disorder Father            of heart attack age 57    Alcohol and Other Disorders Associated Father      Arthritis Father      Other (Other) Father      Heart Disease Mother           CHF age 80    Arthritis Mother      Other (Other) Mother      Diabetes Sister           mellitus    Heart Disease Maternal Grandfather      Cancer Maternal Grandmother           brain tumor    Breast Cancer Self        Social History            Socioeconomic History    Marital status:    Tobacco Use    Smoking status: Former       Packs/day: 1.00       Years: 20.00       Additional pack years: 0.00       Total pack years: 20.00       Types: Cigarettes       Quit date: 3/1/2006       Years since quittin.9i    Smokeless tobacco: Never   Vaping Use    Vaping Use: Never used   Substance and Sexual Activity    Alcohol use: No       Alcohol/week: 0.0 standard drinks of alcohol    Drug use: No   Other Topics Concern    Caffeine Concern No       Comment: tries to drink decaf drinks    Exercise Yes       Comment: jazzercise 3x/wk, tennis 1-2x/wk      Social Determinants of Health           Food Insecurity: No Food Insecurity (2024)     Food Insecurity      Food Insecurity: Never true   Transportation Needs: No Transportation Needs (2024)     Transportation Needs      Lack of Transportation: No   Housing Stability: Low Risk  (2024)     Housing Stability      Housing Instability: No      Housing Instability Emergency: No           Immunization History   Administered Date(s) Administered    Covid-19 Vaccine Moderna 100 mcg/0.5 ml 02/10/2021, 03/10/2021, 10/22/2021, 2022    Covid-19  Vaccine Moderna Bivalent 50mcg/0.5mL 12+ years 09/13/2022    DTAP 04/24/2007    FLU VAC High Dose 65 YRS & Older PRSV Free (07186) 10/24/2016, 09/15/2020    FLUAD High Dose 65 yr and older (06931) 10/11/2019, 09/28/2021    Fluzone Vaccine Medicare () 10/28/2013, 10/24/2016, 09/15/2020    HIGH DOSE FLU 65 YRS AND OLDER PRSV FREE SINGLE D (81167) FLU CLINIC 09/27/2022    Influenza 10/24/2012, 10/23/2015, 10/20/2017, 10/05/2018    Pneumococcal (Prevnar 13) 09/02/2016    Pneumococcal (Prevnar 7) 04/24/2010    Pneumovax 23 09/29/2017    TDAP 09/23/2019    Zoster Vaccine Live (Zostavax) 09/19/2012    Zoster Vaccine Recombinant Adjuvanted (Shingrix) 03/06/2023, 05/23/2023            Allergies   Allergen Reactions    Ampicillin NAUSEA ONLY       Caps    Codeine [Opioid Analgesics]         Derivatives  Syncope    Cortisone [Cortisone Acetate]         Injection into L shoulder  Syncope, stomach cramps      CODE STATUS:  DNR.  Do not Hospitalize.     ADVANCED CARE PLANNING TEAM: SW and staff to continue updates for Guardian Demetrice on a regular basis and with any change in condition.      CURRENT MEDICATIONS - reviewed and updated on [facility]  Tylenol 325 one supp q 4 hrs prn fever  Acidophilus priobiotic - 1 cap per Gtube 2 x day  Mupirocin 2% ointment prn g-tube drainage  Omeprazole 20 via g tube twice daily  Osmolite 1.5 kavin per g tube decreased rate d/t intolerance; widespread edema including face and eyes.   K+ 20 meq daily  Eliquis 5 mg 2 x day  Valproic acid 750 tid  2/28/24 Restart Dificid 200 2 x daily x 10 days for Cdiff - Ended  3/8/24   2/28/24 Start Levaquin 250mg daily x 10 days. Ended 3/8/24.  Banatrol Plus 2 x day  Duonebs q 6 hrs prn  Restart oral Vanco 3/14/24 125 mg qid for increased watery foul smelling stools.                    SUBJECTIVE: unable to answer questions.  loose stools, audible wheezing, respirations 28.         PHYSICAL EXAM: /74. P 108. RR 28. POx 91%. Wgt 146.6#. T 98.   GENERAL  HEALTH: doing very poor; not eating. NPO, frail. Tachypneic and tachycardia.    LINES, TUBES, DRAINS:  +g tube with daily feedings decreased  d/t intolerance and edema.  SKIN: pale, warm, dry  WOUND: see skin/wound assessments per staff   EYES: eyes shut; puffy.     HENT:   + dysphagia. NPO.   BREAST: deferred exam   RESPIRATORY: lungs w/ bilateral audible wheezing;  rate 28; O2 5L n/c.  Duonebs q 6 hrs & prn. Pulse Ox 91%; increase O2 for comfort.  CARDIOVASCULAR:  RRR, . Rate  108.  ABDOMEN: + g-tube. BS+, distended during feedings; rate decreased, duration increased, water flushes decreased; all d/t intolerance..   : deferred exam; incontinent  LYMPHATIC:no lymphedema  MUSCULOSKELETAL: bed confined  EXTREMITIES: + edema upper extremities, face and eyes. L sided weakness. neuropathy.  NEUROLOGIC: occasionally will answer yes/no or call out.   PSYCHIATRIC: non-responsive, mouth breathing.        MEDICAL DECISION MAKING. Guardian Demetrice Jo with frequent updates.      Assessment /Plan    Failure to Thrive - hospice worthy. Guardian reported court will take a very long time to obtain hospice order.  Tube feedings decreased - see  per dietician  Medications adjusted  -  see above  NPO  Mouth care and turn q 2 hrs    Recurrent C-Diff x 3  Recurrent watery stools   Restart Vanco 125 mg qid x 14 days; start 3/14/24.  Failed oral vanco recently;  completed 10 day course of Dificid.  Restarted Dificid 200 mg twice daily x 10 days Ended 3/8/24  Isolation.  Probiotic 2 x day    Previous  UTI  Levaquin 250 q 24 hrs x 10 days Ended 3/8/24.  Probiotic 2 x day    Mild facial twitching   Last Labs - see above  No further labs per guardian.  Valproic acid today WNL from 29.5   Dietician following      Hypokalemia /Hyperkalemia-   K+ 4.3       Previous LLE DVT  Eliquis 5 mg 2 x daily  ECHO reported without any RV strain  Doppler was + for LLE DVT - has been on eliquis long term.     Previous CVA w/residual L sided weakness, pain  & peripheral neuropathy.  History of Falls  Prior ICH s/p Craniotomy  Valproic acid 750 tid - level WNL  No seizure activity noted on EEG  Left arm support     Anxiety/Dementia/Agitation  Lorazepam 1 mg q 2 hrs.        GERD/gastritis   Omeprazole 29 via Gtube 2 x daily     Insomnia - discontinued Remeron 15 mg q hs     Pain  Tylenol supp prn     Disposition:  Long term care at [facility].  Hospice worthy.  F/U with Guardian.  Phone call with Guardian Demetrice.  Provided health status update; respiratory distress, medication changes, oxygen increase, agitation, tube feeding adjustment.  Met with Scientologist member who is at bedside for prayers and spiritual care.  Mosque member is calling patient's  to visit patient to provide spiritual care.      *Greater than 35 minutes spent w/ patient and staff, including but not limited to/ reviewing present status, needs, abilities with disciplines, reviewing medical records, vital signs, labs, completing med rec and entering orders to establish plan of care in [facility].  Note to patient: The 21st Century Cures Act makes medical notes like these available to patients in the interest of transparency. However, this is a medical document intended as peer to peer communication. It is written in medical language and may contain abbreviations or verbiage that are unfamiliar. It may appear blunt or direct. Medical documents are intended to carry relevant information, facts as evident, and the clinical opinion of the practitioner who signs the document.    Venessa CLAROS (Daya)  3/19/24 11 am-12pm - prolonged visit.  [facility] Post Acute Care Team

## 2024-03-21 ENCOUNTER — SNF VISIT (OUTPATIENT)
Dept: INTERNAL MEDICINE CLINIC | Age: 79
End: 2024-03-21

## 2024-03-21 DIAGNOSIS — Z98.890 HISTORY OF CRANIOTOMY: ICD-10-CM

## 2024-03-21 DIAGNOSIS — R45.1 RESTLESSNESS AND AGITATION: ICD-10-CM

## 2024-03-21 DIAGNOSIS — R62.7 FAILURE TO THRIVE IN ADULT: ICD-10-CM

## 2024-03-21 DIAGNOSIS — R06.82 TACHYPNEA: ICD-10-CM

## 2024-03-21 DIAGNOSIS — Z43.1 ATTENTION TO G-TUBE (HCC): ICD-10-CM

## 2024-03-21 DIAGNOSIS — R00.0 TACHYCARDIA: ICD-10-CM

## 2024-03-21 DIAGNOSIS — R06.03 RESPIRATORY DISTRESS: Primary | ICD-10-CM

## 2024-03-21 DIAGNOSIS — I63.9 RIGHT-SIDED CEREBROVASCULAR ACCIDENT (CVA) (HCC): ICD-10-CM

## 2024-03-21 DIAGNOSIS — Z99.81 SUPPLEMENTAL OXYGEN DEPENDENT: ICD-10-CM

## 2024-03-21 DIAGNOSIS — Z51.5 HOSPICE CARE PATIENT: ICD-10-CM

## 2024-03-21 PROCEDURE — 99310 SBSQ NF CARE HIGH MDM 45: CPT | Performed by: NURSE PRACTITIONER

## 2024-03-24 VITALS
TEMPERATURE: 98 F | SYSTOLIC BLOOD PRESSURE: 118 MMHG | BODY MASS INDEX: 30 KG/M2 | WEIGHT: 146.63 LBS | HEART RATE: 78 BPM | DIASTOLIC BLOOD PRESSURE: 74 MMHG | OXYGEN SATURATION: 88 %

## 2024-03-24 NOTE — PROGRESS NOTES
[place] Mayte BRODY.   11/10/1945.  APN Encounter 3/21/24  (late entry)    [facility]    Met with patient at bedside.  Hospice care nurses present.   Increased Dilaudid to comfort.  Increased Lorazepam to comfort  Hyoscyamine Sulfate Tablet 0.125 MG, 1 tab q 4 hrs prn secretions SL    Medications titrated for respiratory distress, audible wheezing, rapid heart rate.  Patient with upper extremity edema and facial edema.  Patient with periods of restlessness.    Patient's Guardian / supervisor agreed with hospice care.    Last labs reviewed:  Chest X-ray negative.  Labs :  WBC 16.3 from 10. HGB 11 from 9.6. Plts 328.  Na 136. K 4.3. Cl 100. Co2 28. BUN 15. Creat 0.8. gfr >60   Valproic Acid level today WNL from 29 the day before.    Mayte Yates  : 11/10/1945. 78 year old female is admitted to [facility] for rehabilitation; tansitioned to long term care.       [facility] Re-Admission: 24  Returned to [place]:               24     Chief complaint today: episodes of respiratory distress, tachypnea, tachycardia.  +Diarrhea, oxygen dependent, agitation,     HPI 78 yr old female with PMhx of anxiety, breast cancer, HTN, DL, dementia presented to the ED after a fall at NH.  Pt was seen earlier in the ED today and had negative imaging and was sent back to [facility] Pt was noted to have worsening L side facial droop so was sent back to the ED.  Pt does have chronic L sided weakness from previous ICH. She is alert to only self given her dementia. Overall, she is a poor historian. She denied any complaints. She denied any CP or SOB. No F/C. No cough. No N/V/D/C or abd pain. In ED, she was scanned again given facial droop and found to have PE. She does have remote history of GI bleed and gastritis. In the ED, she was heme negative. Pt was started on heparin gtt.      Pt was admitted, anticoagulated with heparin drip and transitioned to Eliquis;  discharged back to NH in stable  condition.        Past Medical History:   Diagnosis Date    Anxiety      Breast CA (HCC) 2004    C. difficile diarrhea      CANCER       lft breast lumpectomy pre cancerous cells stage 0    Diabetes mellitus (HCC)      Diarrhea, unspecified      Ductal carcinoma in situ of breast      left breast    Flatulence/gas pain/belching      GERD      H/O infectious mononucleosis       diagnosed in 1960s    High cholesterol      Hypertension      IBS (irritable bowel syndrome)      Osteoporosis      Other and unspecified hyperlipidemia      Personal history of irradiation, presenting hazards to health 2004     mammosite    Sleep disturbance      Stool incontinence      Stroke (HCC)      Type II or unspecified type diabetes mellitus without mention of complication, not stated as uncontrolled      Wears glasses      *  C-DIFF X 3        Past Surgical History:   Procedure Laterality Date    APPENDECTOMY         194    COLONOSCOPY          hiatal hernia, sm internal hem    COLONOSCOPY   2005     fiberoptic    D & C        IR ANGIOGRAM CEREBRAL CAROTID BILATERAL   10/12/2023    LUMPECTOMY LEFT   2004     breast    OTHER SURGICAL HISTORY         lumpectomy 2004 lft breast stage 0    OTHER SURGICAL HISTORY   10/11/2023     CRANIOTOMY FOR RIGHT FRONTAL HEMORRHAGE    RADIATION LEFT         Mammosite    TONSILLECTOMY             UPPER GI ENDOSCOPY,EXAM         2005            Family History   Problem Relation Age of Onset    Heart Disorder Father            of heart attack age 57    Alcohol and Other Disorders Associated Father      Arthritis Father      Other (Other) Father      Heart Disease Mother           CHF age 80    Arthritis Mother      Other (Other) Mother      Diabetes Sister           mellitus    Heart Disease Maternal Grandfather      Cancer Maternal Grandmother           brain tumor    Breast Cancer Self        Social History            Socioeconomic History    Marital status:     Tobacco Use    Smoking status: Former       Packs/day: 1.00       Years: 20.00       Additional pack years: 0.00       Total pack years: 20.00       Types: Cigarettes       Quit date: 3/1/2006       Years since quittin.9i    Smokeless tobacco: Never   Vaping Use    Vaping Use: Never used   Substance and Sexual Activity    Alcohol use: No       Alcohol/week: 0.0 standard drinks of alcohol    Drug use: No   Other Topics Concern    Caffeine Concern No       Comment: tries to drink decaf drinks    Exercise Yes       Comment: jazzercise 3x/wk, tennis 1-2x/wk      Social Determinants of Health           Food Insecurity: No Food Insecurity (2024)     Food Insecurity      Food Insecurity: Never true   Transportation Needs: No Transportation Needs (2024)     Transportation Needs      Lack of Transportation: No   Housing Stability: Low Risk  (2024)     Housing Stability      Housing Instability: No      Housing Instability Emergency: No           Immunization History   Administered Date(s) Administered    Covid-19 Vaccine Moderna 100 mcg/0.5 ml 02/10/2021, 03/10/2021, 10/22/2021, 2022    Covid-19 Vaccine Moderna Bivalent 50mcg/0.5mL 12+ years 2022    DTAP 2007    FLU VAC High Dose 65 YRS & Older PRSV Free (35770) 10/24/2016, 09/15/2020    FLUAD High Dose 65 yr and older (95580) 10/11/2019, 2021    Fluzone Vaccine Medicare () 10/28/2013, 10/24/2016, 09/15/2020    HIGH DOSE FLU 65 YRS AND OLDER PRSV FREE SINGLE D (74077) FLU CLINIC 2022    Influenza 10/24/2012, 10/23/2015, 10/20/2017, 10/05/2018    Pneumococcal (Prevnar 13) 2016    Pneumococcal (Prevnar 7) 2010    Pneumovax 23 2017    TDAP 2019    Zoster Vaccine Live (Zostavax) 2012    Zoster Vaccine Recombinant Adjuvanted (Shingrix) 2023, 2023            Allergies   Allergen Reactions    Ampicillin NAUSEA ONLY       Caps    Codeine [Opioid Analgesics]         Derivatives   Syncope    Cortisone [Cortisone Acetate]         Injection into L shoulder  Syncope, stomach cramps      CODE STATUS:  DNR.  Do not Hospitalize.     ADVANCED CARE PLANNING TEAM: SW and staff to continue updates for Guardielinor Surehs on a regular basis and with any change in condition.      CURRENT MEDICATIONS - reviewed and updated per [facility].                   SUBJECTIVE: unable to answer questions.  loose stools, audible wheezing, respirations 22-28.         PHYSICAL EXAM: /74.  P 78. RR 28. POx 88%. Wgt 146.6#. T 98. 2  GENERAL HEALTH: doing poorly; not eating or drinking.  NPO, frail. Tachypneic and tachycardia.    LINES, TUBES, DRAINS:  +g tube -   SKIN: pale, warm, dry  WOUND: see skin/wound assessments per staff   EYES: closed; puffy.     HENT:   + dysphagia. NPO.   BREAST: deferred exam   RESPIRATORY: lungs w/ bilateral audible wheezing;  rate 28; O2 5L n/c.  Duonebs q 6 hrs & prn. Pulse Ox 88%; increase O2 for comfort.  ABDOMEN: + g-tube. BS+, distended during feedings; discontinued per hospice.    : deferred exam; incontinent  LYMPHATIC:no lymphedema  MUSCULOSKELETAL: bed confined  EXTREMITIES: + edema upper extremities, face and eyes. L sided weakness. neuropathy.  NEUROLOGIC: occasionally will answer yes/no or call out.   PSYCHIATRIC: non-responsive, mouth breathing.      MEDICAL DECISION MAKING. Mayda Suresh Sandro with frequent updates.  Agency agreed to hospice care.     Assessment /Plan    Failure to Thrive -  [facility].   Respiratory Distress  Medications adjusted  per [facility]  Comfort Care.  Mouth care and turn q 2 hrs    Recurrent C-Diff x 3  Restarted Vanco 125 mg qid x 14 days; start 3/14/24.  Failed oral vanco recently;  completed 10 day course of Dificid.  Restarted Dificid 200 mg twice daily x 10 days Ended 3/8/24  Isolation.     Previous  UTI  Levaquin 250 q 24 hrs x 10 days Ended 3/8/24.    Mild facial twitching resolved    No further labs -hospice care    Valproic acid today WNL from 29.5      Hypokalemia /Hyperkalemia-   K+ 4.3       Previous LLE DVT  Eliquis 5 mg 2 x daily  ECHO reported without any RV strain  Doppler was + for LLE DVT - has been on eliquis long term.     Previous CVA w/residual L sided weakness, pain & peripheral neuropathy.  History of Falls  Prior ICH s/p Craniotomy  Valproic acid 750 tid - level WNL     Anxiety/Dementia/Agitation  Lorazepam prn         GERD/gastritis  - noted      Insomnia - discontinued Remeron 15 mg q hs     Pain  -  Dilaudid prn - hospice care.       Disposition:  Long term care at [facility].  [facility].  Restorationism members at bedside for prayers and spiritual care.       *Greater than 35 minutes spent w/ patient and staff, including but not limited to/ reviewing present status, needs, abilities with disciplines, reviewing medical records, vital signs, labs, completing med rec and entering orders to establish plan of care in [facility].  Note to patient: The 21st Century Cures Act makes medical notes like these available to patients in the interest of transparency. However, this is a medical document intended as peer to peer communication. It is written in medical language and may contain abbreviations or verbiage that are unfamiliar. It may appear blunt or direct. Medical documents are intended to carry relevant information, facts as evident, and the clinical opinion of the practitioner who signs the document.    Venessa CLAROS (Daya)  3/21/24 10am.  [facility] Post Acute Care Team

## 2025-02-28 NOTE — ED QUICK NOTES
Per friend at bedside pt is normally more alert and oriented than she is right now. MD aware - see new orders. See session report. Scanned documents.
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
## (undated) DIAGNOSIS — M54.6 THORACIC BACK PAIN, UNSPECIFIED BACK PAIN LATERALITY, UNSPECIFIED CHRONICITY: ICD-10-CM

## (undated) DIAGNOSIS — M47.816 FACET ARTHRITIS OF LUMBAR REGION: Primary | ICD-10-CM

## (undated) DIAGNOSIS — K58.0 IRRITABLE BOWEL SYNDROME WITH DIARRHEA: Primary | ICD-10-CM

## (undated) DEVICE — MARKER SKIN PREP RESIST STRL

## (undated) DEVICE — CODMAN® SURGICAL PATTIES 1/2" X 1/2" (1.27CM X 1.27CM): Brand: CODMAN®

## (undated) DEVICE — KIT VLV 5 PC AIR H2O SUCT BX ENDOGATOR CONN

## (undated) DEVICE — GOWN,SIRUS,FABRIC-REINFORCED,X-LARGE: Brand: MEDLINE

## (undated) DEVICE — STERIS KITS

## (undated) DEVICE — 1200CC GUARDIAN II: Brand: GUARDIAN

## (undated) DEVICE — LIGHT HANDLE

## (undated) DEVICE — KIT HEMSTAT MTRX 8ML PORCINE GEL HUM THROM

## (undated) DEVICE — BITEBLOCK ENDOSCP 60FR MAXI STRP

## (undated) DEVICE — SYRINGE,EAR/ULCER, 2 OZ, STERILE: Brand: MEDLINE

## (undated) DEVICE — DISPOSABLE DUAL IRRIGATING BIPOLAR FORCEPS, NON-STICK,: Brand: SPETZLER-MALIS

## (undated) DEVICE — DRAPE UTIL L W18XL24IN PLAS STR ADH REINF

## (undated) DEVICE — CODMAN® DISPOSABLE CATHETER PASSER: Brand: CODMAN®

## (undated) DEVICE — CRANIOTOMY CDS: Brand: MEDLINE INDUSTRIES, INC.

## (undated) DEVICE — SOLUTION PREP 26ML 0.7% POVACRYLEX 74% ISO

## (undated) DEVICE — RESERVOIR,SUCTION,100CC,SILICONE: Brand: MEDLINE

## (undated) DEVICE — RANEY SCALP CLIP STERILE: Brand: AESCULAP

## (undated) DEVICE — 3M™ IOBAN™ 2 ANTIMICROBIAL INCISE DRAPE 6650EZ: Brand: IOBAN™ 2

## (undated) DEVICE — 10FT COMBINED O2 DELIVERY/CO2 MONITORING. FILTER WITH MICROSTREAM TYPE LUER: Brand: DUAL ADULT NASAL CANNULA

## (undated) DEVICE — SOLUTION IRRIG 1000ML 0.9% NACL USP BTL

## (undated) DEVICE — BANDAGE,GAUZE,BULKEE II,4.5"X4.1YD,STRL: Brand: MEDLINE

## (undated) DEVICE — SPONGE GZ 4XL4IN 100% COT 12 PLY TYP VII WVN

## (undated) DEVICE — PROXIMATE RH ROTATING HEAD SKIN STAPLERS (35 WIDE) CONTAINS 35 STAINLESS STEEL STAPLES: Brand: PROXIMATE

## (undated) DEVICE — DRAIN JACKSON PRATT RND7FR END: Brand: CARDINAL HEALTH

## (undated) DEVICE — STERILE POLYISOPRENE POWDER-FREE SURGICAL GLOVES: Brand: PROTEXIS

## (undated) DEVICE — Device

## (undated) DEVICE — SUTURE VCRL SZ 2-0 L18IN ABSRB UD L26MM CP-2

## (undated) DEVICE — BANDAGE ADH W1INXL3IN NAT FAB WVN N ADH PD

## (undated) DEVICE — PROBE DOPP 20MHZ 280MML 2MM TIP MALEABLE DISP

## (undated) DEVICE — 3M™ RED DOT™ MONITORING ELECTRODE WITH FOAM TAPE AND STICKY GEL, 50/BAG, 20/CASE, 72/PLT 2570: Brand: RED DOT™

## (undated) DEVICE — ELECTRODE ES L2.75IN XLN STD BLDE MOD E-Z CLN

## (undated) DEVICE — BINDER ABD UNISX 9IN 45IN SM AND M UNIV

## (undated) DEVICE — PAD SACRAL SPAN AID

## (undated) DEVICE — Device: Brand: INTELLICART™

## (undated) DEVICE — STERILE POLYISOPRENE POWDER-FREE SURGICAL GLOVES WITH EMOLLIENT COATING: Brand: PROTEXIS

## (undated) DEVICE — CODMAN® SURGICAL PATTIES 1/4" X 1/4" (0.64CM X 0.64CM): Brand: CODMAN®

## (undated) DEVICE — KIT EVAC 400CC DIA1/8IN H PAT 12.5IN 3 SPR

## (undated) DEVICE — GIJAW SINGLE-USE BIOPSY FORCEPS WITH NEEDLE: Brand: GIJAW

## (undated) DEVICE — SLEEVE COMPR M KNEE LEN SGL USE KENDALL SCD

## (undated) DEVICE — 2.3MM SPIRAL ROUTER

## (undated) DEVICE — MAYFIELD® DISPOSABLE ADULT SKULL PIN (PLASTIC BASE): Brand: MAYFIELD®

## (undated) DEVICE — 3.0MM PRECISION NEURO (MATCH HEAD)

## (undated) DEVICE — CAUTERY CORD BIPOLAR YASARGIL

## (undated) DEVICE — ZEISS MD DRAPE

## (undated) DEVICE — JELLY LUB 2OZ GREASELESS FLIP TOP DISP

## (undated) NOTE — LETTER
05/28/21        Eh Nicholas  [address] [address]      Dear Jeannie Senior records indicate that you have outstanding lab work and or testing that was ordered for you and has not yet been completed:  Orders Placed This Encounter

## (undated) NOTE — LETTER
[facility] [address], [address] [place] [address]  Consent for Procedure/Sedation  Date: 10/12/2023         Time: 0900    I hereby authorize MD Nicki Riedel, my physician and his/her assistants (if applicable), which may include medical students, residents, and/or fellows, to perform the following surgical operation/ procedure and administer such anesthesia as may be determined necessary by my physician: Diagnostic cerebral angiogram, and possible closure device on [facility]  2. I recognize that during the surgical operation/procedure, unforeseen conditions may necessitate additional or different procedures than those listed above. I, therefore, further authorize and request that the above-named surgeon, assistants, or designees perform such procedures as are, in their judgment, necessary and desirable. 3.   My surgeon/physician has discussed prior to my surgery the potential benefits, risks and side effects of this procedure; the likelihood of achieving goals; and potential problems that might occur during recuperation. They also discussed reasonable alternatives to the procedure, including risks, benefits, and side effects related to the alternatives and risks related to not receiving this procedure. I have had all my questions answered and I acknowledge that no guarantee has been made as to the result that may be obtained. 4.   Should the need arise during my operation/procedure, which includes change of level of care prior to discharge, I also consent to the administration of blood and/or blood products. Further, I understand that despite careful testing and screening of blood or blood products by collecting agencies, I may still be subject to ill effects as a result of receiving a blood transfusion and/or blood products.   The following are some, but not all, of the potential risks that can occur: fever and allergic reactions, hemolytic reactions, transmission of diseases such as Hepatitis, AIDS and Cytomegalovirus (CMV) and fluid overload. In the event that I wish to have an autologous transfusion of my own blood, or a directed donor transfusion, I will discuss this with my physician. Check only if Refusing Blood or Blood Products  I understand refusal of blood or blood products as deemed necessary by my physician may have serious consequences to my condition to include possible death. I hereby assume responsibility for my refusal and release the hospital, its personnel, and my physicians from any responsibility for the consequences of my refusal.          o  Refuse         5. I authorize the use of any specimen, organs, tissues, body parts or foreign objects that may be removed from my body during the operation/procedure for diagnosis, research or teaching purposes and their subsequent disposal by hospital authorities. I also authorize the release of specimen test results and/or written reports to my treating physician on the hospital medical staff or other referring or consulting physicians involved in my care, at the discretion of the Pathologist or my treating physician. 6.   I consent to the photographing or videotaping of the operations or procedures to be performed, including appropriate portions of my body for medical, scientific, or educational purposes, provided my identity is not revealed by the pictures or by descriptive texts accompanying them. If the procedure has been photographed/videotaped, the surgeon will obtain the original picture, image, videotape or CD. The hospital will not be responsible for storage, release or maintenance of the picture, image, tape or CD.    7.   I consent to the presence of a  or observers in the operating room as deemed necessary by my physician or their designees. 8.   I recognize that in the event my procedure results in extended X-Ray/fluoroscopy time, I may develop a skin reaction. 9.  If I have a Do Not Attempt Resuscitation (DNAR) order in place, that status will be suspended while in the operating room, procedural suite, and during the recovery period unless otherwise explicitly stated by me (or a person authorized to consent on my behalf). The surgeon or my attending physician will determine when the applicable recovery period ends for purposes of reinstating the DNAR order. 10. Patients having a sterilization procedure: I understand that if the procedure is successful the results will be permanent and it will therefore be impossible for me to inseminate, conceive, or bear children. I also understand that the procedure is intended to result in sterility, although the result has not been guaranteed. 11. I acknowledge that my physician has explained sedation/analgesia administration to me including the risk and benefits I consent to the administration of sedation/analgesia as may be necessary or desirable in the judgment of my physician.     I CERTIFY THAT I HAVE READ AND FULLY UNDERSTAND THE ABOVE CONSENT TO OPERATION and/or OTHER PROCEDURE.        ____________________________________       _________________________________      ______________________________  Signature of Patient         Signature of Responsible Person        Printed Name of Responsible Person        ____________________________________      _________________________________      ______________________________       Signature of Witness          Relationship to Patient                       Date                                       Time  Patient Name: Brett Hudson     : 11/10/1945                 Printed: 2023      Medical Record #: [id number]                      Page 1 of

## (undated) NOTE — LETTER
11/06/19        Dalia Mendez  [address] [facility] [address]      Dear Lily Junior records indicate that you have outstanding lab work and or testing that was ordered for you and has not yet been completed:Non Fasting Lab Work  To complete la

## (undated) NOTE — LETTER
Magdalena Abraham, :11/10/1945    CONSENT FOR PROCEDURE/SEDATION    1. I authorize the performance upon Magdalena Abraham  the following: Vulvar Biopsy    2.  I authorize Dr. Rhiannon Lisa MD (and whomever is designated as the doctor’s assistant), to perform the Witness: _________________________________________ Date:___________     Physician Signature: _______________________________ Date:___________

## (undated) NOTE — Clinical Note
April 25, 2017    [phone]      [facility]: The following are the results of your recent tests. Please review the list of test results.   Your result is the value on the left; we have also supplied the rang RED BLOOD CELL COUNT 4.57 3.80 - 5.10 Million/uL   HEMOGLOBIN 13.4 11.7 - 15.5 g/dL   HEMATOCRIT 40.9 35.0 - 45.0 %   MCV 89.4 80.0 - 100.0 fL   MCH 29.2 27.0 - 33.0 pg   MCHC 32.7 32.0 - 36.0 g/dL   RDW 16.0 (H) 11.0 - 15.0 %   PLATELET COUNT 583 957 - 40

## (undated) NOTE — LETTER
05/10/19        Lane Carlson  [address] [phone]      Dear Joao Young records indicate that you have outstanding lab work and or testing that was ordered for you and has not yet been completed: Fasting lab work   To complete the

## (undated) NOTE — MR AVS SNAPSHOT
[place]  [address]  [address] [facility] [address] [phone]               Thank you for choosing us for your health care visit with Bob Quintana MD.  We are glad to serve you and happy to provide you with this summ hyperlipidemia [E78.2], Ductal carcinoma in situ (DCIS) of left breast [D05.12], BMI 31.0-31.9,adult [Z68.31], Diabetes mellitus type 2 in obese (HCC) [E11.9, E66.9], Facet arthritis of lumbar region ([facility] [address] [M46.96], Gastroesophageal reflux disease, esophag lumbar region [facility]) [M46.96], Gastroesophageal reflux disease, esophagitis presence not specified [K21.9], Encounter for annual health examination [Z00.00], Fatigue, unspecified type [R53.83]           Oncology/Hematology Referral - In Network    Complete Gastroesophageal reflux disease, esophagitis presence not specified   Encounter for annual health examination   Fatigue, unspecified type   Order:  Derm - Internal    Aubery Friends, [address] [facility] [address]   Phone:  [phone]   Fax: mellitus ([facility] [E11.9], Mixed hyperlipidemia [E78.2], Ductal carcinoma in situ (DCIS) of left breast [D05.12], BMI 31.0-31.9,adult [Z68.31], Diabetes mellitus type 2 in obese (HCC) [E11.9, E66.9], Facet arthritis of lumbar region ([facility] Alexis Shall Ductal carcinoma in situ (DCIS) of left breast    Essential hypertension    Facet arthritis of lumbar region ([facility] [address]    GERD (gastroesophageal reflux disease)    Mixed hyperlipidemia    Encounter for annual health examination        Acne, unspecified acne t Generic drug:  Glucose Blood   test 3 times per week.            ACCU-CHEK MULTICLIX LANCETS Misc   Dx DM           aspirin 81 MG Tabs   1 TABLET DAILY           Atorvastatin Calcium 40 MG Tabs   TAKE 1 TABLET BY MOUTH IN THE EVENING   Commonly known as:  L Eat plenty of protein, keep the fat content low Sugars:  sodas and sports drinks, candies and desserts   Eat plenty of low-fat dairy products High fat meats and dairy   Choose whole grain products Foods high in sodium   Water is best for hydration Fast farzaneh

## (undated) NOTE — LETTER
[facility] [address], [address]  Consent for Procedure/Sedation  Date: 10/12/2023         Time: 09:03AM    I hereby authorize Dr. Marko Amin, my physician and his/her assistants (if applicable), which may include medical students, residents, and/or fellows, to perform the following surgical operation/ procedure and administer such anesthesia as may be determined necessary by my physician: diagnostic cerebral angiogram on [facility]  2. I recognize that during the surgical operation/procedure, unforeseen conditions may necessitate additional or different procedures than those listed above. I, therefore, further authorize and request that the above-named surgeon, assistants, or designees perform such procedures as are, in their judgment, necessary and desirable. 3.   My surgeon/physician has discussed prior to my surgery the potential benefits, risks and side effects of this procedure; the likelihood of achieving goals; and potential problems that might occur during recuperation. They also discussed reasonable alternatives to the procedure, including risks, benefits, and side effects related to the alternatives and risks related to not receiving this procedure. I have had all my questions answered and I acknowledge that no guarantee has been made as to the result that may be obtained. 4.   Should the need arise during my operation/procedure, which includes change of level of care prior to discharge, I also consent to the administration of blood and/or blood products. Further, I understand that despite careful testing and screening of blood or blood products by collecting agencies, I may still be subject to ill effects as a result of receiving a blood transfusion and/or blood products.   The following are some, but not all, of the potential risks that can occur: fever and allergic reactions, hemolytic reactions, transmission of diseases such as Hepatitis, AIDS and Cytomegalovirus (CMV) and fluid overload. In the event that I wish to have an autologous transfusion of my own blood, or a directed donor transfusion, I will discuss this with my physician. Check only if Refusing Blood or Blood Products  I understand refusal of blood or blood products as deemed necessary by my physician may have serious consequences to my condition to include possible death. I hereby assume responsibility for my refusal and release the hospital, its personnel, and my physicians from any responsibility for the consequences of my refusal.         o  Refuse         5. I authorize the use of any specimen, organs, tissues, body parts or foreign objects that may be removed from my body during the operation/procedure for diagnosis, research or teaching purposes and their subsequent disposal by hospital authorities. I also authorize the release of specimen test results and/or written reports to my treating physician on the hospital medical staff or other referring or consulting physicians involved in my care, at the discretion of the Pathologist or my treating physician. 6.   I consent to the photographing or videotaping of the operations or procedures to be performed, including appropriate portions of my body for medical, scientific, or educational purposes, provided my identity is not revealed by the pictures or by descriptive texts accompanying them. If the procedure has been photographed/videotaped, the surgeon will obtain the original picture, image, videotape or CD. The hospital will not be responsible for storage, release or maintenance of the picture, image, tape or CD.    7.   I consent to the presence of a  or observers in the operating room as deemed necessary by my physician or their designees. 8.   I recognize that in the event my procedure results in extended X-Ray/fluoroscopy time, I may develop a skin reaction. 9.  If I have a Do Not Attempt Resuscitation (DNAR) order in place, that status will be suspended while in the operating room, procedural suite, and during the recovery period unless otherwise explicitly stated by me (or a person authorized to consent on my behalf). The surgeon or my attending physician will determine when the applicable recovery period ends for purposes of reinstating the DNAR order. 10. Patients having a sterilization procedure: I understand that if the procedure is successful the results will be permanent and it will therefore be impossible for me to inseminate, conceive, or bear children. I also understand that the procedure is intended to result in sterility, although the result has not been guaranteed. 11. I acknowledge that my physician has explained sedation/analgesia administration to me including the risk and benefits I consent to the administration of sedation/analgesia as may be necessary or desirable in the judgment of my physician.     I CERTIFY THAT I HAVE READ AND FULLY UNDERSTAND THE ABOVE CONSENT TO OPERATION and/or OTHER PROCEDURE.        ____________________________________       _________________________________      ______________________________  Signature of Patient         Signature of Responsible Person        Printed Name of Responsible Person        ____________________________________      _________________________________      ______________________________       Signature of Witness          Relationship to Patient                       Date                                       Time  Patient Name: Adama Collazo     : 11/10/1945                 Printed: 2023      Medical Record #: [id number]                      Page 1 of 1

## (undated) NOTE — IP AVS SNAPSHOT
Patient Demographics     Address  [address] [address] Phone  [phone] (Home) *Preferred*  [phone] (Mobile)      Patient Contacts     Name Relation Home Work Mobile    Demetrice dupree   [phone]    Sarah Ni Friend [phone]      Ciaran Harris OTHER   [phone]    Khadijah Garcia Friend [phone]      JASVIR BRANTLEY Friend   [phone]      Allergies as of 1/30/2024  Review status set to Review Complete on 1/26/2024       Noted Reaction Type Reactions    Ampicillin 06/10/2011    NAUSEA ONLY    Caps    Codeine [opioid Analgesics] 06/10/2011        Derivatives  Syncope    Cortisone [cortisone Acetate] 06/10/2011        Injection into L shoulder  Syncope, stomach cramps      Code Status Information     Code Status    Full Code      Patient Instructions    None      Follow-up Information     Harjeet Enrique MD. Call.    Specialties: ONCOLOGY, HEMATOLOGY  Contact information:  [address]  [address]  [place] [address] [phone]             Dilcia Avery MD. Schedule an appointment as soon as possible for a visit in 1 week(s).    Specialty: Internal Medicine  Contact information:  [address]  [address]  [place] [address] [phone]                        Your Home Meds List      TAKE these medications       Instructions Authorizing Provider Morning Afternoon Evening As Needed   Accu-Chek FastClix Lancets Misc      USE 1 LANCET TO STICK FINGER DAILY TO TEST BLOOD.   Dilcia Avery         acetaminophen 325 MG Supp  Commonly known as: Tylenol      Place 1 suppository (325 mg total) rectally every 4 (four) hours as needed for Fever.          acidophilus-pectin Caps  Commonly known as: Probiotic  Next dose due: Tonight 1/30      1 capsule by Per G Tube route 2 (two) times daily.          alendronate 70 MG Tabs  Commonly known as: Fosamax  Next dose due: As directed weekly      Take 1 tablet (70 mg total) by mouth once a week.   Dilcia Avery          ALPRAZolam 0.25 MG Tabs  Commonly known as: Xanax      Take 1 tablet (0.25 mg total) by mouth nightly as needed.   Damon Zuniga         apixaban 5 MG Tabs  Commonly known as: Eliquis  Next dose due: Tonight 1/30      Take 1 tablet (5 mg total) by mouth 2 (two) times daily.   Harjeet CHUY Enrique         atorvastatin 80 MG Tabs  Commonly known as: Lipitor  Next dose due: Tomorrow 1/31      Take 1 tablet (80 mg total) by mouth daily. Replaces 40 mg   Dilcia Avrey         calcium carbonate 500 MG Chew  Commonly known as: Tums  Next dose due: Tomorrow 1/31      1 tablet (500 mg total) by Per G Tube route daily.          DULoxetine 20 MG Cpep  Commonly known as: Cymbalta  Next dose due: Tomorrow 1/31      Take 1 capsule (20 mg total) by mouth daily.          mirtazapine 15 MG Tabs  Commonly known as: Remeron  Next dose due: Tonight 1/30      Take 1 tablet (15 mg total) by mouth nightly.          mupirocin 2 % Oint  Commonly known as: Bactroban  Next dose due: Tonight 1/30      Apply 1 Application topically 2 (two) times daily. Around G-tube site          omeprazole 2 mg/mL Susp  Commonly known as: PriLOSEC  Next dose due: Tonight 1/30      20 mL (40 mg total) by Per G Tube route 2 (two) times daily before meals.  Stop taking on: February 22, 2024   Willian Cao         OSMOLITE 1.5 ALYX OR  Next dose due: As directed      by Peg Tube route daily.          potassium chloride 20 MEQ Pack  Commonly known as: Klor-Con  Next dose due: Tomorrow 1/31      Take 20 mEq by mouth daily.          QUEtiapine 25 MG Tabs  Commonly known as: SEROquel  Next dose due: Tonight 1/31      Take 1 tablet (25 mg total) by mouth 2 (two) times daily.          sucralfate 1 GM/10ML Susp  Commonly known as: Carafate  Next dose due: Tonight 1/30 at 4pm      Take 10 mL (1 g total) by mouth in the morning and 10 mL (1 g total) before bedtime. GI recommended Carafate by mouth until seen 12/8/23.  Give at 6 am and 4 pm .          valproic acid 250  MG/5ML Soln  Commonly known as: Depakene  Next dose due: This afternoon and tonight 1/30      Take 15 mL (750 mg total) by mouth 3 (three) times daily.   Lorrie HERNÁNDEZ               Where to Get Your Medications      These medications were sent to [id number] - [place], [place] - [address] [phone], [phone]  [address] [address]    Phone: [phone]   apixaban 5 MG Tabs     Please  your prescriptions at the location directed by your doctor or nurse    Bring a paper prescription for each of these medications  ALPRAZolam 0.25 MG Tabs           [id number] - MAR ACTION REPORT  (last 48 hrs)    ** SITE UNKNOWN **     Order ID Medication Name Action Time Action Reason Comments    [id number] ALPRAZolam (Xanax) tab 0.25 mg 01/29/24 2146 Given      [id number] DULoxetine (Cymbalta)  cap 20 mg 01/29/24 0942 Given      [id number] DULoxetine (Cymbalta) DR cap 20 mg 01/30/24 0955 Given      [id number] QUEtiapine (SEROquel) tab 25 mg 01/28/24 2036 Given      [id number] QUEtiapine (SEROquel) tab 25 mg 01/29/24 0941 Given      [id number] QUEtiapine (SEROquel) tab 25 mg 01/29/24 2146 Given      [id number] QUEtiapine (SEROquel) tab 25 mg 01/30/24 0956 Given      [id number] acetaminophen (Tylenol Extra Strength) tab 500 mg 01/28/24 1713 Given      [id number] acetaminophen (Tylenol Extra Strength) tab 500 mg 01/30/24 0532 Given      [id number] apixaban (Eliquis) tab 5 mg 01/29/24 1132 Given      [id number] apixaban (Eliquis) tab 5 mg 01/29/24 2146 Given      [id number] apixaban (Eliquis) tab 5 mg 01/30/24 0955 Given      [id number] atorvastatin (Lipitor) tab 80 mg 01/29/24 0942 Given      [id number] atorvastatin (Lipitor) tab 80 mg 01/30/24 0955 Given      [id number] calcium carbonate (500 mg elemental calcium/5 mL) 1250 MG/5ML oral suspension 500 mg 01/29/24 0942 Given      [id number] calcium carbonate (500 mg elemental calcium/5 mL) 1250 MG/5ML oral suspension 500 mg 01/30/24 0955 Given      [id number]  lansoprazole (Prevacid Solutab) disintegrating tab 30 mg 01/28/24 1714 Given      [id number] lansoprazole (Prevacid Solutab) disintegrating tab 30 mg 01/29/24 0642 Given      [id number] lansoprazole (Prevacid Solutab) disintegrating tab 30 mg 01/29/24 1652 Given      [id number] lansoprazole (Prevacid Solutab) disintegrating tab 30 mg 01/30/24 0532 Given      [id number] mirtazapine (Remeron) tab 15 mg 01/28/24 2036 Given      [id number] mirtazapine (Remeron) tab 15 mg 01/29/24 2146 Given      [id number] potassium chloride (K-Dur) tab 40 mEq 01/29/24 0941 Given      [id number] potassium chloride (K-Dur) tab 40 mEq 01/29/24 1330 Given      [id number] sucralfate (Carafate) 1 GM/10ML oral suspension 1 g 01/28/24 1714 Given      [id number] sucralfate (Carafate) 1 GM/10ML oral suspension 1 g 01/29/24 0642 Given      [id number] sucralfate (Carafate) 1 GM/10ML oral suspension 1 g 01/29/24 1651 Given      [id number] sucralfate (Carafate) 1 GM/10ML oral suspension 1 g 01/30/24 0537 Given      [id number] valproic acid (Depakene) 250 MG/5ML oral solution 750 mg 01/28/24 1714 Given      [id number] valproic acid (Depakene) 250 MG/5ML oral solution 750 mg 01/28/24 2035 Given      [id number] valproic acid (Depakene) 250 MG/5ML oral solution 750 mg 01/29/24 0941 Given      [id number] valproic acid (Depakene) 250 MG/5ML oral solution 750 mg 01/29/24 1652 Given      [id number] valproic acid (Depakene) 250 MG/5ML oral solution 750 mg 01/29/24 2146 Given      [id number] valproic acid (Depakene) 250 MG/5ML oral solution 750 mg 01/30/24 0955 Given              Recent Vital Signs    Flowsheet Row Most Recent Value   /47 Filed at 01/30/2024 0800   Pulse 86 Filed at 01/30/2024 0800   Resp 18 Filed at 01/30/2024 0800   Temp 98.1 °F (36.7 °C) Filed at 01/30/2024 0800   SpO2 93 % Filed at 01/30/2024 0800      Patient's Most Recent Weight    Flowsheet Row Most Recent Value   Patient Weight 69.9 kg (154 lb)         Lab Results Last 24 Hours      CBC,  Platelet; No Differential [[id number]] (Abnormal)  Resulted: 01/30/24 0628, Result status: Final result   Ordering provider: Harjeet Enrique MD  01/30/24 0612 Resulting lab: [facility] ([facility])    Specimen Information    Type Source Collected On   Blood — 01/30/24 0617          Components    Component Value Reference Range Flag Lab   WBC 6.4 4.0 - 11.0 x10(3) uL — [facility] ([facility])   RBC 3.47 3.80 - 5.30 x10(6)uL [facility] ([facility])   HGB 10.6 12.0 - 16.0 g/dL L [facility] ([facility])   HCT 33.0 35.0 - 48.0 % L [facility] ([facility])   .0 150.0 - 450.0 10(3)uL — [facility])   MCV 95.1 80.0 - 100.0 fL — [facility])   MCH 30.5 26.0 - 34.0 pg — [facility])   MCHC 32.1 31.0 - 37.0 g/dL — [facility] ([facility])   RDW 17.0 % — [facility] ([facility])            Potassium [[id number]] (Normal)  Resulted: 01/29/24 1620, Result status: Final result   Ordering provider: Mahesh Norton MD  01/29/24 0758 Resulting lab: [facility] ([facility])    Specimen Information    Type Source Collected On   Blood — 01/29/24 1543          Components    Component Value Reference Range Flag Lab   Potassium 3.9 3.5 - 5.1 mmol/L — [facility])            Testing Performed By     Lab - Abbreviation Name Director Address Valid Date Range    139 - [facility] ([facility]) [facility] ([facility]) Goldberg, Cathryn A. MD [address] [place] [id number] 03/19/20 1441 - Present            Microbiology Results (All)     Procedure Component Value Units Date/Time    Rapid SARS-CoV-2 by PCR [[id number]]  (Normal) Collected: 01/26/24 0955    Order Status: Completed Lab Status: Final result Updated: 01/26/24 1026    Specimen: Other from [facility]      Rapid SARS-CoV-2 by PCR Not Detected         H&P - H&P Note      H&P signed by Eron King MD at 1/25/2024  9:36 PM  Version 1 of 1    Author: Eron King MD Service: — Author Type: Physician    Filed: 1/25/2024  9:36 PM Date of  Service: 2024  9:21 PM Status: Signed    : Eron King MD (Physician)         [facility]     Mayte Yates Patient Status:  Emergency    11/10/1945 MRN [id number]   Location [facility] EMERGENCY DEPARTMENT Attending Luis Angel Luevano MD   Hosp Day # 0 PCP Dilcia Avery MD     Chief Complaint: fall    Subjective:    History of Present Illness:     Mayte Yates is a 78 year old female with PMhx of anxiety, breast cancer, HTN, DL, dementia presents with fall. Pt was seen earlier in the ED today and had negative imaging and was sent back to her facility. Pt was noted to have worsening L side facial droop so she was brought back in. Pt does have chronic L sided weakness from previous ICH. She is alert to only self given her dementia. Overall she is a poor historian. She currently denies any complaints. She denies any CP or SOB. No F/C. No cough. No N/V/D/C or abd pain. In ED she was scanned again given facial droop and found to have PE. She does have remote history of GI bleed and gastritis. In ED she was heme negative. Pt was started on heparin gtt.       History/Other:    Past Medical History:  Past Medical History:   Diagnosis Date    Anxiety     Breast CA (HCC)     C. difficile diarrhea     CANCER     lft breast lumpectomy pre cancerous cells stage 0    Diabetes mellitus (HCC)     Diarrhea, unspecified     Ductal carcinoma in situ of breast     left breast    Flatulence/gas pain/belching     GERD     H/O infectious mononucleosis     diagnosed in 1960s    High cholesterol     Hypertension     IBS (irritable bowel syndrome)     Osteoporosis     Other and unspecified hyperlipidemia     Personal history of irradiation, presenting hazards to health 2004    mammosite    Sleep disturbance     Stool incontinence     Stroke (HCC)     Type II or unspecified type diabetes mellitus without mention of complication, not stated as uncontrolled     Wears  glasses      Past Surgical History:   Past Surgical History:   Procedure Laterality Date    APPENDECTOMY          COLONOSCOPY       hiatal hernia, sm internal hem    COLONOSCOPY  2005    fiberoptic    D & C      IR ANGIOGRAM CEREBRAL CAROTID BILATERAL  10/12/2023    LUMPECTOMY LEFT  2004    breast    OTHER SURGICAL HISTORY      lumpectomy  lft breast stage 0    OTHER SURGICAL HISTORY  10/11/2023    CRANIOTOMY FOR RIGHT FRONTAL HEMORRHAGE    RADIATION LEFT      Mammosite    TONSILLECTOMY          UPPER GI ENDOSCOPY,EXAM      2005      Family History:   Family History   Problem Relation Age of Onset    Heart Disorder Father          of heart attack age 57    Alcohol and Other Disorders Associated Father     Arthritis Father     Other (Other) Father     Heart Disease Mother         CHF age 80    Arthritis Mother     Other (Other) Mother     Diabetes Sister         mellitus    Heart Disease Maternal Grandfather     Cancer Maternal Grandmother         brain tumor    Breast Cancer Self      Social History:    reports that she quit smoking about 17 years ago. Her smoking use included cigarettes. She has a 20 pack-year smoking history. She has never used smokeless tobacco. She reports that she does not drink alcohol and does not use drugs.     Allergies:   Allergies   Allergen Reactions    Ampicillin NAUSEA ONLY     Caps    Codeine [Opioid Analgesics]      Derivatives  Syncope    Cortisone [Cortisone Acetate]      Injection into L shoulder  Syncope, stomach cramps       Medications:    Current Facility-Administered Medications on File Prior to Encounter   Medication Dose Route Frequency Provider Last Rate Last Admin    [COMPLETED] acetaminophen (Tylenol Extra Strength) tab 1,000 mg  1,000 mg Oral Once Sukhi Holland MD   1,000 mg at 24 1236    [COMPLETED] magnesium oxide (Mag-Ox) tab 400 mg  400 mg Oral Once Sue Heredia DO   400 mg at 23 0821    [COMPLETED]  potassium chloride (K-Dur) tab 40 mEq  40 mEq Oral Once Sue Heredia, DO   40 mEq at 23 0842    [] sodium chloride 0.9% infusion   Intravenous Continuous Blanca Corley MD        [] lactated ringers infusion   Intravenous Continuous Mahesh Norton MD   Stopped at 23 1251    [COMPLETED] potassium chloride (Klor-Con) 20 MEQ oral powder 40 mEq  40 mEq Oral Once Dilcia Avery MD   40 mEq at 23 0914    [COMPLETED] potassium chloride (K-Dur) tab 40 mEq  40 mEq Oral Once Blanca Corley MD   40 mEq at 23 1708    [COMPLETED] sodium chloride 0.9 % IV bolus 500 mL  500 mL Intravenous Once Blanca Corley MD   Stopped at 23 2219    [COMPLETED] acetaminophen (Tylenol) 160 MG/5ML oral liquid 1,024 mg  15 mg/kg Oral Once Blanca Corley MD   1,024 mg at 23 2115    [COMPLETED] sodium chloride 0.9% infusion 1,000 mL  1,000 mL Intravenous Once Blanca Corley MD   Stopped at 23 0203    [COMPLETED] potassium chloride (Klor-Con) 20 MEQ oral powder 40 mEq  40 mEq Oral Q4H Harjeet Babin MD   40 mEq at 23 0520    [COMPLETED] sodium chloride 0.9% infusion   Intravenous Once Dholakia, Ap, DO 10 mL/hr at 23 0115 New Bag at 23 0115    [COMPLETED] sodium chloride 0.9% infusion 1,000 mL  1,000 mL Intravenous Once Lloyd Chacko MD   Stopped at 23 1505    [COMPLETED] pantoprazole (Protonix) 40 mg in sodium chloride 0.9% PF 10 mL IV push  40 mg Intravenous Once Lloyd Chacko MD   40 mg at 23 1340    [COMPLETED] iopamidol 76% (ISOVUE-370) injection for power injector  80 mL Intravenous ONCE PRN Lloyd Chacko MD   80 mL at 23 1458    [COMPLETED] cefTRIAXone (Rocephin) 1 g in D5W 100 mL IVPB-ADD  1 g Intravenous Once Lloyd Chacko MD   Stopped at 23 1718    [COMPLETED] azithromycin (Zithromax) 500 mg in sodium chloride 0.9% 250mL IVPB premix  500 mg Intravenous Once Lloyd Chacko MD   Stopped at 23 1823    []  sodium chloride 0.9% infusion   Intravenous Continuous Harjeet Babin MD   Stopped at 23 2200    [COMPLETED] azithromycin (Zithromax) 500 mg in sodium chloride 0.9% 250mL IVPB premix  500 mg Intravenous Q24H Harjeet Babin MD   Stopped at 23 1810    [COMPLETED] iopamidol 76% (ISOVUE-370) injection for power injector  75 mL Intravenous ONCE PRN Kari Weinberg MD   75 mL at 23 1512     Current Outpatient Medications on File Prior to Encounter   Medication Sig Dispense Refill    mirtazapine 15 MG Oral Tab Take 1 tablet (15 mg total) by mouth nightly.      vancomycin 50 mg/ml Oral Solution Take 2.5 mL (125 mg total) by mouth every 6 (six) hours. 4 x daily X 10 days      DULoxetine 20 MG Oral Cap DR Particles Take 1 capsule (20 mg total) by mouth daily.      QUEtiapine 25 MG Oral Tab Take 0.5 tablets (12.5 mg total) by mouth nightly.      [] First-Lansoprazole 3 MG/ML Oral Suspension Take 30 mg by mouth 2 (two) times daily before meals. 300 mL 2    [] vancomycin 50 mg/mL Oral Recon Soln 2.5 mL (125 mg total) by Per G Tube route every 6 (six) hours for 13 days. 130 mL 0    sucralfate 1 GM/10ML Oral Suspension Take 10 mL (1 g total) by mouth in the morning and 10 mL (1 g total) before bedtime. GI recommended Carafate by mouth until seen 23.  Give at 6 am and 4 pm .      omeprazole 2 mg/mL Oral Suspension 20 mL (40 mg total) by Per G Tube route 2 (two) times daily before meals. 3600 mL 0    ALPRAZolam 0.25 MG Oral Tab Take 1 tablet (0.25 mg total) by mouth nightly as needed.      Nutritional Supplements (OSMOLITE 1.5 ALYX OR) by Peg Tube route daily.      acetaminophen 325 MG Rectal Suppos Place 1 suppository (325 mg total) rectally every 4 (four) hours as needed for Fever.      mupirocin 2 % External Ointment Apply 1 Application topically 2 (two) times daily. Around G-tube site      potassium chloride 20 MEQ Oral Powd Pack Take 20 mEq by mouth daily.      valproic acid 250 MG/5ML Oral  Solution Take 15 mL (750 mg total) by mouth 3 (three) times daily. 300 mL 0    alendronate 70 MG Oral Tab Take 1 tablet (70 mg total) by mouth once a week. (Patient taking differently: Take 75 mg by mouth once a week. Every Sunday) 12 tablet 3    atorvastatin 80 MG Oral Tab Take 1 tablet (80 mg total) by mouth daily. Replaces 40 mg (Patient taking differently: 1 tablet (80 mg total) by Per G Tube route daily. Replaces 40 mg) 90 tablet 1    ACCU-CHEK FASTCLIX LANCETS Does not apply Misc USE 1 LANCET TO STICK FINGER DAILY TO TEST BLOOD. 102 each 1    Calcium Carbonate Antacid (TUMS) 500 MG Oral Chew Tab 1 tablet (500 mg total) by Per G Tube route daily.         Review of Systems:   A comprehensive review of systems was completed.    Pertinent positives and negatives noted in the HPI.    Objective:   Physical Exam:    /65   Pulse 103   Temp 97.1 °F (36.2 °C) (Temporal)   Resp 19   Wt 154 lb 5.2 oz (70 kg)   SpO2 96%   BMI 31.17 kg/m²   General: No acute distress, Alert to self  Respiratory: No rhonchi, no wheezes  Cardiovascular: tachycardia  Abdomen: Soft, Non-tender, non-distended, positive bowel sounds, + G tube  Neuro: L sided weakness, L facial droop  Extremities: No edema      Results:    Labs:      Labs Last 24 Hours:    Recent Labs   Lab 01/25/24 1923   RBC 3.91   HGB 11.7*   HCT 35.0   MCV 89.5   MCH 29.9   MCHC 33.4   RDW 15.3   NEPRELIM 6.48   WBC 11.2*   .0       Recent Labs   Lab 01/25/24 1923   *   BUN 14   CREATSERUM 0.67   EGFRCR 88   CA 8.8   ALB 1.7*      K 4.1      CO2 33.0*   ALKPHO 78   AST 27   BILT 0.2   TP 6.1*       Lab Results   Component Value Date    INR 1.01 01/25/2024    INR 1.06 11/20/2023    INR 1.30 (H) 10/16/2023       Recent Labs   Lab 01/25/24 1923   TROPHS 14       No results for input(s): \"TROP\", \"PBNP\" in the last 168 hours.    No results for input(s): \"PCT\" in the last 168 hours.    Imaging: Imaging data reviewed in Epic.    Assessment &  Plan:      #Acute Pulm Emboli  Continue heparin gtt  Check ECHO in AM  Heme to eval  Check LE dopplers  May need IVF if LE dopplers positive given frequent falls and hx of ICH  Monitor on tele    #Prior CVA with L sided weakness  #Prior ICF  Repeat imaging without acute findings  Neurology to eval  Check MRI brain    #Anxiety  #Dementia  Conntinue ativan PRN, seroquel at night    #Dyslipidemia  Statin    #GERD/Gastritis  PPI  Monitor Hgb and for any bleeding given heparin gtt  Hemoccult negative in ED    #Recent C.Diff  Completed abx course          Plan of care discussed with patient, ED Physician     Eron King MD    Supplementary Documentation:     The 21st Century Cures Act makes medical notes like these available to patients in the interest of transparency. Please be advised this is a medical document. Medical documents are intended to carry relevant information, facts as evident, and the clinical opinion of the practitioner. The medical note is intended as peer to peer communication and may appear blunt or direct. It is written in medical language and may contain abbreviations or verbiage that are unfamiliar.                                   Electronically signed by Eron King MD on 1/25/2024  9:36 PM              Consults - MD Consult Notes      Consults signed by Lincoln Kulkarni MD at 1/26/2024  1:40 PM     Author: Lincoln Kulkarni MD Service: Neurology Author Type: Physician    Filed: 1/26/2024  1:40 PM Date of Service: 1/26/2024  1:22 PM Status: Signed    : Lincoln Kulkarni MD (Physician)     Consult Orders    1. Consult to Neurology [[id number]] ordered by Eron King MD at 01/25/24 2133             [facility]   NEUROLOGY   CONSULT NOTE    Admission date: 1/25/2024  Reason for Consult: Acute stroke.  Chief Complaint:   Chief Complaint   Patient presents with    Stroke   ________________________________________________________________    History      History of Presenting Illness  78 year old female with history of anxiety, breast cancer, hypertension, hyperlipidemia, dementia presented with a history of fall.  Earlier on the day of admission patient was seen in the emergency department, had imaging studies done was sent back to the facility.  Patient was later noted to have worsening left facial droop and was brought to the hospital.  Patient has residual left-sided weakness due to previous stroke, hemorrhagic.  Patient when seen was awake, in mild distress complaining of back pain, able to tell me where she was.  Denied any new weakness, numbness, paresthesias or history of strokes.    History obtained from patient, chart review.    Past Medical History:   Diagnosis Date    Anxiety     Breast CA (HCC) 2004    C. difficile diarrhea     CANCER     lft breast lumpectomy pre cancerous cells stage 0    Diabetes mellitus (HCC)     Diarrhea, unspecified     Ductal carcinoma in situ of breast 2004    left breast    Flatulence/gas pain/belching     GERD     H/O infectious mononucleosis     diagnosed in 1960s    High cholesterol     Hypertension     IBS (irritable bowel syndrome)     Osteoporosis     Other and unspecified hyperlipidemia     Personal history of irradiation, presenting hazards to health 01/01/2004    mammosite    Sleep disturbance     Stool incontinence     Stroke (HCC)     Type II or unspecified type diabetes mellitus without mention of complication, not stated as uncontrolled     Wears glasses      Past Surgical History:   Procedure Laterality Date    APPENDECTOMY      1948    COLONOSCOPY      2005 hiatal hernia, sm internal hem    COLONOSCOPY  01/01/2005    fiberoptic    D & C      IR ANGIOGRAM CEREBRAL CAROTID BILATERAL  10/12/2023    LUMPECTOMY LEFT  01/01/2004    breast    OTHER SURGICAL HISTORY      lumpectomy 2004 lft breast stage 0    OTHER SURGICAL HISTORY  10/11/2023    CRANIOTOMY FOR RIGHT FRONTAL HEMORRHAGE    RADIATION LEFT       Mammosite    TONSILLECTOMY          UPPER GI ENDOSCOPY,EXAM      2005     Social History     Socioeconomic History    Marital status:    Tobacco Use    Smoking status: Former     Packs/day: 1.00     Years: 20.00     Additional pack years: 0.00     Total pack years: 20.00     Types: Cigarettes     Quit date: 3/1/2006     Years since quittin.9    Smokeless tobacco: Never   Vaping Use    Vaping Use: Never used   Substance and Sexual Activity    Alcohol use: No     Alcohol/week: 0.0 standard drinks of alcohol    Drug use: No   Other Topics Concern    Caffeine Concern No     Comment: tries to drink decaf drinks    Exercise Yes     Comment: jazzercise 3x/wk, tennis 1-2x/wk     Social Determinants of Health     Food Insecurity: No Food Insecurity (2024)    Food Insecurity     Food Insecurity: Never true   Transportation Needs: No Transportation Needs (2024)    Transportation Needs     Lack of Transportation: No   Housing Stability: Low Risk  (2024)    Housing Stability     Housing Instability: No     Housing Instability Emergency: No     Family History   Problem Relation Age of Onset    Heart Disorder Father          of heart attack age 57    Alcohol and Other Disorders Associated Father     Arthritis Father     Other (Other) Father     Heart Disease Mother         CHF age 80    Arthritis Mother     Other (Other) Mother     Diabetes Sister         mellitus    Heart Disease Maternal Grandfather     Cancer Maternal Grandmother         brain tumor    Breast Cancer Self      Allergies   Allergies   Allergen Reactions    Ampicillin NAUSEA ONLY     Caps    Codeine [Opioid Analgesics]      Derivatives  Syncope    Cortisone [Cortisone Acetate]      Injection into L shoulder  Syncope, stomach cramps       Home Meds  Current Outpatient Medications   Medication Instructions    ACCU-CHEK FASTCLIX LANCETS Does not apply Misc USE 1 LANCET TO STICK FINGER DAILY TO TEST BLOOD.    acetaminophen  (TYLENOL) 325 mg, Rectal, Every 4 hours PRN    acidophilus-pectin Oral Cap 1 capsule, Per G Tube, 2 times daily    alendronate (FOSAMAX) 70 mg, Oral, Weekly    ALPRAZolam (XANAX) 0.25 mg, Oral, Nightly PRN    apixaban (ELIQUIS) 5 mg, Oral, 2 times daily    atorvastatin (LIPITOR) 80 mg, Oral, Daily, Replaces 40 mg    Calcium Carbonate Antacid (TUMS) 500 MG Oral Chew Tab 1 tablet, Per G Tube, Daily    DULoxetine (CYMBALTA) 20 mg, Oral, Daily    First-Lansoprazole 30 mg, Oral, 2 times daily before meals    mirtazapine (REMERON) 15 mg, Oral, Nightly    mupirocin 2 % External Ointment 1 Application, Topical, 2 times daily, Around G-tube site    Nutritional Supplements (OSMOLITE 1.5 ALYX OR) Peg Tube, Daily    omeprazole (PRILOSEC) 40 mg, Per G Tube, 2 times daily before meals    potassium chloride 20 MEQ Oral Powd Pack 20 mEq, Oral, Daily    QUEtiapine (SEROQUEL) 25 mg, Oral, 2 times daily    sucralfate (CARAFATE) 1 g, Oral, 2 times daily, GI recommended Carafate by mouth until seen 12/8/23.<BR>Give at 6 am and 4 pm     valproic acid (DEPAKENE) 750 mg, Oral, 3 times daily    vancomycin (VANCOCIN) 125 mg, Oral, Every 6 hours, 4 x daily X 10 days     Scheduled Meds:   atorvastatin  80 mg Per G Tube Daily    calcium carbonate  500 mg Per G Tube Daily    DULoxetine  20 mg Per G Tube Daily    lansoprazole  30 mg Per G Tube BID AC    sucralfate  1 g Per G Tube BID AC    valproic acid  750 mg Per G Tube TID    QUEtiapine  25 mg Per G Tube BID    mirtazapine  15 mg Per G Tube Nightly    vancomycin  125 mg Per G Tube Q6H     Continuous Infusions:   continuous dose heparin 1,100 Units/hr (01/26/24 0951)     PRN Meds:ALPRAZolam, acetaminophen, polyethylene glycol (PEG 3350), sennosides, bisacodyl, fleet enema, ondansetron, metoclopramide    OBJECTIVE   VITAL SIGNS:   Temp:  [97.1 °F (36.2 °C)-98.5 °F (36.9 °C)] 98.1 °F (36.7 °C)  Pulse:  [] 99  Resp:  [15-24] 20  BP: (102-138)/(48-87) 114/48  SpO2:  [93 %-100 %] 94  %    PHYSICAL EXAM:    NEUROLOGIC:    Mental Status: Patient awake and responsive.  Able to tell me that she is in [place].  She was also able to tell me the month and the year.  She was able to follow two-step commands.  Mild dysarthria was noted.  No aphasia.    Cranial nerves: PERRL.  Visual fields full.  EOMI. Mild left nasolabial fold flattening.  Hearing grossly intact. Tongue midline with normal movements.   Motor: Patient has residual left-sided weakness from previous stroke including spasticity and decreased movement as well as hyperreflexia.  Normal right sided bulk, tone and power was noted.    Sensation: Intact to light touch bilaterally  Cerebellar: Normal Finger-To-Nose test      LABORATORY DATA:  Last 24 hour labs were reviewed in detail.  Recent Labs   Lab 01/25/24 1923 01/26/24  0520    137   K 4.1 3.9    104   CO2 33.0* 30.0   * 93   BUN 14 11   CREATSERUM 0.67 0.53*     Recent Labs   Lab 01/25/24 1923 01/26/24  0520   WBC 11.2* 14.3*   HGB 11.7* 10.8*   .0 202.0     Recent Labs   Lab 01/25/24 1923   AST 27     No results for input(s): \"MG\", \"PHOS\" in the last 168 hours.  Last A1c value was 7.2% done 10/12/2023.           Radiology:    US VENOUS DOPPLER LEG BILAT - DIAG IMG (CPT=93970)    Result Date: 1/26/2024  CONCLUSION:  Positive for DVT left lower extremity.   LOCATION:  [facility]   Dictated by (CST): Wai Puente MD on 1/26/2024 at 8:23 AM     Finalized by (CST): Wai Puente MD on 1/26/2024 at 8:25 AM       CT ANGIOGRAPHY, CHEST (CPT=71275)    Result Date: 1/26/2024  CONCLUSION:  1. Pulmonary embolus noted at the bifurcation of the pulmonary artery to the right middle lobe and right lower lobe.  2. Distal esophageal wall thickening which may represent esophagitis. 3. Large hiatal hernia 4. Small left-sided pleural effusion with associated left lower lobe compressive atelectasis.     LOCATION:  [facility]   Dictated by (CST): Ricky Quintana MD on 1/26/2024 at  7:27 AM     Finalized by (CST): Ricky Quintana MD on 1/26/2024 at 7:33 AM       XR CHEST AP PORTABLE  (CPT=71045)    Result Date: 1/25/2024  CONCLUSION:   Stable cardiac and mediastinal contours.  Patchy left basilar/retrocardiac opacity is similar to prior and may be atelectatic or inflammatory.  Pleural thickening versus trace left pleural effusion.  No appreciable pneumothorax.  Partially visualized gastrostomy of the upper abdomen.   LOCATION:  [facility]      Dictated by (CST): Selene Kumar MD on 1/25/2024 at 8:21 PM     Finalized by (CST): Selene Kumar MD on 1/25/2024 at 8:23 PM       CT STROKE (DEVANTE) CTA BRAIN/CTA NECK+PERF(CPT=70496/90085/0042T)    Result Date: 1/25/2024  CONCLUSION:  1. No large vessel occlusion is identified in the head or neck. 2. Examination positive for acute pulmonary embolism at the distal aspect of the right interlobar pulmonary artery. 3. CT perfusion imaging demonstrates elevated T-max within the region of probable encephalomalacia in the superior posterior right frontal lobe.  Please refer to the catheter angiography examination from 10/12/2023 for further details. 4. There is a 4 mm pseudoaneurysm projecting medially at the distal V2 segment of the right vertebral artery.  There are a few punctate pseudoaneurysms at the distal V2 segment of the left vertebral artery.   Critical results were discussed with Dr. Luevano at 2011 hours on 1/25/2024. Critical results were read back.   LOCATION:  [id number]   Dictated by (CST): Stromberg, LeRoy, MD on 1/25/2024 at 7:50 PM     Finalized by (CST): Stromberg, LeRoy, MD on 1/25/2024 at 8:15 PM       CT STROKE BRAIN (NO IV)(CPT=70450)    Result Date: 1/25/2024  CONCLUSION:  1. No acute intracranial hemorrhage or hydrocephalus. 2. Moderate region of encephalomalacia again seen at the right frontal lobe.  Mild to moderate chronic small vessel ischemic disease with small chronic infarcts within the left cerebellum.  Small chronic infarct at the right  temporal occipital junction.  If there is clinical concern for acute ischemia/infarction, an MRI of the brain would be recommended for further evaluation. 3. Postoperative changes from a right frontal craniotomy.  Critical results were discussed with Dr. Luevano at 1941 hours on 1/25/2024. Critical results were read back.   LOCATION:  [id number]   Dictated by (CST): Stromberg, LeRoy, MD on 1/25/2024 at 7:34 PM     Finalized by (CST): Stromberg, LeRoy, MD on 1/25/2024 at 7:41 PM      ASSESSMENT/PLAN   78 year old female with:    With history of recent fall and concern for stroke.  Based on most of the historical information seems less likely that this was a stroke.  An MRI and EEG has been requested for further evaluation.  Will review the results.  CT scan of the head as well as CTA did not show any acute abnormality.  Chronic ischemic changes as well as small chronic infarcts were noted.  Patient currently on aspirin and statin for stroke prophylaxis.  Acute pulmonary embolism.  Patient started on IV heparin.  Further workup as per patient's primary team's recommendations.  Residual effect of previous stroke.  Patient has left hemiparesis secondary to previous stroke.  CT evidence of encephalomalacia most likely due to previous stroke.  Due to this finding patient is more prone to have seizure-like activity.  Will check EEG for further evaluation.    Principal Problem:    Acute CVA (cerebrovascular accident) (HCC)  Active Problems:    Anemia    Azotemia    Pulmonary embolus (HCC)    Moderate dementia (HCC)    Other pulmonary embolism without acute cor pulmonale, unspecified chronicity (HCC)    Acute deep vein thrombosis (DVT) of proximal end of left lower extremity (HCC)    Hypoalbuminemia       Lincoln Kulkarni MD  Neurohospitalist  [facility]    Disclaimer: This record was dictated using Dragon software. There may be errors due to voice recognition problems that were not realized and corrected  during the completion of the note.        Electronically signed by Lincoln Kulkarni MD on 1/26/2024  1:40 PM           D/C Summary    No notes of this type exist for this encounter.        Physical Therapy Notes (last 72 hours)      Physical Therapy Note signed by Juana Thornton PTA at 1/29/2024 10:12 AM  Version 1 of 1    Author: Juana Thornton PTA Service: Rehab Author Type: Physical Therapy Assistant    Filed: 1/29/2024 10:12 AM Date of Service: 1/29/2024 10:01 AM Status: Signed    : Juana Thornton PTA (Physical Therapy Assistant)          PHYSICAL THERAPY TREATMENT NOTE - INPATIENT    Room Number: 7603/7603-A     Session: 1          Presenting Problem: fall off bed, worsening L facial droop  Co-Morbidities : CVA with L sided weakness, Dementia, Anxiety, Breast Ca, G-tube      History related to current admission: Patient is a 78 year old female admitted on 1/25/2024 from  for fall, L facial droop worsening.  Pt diagnosed with acute CVA, (+)PE. Patient went to the ER after fall and was discharged back to facility, returned to ER with worsening L facial droop.  Patient was started on heparin 1/25/24 @ 9PM.     1/25 CT Cervical spine:  No evidence of acute fracture of the cervical spine.  There are overall mild multilevel degenerative changes present.If there is persistent clinical concern then consider MRI.     1/26 CT chest:   1. Pulmonary embolus noted at the bifurcation of the pulmonary artery to the right middle lobe and right lower lobe.    2. Distal esophageal wall thickening which may represent esophagitis.   3. Large hiatal hernia   4. Small left-sided pleural effusion with associated left lower lobe compressive atelectasis.       1/26 US Venous doppler:  Positive for DVT, with partial clot in the common femoral vein, superficial femoral vein proximally, and distally, and proximal popliteal vein, with occlusive clot in the mid superficial femoral vein, and distal popliteal vein.      1/25 CT stroke brain/neck:   1. No large vessel occlusion is identified in the head or neck.   2. Examination positive for acute pulmonary embolism at the distal aspect of the right interlobar pulmonary artery.   3. CT perfusion imaging demonstrates elevated T-max within the region of probable encephalomalacia in the superior posterior right frontal lobe.  Please refer to the catheter angiography examination from 10/12/2023 for further details.   4. There is a 4 mm pseudoaneurysm projecting medially at the distal V2 segment of the right vertebral artery.  There are a few punctate pseudoaneurysms at the distal V2 segment of the left vertebral artery.     Recent Admission:   12/5-12/7/23 admitted with delirium and dehydration, dc to [facility]   11/20-11/24/23: s/p PEG,[facility]  10/11-10/25/23: IPH s/p R frontal craniotomy (from home and DC [facility])      ASSESSMENT     The patient seen for PT treatment session this AM. Pt alert and oriented to self, not to place. Orientation tends to fluctuate and at times pt was confused and unable to recall correct living situation.  Presented with pain only during assistant to L LE. Patient shows balance deficits in sitting and standing, needs assist to recorrect posture position and trunk control. Lateral lean and lob to L in sitting and standing. The patient continues to be below baseline for strength,endurance and balance resulting in decreased functional independence. Will benefit from ongoing skilled PT.         DISCHARGE RECOMMENDATIONS  PT Discharge Recommendations: Sub-acute rehabilitation (PT to upgrade mobility)     PLAN  PT Treatment Plan: Bed mobility;Patient education;Range of motion;Strengthening;Transfer training  Rehab Potential : Fair  Frequency (Obs): 3-5x/week    CURRENT GOALS     Goal #1 Patient is able to demonstrate supine - sit EOB @ level: cga      Goal #2 Patient is able to demonstrate transfers Sit to/from Stand at assistance level: moderate  assistance      Goal #3 Patient is able to tolerate static standing mod assist x 1 x 1 minute      Goal #4     Goal #5     Goal #6     Goal Comments: Goals established on 1/27/2024 1/29/2024 all goals ongoing.      SUBJECTIVE      Upon arrival, pt oriented to self. She stated \" I live with my parents in their home. But now I have my own home so I will live there.\"  Pt was reoriented to situation,  For place, pt states \" I am in [facility].\" Pt was reoriented.     \"No one has worked with me on this before.\" Referring to wt shift activity towards R side.   Pain reported in LLE during assist, pain gone when hand placement removed.     OBJECTIVE  Precautions: Bed/chair alarm;Other (Comment) (G-tube)    WEIGHT BEARING RESTRICTION  Weight Bearing Restriction: None                PAIN ASSESSMENT   Rating: Unable to rate  Location: L LE to touch  Management Techniques: Activity promotion    BALANCE                                                                                                                       Static Sitting: Poor +  Dynamic Sitting: Poor +           Static Standing: Poor  Dynamic Standing: Not tested    ACTIVITY TOLERANCE      fair    AM-PAC '6-Clicks' INPATIENT SHORT FORM - BASIC MOBILITY  How much difficulty does the patient currently have...  Patient Difficulty: Turning over in bed (including adjusting bedclothes, sheets and blankets)?: A Little   Patient Difficulty: Sitting down on and standing up from a chair with arms (e.g., wheelchair, bedside commode, etc.): A Lot   Patient Difficulty: Moving from lying on back to sitting on the side of the bed?: A Lot   How much help from another person does the patient currently need...   Help from Another: Moving to and from a bed to a chair (including a wheelchair)?: Total   Help from Another: Need to walk in hospital room?: Total   Help from Another: Climbing 3-5 steps with a railing?: Total       AM-PAC Score:  Raw Score: 10   Approx Degree of  Impairment: 76.75%   Standardized Score (AM-PAC Scale): 32.29   CMS Modifier (G-Code): CL    FUNCTIONAL ABILITY STATUS  Gait Assessment   Functional Mobility/Gait Assessment  Gait Assistance: Not tested    Skilled Therapy Provided    Bed Mobility:  Rolling: min   Supine<>Sit: mod assist, cues for technique to rotate trunk and hand support on the bed rail. Patient needed assist for trunk control and scooting.  Sit<>Supine: max assist.      Transfer Mobility:  Sit<>Stand: max assist, hand support on the back handle of the chair ( Locked). Pt maintained standing for 20 sec, repeated three times. Narrow STEPHANIE, L knee flexed and dec wb, left UE weakness. Lob towards L side.    Stand<>Sit: max.    Gait: NT    Therapist's Comments: patient worked on sitting balance training, standing balance training, and bed mobility technique training.   Pt was left in the room, RN was notified.     THERAPEUTIC EXERCISES  Lower Extremity Alternating marching  Heel slides  LAQ     Upper Extremity ROM ex     Position Sitting and Supine     Repetitions   10   Sets   1     Patient End of Session: In bed;Needs met;Call light within reach;RN aware of session/findings;All patient questions and concerns addressed;Alarm set    PT Session Time: 30 minutes  Gait Trainin minutes  Therapeutic Activity: 5 minutes  Therapeutic Exercise: 10 minutes   Neuromuscular Re-education: 10 minutes                 Physical Therapy Note signed by Mateo Medellin PT at 2024  2:08 PM  Version 1 of 1    Author: Mateo Medellin PT Service: — Author Type: Physical Therapist    Filed: 2024  2:08 PM Date of Service: 2024 10:28 AM Status: Signed    : Mateo Medellin PT (Physical Therapist)             PHYSICAL THERAPY EVALUATION - INPATIENT     Room Number: 7603/7603-A  Evaluation Date: 2024  Type of Evaluation: Initial  Physician Order: PT Eval and Treat    Presenting Problem: fall off bed, worsening L facial droop  Co-Morbidities :  CVA with L sided weakness, Dementia, Anxiety, Breast Ca, G-tube  Reason for Therapy: Mobility Dysfunction and Discharge Planning    History related to current admission: Patient is a 78 year old female admitted on 1/25/2024 from  for fall, L facial droop worsening.  Pt diagnosed with acute CVA, (+)PE. Patient went to the ER after fall and was discharged back to facility, returned to ER with worsening L facial droop.  Patient was started on heparin 1/25/24 @ 9PM.     1/25 CT Cervical spine:  No evidence of acute fracture of the cervical spine.  There are overall mild multilevel degenerative changes present.If there is persistent clinical concern then consider MRI.     1/26 CT chest:   1. Pulmonary embolus noted at the bifurcation of the pulmonary artery to the right middle lobe and right lower lobe.    2. Distal esophageal wall thickening which may represent esophagitis.   3. Large hiatal hernia   4. Small left-sided pleural effusion with associated left lower lobe compressive atelectasis.       1/26 US Venous doppler:  Positive for DVT, with partial clot in the common femoral vein, superficial femoral vein proximally, and distally, and proximal popliteal vein, with occlusive clot in the mid superficial femoral vein, and distal popliteal vein.     1/25 CT stroke brain/neck:   1. No large vessel occlusion is identified in the head or neck.   2. Examination positive for acute pulmonary embolism at the distal aspect of the right interlobar pulmonary artery.   3. CT perfusion imaging demonstrates elevated T-max within the region of probable encephalomalacia in the superior posterior right frontal lobe.  Please refer to the catheter angiography examination from 10/12/2023 for further details.   4. There is a 4 mm pseudoaneurysm projecting medially at the distal V2 segment of the right vertebral artery.  There are a few punctate pseudoaneurysms at the distal V2 segment of the left vertebral artery.     Recent Admission:    12/5-12/7/23 admitted with delirium and dehydration, dc to [facility]   11/20-11/24/23: s/p PEG,from [facility] > [facility]  10/11-10/25/23: IPH s/p R frontal craniotomy (from home and DC [facility])  ASSESSMENT   In this PT evaluation, the patient presents with the following impairments: activity tolerance, independence during functional mobility tasks during bed mobility/sitting, sitting balance and tolerance; patient was unable to participate in transfers or standing today.  These impairments and comorbidities manifest themselves as functional limitations in independent bed mobility, transfers, and gait.  The patient is below baseline and would benefit from skilled inpatient PT to address the above deficits to assist patient in returning to prior to level of function.   Functional outcome measures completed include [facility].  The AM-PAC '6-Clicks' Inpatient Basic Mobility Short Form was completed and this patient is demonstrating a Approx Degree of Impairment: 68.66%  degree of impairment in mobility. Research supports that patients with this level of impairment may benefit from [facility].    DISCHARGE RECOMMENDATIONS  PT Discharge Recommendations: Sub-acute rehabilitation (PT to upgrade mobility)    PLAN  PT Treatment Plan: Bed mobility;Patient education;Range of motion;Strengthening;Transfer training  Rehab Potential : Fair  Frequency (Obs): 3-5x/week         CURRENT GOALS    Goal #1 Patient is able to demonstrate supine - sit EOB @ level: cga     Goal #2 Patient is able to demonstrate transfers Sit to/from Stand at assistance level: moderate assistance     Goal #3 Patient is able to tolerate static standing mod assist x 1 x 1 minute     Goal #4    Goal #5    Goal #6    Goal Comments: Goals established on 1/27/2024    HOME SITUATION  Type of Home: House   Home Layout: Two level                Lives With: Alone  Drives: Yes          Prior Level of [place]: Per 12/6 PT Evaluation-patient was completely independent, living alone and  driving prior to 10/23.  On 12/6 PT Evaluation, patient exhibited posterior lean sitting unsupported at the EOB with fair- sitting.      SUBJECTIVE  \"I don't know where I am\"      OBJECTIVE  Precautions: Bed/chair alarm;Other (Comment) (G-tube)  Fall Risk: High fall risk    WEIGHT BEARING RESTRICTION  Weight Bearing Restriction: None                PAIN ASSESSMENT  Rating: Unable to rate  Location: L UE/LE  Management Techniques: Activity promotion;Other (Comment) (Rest)    COGNITION  Arousal/Alertness:  delayed responses to stimuli  Attention Span:  appears intact  Memory:  impaired working memory and decreased recall of recent events  Following Commands:  follows one step commands with increased time, follows one step commands with repetition, and follows one step commands consistently  Motor Planning: impaired    RANGE OF MOTION AND STRENGTH ASSESSMENT  Upper extremity ROM and strength are within functional limits R UE AROM, L UE AAROM to shoulder and elbow    Lower extremity ROM is within functional limits; R LE AROM, L LE AAROM    Lower extremity strength is within functional limits: R LE grossly 3+-4- in the hip and knee, L hip and knee 3-/5      BALANCE  Static Sitting: Fair  Dynamic Sitting: Poor +  Static Standing: Not tested  Dynamic Standing: Not tested    ADDITIONAL TESTS                                    ACTIVITY TOLERANCE                         O2 WALK       NEUROLOGICAL FINDINGS                        AM-PAC '6-Clicks' INPATIENT SHORT FORM - BASIC MOBILITY  How much difficulty does the patient currently have...  Patient Difficulty: Turning over in bed (including adjusting bedclothes, sheets and blankets)?: A Little   Patient Difficulty: Sitting down on and standing up from a chair with arms (e.g., wheelchair, bedside commode, etc.): A Lot   Patient Difficulty: Moving from lying on back to sitting on the side of the bed?: A Little   How much help from another person does the patient currently need...    Help from Another: Moving to and from a bed to a chair (including a wheelchair)?: A Lot   Help from Another: Need to walk in hospital room?: Total   Help from Another: Climbing 3-5 steps with a railing?: Total       AM-PAC Score:  Raw Score: 12   Approx Degree of Impairment: 68.66%   Standardized Score (AM-PAC Scale): 35.33   CMS Modifier (G-Code): CL    FUNCTIONAL ABILITY STATUS  Gait Assessment   Functional Mobility/Gait Assessment  Gait Assistance: Not tested    Skilled Therapy Provided     Bed Mobility:  Rolling: min assist towards L with HOB elevated  Supine to sit: min assist   Sit to supine: min-mod assist to LE, min assist to trunk     Transfer Mobility:  Sit to stand: NT   Stand to sit: NT  Gait = NT    Therapist's Comments: RN approved session, patient was received in L sidely in bed with HOB slightly elevated, RN was present at the start of the session.  This PT assisted RN to scoot patient up towards HOB, patient required max assist x 2 using the draw sheet. Patient was able to remain sitting up unsupported exhibiting some L lateral trunk lean and patient was able to correct to midline with cues provided and remain in midline briefly, (-)posterior trunk lean and needs cues to keep R hand on the side, patient is very fearful of falling and needs to be reassured multiple times, Patient was max assist x 1 to scoot towards EOB.  Patient tolerated sitting unsupported x 2 mins then requested to return to bed. Patient was oriented to place and day of the week.     Exercise/Education Provided:  Discussed role of role of PT.  Patient performed AA/AROM R UE/LE and AAROM L LE/UE (patient only allowed a few reps of L UE AAROM reporting pain and discomfort); patient was provided with encouragement during exs.      Patient End of Session: In bed;Needs met;Call light within reach;RN aware of session/findings;All patient questions and concerns addressed;Alarm set      Patient Evaluation Complexity Level:  History  Moderate - 1 or 2 personal factors and/or co-morbidities   Examination of body systems Moderate - addressing a total of 3 or more elements   Clinical Presentation Moderate - Evolving   Clinical Decision Making Moderate - Evolving       PT Session Time: 30 minutes  Gait Training:  minutes  Therapeutic Activity: 15 minutes  Neuromuscular Re-education:  minutes  Therapeutic Exercise: 8 minutes                       Occupational Therapy Notes (last 72 hours)      Occupational Therapy Note signed by Jairo Goyal OT at 1/29/2024  4:44 PM  Version 1 of 1    Author: Jairo Goyal OT Service: Rehab Author Type: Occupational Therapist    Filed: 1/29/2024  4:44 PM Date of Service: 1/29/2024  4:37 PM Status: Signed    : Jairo Goyal OT (Occupational Therapist)       OT orders received and pt chart reviewed. Per chart review, pt lives at [facility]. Pt baseline requires assist with ADLs, incontinent of bowel and bladder, assist for feeding and total assist for transfers. Pt is at reported baseline and presents with no skilled acute IP OT needs at this time. OT will sign off.              Video Swallow Study Notes    No notes of this type exist for this encounter.        SLP Note - SLP Notes      SLP Note signed by Vero Skinner SLP at 1/29/2024  2:40 PM  Version 1 of 1    Author: Vero Skinner SLP Service: — Author Type: SPEECH-LANGUAGE PATHOLOGIST    Filed: 1/29/2024  2:40 PM Date of Service: 1/29/2024  2:31 PM Status: Signed    : Vero Skinner, SLP (SPEECH-LANGUAGE PATHOLOGIST)       SPEECH DAILY NOTE - INPATIENT    ASSESSMENT & PLAN   ASSESSMENT  Pt seen for dysphagia tx to assess tolerance with recommended diet, ensure appropriate utilization of aspiration precautions and provide pt/family education. Pt found lying semi-upright in bed; alert & participatory; partially eaten noon tray present in room. Head of bed positioned upright to 90 degrees and pt midline.  Assisted with po trials of thin and pureed consistencies with good tolerance and no overt clinical s/s of aspiration noted. Pt politely refused and soft solids. Reviewed and reiterated importance of aspiration precautions with good understanding reported. Will d/c from services at this time.     Diet Recommendations - Solids: Mechanical soft chopped/ Soft & Bite Sized  Diet Recommendations - Liquids: Thin Liquids (small, single sips - straws ok)    Compensatory Strategies Recommended: Slow rate;Alternate consistencies;Small bites and sips;Extra sauce/gravy  Aspiration Precautions: Upright position;Slow rate;Small bites and sips  Medication Administration Recommendations: One pill at a time (take whole or crushed in pureed if any difficulty)    Patient Experiencing Pain: No                Discharge Recommendations  Discharge Recommendations/Plan: Skilled nursing facility    Treatment Plan  Treatment Plan/Recommendations: Dysphagia therapy;Aspiration precautions          GOALS  Goal #1 The patient will tolerate soft & bite sized consistency and thin - small, single sips of  liquids without overt signs or symptoms of aspiration with 95 % accuracy over 2 session(s). Met   Goal #2 The patient/family/caregiver will demonstrate understanding and implementation of aspiration precautions and swallow strategies independently over 2 session(s).    Met   Goal #3 The patient will utilize compensatory strategies as outlined by  BSSE (clinical evaluation) including Slow rate, Small bites, Small sips, Alternate liquids/solids, Upright 90 degrees, Feed patient with as needed assistance 100 % of the time across 2 sessions. Met   Goal #4 Communication screening pending neuro workup    N/a           FOLLOW UP  Follow Up Needed (Documentation Required): No  SLP Follow-up Date: 01/27/24  Number of Visits to Meet Established Goals: 2    Session: 1    If you have any questions, please contact LOUIS Araya MA,  CCC-SLP  Pager [phone]      Electronically signed by Vero Skinner, SLP on 1/29/2024  2:40 PM           Immunizations     Name Date      Covid-19 Moderna 04/05/22     Covid-19 Moderna 10/22/21     Covid-19 Moderna 03/10/21     Covid-19 Moderna 02/10/21     Covid-19 Moderna Bivalent 12+ years 09/13/22     DTAP-DAPTACEL 04/24/07     Fluad 0.5ml 09/28/21     Fluad 0.5ml 10/11/19     INFLUENZA 09/27/22     INFLUENZA 09/15/20     INFLUENZA 09/15/20     INFLUENZA 10/05/18     INFLUENZA 10/20/17     INFLUENZA 10/24/16     INFLUENZA 10/24/16     INFLUENZA 10/23/15     INFLUENZA 10/28/13     INFLUENZA 10/24/12     Pneumococcal (Prevnar 13) 09/02/16     Pneumococcal (Prevnar 7) 04/24/10     Pneumovax 23 09/29/17     TDAP 09/23/19     Zoster Vaccine Live (Zostavax) 09/19/12     Zoster Vaccine Recombinant Adjuvanted (Shingrix) 05/23/23     Zoster Vaccine Recombinant Adjuvanted (Shingrix) 03/06/23       Future Appointments        Provider Department Center    2/14/2024 10:20 AM Kari Weinberg MD [facility]      Multidisciplinary Problems     Active Goals        Problem: Patient/Family Goals    Goal Priority Disciplines Outcome Interventions   Patient/Family Long Term Goal     Interdisciplinary Progressing    Description: Patient's Long Term Goal: discharge in stable condition    Interventions:  - pain control  - antibiotics  - decrease stress stimuli  - See additional Care Plan goals for specific interventions   Patient/Family Short Term Goal     Interdisciplinary Progressing    Description: Patient's Short Term Goal: pain control    Interventions:   - follow medication as ordered  - PRNs as needed  - See additional Care Plan goals for specific interventions